# Patient Record
Sex: MALE | Race: WHITE | NOT HISPANIC OR LATINO | Employment: OTHER | URBAN - METROPOLITAN AREA
[De-identification: names, ages, dates, MRNs, and addresses within clinical notes are randomized per-mention and may not be internally consistent; named-entity substitution may affect disease eponyms.]

---

## 2017-01-20 ENCOUNTER — ALLSCRIPTS OFFICE VISIT (OUTPATIENT)
Dept: OTHER | Facility: OTHER | Age: 82
End: 2017-01-20

## 2017-02-01 ENCOUNTER — TRANSCRIBE ORDERS (OUTPATIENT)
Dept: LAB | Facility: CLINIC | Age: 82
End: 2017-02-01

## 2017-02-01 ENCOUNTER — APPOINTMENT (OUTPATIENT)
Dept: LAB | Facility: CLINIC | Age: 82
End: 2017-02-01
Payer: MEDICARE

## 2017-02-01 DIAGNOSIS — E03.4 IDIOPATHIC ATROPHIC HYPOTHYROIDISM: Primary | ICD-10-CM

## 2017-02-01 DIAGNOSIS — N18.30 CHRONIC KIDNEY DISEASE, STAGE III (MODERATE) (HCC): ICD-10-CM

## 2017-02-01 DIAGNOSIS — I12.9 PARENCHYMAL RENAL HYPERTENSION, STAGE 1-4 OR UNSPECIFIED CHRONIC KIDNEY DISEASE: ICD-10-CM

## 2017-02-01 DIAGNOSIS — E03.4 IDIOPATHIC ATROPHIC HYPOTHYROIDISM: ICD-10-CM

## 2017-02-01 DIAGNOSIS — E78.5 HYPERLIPIDEMIA, UNSPECIFIED HYPERLIPIDEMIA TYPE: ICD-10-CM

## 2017-02-01 LAB
ANION GAP SERPL CALCULATED.3IONS-SCNC: 3 MMOL/L (ref 4–13)
BUN SERPL-MCNC: 17 MG/DL (ref 5–25)
CALCIUM SERPL-MCNC: 9.1 MG/DL (ref 8.3–10.1)
CHLORIDE SERPL-SCNC: 108 MMOL/L (ref 100–108)
CO2 SERPL-SCNC: 31 MMOL/L (ref 21–32)
CREAT SERPL-MCNC: 1.41 MG/DL (ref 0.6–1.3)
GFR SERPL CREATININE-BSD FRML MDRD: 48.2 ML/MIN/1.73SQ M
GLUCOSE SERPL-MCNC: 102 MG/DL (ref 65–140)
POTASSIUM SERPL-SCNC: 4.3 MMOL/L (ref 3.5–5.3)
SODIUM SERPL-SCNC: 142 MMOL/L (ref 136–145)
TSH SERPL DL<=0.05 MIU/L-ACNC: 5.01 UIU/ML (ref 0.36–3.74)

## 2017-02-01 PROCEDURE — 80048 BASIC METABOLIC PNL TOTAL CA: CPT

## 2017-02-01 PROCEDURE — 84443 ASSAY THYROID STIM HORMONE: CPT

## 2017-02-01 PROCEDURE — 36415 COLL VENOUS BLD VENIPUNCTURE: CPT

## 2017-02-03 ENCOUNTER — ALLSCRIPTS OFFICE VISIT (OUTPATIENT)
Dept: OTHER | Facility: OTHER | Age: 82
End: 2017-02-03

## 2017-04-12 ENCOUNTER — APPOINTMENT (OUTPATIENT)
Dept: LAB | Facility: CLINIC | Age: 82
End: 2017-04-12
Payer: MEDICARE

## 2017-04-12 ENCOUNTER — TRANSCRIBE ORDERS (OUTPATIENT)
Dept: LAB | Facility: CLINIC | Age: 82
End: 2017-04-12

## 2017-04-12 DIAGNOSIS — E78.2 MIXED HYPERLIPIDEMIA: Primary | ICD-10-CM

## 2017-04-12 DIAGNOSIS — E78.5 HYPERLIPIDEMIA: ICD-10-CM

## 2017-04-12 DIAGNOSIS — N18.30 CHRONIC KIDNEY DISEASE, STAGE III (MODERATE) (HCC): ICD-10-CM

## 2017-04-12 DIAGNOSIS — I12.9 HYPERTENSIVE CHRONIC KIDNEY DISEASE WITH STAGE 1 THROUGH STAGE 4 CHRONIC KIDNEY DISEASE, OR UNSPECIFIED CHRONIC KIDNEY DISEASE: ICD-10-CM

## 2017-04-12 LAB
ALBUMIN SERPL BCP-MCNC: 3.4 G/DL (ref 3.5–5)
ALP SERPL-CCNC: 70 U/L (ref 46–116)
ALT SERPL W P-5'-P-CCNC: 26 U/L (ref 12–78)
ANION GAP SERPL CALCULATED.3IONS-SCNC: 7 MMOL/L (ref 4–13)
AST SERPL W P-5'-P-CCNC: 23 U/L (ref 5–45)
BASOPHILS # BLD AUTO: 0.02 THOUSANDS/ΜL (ref 0–0.1)
BASOPHILS NFR BLD AUTO: 0 % (ref 0–1)
BILIRUB SERPL-MCNC: 0.67 MG/DL (ref 0.2–1)
BUN SERPL-MCNC: 20 MG/DL (ref 5–25)
CALCIUM SERPL-MCNC: 9 MG/DL (ref 8.3–10.1)
CHLORIDE SERPL-SCNC: 109 MMOL/L (ref 100–108)
CHOLEST SERPL-MCNC: 148 MG/DL (ref 50–200)
CK SERPL-CCNC: 146 U/L (ref 39–308)
CO2 SERPL-SCNC: 28 MMOL/L (ref 21–32)
CREAT SERPL-MCNC: 1.54 MG/DL (ref 0.6–1.3)
CREAT UR-MCNC: 182 MG/DL
EOSINOPHIL # BLD AUTO: 0.25 THOUSAND/ΜL (ref 0–0.61)
EOSINOPHIL NFR BLD AUTO: 4 % (ref 0–6)
ERYTHROCYTE [DISTWIDTH] IN BLOOD BY AUTOMATED COUNT: 15.1 % (ref 11.6–15.1)
GFR SERPL CREATININE-BSD FRML MDRD: 43.6 ML/MIN/1.73SQ M
GLUCOSE P FAST SERPL-MCNC: 105 MG/DL (ref 65–99)
HCT VFR BLD AUTO: 41.9 % (ref 36.5–49.3)
HDLC SERPL-MCNC: 44 MG/DL (ref 40–60)
HGB BLD-MCNC: 13.5 G/DL (ref 12–17)
LDLC SERPL CALC-MCNC: 82 MG/DL (ref 0–100)
LYMPHOCYTES # BLD AUTO: 1.19 THOUSANDS/ΜL (ref 0.6–4.47)
LYMPHOCYTES NFR BLD AUTO: 20 % (ref 14–44)
MAGNESIUM SERPL-MCNC: 2.6 MG/DL (ref 1.6–2.6)
MCH RBC QN AUTO: 30.5 PG (ref 26.8–34.3)
MCHC RBC AUTO-ENTMCNC: 32.2 G/DL (ref 31.4–37.4)
MCV RBC AUTO: 95 FL (ref 82–98)
MONOCYTES # BLD AUTO: 1.04 THOUSAND/ΜL (ref 0.17–1.22)
MONOCYTES NFR BLD AUTO: 18 % (ref 4–12)
NEUTROPHILS # BLD AUTO: 3.38 THOUSANDS/ΜL (ref 1.85–7.62)
NEUTS SEG NFR BLD AUTO: 58 % (ref 43–75)
NRBC BLD AUTO-RTO: 0 /100 WBCS
PHOSPHATE SERPL-MCNC: 2.3 MG/DL (ref 2.3–4.1)
PLATELET # BLD AUTO: 222 THOUSANDS/UL (ref 149–390)
PMV BLD AUTO: 11.1 FL (ref 8.9–12.7)
POTASSIUM SERPL-SCNC: 4.2 MMOL/L (ref 3.5–5.3)
PROT SERPL-MCNC: 6.8 G/DL (ref 6.4–8.2)
PROT UR-MCNC: 24 MG/DL
PROT/CREAT UR: 0.13 MG/G{CREAT} (ref 0–0.1)
PTH-INTACT SERPL-MCNC: 72.5 PG/ML (ref 14–72)
RBC # BLD AUTO: 4.43 MILLION/UL (ref 3.88–5.62)
SODIUM SERPL-SCNC: 144 MMOL/L (ref 136–145)
TRIGL SERPL-MCNC: 110 MG/DL
WBC # BLD AUTO: 5.9 THOUSAND/UL (ref 4.31–10.16)

## 2017-04-12 PROCEDURE — 85025 COMPLETE CBC W/AUTO DIFF WBC: CPT

## 2017-04-12 PROCEDURE — 82550 ASSAY OF CK (CPK): CPT

## 2017-04-12 PROCEDURE — 82570 ASSAY OF URINE CREATININE: CPT

## 2017-04-12 PROCEDURE — 80061 LIPID PANEL: CPT

## 2017-04-12 PROCEDURE — 80053 COMPREHEN METABOLIC PANEL: CPT

## 2017-04-12 PROCEDURE — 84100 ASSAY OF PHOSPHORUS: CPT

## 2017-04-12 PROCEDURE — 84156 ASSAY OF PROTEIN URINE: CPT

## 2017-04-12 PROCEDURE — 83970 ASSAY OF PARATHORMONE: CPT

## 2017-04-12 PROCEDURE — 83735 ASSAY OF MAGNESIUM: CPT

## 2017-04-12 PROCEDURE — 36415 COLL VENOUS BLD VENIPUNCTURE: CPT

## 2017-05-02 ENCOUNTER — ALLSCRIPTS OFFICE VISIT (OUTPATIENT)
Dept: OTHER | Facility: OTHER | Age: 82
End: 2017-05-02

## 2017-05-08 ENCOUNTER — ALLSCRIPTS OFFICE VISIT (OUTPATIENT)
Dept: OTHER | Facility: OTHER | Age: 82
End: 2017-05-08

## 2017-06-27 ENCOUNTER — GENERIC CONVERSION - ENCOUNTER (OUTPATIENT)
Dept: OTHER | Facility: OTHER | Age: 82
End: 2017-06-27

## 2017-08-17 ENCOUNTER — ALLSCRIPTS OFFICE VISIT (OUTPATIENT)
Dept: OTHER | Facility: OTHER | Age: 82
End: 2017-08-17

## 2017-10-15 ENCOUNTER — APPOINTMENT (EMERGENCY)
Dept: CT IMAGING | Facility: HOSPITAL | Age: 82
End: 2017-10-15
Payer: MEDICARE

## 2017-10-15 ENCOUNTER — HOSPITAL ENCOUNTER (EMERGENCY)
Facility: HOSPITAL | Age: 82
Discharge: HOME | End: 2017-10-15
Attending: EMERGENCY MEDICINE | Admitting: EMERGENCY MEDICINE
Payer: MEDICARE

## 2017-10-15 VITALS
WEIGHT: 201 LBS | HEART RATE: 68 BPM | BODY MASS INDEX: 31.48 KG/M2 | OXYGEN SATURATION: 95 % | TEMPERATURE: 98.2 F | DIASTOLIC BLOOD PRESSURE: 67 MMHG | RESPIRATION RATE: 17 BRPM | SYSTOLIC BLOOD PRESSURE: 142 MMHG

## 2017-10-15 DIAGNOSIS — N20.1 LEFT URETERAL STONE: Primary | ICD-10-CM

## 2017-10-15 LAB
ALBUMIN SERPL BCP-MCNC: 3.1 G/DL (ref 3.5–5)
ALP SERPL-CCNC: 69 U/L (ref 46–116)
ALT SERPL W P-5'-P-CCNC: 21 U/L (ref 12–78)
ANION GAP SERPL CALCULATED.3IONS-SCNC: 8 MMOL/L (ref 4–13)
AST SERPL W P-5'-P-CCNC: 17 U/L (ref 5–45)
BASOPHILS # BLD AUTO: 0.02 THOUSANDS/ΜL (ref 0–0.1)
BASOPHILS NFR BLD AUTO: 0 % (ref 0–1)
BILIRUB SERPL-MCNC: 0.9 MG/DL (ref 0.2–1)
BUN SERPL-MCNC: 24 MG/DL (ref 5–25)
CALCIUM SERPL-MCNC: 8.9 MG/DL (ref 8.3–10.1)
CHLORIDE SERPL-SCNC: 102 MMOL/L (ref 100–108)
CLARITY, POC: CLEAR
CO2 SERPL-SCNC: 27 MMOL/L (ref 21–32)
COLOR, POC: YELLOW
CREAT SERPL-MCNC: 2.47 MG/DL (ref 0.6–1.3)
EOSINOPHIL # BLD AUTO: 0.01 THOUSAND/ΜL (ref 0–0.61)
EOSINOPHIL NFR BLD AUTO: 0 % (ref 0–6)
ERYTHROCYTE [DISTWIDTH] IN BLOOD BY AUTOMATED COUNT: 15.1 % (ref 11.6–15.1)
EXT BILIRUBIN, UA: NORMAL
EXT BLOOD URINE: NORMAL
EXT GLUCOSE, UA: NORMAL
EXT KETONES: NORMAL
EXT NITRITE, UA: NORMAL
EXT PH, UA: 6
EXT PROTEIN, UA: 100
EXT SPECIFIC GRAVITY, UA: 1.02
EXT UROBILINOGEN: 0.2
GFR SERPL CREATININE-BSD FRML MDRD: 24 ML/MIN/1.73SQ M
GLUCOSE SERPL-MCNC: 110 MG/DL (ref 65–140)
HCT VFR BLD AUTO: 38.4 % (ref 36.5–49.3)
HGB BLD-MCNC: 12.5 G/DL (ref 12–17)
LACTATE SERPL-SCNC: 1.4 MMOL/L (ref 0.5–2)
LIPASE SERPL-CCNC: 51 U/L (ref 73–393)
LYMPHOCYTES # BLD AUTO: 1 THOUSANDS/ΜL (ref 0.6–4.47)
LYMPHOCYTES NFR BLD AUTO: 8 % (ref 14–44)
MCH RBC QN AUTO: 30.6 PG (ref 26.8–34.3)
MCHC RBC AUTO-ENTMCNC: 32.6 G/DL (ref 31.4–37.4)
MCV RBC AUTO: 94 FL (ref 82–98)
MONOCYTES # BLD AUTO: 2.16 THOUSAND/ΜL (ref 0.17–1.22)
MONOCYTES NFR BLD AUTO: 18 % (ref 4–12)
NEUTROPHILS # BLD AUTO: 8.81 THOUSANDS/ΜL (ref 1.85–7.62)
NEUTS SEG NFR BLD AUTO: 74 % (ref 43–75)
PLATELET # BLD AUTO: 193 THOUSANDS/UL (ref 149–390)
PMV BLD AUTO: 11 FL (ref 8.9–12.7)
POTASSIUM SERPL-SCNC: 4 MMOL/L (ref 3.5–5.3)
PROT SERPL-MCNC: 6.9 G/DL (ref 6.4–8.2)
RBC # BLD AUTO: 4.08 MILLION/UL (ref 3.88–5.62)
SODIUM SERPL-SCNC: 137 MMOL/L (ref 136–145)
WBC # BLD AUTO: 12 THOUSAND/UL (ref 4.31–10.16)
WBC # BLD EST: NORMAL 10*3/UL

## 2017-10-15 PROCEDURE — 85025 COMPLETE CBC W/AUTO DIFF WBC: CPT | Performed by: EMERGENCY MEDICINE

## 2017-10-15 PROCEDURE — 99284 EMERGENCY DEPT VISIT MOD MDM: CPT

## 2017-10-15 PROCEDURE — 74176 CT ABD & PELVIS W/O CONTRAST: CPT

## 2017-10-15 PROCEDURE — 96361 HYDRATE IV INFUSION ADD-ON: CPT

## 2017-10-15 PROCEDURE — 36415 COLL VENOUS BLD VENIPUNCTURE: CPT | Performed by: EMERGENCY MEDICINE

## 2017-10-15 PROCEDURE — 83605 ASSAY OF LACTIC ACID: CPT | Performed by: EMERGENCY MEDICINE

## 2017-10-15 PROCEDURE — 96360 HYDRATION IV INFUSION INIT: CPT

## 2017-10-15 PROCEDURE — 80053 COMPREHEN METABOLIC PANEL: CPT | Performed by: EMERGENCY MEDICINE

## 2017-10-15 PROCEDURE — 81002 URINALYSIS NONAUTO W/O SCOPE: CPT | Performed by: EMERGENCY MEDICINE

## 2017-10-15 PROCEDURE — 83690 ASSAY OF LIPASE: CPT | Performed by: EMERGENCY MEDICINE

## 2017-10-15 RX ORDER — OXYCODONE HYDROCHLORIDE 5 MG/1
5 TABLET ORAL EVERY 8 HOURS PRN
Qty: 10 TABLET | Refills: 0 | Status: SHIPPED | OUTPATIENT
Start: 2017-10-15 | End: 2018-03-28 | Stop reason: ALTCHOICE

## 2017-10-15 RX ORDER — ACETAMINOPHEN 325 MG/1
650 TABLET ORAL ONCE
Status: COMPLETED | OUTPATIENT
Start: 2017-10-15 | End: 2017-10-15

## 2017-10-15 RX ORDER — ONDANSETRON 2 MG/ML
4 INJECTION INTRAMUSCULAR; INTRAVENOUS ONCE
Status: DISCONTINUED | OUTPATIENT
Start: 2017-10-15 | End: 2017-10-16 | Stop reason: HOSPADM

## 2017-10-15 RX ORDER — ACETAMINOPHEN 500 MG
500 TABLET ORAL EVERY 6 HOURS PRN
COMMUNITY

## 2017-10-15 RX ORDER — OXYCODONE HYDROCHLORIDE 5 MG/1
5 TABLET ORAL ONCE
Status: COMPLETED | OUTPATIENT
Start: 2017-10-15 | End: 2017-10-15

## 2017-10-15 RX ORDER — MORPHINE SULFATE 4 MG/ML
4 INJECTION, SOLUTION INTRAMUSCULAR; INTRAVENOUS ONCE
Status: DISCONTINUED | OUTPATIENT
Start: 2017-10-15 | End: 2017-10-16 | Stop reason: HOSPADM

## 2017-10-15 RX ADMIN — OXYCODONE HYDROCHLORIDE 5 MG: 5 TABLET ORAL at 23:00

## 2017-10-15 RX ADMIN — SODIUM CHLORIDE 1000 ML: 0.9 INJECTION, SOLUTION INTRAVENOUS at 20:10

## 2017-10-15 RX ADMIN — ACETAMINOPHEN 650 MG: 325 TABLET ORAL at 21:45

## 2017-10-15 NOTE — ED PROVIDER NOTES
History  Chief Complaint   Patient presents with    Abdominal Pain     pt c/o lower abd pain starting days ago  increased pain  History provided by:  Patient   used: No     80year-old male presented with lower abdominal discomfort starting 2 days ago  States that it is poorly localized, mild to moderate intensity  Denies any history of similar pain  No abdominal surgeries  No fever, chills, nausea, vomiting  Has been constipated for a few days  No blood in the stool prior  Decreased appetite  On exam here he has some left lower quadrant tenderness and abdomen feels somewhat distended  Differential diagnosis includes diverticulitis, bowel obstruction viral illness  Will check labs, CT, give fluids  Prior to Admission Medications   Prescriptions Last Dose Informant Patient Reported? Taking? LORazepam (ATIVAN) 1 mg tablet   Yes Yes   Sig: Take 1 mg by mouth as needed for anxiety     Levothyroxine Sodium 112 MCG CAPS   Yes Yes   Sig: Take 112 mcg by mouth daily     Metoprolol Tartrate 75 MG TABS   Yes Yes   Sig: Take 75 mg by mouth 2 (two) times a day  Multiple Vitamins-Minerals (MULTIVITAMIN WITH MINERALS) tablet  Spouse/Significant Other Yes Yes   Sig: Take 1 tablet by mouth daily  Potassium Chloride ER 20 MEQ TBCR   Yes Yes   Sig: Take 20 mEq by mouth every other day     acetaminophen (TYLENOL) 500 mg tablet 10/15/2017 at 1630  Yes Yes   Sig: Take 500 mg by mouth every 6 (six) hours as needed for mild pain   amLODIPine (NORVASC) 2 5 mg tablet   Yes Yes   Sig: Take 2 5 mg by mouth 2 (two) times a day     amiodarone 200 mg tablet   Yes Yes   Sig: Take 200 mg by mouth daily  aspirin 81 MG tablet   Yes Yes   Sig: Take 81 mg by mouth daily  furosemide (LASIX) 40 mg tablet   Yes Yes   Sig: Take 40 mg by mouth every other day     mexiletine (MEXITIL) 150 mg capsule   Yes Yes   Sig: Take 150 mg by mouth 2 (two) times a day     nitroglycerin (NITROSTAT) 0 4 mg SL tablet Yes Yes   Sig: Place 0 4 mg under the tongue every 5 (five) minutes as needed for chest pain     pravastatin (PRAVACHOL) 80 mg tablet   Yes Yes   Sig: Take 80 mg by mouth daily  tamsulosin (FLOMAX) 0 4 mg   Yes Yes   Sig: Take 0 4 mg by mouth daily with dinner  Facility-Administered Medications: None       Past Medical History:   Diagnosis Date    AICD (automatic cardioverter/defibrillator) present 2004    AICD (automatic cardioverter/defibrillator) present 2006    AICD (automatic cardioverter/defibrillator) present 2013    [facility]    Arthritis     R knee and neck    Atrial fibrillation (HCC)     BPH (benign prostatic hyperplasia)     CAD (coronary artery disease) of artery bypass graft     CHF (congestive heart failure) (HCC)     combine    Coronary artery disease     Disease of thyroid gland     Hyperlipidemia     Hypertension     Ischemic cardiomyopathy     Renal disorder     V tach ([address] )        Past Surgical History:   Procedure Laterality Date    CARDIAC CATHETERIZATION      CARDIAC SURGERY      Pacemaker    CORONARY ARTERY BYPASS GRAFT         Family History   Problem Relation Age of Onset    Tuberculosis Father      WWI     I have reviewed and agree with the history as documented  Social History   Substance Use Topics    Smoking status: Never Smoker    Smokeless tobacco: Never Used    Alcohol use No        Review of Systems   Constitutional: Positive for appetite change  Negative for activity change, fatigue and fever  Respiratory: Negative for chest tightness and shortness of breath  Gastrointestinal: Positive for abdominal pain and constipation  Negative for nausea and vomiting  Musculoskeletal: Negative for back pain and neck pain  Skin: Negative for color change and pallor  Neurological: Negative for dizziness, weakness and headaches  All other systems reviewed and are negative        Physical Exam  ED Triage Vitals   Temperature Pulse Respirations Blood Pressure SpO2   10/15/17 1856 10/15/17 1854 10/15/17 1854 10/15/17 1854 10/15/17 1854   98 2 °F (36 8 °C) 77 16 142/71 92 %      Temp Source Heart Rate Source Patient Position - Orthostatic VS BP Location FiO2 (%)   10/15/17 1856 10/15/17 2000 10/15/17 2000 10/15/17 2000 --   Oral Monitor Sitting Right arm       Pain Score       10/15/17 1854       8           Physical Exam   Constitutional: He is oriented to person, place, and time  He appears well-developed and well-nourished  No distress  HENT:   Head: Normocephalic and atraumatic  Mouth/Throat: Oropharynx is clear and moist    Neck: Normal range of motion  Neck supple  Cardiovascular: Regular rhythm  Pulmonary/Chest: Effort normal and breath sounds normal    Abdominal: Soft  Bowel sounds are normal  He exhibits distension  Mild left lower quadrant tenderness  No rebound or guarding  Musculoskeletal: Normal range of motion  He exhibits no edema  Neurological: He is alert and oriented to person, place, and time  Skin: Skin is warm and dry  Psychiatric: He has a normal mood and affect  His behavior is normal    Nursing note and vitals reviewed        ED Medications  Medications   sodium chloride 0 9 % bolus 1,000 mL (0 mL Intravenous Stopped 10/15/17 2142)   acetaminophen (TYLENOL) tablet 650 mg (650 mg Oral Given 10/15/17 2145)   oxyCODONE (ROXICODONE) IR tablet 5 mg (5 mg Oral Given 10/15/17 2300)       Diagnostic Studies  Labs Reviewed   CBC AND DIFFERENTIAL - Abnormal        Result Value Ref Range Status    WBC 12 00 (*) 4 31 - 10 16 Thousand/uL Final    Lymphocytes Relative 8 (*) 14 - 44 % Final    Monocytes Relative 18 (*) 4 - 12 % Final    Neutrophils Absolute 8 81 (*) 1 85 - 7 62 Thousands/µL Final    Monocytes Absolute 2 16 (*) 0 17 - 1 22 Thousand/µL Final    RBC 4 08  3 88 - 5 62 Million/uL Final    Hemoglobin 12 5  12 0 - 17 0 g/dL Final    Hematocrit 38 4  36 5 - 49 3 % Final    MCV 94  82 - 98 fL Final    MCH 30 6  26 8 - 34 3 pg Final    MCHC 32 6  31 4 - 37 4 g/dL Final    RDW 15 1  11 6 - 15 1 % Final    MPV 11 0  8 9 - 12 7 fL Final    Platelets 857  761 - 390 Thousands/uL Final    Neutrophils Relative 74  43 - 75 % Final    Eosinophils Relative 0  0 - 6 % Final    Basophils Relative 0  0 - 1 % Final    Lymphocytes Absolute 1 00  0 60 - 4 47 Thousands/µL Final    Eosinophils Absolute 0 01  0 00 - 0 61 Thousand/µL Final    Basophils Absolute 0 02  0 00 - 0 10 Thousands/µL Final   COMPREHENSIVE METABOLIC PANEL - Abnormal     Creatinine 2 47 (*) 0 60 - 1 30 mg/dL Final    Comment: Standardized to IDMS reference method    Albumin 3 1 (*) 3 5 - 5 0 g/dL Final    Sodium 137  136 - 145 mmol/L Final    Potassium 4 0  3 5 - 5 3 mmol/L Final    Chloride 102  100 - 108 mmol/L Final    CO2 27  21 - 32 mmol/L Final    Anion Gap 8  4 - 13 mmol/L Final    BUN 24  5 - 25 mg/dL Final    Glucose 110  65 - 140 mg/dL Final    Comment:   If the patient is fasting, the ADA then defines impaired fasting glucose as > 100 mg/dL and diabetes as > or equal to 123 mg/dL  Specimen collection should occur prior to Sulfasalazine administration due to the potential for falsely depressed results  Specimen collection should occur prior to Sulfapyridine administration due to the potential for falsely elevated results  Calcium 8 9  8 3 - 10 1 mg/dL Final    AST 17  5 - 45 U/L Final    Comment:   Specimen collection should occur prior to Sulfasalazine administration due to the potential for falsely depressed results  ALT 21  12 - 78 U/L Final    Comment:   Specimen collection should occur prior to Sulfasalazine administration due to the potential for falsely depressed results       Alkaline Phosphatase 69  46 - 116 U/L Final    Total Protein 6 9  6 4 - 8 2 g/dL Final    Total Bilirubin 0 90  0 20 - 1 00 mg/dL Final    eGFR 24  ml/min/1 73sq m Final    Narrative:     National Kidney Disease Education Program recommendations are as follows:  GFR calculation is accurate only with a steady state creatinine  Chronic Kidney disease less than 60 ml/min/1 73 sq  meters  Kidney failure less than 15 ml/min/1 73 sq  meters  LIPASE - Abnormal     Lipase 51 (*) 73 - 393 u/L Final   LACTIC ACID, PLASMA - Normal    LACTIC ACID 1 4  0 5 - 2 0 mmol/L Final    Narrative:     Result may be elevated if tourniquet was used during collection  POCT URINALYSIS DIPSTICK - Normal    Color, UA yellow   Final    Clarity, UA clear   Final    EXT Glucose, UA neg   Final    EXT Bilirubin, UA (Ref: Negative) neg   Final    EXT Ketones, UA (Ref: Negative) mod   Final    EXT Spec Grav, UA 1 020   Final    EXT Blood, UA (Ref: Negative) large   Final    EXT pH, UA 6 0   Final    EXT Protein, UA (Ref: Negative) 100   Final    EXT Urobilinogen, UA (Ref: 0 2- 1 0) 0 2   Final    EXT Leukocytes, UA (Ref: Negative) neg   Final    EXT Nitrite, UA (Ref: Negative) neg   Final       CT abdomen pelvis wo contrast   ED Interpretation   Multiple obstructing calculi in the left ureter  Final Result   1  Bilateral nephrolithiasis including multiple left-sided ureteral calculi measuring 3 to 6 mm and resulting in obstruction with moderate left hydroureteronephrosis evident  2  Colonic diverticular disease without diverticulitis  3  Cholelithiasis without cholecystitis  4  Small left pleural effusion  Workstation performed: [id number]             Procedures  Procedures      Phone Contacts  ED Phone Contact    ED Course  ED Course                                MDM  Number of Diagnoses or Management Options  Left ureteral stone:   Diagnosis management comments: 59-year-old male presents with some poorly localized lower abdominal pain for 2 days  CT remarkable for multiple obstructive left ureteral stones  Labs notable for mild RACHEL  Given IV fluids here  He is tolerating food and drink and has urologist to follow up with  His pain is well controlled    Will discharge and have him return if his symptoms worsen       Amount and/or Complexity of Data Reviewed  Clinical lab tests: ordered and reviewed  Tests in the radiology section of CPT®: ordered and reviewed    Patient Progress  Patient progress: improved    CritCare Time    Disposition  Final diagnoses:   Left ureteral stone     ED Disposition     ED Disposition Condition Comment    Discharge  Aris De Luna discharge to home/self care  Condition at discharge: Stable        Follow-up Information     Follow up With Specialties Details Why Hesham Loya MD Urology   [address], [address]  [place] [id number]  [phone]          Discharge Medication List as of 10/15/2017 10:53 PM      START taking these medications    Details   oxyCODONE (ROXICODONE) 5 mg immediate release tablet Take 1 tablet by mouth every 8 (eight) hours as needed for moderate pain Max Daily Amount: 15 mg, Starting Sun 10/15/2017, Print         CONTINUE these medications which have NOT CHANGED    Details   acetaminophen (TYLENOL) 500 mg tablet Take 500 mg by mouth every 6 (six) hours as needed for mild pain, Historical Med      amiodarone 200 mg tablet Take 200 mg by mouth daily  , Until Discontinued, Historical Med      amLODIPine (NORVASC) 2 5 mg tablet Take 2 5 mg by mouth 2 (two) times a day  , Historical Med      aspirin 81 MG tablet Take 81 mg by mouth daily  , Until Discontinued, Historical Med      furosemide (LASIX) 40 mg tablet Take 40 mg by mouth every other day  , Historical Med      Levothyroxine Sodium 112 MCG CAPS Take 112 mcg by mouth daily  , Historical Med      LORazepam (ATIVAN) 1 mg tablet Take 1 mg by mouth as needed for anxiety  , Historical Med      Metoprolol Tartrate 75 MG TABS Take 75 mg by mouth 2 (two) times a day , Historical Med      mexiletine (MEXITIL) 150 mg capsule Take 150 mg by mouth 2 (two) times a day , Until Discontinued, Historical Med      Multiple Vitamins-Minerals (MULTIVITAMIN WITH MINERALS) tablet Take 1 tablet by mouth daily  , Until Discontinued, Historical Med      nitroglycerin (NITROSTAT) 0 4 mg SL tablet Place 0 4 mg under the tongue every 5 (five) minutes as needed for chest pain  , Historical Med      Potassium Chloride ER 20 MEQ TBCR Take 20 mEq by mouth every other day  , Historical Med      pravastatin (PRAVACHOL) 80 mg tablet Take 80 mg by mouth daily  , Until Discontinued, Historical Med      tamsulosin (FLOMAX) 0 4 mg Take 0 4 mg by mouth daily with dinner , Until Discontinued, Historical Med           No discharge procedures on file      ED Provider  Electronically Signed by       Maye Grady MD  10/16/17 7306

## 2017-10-16 ENCOUNTER — ANESTHESIA EVENT (OUTPATIENT)
Dept: PERIOP | Facility: HOSPITAL | Age: 82
End: 2017-10-16
Payer: MEDICARE

## 2017-10-16 NOTE — ANESTHESIA PREPROCEDURE EVALUATION
Review of Systems/Medical History          Cardiovascular  Pacemaker/AICD, Hyperlipidemia, Hypertension , CAD, , History of CABG, CHF , NYHA Classification: III compensated CHF,    Pulmonary  ,   Comment: Restrictive pattern PFTs     GI/Hepatic       Kidney stones, Chronic kidney disease stage 3,        Endo/Other  History of thyroid disease , Arthritis     GYN       Hematology   Musculoskeletal  Obesity ,        Neurology   Psychology              Anesthesia Plan  ASA Score- 4       Anesthesia Type- general with ASA Monitors  Additional Monitors:   Airway Plan: LMA  Induction- intravenous        Informed Consent-      Lab Results   Component Value Date    WBC 12 00 (H) 10/15/2017    HGB 12 5 10/15/2017    HCT 38 4 10/15/2017    MCV 94 10/15/2017     10/15/2017     Lab Results   Component Value Date    GLUCOSE 110 10/15/2017    CALCIUM 8 9 10/15/2017     10/15/2017    K 4 0 10/15/2017    CO2 27 10/15/2017     10/15/2017    BUN 24 10/15/2017    CREATININE 2 47 (H) 10/15/2017     Lab Results   Component Value Date    INR 1 35 (H) 06/26/2016    INR 1 05 09/11/2015    PROTIME 16 3 (H) 06/26/2016    PROTIME 13 5 09/11/2015     Lab Results   Component Value Date    PTT 35 06/26/2016

## 2017-10-16 NOTE — DISCHARGE INSTRUCTIONS
Ureteral Stones   WHAT YOU NEED TO KNOW:   A ureteral stone is a stone that forms in the kidney and moves down the ureter and gets stuck there  The ureter is the tube that takes urine from the kidney to the bladder  Stones can form in the urinary system when your urine has high levels of minerals and salts  Urinary stones can be made of uric acid, calcium, phosphate, or oxalate crystals  DISCHARGE INSTRUCTIONS:   Return to the emergency department if:   · You have severe pain that does not improve, even after you take medicine  · You have vomiting that is not relieved by medicine  · You develop a fever  Contact your healthcare provider if:   · You develop a fever  · You have any questions or concerns about your condition or care  Follow up with your healthcare provider as directed: You may need to return for more tests  Write down your questions so you remember to ask them during your visits  Medicines: You may need any of the following:  · NSAIDs , such as ibuprofen, help decrease swelling, pain, and fever  This medicine is available with or without a doctor's order  NSAIDs can cause stomach bleeding or kidney problems in certain people  If you take blood thinner medicine, always ask your healthcare provider if NSAIDs are safe for you  Always read the medicine label and follow directions  · Prescription pain medicine  may help decrease pain or help your ureteral stone pass  Do not wait until the pain is severe before you take pain medicine  · Nausea medicine  may help calm your stomach and prevent vomiting  · Take your medicine as directed  Contact your healthcare provider if you think your medicine is not helping or if you have side effects  Tell him or her if you are allergic to any medicine  Keep a list of the medicines, vitamins, and herbs you take  Include the amounts, and when and why you take them  Bring the list or the pill bottles to follow-up visits   Carry your medicine list with you in case of an emergency  Self-care:   · Drink plenty of liquids  Your healthcare provider may tell you to drink at least 8 to 12 (eight-ounce) cups of liquids each day  This helps flush out the ureteral stones when you urinate  Water is the best liquid to drink  · Strain your urine every time you go to the bathroom  Urinate through a strainer or a piece of thin cloth to catch the stones  Take the stones to your healthcare provider so they can be sent to the lab for tests  This will help your healthcare providers plan the best treatment for you  · Ask your healthcare provider about any nutrition changes you need to make  You may need to limit certain foods such as foods high in sodium (salt), certain protein foods, or foods high in oxalate  After you pass your ureteral stone: Once you have passed your ureteral stone, you may need to do a 24-hour urine test  You may need to save all of your urine for 24 hours  Each time you go to the bathroom, you will urinate into a container  Then you will pour your urine into a larger container that is kept cold  You may be told to write down the time and amount of urine you passed  At the end of 24 hours, the urine is sent to a lab for tests  Results from the test will help your healthcare provider plan ways to prevent more stones from forming  © 2017 [employer] Information is for End User's use only and may not be sold, redistributed or otherwise used for commercial purposes  All illustrations and images included in CareNotes® are the copyrighted property of [employer]  or [employer]  The above information is an  only  It is not intended as medical advice for individual conditions or treatments  Talk to your doctor, nurse or pharmacist before following any medical regimen to see if it is safe and effective for you

## 2017-10-17 ENCOUNTER — HOSPITAL ENCOUNTER (OUTPATIENT)
Facility: HOSPITAL | Age: 82
Setting detail: OUTPATIENT SURGERY
Discharge: HOME | End: 2017-10-17
Attending: UROLOGY | Admitting: UROLOGY
Payer: MEDICARE

## 2017-10-17 ENCOUNTER — ANESTHESIA (OUTPATIENT)
Dept: PERIOP | Facility: HOSPITAL | Age: 82
End: 2017-10-17
Payer: MEDICARE

## 2017-10-17 ENCOUNTER — APPOINTMENT (OUTPATIENT)
Dept: RADIOLOGY | Facility: HOSPITAL | Age: 82
End: 2017-10-17
Payer: MEDICARE

## 2017-10-17 VITALS
BODY MASS INDEX: 30.92 KG/M2 | HEIGHT: 68 IN | TEMPERATURE: 99.1 F | OXYGEN SATURATION: 96 % | SYSTOLIC BLOOD PRESSURE: 135 MMHG | HEART RATE: 62 BPM | WEIGHT: 204 LBS | RESPIRATION RATE: 18 BRPM | DIASTOLIC BLOOD PRESSURE: 64 MMHG

## 2017-10-17 DIAGNOSIS — N20.1 CALCULUS OF URETER: ICD-10-CM

## 2017-10-17 PROCEDURE — 82360 CALCULUS ASSAY QUANT: CPT | Performed by: UROLOGY

## 2017-10-17 PROCEDURE — C1769 GUIDE WIRE: HCPCS | Performed by: UROLOGY

## 2017-10-17 PROCEDURE — C2617 STENT, NON-COR, TEM W/O DEL: HCPCS | Performed by: UROLOGY

## 2017-10-17 PROCEDURE — 87086 URINE CULTURE/COLONY COUNT: CPT | Performed by: UROLOGY

## 2017-10-17 PROCEDURE — 74420 UROGRAPHY RTRGR +-KUB: CPT

## 2017-10-17 DEVICE — STENT URETERAL 4.7FR 26CM INLAY OPTIMA: Type: IMPLANTABLE DEVICE | Site: URETER | Status: FUNCTIONAL

## 2017-10-17 RX ORDER — FENTANYL CITRATE 50 UG/ML
INJECTION, SOLUTION INTRAMUSCULAR; INTRAVENOUS AS NEEDED
Status: DISCONTINUED | OUTPATIENT
Start: 2017-10-17 | End: 2017-10-17 | Stop reason: SURG

## 2017-10-17 RX ORDER — FENTANYL CITRATE/PF 50 MCG/ML
25 SYRINGE (ML) INJECTION
Status: DISCONTINUED | OUTPATIENT
Start: 2017-10-17 | End: 2017-10-17 | Stop reason: HOSPADM

## 2017-10-17 RX ORDER — MAGNESIUM HYDROXIDE 1200 MG/15ML
LIQUID ORAL AS NEEDED
Status: DISCONTINUED | OUTPATIENT
Start: 2017-10-17 | End: 2017-10-17 | Stop reason: HOSPADM

## 2017-10-17 RX ORDER — SODIUM CHLORIDE, SODIUM LACTATE, POTASSIUM CHLORIDE, CALCIUM CHLORIDE 600; 310; 30; 20 MG/100ML; MG/100ML; MG/100ML; MG/100ML
30 INJECTION, SOLUTION INTRAVENOUS CONTINUOUS
Status: DISCONTINUED | OUTPATIENT
Start: 2017-10-17 | End: 2017-10-17 | Stop reason: HOSPADM

## 2017-10-17 RX ORDER — LIDOCAINE HYDROCHLORIDE 10 MG/ML
INJECTION, SOLUTION INFILTRATION; PERINEURAL AS NEEDED
Status: DISCONTINUED | OUTPATIENT
Start: 2017-10-17 | End: 2017-10-17 | Stop reason: SURG

## 2017-10-17 RX ORDER — SODIUM CHLORIDE 9 MG/ML
INJECTION, SOLUTION INTRAVENOUS AS NEEDED
Status: DISCONTINUED | OUTPATIENT
Start: 2017-10-17 | End: 2017-10-17 | Stop reason: HOSPADM

## 2017-10-17 RX ORDER — PROPOFOL 10 MG/ML
INJECTION, EMULSION INTRAVENOUS AS NEEDED
Status: DISCONTINUED | OUTPATIENT
Start: 2017-10-17 | End: 2017-10-17 | Stop reason: SURG

## 2017-10-17 RX ORDER — OXYCODONE HYDROCHLORIDE AND ACETAMINOPHEN 5; 325 MG/1; MG/1
1 TABLET ORAL EVERY 4 HOURS PRN
Status: DISCONTINUED | OUTPATIENT
Start: 2017-10-17 | End: 2017-10-17 | Stop reason: HOSPADM

## 2017-10-17 RX ORDER — SODIUM CHLORIDE, SODIUM LACTATE, POTASSIUM CHLORIDE, CALCIUM CHLORIDE 600; 310; 30; 20 MG/100ML; MG/100ML; MG/100ML; MG/100ML
125 INJECTION, SOLUTION INTRAVENOUS CONTINUOUS
Status: DISCONTINUED | OUTPATIENT
Start: 2017-10-17 | End: 2017-10-17 | Stop reason: HOSPADM

## 2017-10-17 RX ORDER — ONDANSETRON 2 MG/ML
4 INJECTION INTRAMUSCULAR; INTRAVENOUS ONCE AS NEEDED
Status: DISCONTINUED | OUTPATIENT
Start: 2017-10-17 | End: 2017-10-17 | Stop reason: HOSPADM

## 2017-10-17 RX ORDER — OXYCODONE HYDROCHLORIDE AND ACETAMINOPHEN 5; 325 MG/1; MG/1
1 TABLET ORAL EVERY 4 HOURS PRN
Qty: 10 TABLET | Refills: 0 | Status: SHIPPED | OUTPATIENT
Start: 2017-10-17 | End: 2017-10-20

## 2017-10-17 RX ORDER — ONDANSETRON 2 MG/ML
INJECTION INTRAMUSCULAR; INTRAVENOUS AS NEEDED
Status: DISCONTINUED | OUTPATIENT
Start: 2017-10-17 | End: 2017-10-17 | Stop reason: SURG

## 2017-10-17 RX ADMIN — CEFAZOLIN SODIUM 2000 MG: 2 SOLUTION INTRAVENOUS at 07:36

## 2017-10-17 RX ADMIN — ONDANSETRON 4 MG: 2 INJECTION INTRAMUSCULAR; INTRAVENOUS at 07:41

## 2017-10-17 RX ADMIN — LIDOCAINE HYDROCHLORIDE 100 MG: 10 INJECTION, SOLUTION INFILTRATION; PERINEURAL at 07:36

## 2017-10-17 RX ADMIN — SODIUM CHLORIDE, SODIUM LACTATE, POTASSIUM CHLORIDE, AND CALCIUM CHLORIDE: .6; .31; .03; .02 INJECTION, SOLUTION INTRAVENOUS at 07:25

## 2017-10-17 RX ADMIN — FENTANYL CITRATE 25 MCG: 50 INJECTION, SOLUTION INTRAMUSCULAR; INTRAVENOUS at 07:49

## 2017-10-17 RX ADMIN — PROPOFOL 50 MG: 10 INJECTION, EMULSION INTRAVENOUS at 07:37

## 2017-10-17 RX ADMIN — OXYCODONE HYDROCHLORIDE AND ACETAMINOPHEN 1 TABLET: 5; 325 TABLET ORAL at 10:12

## 2017-10-17 RX ADMIN — PROPOFOL 50 MG: 10 INJECTION, EMULSION INTRAVENOUS at 07:36

## 2017-10-17 NOTE — ANESTHESIA POSTPROCEDURE EVALUATION
Post-Op Assessment Note      CV Status:  Stable    Mental Status:  Alert and awake    Hydration Status:  Euvolemic    PONV Controlled:  Controlled    Airway Patency:  Patent    Post Op Vitals Reviewed: Yes          Staff: Anesthesiologist           BP   129/69   Temp   97 8 F   Pulse  65 paced   Resp   18   SpO2 97% 6LPM FM     Discussed with Dr Guera Delaney, no need for device interrogation following procedure following magnet application only  Will remote interrogate tomorrow

## 2017-10-17 NOTE — OP NOTE
OPERATIVE REPORT  PATIENT NAME: Audrey Schroeder    :  1935  MRN: [id number]  Pt Location: [facility] 01    SURGERY DATE: 10/17/2017    Surgeon(s) and Role:     * Christopher Singh MD - Primary    Preop Diagnosis:  Calculus of ureter [N20 1] LEFT    Post-Op Diagnosis Codes:     * Calculus of ureter [N20 1]    Procedure(s) (LRB):  CYSTOSCOPY, left  RETROGRADE PYELOGRAM WITH LEFT URETEROSCOPY , stone extraction,LEFT STENT INSERTION (N/A)    Specimen(s):  ID Type Source Tests Collected by Time Destination   A :  Urine Urine, Cystoscopic URINE CULTURE Christopher Singh MD 10/17/2017 0720    B :  Calculus Urinary Bladder STONE ANALYSIS Christopher Singh MD 10/17/2017 4118    C :  Calculus Ureter, Left STONE ANALYSIS Christopher Singh MD 10/17/2017 3938        Estimated Blood Loss:   Minimal    Drains:       Anesthesia Type:   General    Operative Indications:  Calculus of ureter [N20 1]  Left - multiple    Operative Findings:  Multiple left ureteral calculi    Complications:   None    Procedure and Technique: This 80-year-old male has had multiple stones in the past   He presented to [facility] on  with severe pain  At that time he was noted to have hydronephrosis with multiple stones in the left ureter  He also had a stone in the lower pole of the left kidney  Patient brought to operating room identified transferred to the OR table  General anesthesia was administered  After adequate anesthesia patient was placed in lithotomy position and genitalia prepped with Betadine and he was draped  A confirmatory time-out was then performed  Cystoscopy then followed , the urethra was unremarkable  The prostate showed mild lateral lobe hypertrophy judged to be mild to moderate occlusion grade 1-2  The bladder is entered there was trabeculation  Stone debris was seen in the bladder  Edy Rockers was also seen at the ureteral orifice  The stone debris from the bladder was evacuated    A retrograde study was then performed and a safety wire passed up into the kidney upon placement of the safety wire stone also effluxed from the ureteral orifice  A ureteral scope was then introduced into the orifice small amount of debris and small stone in the orifice was flushed out just with irrigation fluid  The ureteroscope was then advanced up to the level of the kidney  No other stone was seen in the ureter at this time  All stones from the previous CT scan demonstrated have been removed  The scope was removed from the ureter  The safety wire was then backloaded with a 4 7 x 26 cm NU stent  He tolerated this well  He is awake without incident and transferred to PACU in good condition  Stent to be removed in 5-7 days     I was present for the entire procedure    Patient Disposition:  PACU     SIGNATURE: Kevin Noel MD  DATE: October 17, 2017  TIME: 8:07 AM

## 2017-10-17 NOTE — PERIOPERATIVE NURSING NOTE
Dr Eliana Shabazz called reported patient continues to pass urine in small amts pink in color no clots, bladder scanned for 31ml reported will proceed with discharge   Patient due to take diuretic when he goes home

## 2017-10-17 NOTE — DISCHARGE INSTRUCTIONS

## 2017-10-19 LAB — BACTERIA UR CULT: NORMAL

## 2017-10-27 LAB
CA PHOS MFR STONE: 2 %
COLOR STONE: NORMAL
COM MFR STONE: 5 %
COMMENT-STONE3: NORMAL
COMPOSITION: NORMAL
LABORATORY COMMENT REPORT: NORMAL
NIDUS STONE QL: NORMAL
PHOTO: NORMAL
SIZE STONE: NORMAL MM
STONE ANALYSIS-IMP: NORMAL
STONE ANALYSIS-IMP: NORMAL
URATE MFR STONE: 93 %
WT STONE: 249 MG

## 2017-11-01 ENCOUNTER — APPOINTMENT (OUTPATIENT)
Dept: LAB | Facility: CLINIC | Age: 82
End: 2017-11-01
Payer: MEDICARE

## 2017-11-01 ENCOUNTER — TRANSCRIBE ORDERS (OUTPATIENT)
Dept: LAB | Facility: CLINIC | Age: 82
End: 2017-11-01

## 2017-11-01 DIAGNOSIS — I12.9 HYPERTENSIVE CHRONIC KIDNEY DISEASE WITH STAGE 1 THROUGH STAGE 4 CHRONIC KIDNEY DISEASE, OR UNSPECIFIED CHRONIC KIDNEY DISEASE: ICD-10-CM

## 2017-11-01 DIAGNOSIS — E78.5 HYPERLIPIDEMIA: ICD-10-CM

## 2017-11-01 DIAGNOSIS — N18.30 CHRONIC KIDNEY DISEASE, STAGE III (MODERATE) (HCC): ICD-10-CM

## 2017-11-01 DIAGNOSIS — E03.4 IDIOPATHIC ATROPHIC HYPOTHYROIDISM: ICD-10-CM

## 2017-11-01 DIAGNOSIS — E78.2 MIXED HYPERLIPIDEMIA: ICD-10-CM

## 2017-11-01 DIAGNOSIS — N18.30 CHRONIC KIDNEY DISEASE, STAGE III (MODERATE) (HCC): Primary | ICD-10-CM

## 2017-11-01 LAB
25(OH)D3 SERPL-MCNC: 38.7 NG/ML (ref 30–100)
ALBUMIN SERPL BCP-MCNC: 3.4 G/DL (ref 3.5–5)
ALP SERPL-CCNC: 83 U/L (ref 46–116)
ALT SERPL W P-5'-P-CCNC: 37 U/L (ref 12–78)
ANION GAP SERPL CALCULATED.3IONS-SCNC: 5 MMOL/L (ref 4–13)
AST SERPL W P-5'-P-CCNC: 31 U/L (ref 5–45)
BASOPHILS # BLD AUTO: 0.03 THOUSANDS/ΜL (ref 0–0.1)
BASOPHILS NFR BLD AUTO: 1 % (ref 0–1)
BILIRUB SERPL-MCNC: 0.7 MG/DL (ref 0.2–1)
BUN SERPL-MCNC: 20 MG/DL (ref 5–25)
CALCIUM SERPL-MCNC: 9.3 MG/DL (ref 8.3–10.1)
CHLORIDE SERPL-SCNC: 105 MMOL/L (ref 100–108)
CHOLEST SERPL-MCNC: 122 MG/DL (ref 50–200)
CK MB SERPL-MCNC: 1.4 % (ref 0–2.5)
CK MB SERPL-MCNC: 2.6 NG/ML (ref 0–5)
CK SERPL-CCNC: 187 U/L (ref 39–308)
CO2 SERPL-SCNC: 31 MMOL/L (ref 21–32)
CREAT SERPL-MCNC: 1.45 MG/DL (ref 0.6–1.3)
CREAT UR-MCNC: 118 MG/DL
EOSINOPHIL # BLD AUTO: 0.17 THOUSAND/ΜL (ref 0–0.61)
EOSINOPHIL NFR BLD AUTO: 3 % (ref 0–6)
ERYTHROCYTE [DISTWIDTH] IN BLOOD BY AUTOMATED COUNT: 14.9 % (ref 11.6–15.1)
GFR SERPL CREATININE-BSD FRML MDRD: 45 ML/MIN/1.73SQ M
GLUCOSE P FAST SERPL-MCNC: 111 MG/DL (ref 65–99)
HCT VFR BLD AUTO: 43 % (ref 36.5–49.3)
HDLC SERPL-MCNC: 42 MG/DL (ref 40–60)
HGB BLD-MCNC: 13.7 G/DL (ref 12–17)
LDLC SERPL CALC-MCNC: 57 MG/DL (ref 0–100)
LYMPHOCYTES # BLD AUTO: 1.08 THOUSANDS/ΜL (ref 0.6–4.47)
LYMPHOCYTES NFR BLD AUTO: 17 % (ref 14–44)
MAGNESIUM SERPL-MCNC: 2 MG/DL (ref 1.6–2.6)
MCH RBC QN AUTO: 30 PG (ref 26.8–34.3)
MCHC RBC AUTO-ENTMCNC: 31.9 G/DL (ref 31.4–37.4)
MCV RBC AUTO: 94 FL (ref 82–98)
MONOCYTES # BLD AUTO: 0.86 THOUSAND/ΜL (ref 0.17–1.22)
MONOCYTES NFR BLD AUTO: 13 % (ref 4–12)
NEUTROPHILS # BLD AUTO: 4.28 THOUSANDS/ΜL (ref 1.85–7.62)
NEUTS SEG NFR BLD AUTO: 66 % (ref 43–75)
PHOSPHATE SERPL-MCNC: 3.1 MG/DL (ref 2.3–4.1)
PLATELET # BLD AUTO: 371 THOUSANDS/UL (ref 149–390)
PMV BLD AUTO: 10 FL (ref 8.9–12.7)
POTASSIUM SERPL-SCNC: 4 MMOL/L (ref 3.5–5.3)
PROT SERPL-MCNC: 7.1 G/DL (ref 6.4–8.2)
PROT UR-MCNC: 20 MG/DL
PROT/CREAT UR: 0.17 MG/G{CREAT} (ref 0–0.1)
PTH-INTACT SERPL-MCNC: 54 PG/ML (ref 14–72)
RBC # BLD AUTO: 4.56 MILLION/UL (ref 3.88–5.62)
SODIUM SERPL-SCNC: 141 MMOL/L (ref 136–145)
TRIGL SERPL-MCNC: 117 MG/DL
TSH SERPL DL<=0.05 MIU/L-ACNC: 3.31 UIU/ML (ref 0.36–3.74)
WBC # BLD AUTO: 6.42 THOUSAND/UL (ref 4.31–10.16)

## 2017-11-01 PROCEDURE — 82306 VITAMIN D 25 HYDROXY: CPT

## 2017-11-01 PROCEDURE — 84100 ASSAY OF PHOSPHORUS: CPT

## 2017-11-01 PROCEDURE — 83735 ASSAY OF MAGNESIUM: CPT

## 2017-11-01 PROCEDURE — 84443 ASSAY THYROID STIM HORMONE: CPT

## 2017-11-01 PROCEDURE — 36415 COLL VENOUS BLD VENIPUNCTURE: CPT

## 2017-11-01 PROCEDURE — 82550 ASSAY OF CK (CPK): CPT

## 2017-11-01 PROCEDURE — 80061 LIPID PANEL: CPT

## 2017-11-01 PROCEDURE — 80053 COMPREHEN METABOLIC PANEL: CPT

## 2017-11-01 PROCEDURE — 82553 CREATINE MB FRACTION: CPT

## 2017-11-01 PROCEDURE — 84156 ASSAY OF PROTEIN URINE: CPT

## 2017-11-01 PROCEDURE — 85025 COMPLETE CBC W/AUTO DIFF WBC: CPT

## 2017-11-01 PROCEDURE — 83970 ASSAY OF PARATHORMONE: CPT

## 2017-11-01 PROCEDURE — 82570 ASSAY OF URINE CREATININE: CPT

## 2017-11-06 ENCOUNTER — ALLSCRIPTS OFFICE VISIT (OUTPATIENT)
Dept: OTHER | Facility: OTHER | Age: 82
End: 2017-11-06

## 2017-11-07 NOTE — PROGRESS NOTES
Assessment  1  Benign hypertension with chronic kidney disease, stage III (403 10,585 3) (I12 9,N18 3)   2  Chronic kidney disease, stage 3 (585 3) (N18 3)   3  Hyperlipidemia (272 4) (E78 5)   4  Nephrolithiasis (592 0) (N20 0)    Plan  Benign hypertension with chronic kidney disease, stage III, Chronic kidney disease,  stage 3, Hyperlipidemia    · (1) CBC/PLT/DIFF; Status:Active; Requested for:01May2018; Perform:[facility]; QRB:[id number];OAKTSWQ; For:Benign hypertension with chronic kidney disease, stage III, Chronic kidney disease, stage 3, Hyperlipidemia; Ordered By:Abdullahi Esteves;   · (1) COMPREHENSIVE METABOLIC PANEL; Status:Active; Requested for:01May2018; Perform:[facility]; :[id number];CVIXPAW; For:Benign hypertension with chronic kidney disease, stage III, Chronic kidney disease, stage 3, Hyperlipidemia; Ordered By:Abdullahi Esteves;   · (1) MAGNESIUM; Status:Active; Requested for:01May2018; Perform:[facility]; VIT:45YWN2097;TDUKHMF; For:Benign hypertension with chronic kidney disease, stage III, Chronic kidney disease, stage 3, Hyperlipidemia; Ordered By:Abdullahi Esteves;   · (1) PHOSPHORUS; Status:Active; Requested for:01May2018; Perform:[facility]; NBK:86EAT9665;EIQZHAR; For:Benign hypertension with chronic kidney disease, stage III, Chronic kidney disease, stage 3, Hyperlipidemia; Ordered By:Jovany Esteves;   · (1) PTH N-TERMINAL (INTACT); Status:Active; Requested for:01May2018; Perform:[facility]; UBW:95TSK6901;HELXQPS; For:Benign hypertension with chronic kidney disease, stage III, Chronic kidney disease, stage 3, Hyperlipidemia; Ordered By:Jovany Esteves;   · (1) URINE PROTEIN CREATININE RATIO; Status:Active; Requested for:01May2018; Perform:[facility]; RZE:11ZQL0538;GQHRSBL; For:Benign hypertension with chronic kidney disease, stage III, Chronic kidney disease, stage 3, Hyperlipidemia;  Ordered By:Abdullahi Esteves Proper;   · (1) VITAMIN D 25-HYDROXY; Status:Active; Requested for:01May2018; Perform:[facility]; ROW:57LML5928;LMOYJTC; For:Benign hypertension with chronic kidney disease, stage III, Chronic kidney disease, stage 3, Hyperlipidemia; Ordered By:Dory Esteves Ang;   · Follow-up visit in 6 months Evaluation and Treatment  Follow-up  Status: Hold For -  Scheduling  Requested for: [id number]   Ordered; For: Benign hypertension with chronic kidney disease, stage III, Chronic kidney disease, stage 3, Hyperlipidemia; Ordered By: Celeste Don Performed:  Due: [id number]  Nephrolithiasis    · (1) STONE RISK PROFILE, 24 HOUR URINE; Status:Active; Requested for:01May2018; Perform:[facility]; Due:01May2019;Ordered;For:Nephrolithiasis; Ordered By:Jovany Esteves;    1  No medication changes today  2   Follow-up labs in 6 months including a 24 hour urine collection for stone prevention  3   Follow-up appointment in 6 months  4   General recommendations:  -avoid salt  -continue to lose weight as you are doing  -try to exercise such as walking at least 3 days a week for 0 5 hour as you are able to  -try to drink at least 10-8 oz glasses of water a day  -avoid medicines such as ibuprofen, Motrin, Naprosyn or Aleve on a regular basis, you can use Tylenol as needed     Discussion/Summary    #1  CK D stage III: The patient's creatinine is doing well at 1 45 mg/dL  He has no significant proteinuria  The mainstay of therapy remains good hypertensive control, good dyslipidemic control per your discretion  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  Volume: He essentially remains euvolemic  Only minimal edema at most  He takes furosemide 3 times a week  He knows to take an extra furosemide the next day on his off day for extra fluid or weight gain  He also takes potassium with this  Hypertension: His readings demonstrate slightly elevated systolic readings but very low diastolic readings  In order to avoid hypoperfusion I would not make any changes at this time  Continue to push diet and exercise and avoidance of salt  Patient has been counseled as such  Electrolytes: remained quite good on potassium supplementation  Mineral bone disease: Doing well at this time  Anemia remains normal Dyslipidemia is outstanding on his current dose of statin  Follow-up per your discretion  Nephrolithiasis actively followed by Urology  I will check a 24 hour urine stone risk profile next visit  For now drink at least 80 oz of water a day  Avoid salt :Cardiac follow-up per Dr Fredis Silva  The patient has ischemic cardiomyopathy/ICD/systolic congestive heart failure  Pulmonary follow-up with Dr Maye Bae as needed  The patient was counseled regarding diagnostic results,-- instructions for management,-- risk factor reductions,-- impressions,-- importance of compliance with treatment  The patient has the current Goals: Continue to monitor and treat leg [id number] oz of water at least todayto monitor blood pressure as he is doing  None  Patient is able to Self-Care  Educational resources provided:   Possible side effects of new medications were reviewed with the patient/guardian today  The treatment plan was reviewed with the patient/guardian  The patient/guardian understands and agrees with the treatment plan   CKD Teaching includes hypertension management, sodium restriction and fluid management  Reason For Visit  Chronic Kidney Disease stage III      History of Present Illness  Right nephrolithiasis requiring intervention by Dr Jerry Pedraza with a ureteral stent and subsequently took it out 1 week later  He is scheduled for lithotripsy in the near future  No further flank pain or blood in the urine at this time  we no urinary symptoms including foamy urinefevers chills cough or colds   Fair appetite, fair energyGI symptomscardiovascular symptoms with an overall improvement in leg swellingheadaches, dizziness or lightheadednesspressure medications:2 5 mg daily75 mg twice a day40 mg every other day along with potassium every other daypressures at home:readings: 138/53 without orthostatic changesreadings: 140/48 without orthostatic changes      Review of Systems  See HPI, otherwise review of systems is completely reviewed the patient are negative         ROS reviewed  Past Medical History    The active problems and past medical history were reviewed and updated today  #1  Ischemic cardiomyopathy/ICD/systolic congestive heart failureNephrolithiasis as outlined aboveHypertension: Since 2004: Usually 120s-140/68-80  history of asthma/emphysema/cancer/seizures/MI      Surgical History    The surgical history was reviewed and updated today  #1  CABG x4 in 2004Tonsils and adenoidectomyAICD 2004/replaced in 2006/2013Lithotripsy/ureter scopic stone removal percutaneous nephrolithotomy 2004 2005      Family History    The family history was reviewed and updated today  No family history of kidney disease family history of hypertension  Social History  The social history was reviewed and is unchanged  Never smoke cigarettes but just cigarsdrinkerIllicit drug use  uses a lot of salt in his diabetes try to cut back; no exercise         Current Meds   1  Amiodarone HCl - 200 MG Oral Tablet; TAKE 1 TABLET DAILY; Therapy: 88EHZ7704 to (Evaluate:23Jun2017)  Requested for: 55QEN3097; Last   Rx:28Jun2016 Ordered   2  AmLODIPine Besylate 2 5 MG Oral Tablet; Take 1 tablet  daily; Therapy: 60CEN4086 to (Evaluate:27Apr2018)  Requested for: 16XNF5659; Last   Rx:02May2017 Ordered   3  Aspirin 81 MG TABS; TAKE 1 TABLET DAILY; Therapy: (Recorded:73Nsi5558) to Recorded   4  Klor-Con M20 20 MEQ Oral Tablet Extended Release; take 1 tablet every other day; Therapy: (Recorded:02May2017) to Recorded   5  Lasix 40 MG Oral Tablet; take 1 tablet every other day;    Therapy: (Nathalie Agudelo) to Recorded   6  LORazepam 1 MG Oral Tablet; TAKE 1 TABLET AT BEDTIME AS NEEDED; Therapy: 22NOL3026 to Recorded   7  Metoprolol Tartrate 50 MG Oral Tablet; TAKE 1 1/2 TABLETS TWICE A DAY; Therapy: (Vianney Eason) to Recorded   8  Mexiletine HCl - 150 MG Oral Capsule; Take 1 capsule twice daily  Requested for:   2016; Last Rx:2016 Ordered   9  Multivitamins CAPS; TAKE 1 CAPSULE DAILY; Therapy: (Recorded:28Ybw8982) to Recorded   10  Nitrostat 0 4 MG Sublingual Tablet Sublingual; DISSOLVE 1 TABLET UNDER THE    TONGUE AS NEEDED FOR CHEST PAIN  Requested for: ; Last Rx:14Gzb2637    Ordered   11  Pravastatin Sodium 80 MG Oral Tablet; TAKE 1 TABLET DAILY; Therapy: 94HGU7619 to (Evaluate:77Lcj5534) Recorded   12  Synthroid 112 MCG Oral Tablet; TAKE 1 TABLET DAILY; Therapy: (Recorded:61Uvb1887) to Recorded   13  Tamsulosin HCl - 0 4 MG Oral Capsule; TAKE 1 CAPSULE Bedtime; Therapy: (Recorded:07Knr5805) to Recorded    Allergies  1  No Known Drug Allergies    Vitals  Vital Signs    Recorded: 18AUA3925 10:15AM Recorded: 96CPL7776 10:05AM   Heart Rate 64     Pulse Quality Regular     Systolic 214, LUE, Sitting 128, LUE, Standing 128, LUE, Sitting   Diastolic 64, LUE, Sitting 66, LUE, Standing 64, LUE, Sitting   BP Comments Blood pressure standin/60     Height  5 ft 7 in    Weight  198 lb     BMI Calculated  31 01    BSA Calculated  2 01      Physical Exam    Constitutional: General appearance: No acute distress, well appearing and well nourished  -- obese  ENT: External ears and nose appear normal      Eyes: Anicteric sclerae  Neck: No bruit heard over either carotid  JVD:  No JVD present  Pulmonary: Respiratory effort: No increased work of breathing or signs of respiratory distress  -- Auscultation of lungs: Clear to auscultation  Cardiovascular: Auscultation of heart: Normal rate and rhythm, normal S1 and S2, without murmurs      Abdomen: Non-tender, no masses  Extremities: No cyanosis, clubbing or edema  -- Trace ankle edema bilaterally  Rash: No rash present  Neurologic: Non Focal      Psychiatric: Orientation to person, place, and time: Normal  -- and-- Mood and affect: Normal     Back: No CVA tenderness  Results/Data  (1) COMPREHENSIVE METABOLIC PANEL 90OIX3860 15:25UN [facility] [place] Order Number: [id number]     Test Name Result Flag Reference   SODIUM 141 mmol/L  136-145   POTASSIUM 4 0 mmol/L  3 5-5 3   CHLORIDE 105 mmol/L  100-108   CARBON DIOXIDE 31 mmol/L  21-32   ANION GAP (CALC) 5 mmol/L  4-13   BLOOD UREA NITROGEN 20 mg/dL  5-25   CREATININE 1 45 mg/dL H 0 60-1 30   Standardized to IDMS reference method   CALCIUM 9 3 mg/dL  8 3-10 1   BILI, TOTAL 0 70 mg/dL  0 20-1 00   ALK PHOSPHATAS 83 U/L     ALT (SGPT) 37 U/L  12-78   Specimen collection should occur prior to Sulfasalazine administration due to the potential for falsely depressed results  AST(SGOT) 31 U/L  5-45   Specimen collection should occur prior to Sulfasalazine administration due to the potential for falsely depressed results  ALBUMIN 3 4 g/dL L 3 5-5 0   TOTAL PROTEIN 7 1 g/dL  6 4-8 2   eGFR 45 ml/min/1 73sq m     [facility] Disease Education Program recommendations are as follows:  GFR calculation is accurate only with a steady state creatinine  Chronic Kidney disease less than 60 ml/min/1 73 sq  meters  Kidney failure less than 15 ml/min/1 73 sq  meters  GLUCOSE FASTING 111 mg/dL H 65-99   Specimen collection should occur prior to Sulfasalazine administration due to the potential for falsely depressed results  Specimen collection should occur prior to Sulfapyridine administration due to the potential for falsely elevated results       (1) CK (CPK) 06VNZ2691 07:04AM [facility] Aman Order Number: [id number]     Test Name Result Flag Reference   CK (CPK) 187 U/L     CK MB FRACTION 2 6 ng/mL  0 0-5 0   CK-MB INDEX 1 4 %  0 0-2 5 (1) CBC/PLT/DIFF [id number] 07:04AM [facility] Order Number: [id number]     Test Name Result Flag Reference   WBC COUNT 6 42 Thousand/uL  4 31-10 16   RBC COUNT 4 56 Million/uL  3 88-5 62   HEMOGLOBIN 13 7 g/dL  12 0-17 0   HEMATOCRIT 43 0 %  36 5-49 3   MCV 94 fL  82-98   MCH 30 0 pg  26 8-34 3   MCHC 31 9 g/dL  31 4-37 4   RDW 14 9 %  11 6-15 1   MPV 10 0 fL  8 9-12 7   PLATELET COUNT 226 Thousands/uL  149-390   NEUTROPHILS RELATIVE PERCENT 66 %  43-75   LYMPHOCYTES RELATIVE PERCENT 17 %  14-44   MONOCYTES RELATIVE PERCENT 13 % H 4-12   EOSINOPHILS RELATIVE PERCENT 3 %  0-6   BASOPHILS RELATIVE PERCENT 1 %  0-1   NEUTROPHILS ABSOLUTE COUNT 4 28 Thousands/? ??L  1 85-7 62   LYMPHOCYTES ABSOLUTE COUNT 1 08 Thousands/? ??L  0 60-4 47   MONOCYTES ABSOLUTE COUNT 0 86 Thousand/? ??L  0 17-1 22   EOSINOPHILS ABSOLUTE COUNT 0 17 Thousand/? ??L  0 00-0 61   BASOPHILS ABSOLUTE COUNT 0 03 Thousands/? ??L  0 00-0 10     (1) MAGNESIUM [id number] 07:04AM [facility] Order Number: [id number]     Test Name Result Flag Reference   MAGNESIUM 2 0 mg/dL  1 6-2 6     (1) PHOSPHORUS [id number] 07:04AM [facility] Order Number: [id number]     Test Name Result Flag Reference   PHOSPHORUS 3 1 mg/dL  2 3-4 1     (1) PTH N-TERMINAL (INTACT) 82FRT2558 07:04AM [facility] Order Number: [id number]     Test Name Result Flag Reference   PARATHYROID HORMONE INTACT 54 0 pg/mL  14 0-72 0     (1) URINE PROTEIN CREATININE RATIO [id number] 07:04AM [facility] Order Number: [id number]     Test Name Result Flag Reference   CREATININE URINE 118 0 mg/dL     URINE PROTEIN:CREATININE RATIO 0 17 H 0 00-0 10   URINE PROTEIN 20 mg/dL       (1) VITAMIN D 25-HYDROXY [id number] 07:04AM [facility] Order Number: [id number]     Test Name Result Flag Reference   VIT D 25-HYDROX 38 7 ng/mL  30 0-100 0   This assay is a certified procedure of the CDC Vitamin D Standardization Certification Program (VDSCP)     Deficiency <20ng/ml   Insufficiency 20-30ng/ml   Sufficient  ng/ml     *Patients undergoing fluorescein dye angiography may retain small amounts of fluorescein in the body for 48-72 hours post procedure  Samples containing fluorescein can produce falsely elevated Vitamin D values  If the patient had this procedure, a specimen should be resubmitted post fluorescein clearance  (1) LIPID PANEL, FASTING [id number] 07:04AM [facility], Provider   Test ordered by: Idania Nevarez     Test Name Result Flag Reference   CHOLESTEROL 122 mg/dL     HDL,DIRECT 42 mg/dL  40-60   Specimen collection should occur prior to Metamizole administration due to the potential for falsley depressed results  LDL CHOLESTEROL CALCULATED 57 mg/dL  0-100   This is a patient instruction: This is a fasting test  Water, black tea or black coffee only after 9:00pm the night before the test  Drink 2 glasses of water the morning of the test         Triglyceride:        Normal <150 mg/dl   Borderline High 150-199 mg/dl   High 200-499 mg/dl   Very High >499 mg/dl      Cholesterol:       Desirable <200 mg/dl    Borderline High 200-239 mg/dl    High >239 mg/dl      HDL Cholesterol:       High>59 mg/dL    Low <41 mg/dL      This screening LDL is a calculated result  It does not have the accuracy of the Direct Measured LDL in the monitoring of patients with hyperlipidemia and/or statin therapy  Direct Measure LDL (BLI467) must be ordered separately in these patients  TRIGLYCERIDES 117 mg/dL  <=150   Specimen collection should occur prior to N-Acetylcysteine or Metamizole administration due to the potential for falsely depressed results  (1) TSH [id number] 07:04AM [facility], Provider   Test ordered by: Idania Nevarez     Test Name Result Flag Reference   TSH 3 309 uIU/mL  0 358-3 740   This is a patient instruction: This test is non-fasting  Please drink two glasses of water morning of bloodwork  Patients undergoing fluorescein dye angiography may retain small amounts of fluorescein in the body for 48-72 hours post procedure  Samples containing fluorescein can produce falsely depressed TSH values  If the patient had this procedure,a specimen should be resubmitted post fluorescein clearance  (1) COMPREHENSIVE METABOLIC PANEL [id number] 79:09[facility] [place] Order Number: [id number]     Test Name Result Flag Reference   CHLORIDE 105 mmol/L  100-108     Thank you very much for allowing me to participate in the care of this patient  If you have any questions, please do not hesitate to contact me        Future Appointments    Date/Time Provider Specialty Site   11/17/2017 01:00 PM Cardiology, Device Remote   [address]   02/19/2018 09:00 AM Cardiology, Device Remote   [address]   12/12/2017 11:00 AM Vahe Levy MD Cardiology [address]     Signatures   Electronically signed by : CHRIS Thorpe ; Nov 6 2017 10:29AM EST                       (Author)

## 2017-11-17 ENCOUNTER — ALLSCRIPTS OFFICE VISIT (OUTPATIENT)
Dept: OTHER | Facility: OTHER | Age: 82
End: 2017-11-17

## 2017-12-12 ENCOUNTER — ALLSCRIPTS OFFICE VISIT (OUTPATIENT)
Dept: OTHER | Facility: OTHER | Age: 82
End: 2017-12-12

## 2017-12-12 DIAGNOSIS — I50.22 CHRONIC SYSTOLIC CONGESTIVE HEART FAILURE (HCC): ICD-10-CM

## 2017-12-12 DIAGNOSIS — I25.10 ATHEROSCLEROTIC HEART DISEASE OF NATIVE CORONARY ARTERY WITHOUT ANGINA PECTORIS: ICD-10-CM

## 2017-12-13 NOTE — PROGRESS NOTES
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   2  Chronic kidney disease, stage 3 (585 3) (N18 3)   3  Chronic systolic congestive heart failure (428 22,428 0) (I50 22)   4  Coronary artery disease (414 00) (I25 10)   5  MARTINES (dyspnea on exertion) (786 09) (R06 09)   6  Hyperlipidemia (272 4) (E78 5)   7  Hypertension (401 9) (I10)   8  Ischemic cardiomyopathy (414 8) (I25 5)   9  Ventricular tachycardia (427 1) (I47 2)    Plan  Chronic systolic congestive heart failure, Coronary artery disease    · * XR CHEST PA & LATERAL; Status:Active; Requested for:33Zox6585;    Perform:[facility]; Due:94Tkt6111; Ordered;systolic congestive heart failure, Coronary artery disease; Ordered By:Mele Montalvo;   · Follow-up visit in 6 months Evaluation and Treatment  Follow-up  Status: Complete Done: 46WFA0879   Ordered; For: Chronic systolic congestive heart failure, Coronary artery disease; Ordered By: Mayra Amador Performed:  Due: 99HQP6053; Last Updated By: Feliz Martinez; 12/12/2017 11:36:23 AM  Hypertension, Ischemic cardiomyopathy    · EKG/ECG- POC; Status:Complete;   Done: 33GHI4218   Perform: In Office; Due:68Row1387; Last Updated By:Beam, Myrtis Shone; 12/12/2017 10:53:09 AM;Ordered;Ischemic cardiomyopathy; Ordered By:Marais, Saintclair Mulling;    Discussion/Summary  Cardiology Discussion Summary Free Text Note Form [facility]:   Dear Dr Shweta Swartz,  was a pleasure to have Ashwini Collier see me in the [facility] office   - Stress test for VT 9/15 showed EF 43%, large inferior, inferolateral scar, no ischemia  On a beta blocker, statin, aspirin  no current complaints  systolic CHF/ischemic cardiomyopathy - Class III CHF symptoms but remains euvolemic on furosemide 3 times a week  Stopped lisinopril because of low BP and RACHEL 9/15  tachycardia/Biventricular ICD - this was an upgrade in 2013  VT 9/15 and hospitalized, Continues on Mexilitine   PFT's 9/14 - stable with mild restrictive pattern, DLCO 72%  Recent TSH and LFTs are normal  He refuses PFT's  He will accept getting a CXR  - on max pravastatin, last LDL 57  of the left shoulder and right knee - Tylenol or tramadol is okay  Avoid NSAIDs  stage 3 - Creatinine stable at 1 45     for Amiodarone surveillance  well from Cardiac standpoint  Chief Complaint  Chief Complaint Free Text Note Form: Patient is here for a follow up and has no complaints at this time  He yearly EKG was completed today  Chief Complaint Chronic Condition [facility]: Patient is here today for follow up of chronic conditions described in HPI  History of Present Illness  Cardiology HPI Free Text Note Form [facility]: Bob Lopez presents without any new cardiac symptoms  He has a history cardiomyopathy, CAD, ICD, chronic systolic CHF, ventricular tachycardia without recurrence on mexiletine and amiodarone  He has not pursued his PFTs surveillance  He is agreeable to going with a chest x-ray  He denies any worsening cough, dyspnea, edema etc  He denies any device shocks  He denies chest pain  He has CKD stage 3 but creatinine has been stable  He has a history of acute kidney injury and hyperkalemia with lisinopril and therefore does not take ACE-inhibitor therapy  He enjoys is vacationing in [place] every year, near [facility] and will be leaving on Saturday  Active Problems  Problems    1  Abnormal chest xray (793 2) (R93 8)   2  Abnormal pulmonary function test (794 2) (R94 2)   3  Acute kidney injury (584 9) (N17 9)   4  Anxiety (300 00) (F41 9)   5  Benign hypertension with chronic kidney disease, stage III (403 10,585 3) (I12 9,N18 3)   6  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   7  Chronic kidney disease, stage 3 (585 3) (N18 3)   8  Chronic respiratory failure with hypoxia (518 83,799 02) (J96 11)   9  Chronic systolic congestive heart failure (428 22,428 0) (I50 22)   10   Coronary artery disease (414 00) (I25 10)   11  MARTINES (dyspnea on exertion) (786 09) (R06 09)   12  Hospital discharge follow-up (V67 59) (Z09)   13  Hyperlipidemia (272 4) (E78 5)   14  Hypertension (401 9) (I10)   15  Hypothyroidism (244 9) (E03 9)   16  Ischemic cardiomyopathy (414 8) (I25 5)   17  Nephrolithiasis (592 0) (N20 0)   18  Nocturnal hypoxia (327 24) (G47 34)   19  Right knee pain (719 46) (M25 561)   20  Sustained SVT (427 89) (I47 1)   21  Ventricular tachycardia (427 1) (I47 2)    Past Medical History  Problems    1  History of Cough (786 2) (R05)   2  History of edema (V13 89) (Z87 898)   3  History of pneumonia (V12 61) (Z87 01)   4  History of Nephrolithiasis (V13 01)  Active Problems And Past Medical History Reviewed: The active problems and past medical history were reviewed and updated today  Surgical History  Problems    1  CABG   2  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   3  History of Previous Stent Placement    Family History  Father    1  Family history of Tuberculosis    Social History  Problems    · Daily caffeine consumption, 2-3 servings a day   · Former smoker (V15 82) (Y88 563)   · Marital History - Currently    · Never Drank Alcohol  Social History Reviewed: The social history was reviewed and updated today  The social history was reviewed and is unchanged  Current Meds   1  Amiodarone HCl - 200 MG Oral Tablet; TAKE 1 TABLET DAILY; Therapy: 74TNT5754 to (Evaluate:23Jun2017)  Requested for: 10QWN7175; Last Rx:28Jun2016 Ordered   2  AmLODIPine Besylate 2 5 MG Oral Tablet; Take 1 tablet  daily; Therapy: 67UXF7951 to (Evaluate:27Apr2018)  Requested for: 06HYX3480; Last Rx:02May2017 Ordered   3  Aspirin 81 MG TABS; TAKE 1 TABLET DAILY; Therapy: (Recorded:22Adb6782) to Recorded   4  Klor-Con M20 20 MEQ Oral Tablet Extended Release; take 1 tablet every other day; Therapy: (Recorded:02May2017) to Recorded   5   Lasix 40 MG Oral Tablet; take 1 tablet every other day; Therapy: (Recorded:18Bqt9415) to Recorded   6  LORazepam 1 MG Oral Tablet; TAKE 1 TABLET AT BEDTIME AS NEEDED; Therapy: 70ACT8112 to Recorded   7  Metoprolol Tartrate 50 MG Oral Tablet; TAKE 1 1/2 TABLETS TWICE A DAY; Therapy: (459 9584) to Recorded   8  Mexiletine HCl - 150 MG Oral Capsule; Take 1 capsule twice daily  Requested for: 28Jun2016; Last Rx:28Jun2016 Ordered   9  Multivitamins CAPS; TAKE 1 CAPSULE DAILY; Therapy: (Recorded:72Yub2395) to Recorded   10  Nitrostat 0 4 MG Sublingual Tablet Sublingual; DISSOLVE 1 TABLET UNDER THE  TONGUE AS NEEDED FOR CHEST PAIN  Requested for: 75JOO6767; Last Rx:84Luv2896  Ordered   11  Pravastatin Sodium 80 MG Oral Tablet; TAKE 1 TABLET DAILY; Therapy: 12WUJ5749 to (Evaluate:39Pag8405) Recorded   12  Synthroid 112 MCG Oral Tablet; TAKE 1 TABLET DAILY; Therapy: (Recorded:83Njd7267) to Recorded   13  Tamsulosin HCl - 0 4 MG Oral Capsule; TAKE 1 CAPSULE Bedtime; Therapy: (Recorded:93Jpd5705) to Recorded  Medication List Reviewed: The medication list was reviewed and updated today  Allergies  Medication    1  No Known Drug Allergies    Vitals  Vital Signs    Recorded: 45Zdw6378 10:53AM   Heart Rate 62   Systolic 105, LUE, Sitting   Diastolic 78, LUE, Sitting   Height 5 ft 7 in   Weight 201 lb 4 oz   BMI Calculated 31 52   BSA Calculated 2 03   O2 Saturation 98       Physical Exam   Constitutional - General appearance: No acute distress, well appearing and well nourished  Eyes - Conjunctiva and Sclera examination: Conjunctiva pink, sclera anicteric  Neck - Normal, no JVD   Pulmonary - Respiratory effort: No signs of respiratory distress  -- Auscultation of lungs: Clear to auscultation  Cardiovascular - Auscultation of heart: Normal rate and rhythm, normal S1 and S2, no murmurs  Carotid pulses: Normal, 2+ bilaterally  -- Pedal pulses: Normal, 2+ bilaterally   -- Examination of extremities for edema and/or varicosities: Normal  Abdomen - Soft  Musculoskeletal - Gait and station: Normal gait  Skin - Skin: Normal without rashes  Skin is warm and well perfused  Neurologic - Speech normal  No focal deficits  Psychiatric - Orientation to person, place, and time: Normal       Results/Data  Diagnostic Studies Reviewed Cardio:  Stress Test: 9/14 - EF 19%, large severe scar inferoseptal, inferior, inferolateral, anterolateral walls, no ischemia  Echocardiogram/XIOMARA: 6/14 - EF 30%, global hypokinesis, dilated LV, mild MR, mild TR  Other: PFTs as per the history of present illness  ECG Report: 11/20/15 - AV paced rhythm, heart rate 60- AV paced- AV paced12/17 - AV paced  (1) COMPREHENSIVE METABOLIC PANEL 74UIG2269 98:26AB [employer] Order Number: [id number]     Test Name Result Flag Reference   SODIUM 141 mmol/L  136-145   POTASSIUM 4 0 mmol/L  3 5-5 3   CHLORIDE 105 mmol/L  100-108   CARBON DIOXIDE 31 mmol/L  21-32   ANION GAP (CALC) 5 mmol/L  4-13   BLOOD UREA NITROGEN 20 mg/dL  5-25   CREATININE 1 45 mg/dL H 0 60-1 30   Standardized to IDMS reference method   CALCIUM 9 3 mg/dL  8 3-10 1   BILI, TOTAL 0 70 mg/dL  0 20-1 00   ALK PHOSPHATAS 83 U/L     ALT (SGPT) 37 U/L  12-78   Specimen collection should occur prior to Sulfasalazine administration due to the potential for falsely depressed results  AST(SGOT) 31 U/L  5-45   Specimen collection should occur prior to Sulfasalazine administration due to the potential for falsely depressed results  ALBUMIN 3 4 g/dL L 3 5-5 0   TOTAL PROTEIN 7 1 g/dL  6 4-8 2   eGFR 45 ml/min/1 73sq m       [facility] [employer] Disease Education Program recommendations are as follows: GFR calculation is accurate only with a steady state creatinine Chronic Kidney disease less than 60 ml/min/1 73 sq  meters Kidney failure less than 15 ml/min/1 73 sq  meters     GLUCOSE FASTING 111 mg/dL H 65-99   Specimen collection should occur prior to Sulfasalazine administration due to the potential for falsely depressed results  Specimen collection should occur prior to Sulfapyridine administration due to the potential for falsely elevated results  (1) LIPID PANEL, FASTING 08UDM6287 07:04AM [facility], Provider   Test ordered by: Gerlean Aschoff     Test Name Result Flag Reference   CHOLESTEROL 122 mg/dL     HDL,DIRECT 42 mg/dL  40-60   Specimen collection should occur prior to Metamizole administration due to the potential for falsley depressed results  LDL CHOLESTEROL CALCULATED 57 mg/dL  0-100     This is a patient instruction: This is a fasting test  Water, black tea or black coffee only after 9:00pm the night before the test  Drink 2 glasses of water the morning of the test     Triglyceride:       Normal <150 mg/dl  Borderline High 150-199 mg/dl  High 200-499 mg/dl  Very High >499 mg/dl   Cholesterol:      Desirable <200 mg/dl   Borderline High 200-239 mg/dl   High >239 mg/dl   HDL Cholesterol:      High>59 mg/dL   Low <41 mg/dL   This screening LDL is a calculated result  It does not have the accuracy of the Direct Measured LDL in the monitoring of patients with hyperlipidemia and/or statin therapy  Direct Measure LDL (MDO723) must be ordered separately in these patients  TRIGLYCERIDES 117 mg/dL  <=150   Specimen collection should occur prior to N-Acetylcysteine or Metamizole administration due to the potential for falsely depressed results       Future Appointments    Date/Time Provider Specialty Site   02/19/2018 09:00 AM Cardiology, Device Remote  [facility] CARDIOLOGY DEVICE CLINIC       Signatures   Electronically signed by : Lianna Iqbal MD; Dec 12 2017  4:54PM EST                       (Author)

## 2018-01-11 NOTE — MISCELLANEOUS
Message   Recorded as Task   Date: 08/08/2016 10:41 AM, Created By: Arthur Schumacher   Task Name: Follow Up   Assigned To: Catracho Lombardi   Regarding Patient: Tari Conti, Status: Active   Kamar Aguilar - 08 Aug 2016 10:41 AM     TASK CREATED  stop the amlodipine  repeat bp readings in 2-3 weeks am and pm , sitting and standing   Spoke to pt and informed about the above  PL      Active Problems    1  Abnormal chest xray (793 2) (R93 8)   2  Abnormal pulmonary function test (794 2) (R94 2)   3  Acute kidney injury (584 9) (N17 9)   4  Anxiety (300 00) (F41 9)   5  Benign hypertension with chronic kidney disease, stage III (403 10,585 3) (I12 9,N18 3)   6  History of Cardioverter-Defibrillator Pulse Generator With Synchronous Cardiac   Pacemaker   7  Chronic kidney disease, stage 3 (585 3) (N18 3)   8  Chronic respiratory failure with hypoxia (518 83,799 02) (J96 11)   9  Chronic systolic congestive heart failure (428 22,428 0) (I50 22)   10  Coronary artery disease (414 00) (I25 10)   11  MARTINES (dyspnea on exertion) (786 09) (R06 09)   12  Hospital discharge follow-up (V67 59) (Z09)   13  Hyperlipidemia (272 4) (E78 5)   14  Hypertension (401 9) (I10)   15  Hypothyroidism (244 9) (E03 9)   16  Ischemic cardiomyopathy (414 8) (I25 5)   17  Nocturnal hypoxia (327 24) (G47 34)   18  Right knee pain (719 46) (M25 561)   19  Sustained SVT (427 89) (I47 1)   20  Ventricular tachycardia (427 1) (I47 2)    Current Meds   1  Amiodarone HCl - 200 MG Oral Tablet; TAKE 1 TABLET DAILY; Therapy: 76SRU9889 to (Evaluate:23Jun2017)  Requested for: 53FTL4716; Last   Rx:28Jun2016 Ordered   2  Aspirin 81 MG TABS; TAKE 1 TABLET DAILY; Therapy: (Recorded:89Obx4394) to Recorded   3  Klor-Con M10 10 MEQ Oral Tablet Extended Release; 4X a week; Therapy: (Recorded:75Som6294) to Recorded   4  Klor-Con M20 20 MEQ Oral Tablet Extended Release; 3X A WEEK; Therapy: (Recorded:81Per2469) to Recorded   5   Lasix 20 MG Oral Tablet (Furosemide); 4X a week; Therapy: (Recorded:59Kkh9429) to Recorded   6  Lasix 40 MG Oral Tablet (Furosemide); 3X A WEEK; Therapy: (Recorded:85Bmv0364) to Recorded   7  LORazepam 0 5 MG Oral Tablet; TAKE 1 TABLET DAILY AS NEEDED; Therapy: 17DPT0752 to Recorded   8  Metoprolol Tartrate 50 MG Oral Tablet; TAKE 1 1/2 TABLETS TWICE A DAY; Therapy: (Gómez Left) to Recorded   9  Mexiletine HCl - 150 MG Oral Capsule; Take 1 capsule twice daily  Requested for:   28Jun2016; Last Rx:28Jun2016 Ordered   10  Multivitamins CAPS; TAKE 1 CAPSULE DAILY; Therapy: (Recorded:23Bxy5600) to Recorded   11  Nitrostat 0 4 MG Sublingual Tablet Sublingual; DISSOLVE 1 TABLET UNDER THE    TONGUE AS NEEDED FOR CHEST PAIN  Requested for: 89QTH9331; Last Rx:22Qvo7223    Ordered   12  Pravastatin Sodium 80 MG Oral Tablet; TAKE 1 TABLET DAILY; Therapy: 81NVF6463 to (Evaluate:23Nkm2206) Recorded   13  Synthroid 100 MCG Oral Tablet (Levothyroxine Sodium); TAKE 1 TABLET DAILY; Therapy: (Recorded:77Wxt6075) to Recorded   14  Tamsulosin HCl - 0 4 MG Oral Capsule; TAKE 1 CAPSULE Bedtime; Therapy: (Recorded:51Ksp8128) to Recorded    Allergies    1   No Known Drug Allergies    Signatures   Electronically signed by : CHRIS Ray ; Aug  8 2016  2:30PM EST

## 2018-01-12 VITALS
SYSTOLIC BLOOD PRESSURE: 132 MMHG | BODY MASS INDEX: 31.08 KG/M2 | WEIGHT: 198 LBS | DIASTOLIC BLOOD PRESSURE: 64 MMHG | HEIGHT: 67 IN | HEART RATE: 64 BPM

## 2018-01-12 VITALS
DIASTOLIC BLOOD PRESSURE: 62 MMHG | HEART RATE: 59 BPM | HEIGHT: 67 IN | SYSTOLIC BLOOD PRESSURE: 120 MMHG | WEIGHT: 203.44 LBS | BODY MASS INDEX: 31.93 KG/M2

## 2018-01-14 VITALS
HEIGHT: 67 IN | WEIGHT: 203.5 LBS | SYSTOLIC BLOOD PRESSURE: 130 MMHG | DIASTOLIC BLOOD PRESSURE: 68 MMHG | BODY MASS INDEX: 31.94 KG/M2 | HEART RATE: 68 BPM

## 2018-01-23 VITALS
OXYGEN SATURATION: 98 % | SYSTOLIC BLOOD PRESSURE: 126 MMHG | DIASTOLIC BLOOD PRESSURE: 78 MMHG | WEIGHT: 201.25 LBS | BODY MASS INDEX: 31.59 KG/M2 | HEIGHT: 67 IN | HEART RATE: 62 BPM

## 2018-02-19 ENCOUNTER — CLINICAL SUPPORT (OUTPATIENT)
Dept: CARDIOLOGY CLINIC | Facility: CLINIC | Age: 83
End: 2018-02-19
Payer: MEDICARE

## 2018-02-19 DIAGNOSIS — I25.5 ISCHEMIC CARDIOMYOPATHY: ICD-10-CM

## 2018-02-19 DIAGNOSIS — Z95.810 AICD (AUTOMATIC CARDIOVERTER/DEFIBRILLATOR) PRESENT: ICD-10-CM

## 2018-02-19 DIAGNOSIS — I47.2 V TACH (HCC): Primary | ICD-10-CM

## 2018-02-19 PROCEDURE — 93297 REM INTERROG DEV EVAL ICPMS: CPT | Performed by: INTERNAL MEDICINE

## 2018-02-19 PROCEDURE — 93296 REM INTERROG EVL PM/IDS: CPT | Performed by: INTERNAL MEDICINE

## 2018-02-19 PROCEDURE — 93295 DEV INTERROG REMOTE 1/2/MLT: CPT | Performed by: INTERNAL MEDICINE

## 2018-02-20 NOTE — PROGRESS NOTES
MERLIN TRANSMISSION: BATTERY STATUS "OK"  AP 98% %  RA AMP <2:1 SAFETY MARGIN  OTHERWISE ALL OTHER AVAILABLE LEAD PARAMETERS & TRENDS WITHIN NORMAL LIMITS  NO SIGNIFICANT HIGH RATE EPISODES  CORVUE IMPEDANCE MONITORING WITHIN NORMAL LIMITS  NORMAL DEVICE FUNCTION  NC     Current Outpatient Prescriptions:     acetaminophen (TYLENOL) 500 mg tablet, Take 500 mg by mouth every 6 (six) hours as needed for mild pain, Disp: , Rfl:     amiodarone 200 mg tablet, Take 200 mg by mouth daily  , Disp: , Rfl:     amLODIPine (NORVASC) 2 5 mg tablet, Take 2 5 mg by mouth 2 (two) times a day  , Disp: , Rfl:     aspirin 81 MG tablet, Take 81 mg by mouth daily  , Disp: , Rfl:     furosemide (LASIX) 40 mg tablet, Take 40 mg by mouth every other day  , Disp: , Rfl:     Levothyroxine Sodium 112 MCG CAPS, Take 112 mcg by mouth daily  , Disp: , Rfl:     LORazepam (ATIVAN) 1 mg tablet, Take 1 mg by mouth as needed for anxiety  , Disp: , Rfl:     Metoprolol Tartrate 75 MG TABS, Take 75 mg by mouth 2 (two) times a day , Disp: , Rfl:     mexiletine (MEXITIL) 150 mg capsule, Take 150 mg by mouth 2 (two) times a day , Disp: , Rfl:     Multiple Vitamins-Minerals (MULTIVITAMIN WITH MINERALS) tablet, Take 1 tablet by mouth daily  , Disp: , Rfl:     nitroglycerin (NITROSTAT) 0 4 mg SL tablet, Place 0 4 mg under the tongue every 5 (five) minutes as needed for chest pain  , Disp: , Rfl:     oxyCODONE (ROXICODONE) 5 mg immediate release tablet, Take 1 tablet by mouth every 8 (eight) hours as needed for moderate pain Max Daily Amount: 15 mg, Disp: 10 tablet, Rfl: 0    Potassium Chloride ER 20 MEQ TBCR, Take 20 mEq by mouth every other day  , Disp: , Rfl:     pravastatin (PRAVACHOL) 80 mg tablet, Take 80 mg by mouth daily  , Disp: , Rfl:     tamsulosin (FLOMAX) 0 4 mg, Take 0 4 mg by mouth daily with dinner , Disp: , Rfl:     [address]   [address] [place] [address], [address] [facility]   Phone: [phone] TRANSTHORACIC ECHOCARDIOGRAM   2D, M-MODE, DOPPLER, AND COLOR DOPPLER   Study date:  12-Sep-2015     LEFT VENTRICLE:   The ventricle was moderately dilated  Systolic function was markedly reduced  Ejection fraction was estimated to be   30 %

## 2018-02-26 DIAGNOSIS — N40.1 BENIGN PROSTATIC HYPERPLASIA WITH LOWER URINARY TRACT SYMPTOMS, SYMPTOM DETAILS UNSPECIFIED: Primary | ICD-10-CM

## 2018-02-26 NOTE — TELEPHONE ENCOUNTER
Pt called this morning asking that we send a refill for his tamsulosin to his [address]  I will put a mesg into [place] to have this refilled

## 2018-02-27 RX ORDER — TAMSULOSIN HYDROCHLORIDE 0.4 MG/1
0.4 CAPSULE ORAL
Qty: 90 CAPSULE | Refills: 3 | Status: SHIPPED | OUTPATIENT
Start: 2018-02-27 | End: 2022-01-24

## 2018-03-07 NOTE — PROGRESS NOTES
"  Discussion/Summary  Normal device function      Results/Data  Results   Cardiac Device Remote 02Jun2016 06:00AM Yelitza Gallo     Test Name Result Flag Reference   MISCELLANEOUS COMMENT      MERLIN TRANSMISSION: BATTERY VOLTAGE ADEQUATE (3 5 YRS)  AP-97%, BVP>99%  NO SIGNIFICANT HIGH RATE EPISODES  CORVUE IMPEDANCE MONITORING WITHIN NORMAL LIMITS  ALL AVAILABLE LEAD PARAMETERS WITHIN NORMAL LIMITS  NORMAL DEVICE FUNCTION  GV   Cardiac Electrophysiology Report      [id number]  pdf   DEVICE TYPE CRT-D       Cardiac Electrophysiology Report 02Jun2016 06:00AM Yelitza Gallo     Test Name Result Flag Reference   Cardiac Electrophysiology Report      [id number]  pdf     Signatures   Electronically signed by : Kathe Bush RN; Jun 2 2016  1:22PM EST                       (Author)    Electronically signed by : Enrique Hendrix DO; Donal  3 2016 12:30PM EST                       (Author)    "

## 2018-03-07 NOTE — PROGRESS NOTES
"  Discussion/Summary  Normal device function     2 PMT noted  one is sinus tachycardia  other is PMT that terminates with PMT algorithm  Results/Data  Cardiac Device In Clinic [id number] 01:28PM Sylvester Quezada     Test Name Result Flag Reference   MISCELLANEOUS COMMENT (Report)     DEVICE INTERROGATED IN THE [facility] OFFICE: BATTERY VOLTAGE ADEQUATE (3 3 YRS)  AP 89% BVP >99%  ALL LEAD PARAMETERS WITHIN NORMAL LIMITS  2 PMT EPISODES  1 IS SINUS TACH, THE OTHER IS TRUE PMT WITH SUCCESSFUL TERMINATION BY PMT ALGORITHM  NO OTHER SIGNIFICANT HIGH RATE EPISODES  CORVUE IMPEDANCE MONITORING WITHIN NORMAL LIMITS  NO PROGRAMMING CHANGES MADE TO DEVICE PARAMETERS  APPROPRIATELY FUNCTIONING BV ICD  CP   Cardiac Electrophysiology Report      [id number]  pdf   DEVICE TYPE CRT-D       Cardiac Electrophysiology Report 61Pns2018 01:28PM Sylvester Quezada     Test Name Result Flag Reference   Cardiac Electrophysiology Report      [id number]  pdf     Signatures   Electronically signed by : Christiana Lucero, ; Aug  2 2016 10:58AM EST                       (Author)    Electronically signed by : CHRIS Shook ; Aug  4 2016 10:50PM EST                       (Author)    "

## 2018-03-07 NOTE — PROGRESS NOTES
"  Discussion/Summary  Normal device function      Results/Data  Cardiac Device In Clinic [id number] 12:52PM Darrol Kurt     Test Name Result Flag Reference   MISCELLANEOUS COMMENT (Report)     DEVICE INTERROGATED IN THE [facility] OFFICE: BATTERY VOLTAGE ADEQUATE (2 3 YR)  AP 97% BP >99%  ALL LEAD PARAMETERS WITHIN NORMAL LIMITS  NO SIGNIFICANT HIGH RATE EPISODES  1 PMT EPISODE, DEVICE TERMINATED  CORVUE IMPEDANCE MONITORING WITHIN NORMAL LIMITS  NO PROGRAMMING CHANGES MADE TO DEVICE PARAMETERS  NORMAL DEVICE FUNCTION  RG   Cardiac Electrophysiology Report      [id number]  pdf   DEVICE TYPE CRT-D       Cardiac Electrophysiology Report [id number] 12:52PM Darrol Kurt     Test Name Result Flag Reference   Cardiac Electrophysiology Report      [id number]  pdf     Signatures   Electronically signed by : Elif Castelan, ; Aug 17 2017  9:14AM EST                       (Author)    Electronically signed by : CHRIS Calvillo ; Aug 17 2017  1:17PM EST                       (Author)    "

## 2018-03-07 NOTE — PROGRESS NOTES
"  Discussion/Summary  Normal device function      Results/Data  Cardiac Device Remote 20Jan2017 07:00AM Zelphia Lung     Test Name Result Flag Reference   MISCELLANEOUS COMMENT (Report)     MERLIN TRANSMISSION - CORVUE ONLY: CORVUE IMPEDANCE THRESHOLD CROSSED BUT NOW RETURNING TO WNL; WILL JULIET IN 31 DAYS; NORMAL DEVICE FUNCTION  eb [id number] VOLTAGE ADEQUATE (2 8 YRS); AP = 97%, BVP = 99%; NO SIGNIFICANT HIGH RATE EPISODES; ALL AVAILABLE LEAD PARAMETERS WITHIN NORMAL LIMITS  eb   Cardiac Electrophysiology Report      [id number]  merlin  1 20 17  pdf   DEVICE TYPE CRT-D       Cardiac Electrophysiology Report [id number] 07:00AM Zelphia Lung     Test Name Result Flag Reference   Cardiac Electrophysiology Report      [id number]  pdf     Signatures   Electronically signed by : Trang Gallegos, ; Jan 20 2017  2:16PM EST                       (Author)    Electronically signed by : Mili Meng DO; Jan 20 2017  7:45PM EST                       (Author)    "

## 2018-03-07 NOTE — PROGRESS NOTES
"  Discussion/Summary  Normal device function     Adequate BiV pacing  Results/Data  Cardiac Device Remote [id number] 06:00AM Kendra Barry     Test Name Result Flag Reference   MISCELLANEOUS COMMENT (Report)     [address] FOR NSVT [id number] STATUS "OK"  AP= 97%, BVP = 99%  ALL AVAILABLE LEAD PARAMETERS APPEAR WITHIN NORMAL LIMITS & STABLE  1 VHR EPISODE FOR NSVT WITH DURATION @ 10 SECS/13 BEATS/AVG  MS  EF = 30% (9/2015 ECHO)  PT TAKES AMIODORONE, METOPROLOL TART , ASA 81  CORVUE IMPEDANCE MONITORING WITHIN NORMAL LIMITS  APPROPRIATELY FUNCTIONING ICD  eb   Cardiac Electrophysiology Report      [id number]  merlin  6 27 17 pdf   DEVICE TYPE CRT-D       Cardiac Electrophysiology Report [id number] 06:00AM Kendra Barry     Test Name Result Flag Reference   Cardiac Electrophysiology Report      [id number]  pdf     Signatures   Electronically signed by : Dinora Ledesma, ; Jun 27 2017  9:47AM EST                       (Author)    Electronically signed by : CHRIS Reeves ; Jun 30 2017  6:26AM EST                       (Author)    "

## 2018-03-07 NOTE — PROGRESS NOTES
"  Discussion/Summary  Normal device function      Results/Data  Results   Cardiac Device Remote [id number] 07:00AM Fer Craze     Test Name Result Flag Reference   MISCELLANEOUS COMMENT      MERLIN TRANSMISSION: [facility]  (3 6 YRS)  NO SIGNIFICANT HIGH RATE EPISODES  ALL AVAILABLE LEAD PARAMETERS WITHIN NORMAL LIMITS  AP 97% BVP 99%  CORVUE IMPEDANCE MONITORING WITHIN NORMAL LIMITS  NORMAL DEVICE FUNCTION  ---PASTOR   Cardiac Electrophysiology Report      [id number]  pdf   DEVICE TYPE CRT-D       Cardiac Electrophysiology Report [id number] 07:00AM Fer Craze     Test Name Result Flag Reference   Cardiac Electrophysiology Report      [id number]  pdf     Signatures   Electronically signed by : Mayra Shah, ; Mar  1 2016  9:21AM EST                       (Author)    Electronically signed by : Jack Atkinson DO; Mar 15 2016  8:50PM EST                       (Author)    "

## 2018-03-07 NOTE — PROGRESS NOTES
"  Discussion/Summary  Normal device function      Results/Data  Cardiac Device In Clinic [id number] 01:51PM Lenore Mckeon     Test Name Result Flag Reference   MISCELLANEOUS COMMENT (Report)     DEVICE INTERROGATED IN THE [facility] OFFICE  PTS DEVICE IS ON ALERT FROM [facility] FOR PREMATURE BATTERY DEPLETION  VIBRATORY NOTIFIER DEMONSTRATED AND PT TOLD TO GO TO THE ER IF NOTIFIER ACTIVATES  IMPORTANCE OF MERLIN TRANSMITTER REVIEWED WITH PT  BATTERY VOLTAGE ADEQUATE (3 YRS)  AP >99% BVP >99%  ALL LEAD PARAMETERS WITHIN NORMAL LIMITS  NO SIGNIFICANT HIGH RATE EPISODES  CORVUE IMPEDANCE MONITORING WITHIN NORMAL LIMITS  NO PROGRAMMING CHANGES MADE TO DEVICE PARAMETERS  PT NOT PACEMAKER DEPENDENT  APPROPRIATELY FUNCTIONING BIV ICD  CP   Cardiac Electrophysiology Report      [id number]  pdf   DEVICE TYPE CRT-D       Cardiac Electrophysiology Report [id number] 01:51PM Lenore Mckeon     Test Name Result Flag Reference   Cardiac Electrophysiology Report      [id number]  pdf     Signatures   Electronically signed by : Rosa Maxwell, ; Dec  7 2016  8:58AM EST                       (Author)    Electronically signed by : Gordon Fox DO; Dec  9 2016  7:20PM EST                       (Author)    "

## 2018-03-15 ENCOUNTER — TELEPHONE (OUTPATIENT)
Dept: CARDIOLOGY CLINIC | Facility: CLINIC | Age: 83
End: 2018-03-15

## 2018-03-17 ENCOUNTER — TRANSCRIBE ORDERS (OUTPATIENT)
Dept: LAB | Facility: CLINIC | Age: 83
End: 2018-03-17

## 2018-03-17 ENCOUNTER — HOSPITAL ENCOUNTER (OUTPATIENT)
Dept: RADIOLOGY | Facility: HOSPITAL | Age: 83
Discharge: HOME | End: 2018-03-17
Payer: MEDICARE

## 2018-03-17 ENCOUNTER — APPOINTMENT (OUTPATIENT)
Dept: LAB | Facility: CLINIC | Age: 83
End: 2018-03-17
Payer: MEDICARE

## 2018-03-17 ENCOUNTER — TRANSCRIBE ORDERS (OUTPATIENT)
Dept: ADMINISTRATIVE | Facility: HOSPITAL | Age: 83
End: 2018-03-17

## 2018-03-17 DIAGNOSIS — I25.719 ATHEROSCLEROSIS OF AUTOLOGOUS VEIN CORONARY ARTERY BYPASS GRAFT WITH ANGINA PECTORIS (HCC): ICD-10-CM

## 2018-03-17 DIAGNOSIS — N18.30 CHRONIC KIDNEY DISEASE, STAGE III (MODERATE) (HCC): ICD-10-CM

## 2018-03-17 DIAGNOSIS — I10 ESSENTIAL HYPERTENSION, MALIGNANT: ICD-10-CM

## 2018-03-17 DIAGNOSIS — R05.9 COUGH: ICD-10-CM

## 2018-03-17 DIAGNOSIS — N20.0 STONE, KIDNEY: Primary | ICD-10-CM

## 2018-03-17 DIAGNOSIS — N18.30 CHRONIC KIDNEY DISEASE, STAGE III (MODERATE) (HCC): Primary | ICD-10-CM

## 2018-03-17 DIAGNOSIS — E03.4 IDIOPATHIC ATROPHIC HYPOTHYROIDISM: ICD-10-CM

## 2018-03-17 DIAGNOSIS — N20.0 STONE, KIDNEY: ICD-10-CM

## 2018-03-17 LAB
ALBUMIN SERPL BCP-MCNC: 3.5 G/DL (ref 3.5–5)
ALP SERPL-CCNC: 71 U/L (ref 46–116)
ALT SERPL W P-5'-P-CCNC: 22 U/L (ref 12–78)
ANION GAP SERPL CALCULATED.3IONS-SCNC: 9 MMOL/L (ref 4–13)
AST SERPL W P-5'-P-CCNC: 21 U/L (ref 5–45)
BASOPHILS # BLD AUTO: 0.02 THOUSANDS/ΜL (ref 0–0.1)
BASOPHILS NFR BLD AUTO: 0 % (ref 0–1)
BILIRUB SERPL-MCNC: 0.8 MG/DL (ref 0.2–1)
BILIRUB UR QL STRIP: NEGATIVE
BUN SERPL-MCNC: 21 MG/DL (ref 5–25)
CALCIUM SERPL-MCNC: 9.3 MG/DL (ref 8.3–10.1)
CHLORIDE SERPL-SCNC: 107 MMOL/L (ref 100–108)
CHOLEST SERPL-MCNC: 110 MG/DL (ref 50–200)
CLARITY UR: CLEAR
CO2 SERPL-SCNC: 29 MMOL/L (ref 21–32)
COLOR UR: YELLOW
CREAT SERPL-MCNC: 1.45 MG/DL (ref 0.6–1.3)
EOSINOPHIL # BLD AUTO: 0.22 THOUSAND/ΜL (ref 0–0.61)
EOSINOPHIL NFR BLD AUTO: 3 % (ref 0–6)
ERYTHROCYTE [DISTWIDTH] IN BLOOD BY AUTOMATED COUNT: 14.9 % (ref 11.6–15.1)
GFR SERPL CREATININE-BSD FRML MDRD: 45 ML/MIN/1.73SQ M
GLUCOSE P FAST SERPL-MCNC: 109 MG/DL (ref 65–99)
GLUCOSE UR STRIP-MCNC: NEGATIVE MG/DL
HCT VFR BLD AUTO: 39.6 % (ref 36.5–49.3)
HDLC SERPL-MCNC: 40 MG/DL (ref 40–60)
HGB BLD-MCNC: 12.8 G/DL (ref 12–17)
HGB UR QL STRIP.AUTO: NEGATIVE
KETONES UR STRIP-MCNC: NEGATIVE MG/DL
LDLC SERPL CALC-MCNC: 54 MG/DL (ref 0–100)
LEUKOCYTE ESTERASE UR QL STRIP: NEGATIVE
LYMPHOCYTES # BLD AUTO: 1.26 THOUSANDS/ΜL (ref 0.6–4.47)
LYMPHOCYTES NFR BLD AUTO: 19 % (ref 14–44)
MCH RBC QN AUTO: 30.5 PG (ref 26.8–34.3)
MCHC RBC AUTO-ENTMCNC: 32.3 G/DL (ref 31.4–37.4)
MCV RBC AUTO: 94 FL (ref 82–98)
MONOCYTES # BLD AUTO: 0.97 THOUSAND/ΜL (ref 0.17–1.22)
MONOCYTES NFR BLD AUTO: 15 % (ref 4–12)
NEUTROPHILS # BLD AUTO: 4.04 THOUSANDS/ΜL (ref 1.85–7.62)
NEUTS SEG NFR BLD AUTO: 63 % (ref 43–75)
NITRITE UR QL STRIP: NEGATIVE
PH UR STRIP.AUTO: 6 [PH] (ref 4.5–8)
PLATELET # BLD AUTO: 232 THOUSANDS/UL (ref 149–390)
PMV BLD AUTO: 10.2 FL (ref 8.9–12.7)
POTASSIUM SERPL-SCNC: 3.7 MMOL/L (ref 3.5–5.3)
PROT SERPL-MCNC: 6.7 G/DL (ref 6.4–8.2)
PROT UR STRIP-MCNC: NEGATIVE MG/DL
RBC # BLD AUTO: 4.2 MILLION/UL (ref 3.88–5.62)
SODIUM SERPL-SCNC: 145 MMOL/L (ref 136–145)
SP GR UR STRIP.AUTO: 1.02 (ref 1–1.03)
TRIGL SERPL-MCNC: 80 MG/DL
TSH SERPL DL<=0.05 MIU/L-ACNC: 6.12 UIU/ML (ref 0.36–3.74)
UROBILINOGEN UR QL STRIP.AUTO: 0.2 E.U./DL
WBC # BLD AUTO: 6.51 THOUSAND/UL (ref 4.31–10.16)

## 2018-03-17 PROCEDURE — 81003 URINALYSIS AUTO W/O SCOPE: CPT | Performed by: INTERNAL MEDICINE

## 2018-03-17 PROCEDURE — 36415 COLL VENOUS BLD VENIPUNCTURE: CPT

## 2018-03-17 PROCEDURE — 71046 X-RAY EXAM CHEST 2 VIEWS: CPT

## 2018-03-17 PROCEDURE — 80061 LIPID PANEL: CPT

## 2018-03-17 PROCEDURE — 84443 ASSAY THYROID STIM HORMONE: CPT

## 2018-03-17 PROCEDURE — 80053 COMPREHEN METABOLIC PANEL: CPT

## 2018-03-17 PROCEDURE — 85025 COMPLETE CBC W/AUTO DIFF WBC: CPT

## 2018-03-17 PROCEDURE — 74018 RADEX ABDOMEN 1 VIEW: CPT

## 2018-03-28 ENCOUNTER — OFFICE VISIT (OUTPATIENT)
Dept: UROLOGY | Facility: CLINIC | Age: 83
End: 2018-03-28
Payer: MEDICARE

## 2018-03-28 VITALS
HEIGHT: 68 IN | SYSTOLIC BLOOD PRESSURE: 129 MMHG | WEIGHT: 200 LBS | DIASTOLIC BLOOD PRESSURE: 72 MMHG | HEART RATE: 65 BPM | BODY MASS INDEX: 30.31 KG/M2

## 2018-03-28 DIAGNOSIS — N20.0 KIDNEY STONES: ICD-10-CM

## 2018-03-28 DIAGNOSIS — N40.1 BPH WITH OBSTRUCTION/LOWER URINARY TRACT SYMPTOMS: Primary | ICD-10-CM

## 2018-03-28 DIAGNOSIS — N13.8 BPH WITH OBSTRUCTION/LOWER URINARY TRACT SYMPTOMS: Primary | ICD-10-CM

## 2018-03-28 LAB
SL AMB  POCT GLUCOSE, UA: ABNORMAL
SL AMB LEUKOCYTE ESTERASE,UA: ABNORMAL
SL AMB POCT BLOOD,UA: ABNORMAL
SL AMB POCT CLARITY,UA: CLEAR
SL AMB POCT COLOR,UA: YELLOW
SL AMB POCT KETONES,UA: ABNORMAL
SL AMB POCT NITRITE,UA: ABNORMAL
SL AMB POCT PH,UA: 5
SL AMB POCT URINE PROTEIN: ABNORMAL

## 2018-03-28 PROCEDURE — 99213 OFFICE O/P EST LOW 20 MIN: CPT | Performed by: UROLOGY

## 2018-03-28 PROCEDURE — 81002 URINALYSIS NONAUTO W/O SCOPE: CPT | Performed by: UROLOGY

## 2018-04-05 ENCOUNTER — TELEPHONE (OUTPATIENT)
Dept: CARDIOLOGY CLINIC | Facility: CLINIC | Age: 83
End: 2018-04-05

## 2018-04-05 NOTE — TELEPHONE ENCOUNTER
I embarked in [place], I am in the [facility] office next week and you could schedule him in the office with me Monday Tuesday or Wednesday  However if his symptoms are worse, he should go to the ER  If that does not work, just find any cardiologist with the soonest availability

## 2018-04-05 NOTE — TELEPHONE ENCOUNTER
Pt's spouse Adam Perez called to have Britt Xie seen this week by you  Britt Xie c/o worsening SOB over the last few weeks  Weight is unchanged, no worsening edema  Has edema on off day of Lasix but resolves when days Lasix is given  Currently on Lasix 40 mg QOD  No availability by you or Payton Bolden anytime soon  Please advise     C/b # [phone]

## 2018-04-09 ENCOUNTER — OFFICE VISIT (OUTPATIENT)
Dept: CARDIOLOGY CLINIC | Facility: CLINIC | Age: 83
End: 2018-04-09
Payer: MEDICARE

## 2018-04-09 VITALS
DIASTOLIC BLOOD PRESSURE: 76 MMHG | HEIGHT: 68 IN | SYSTOLIC BLOOD PRESSURE: 124 MMHG | HEART RATE: 62 BPM | BODY MASS INDEX: 29.86 KG/M2 | WEIGHT: 197 LBS | OXYGEN SATURATION: 92 %

## 2018-04-09 DIAGNOSIS — I25.810 CORONARY ARTERY DISEASE INVOLVING CORONARY BYPASS GRAFT OF NATIVE HEART WITHOUT ANGINA PECTORIS: ICD-10-CM

## 2018-04-09 DIAGNOSIS — I10 BENIGN ESSENTIAL HYPERTENSION: ICD-10-CM

## 2018-04-09 DIAGNOSIS — E78.5 HYPERLIPIDEMIA, UNSPECIFIED HYPERLIPIDEMIA TYPE: ICD-10-CM

## 2018-04-09 DIAGNOSIS — I25.5 ISCHEMIC CARDIOMYOPATHY: ICD-10-CM

## 2018-04-09 DIAGNOSIS — R06.00 DOE (DYSPNEA ON EXERTION): ICD-10-CM

## 2018-04-09 DIAGNOSIS — I47.2 V TACH (HCC): ICD-10-CM

## 2018-04-09 DIAGNOSIS — R06.02 SHORTNESS OF BREATH: Primary | ICD-10-CM

## 2018-04-09 DIAGNOSIS — I48.0 PAROXYSMAL ATRIAL FIBRILLATION (HCC): ICD-10-CM

## 2018-04-09 PROCEDURE — 93000 ELECTROCARDIOGRAM COMPLETE: CPT | Performed by: INTERNAL MEDICINE

## 2018-04-09 PROCEDURE — 99214 OFFICE O/P EST MOD 30 MIN: CPT | Performed by: INTERNAL MEDICINE

## 2018-04-09 RX ORDER — FUROSEMIDE 40 MG/1
40 TABLET ORAL DAILY
Qty: 90 TABLET | Refills: 3 | Status: SHIPPED | OUTPATIENT
Start: 2018-04-09 | End: 2018-04-26 | Stop reason: SDUPTHER

## 2018-04-09 NOTE — PROGRESS NOTES
Follow-up - Cardiology   Jayant Casiano 80 y o  male MRN: [id number]        Problems    1  Shortness of breath  POCT ECG    Basic metabolic panel    Echo complete with contrast if indicated    NM myocardial perfusion spect (rx stress and/or rest)    furosemide (LASIX) 40 mg tablet   2  Coronary artery disease involving coronary bypass graft of native heart without angina pectoris     3  Hyperlipidemia, unspecified hyperlipidemia type     4  Benign essential hypertension     5  Ischemic cardiomyopathy     6  Paroxysmal atrial fibrillation (HCC)     7  V tach ([address] )     8  MARTINES (dyspnea on exertion)         Plan    Wesly came to see me for worsening shortness of breath that has been persistent since January, but seem to have initially started as may be an upper respiratory infection  He denies any significant weight gain, but on examination he does have evidence of jugular venous distension suggesting some signs of volume overload  It has been about 3 years since we have done major cardiac testing, and his EKG is essentially uninterpretable from a ischemic standpoint due to the biventricular paced rhythm  I recommended he undergo a repeat ischemic evaluation by Arianna Steve stress test     I have recommended he undergo an echocardiogram to reassess LV function and assess for any new valvular disease  I recommended increase furosemide to 40 mg once a day, have repeat blood work done in 2 weeks prior to seeing me  If this testing proves negative, I am a little concerned regarding the amiodarone and we will have to consider further testing in that regard, and or discontinuation  HPI: Jayant Casiano is a 80y o  year old male  With a longstanding history of CAD, CABG, ischemic cardiomyopathy with last known ejection fraction 16%, chronic systolic/ diastolic CHF, ventricular tachycardia and paroxysmal atrial fibrillation, on long-term amiodarone and mexiletine     He comes to me for an acute visit for worsening shortness of breath and dyspnea  He does have mild conversational dyspnea in the office today  He denies any significant weight gain, denies any noncompliance with his furosemide 40 mg every other day, denies any worsening edema  He denies a cough, fever, chills  He recently was in [place] but this was about 3 months ago  He denies any leg pain or leg tenderness  He has a  CRT- D device  He is in an appropriate paced rhythm today, his pacemaker checks as of February revealed 100% ventricular paced rhythm  He does have a history of abnormal PFTs from 2014 with stable mild restrictive pattern at that time, DLCO 72%  He has had surveillance studies otherwise from an amiodarone standpoint but has refused to have repeat PFTs done, and thus has undergone chest x-ray imaging  He has mild chronic changes of the bilateral bases described as atelectasis and scarring  Recent chest x-ray 3/17/2018 did not reveal any significant interstitial abnormalities  He has CKD stage 3, had an episode of acute kidney injury last October, creatinine over 2 at that time, but more recently down to his baseline 1 45  He follows with Nephrology  Review of Systems   Constitutional: Positive for fatigue  Positive insomnia   HENT: Negative  Respiratory: Positive for chest tightness and shortness of breath  Negative for cough and wheezing  Cardiovascular: Negative  Negative for chest pain, palpitations and leg swelling  Gastrointestinal: Negative  Genitourinary: Negative  Musculoskeletal: Negative  Skin: Negative  Neurological: Negative  Hematological: Negative  Psychiatric/Behavioral: Negative            Past Medical History:   Diagnosis Date    AICD (automatic cardioverter/defibrillator) present 2004    AICD (automatic cardioverter/defibrillator) present 2006    AICD (automatic cardioverter/defibrillator) present 2013    [facility]    Arthritis     R knee and neck  Atrial fibrillation (HCC)     BPH (benign prostatic hyperplasia)     CAD (coronary artery disease) of artery bypass graft     CHF (congestive heart failure) (HCC)     combine    Coronary artery disease     Disease of thyroid gland     Hyperlipidemia     Hypertension     Ischemic cardiomyopathy     Left ureteral calculus 10/17/2017    Renal disorder     V tach (HCC)      History   Alcohol Use No     History   Drug Use No     History   Smoking Status    Never Smoker   Smokeless Tobacco    Never Used       Allergies:  No Known Allergies    Medications:     Current Outpatient Prescriptions:     acetaminophen (TYLENOL) 500 mg tablet, Take 500 mg by mouth every 6 (six) hours as needed for mild pain, Disp: , Rfl:     amiodarone 200 mg tablet, Take 200 mg by mouth daily  , Disp: , Rfl:     amLODIPine (NORVASC) 2 5 mg tablet, Take 2 5 mg by mouth daily  , Disp: , Rfl:     aspirin 81 MG tablet, Take 81 mg by mouth daily  , Disp: , Rfl:     furosemide (LASIX) 40 mg tablet, Take 1 tablet (40 mg total) by mouth daily, Disp: 90 tablet, Rfl: 3    Levothyroxine Sodium 112 MCG CAPS, Take 112 mcg by mouth daily  , Disp: , Rfl:     LORazepam (ATIVAN) 1 mg tablet, Take 1 mg by mouth as needed for anxiety  , Disp: , Rfl:     Metoprolol Tartrate 75 MG TABS, Take 75 mg by mouth 2 (two) times a day , Disp: , Rfl:     mexiletine (MEXITIL) 150 mg capsule, Take 150 mg by mouth 2 (two) times a day , Disp: , Rfl:     Multiple Vitamins-Minerals (MULTIVITAMIN WITH MINERALS) tablet, Take 1 tablet by mouth daily  , Disp: , Rfl:     nitroglycerin (NITROSTAT) 0 4 mg SL tablet, Place 0 4 mg under the tongue every 5 (five) minutes as needed for chest pain  , Disp: , Rfl:     Potassium Chloride ER 20 MEQ TBCR, Take 20 mEq by mouth every other day  , Disp: , Rfl:     pravastatin (PRAVACHOL) 80 mg tablet, Take 80 mg by mouth daily  , Disp: , Rfl:     tamsulosin (FLOMAX) 0 4 mg, Take 1 capsule (0 4 mg total) by mouth daily with dinner, Disp: 90 capsule, Rfl: 3      Vitals:    04/09/18 1344   BP: 124/76   Pulse: 62   SpO2: 92%     Weight (last 2 days)     Date/Time   Weight    04/09/18 1344  89 4 (197)            Physical Exam   Constitutional: He is oriented to person, place, and time  He appears well-developed and well-nourished  No distress  HENT:   Head: Normocephalic and atraumatic  Mouth/Throat: Oropharynx is clear and moist    Eyes: Conjunctivae and EOM are normal  Pupils are equal, round, and reactive to light  No scleral icterus  Neck: Normal range of motion  Neck supple  JVD present  No thyromegaly present  Cardiovascular: Normal rate, regular rhythm, normal heart sounds and intact distal pulses  Exam reveals no gallop and no friction rub  No murmur heard  Pulmonary/Chest: Effort normal  No respiratory distress  He has no wheezes  He has rales (  Faint basilar crackles)  Abdominal: Soft  Bowel sounds are normal  He exhibits no distension  There is no tenderness  Musculoskeletal: Normal range of motion  He exhibits no edema or deformity  Neurological: He is alert and oriented to person, place, and time  No cranial nerve deficit  Skin: Skin is warm and dry  No rash noted  He is not diaphoretic  No erythema  Psychiatric: He has a normal mood and affect           Laboratory Studies:  CMP:     Sodium 136 - 145 mmol/L 145     Potassium 3 5 - 5 3 mmol/L 3 7     Chloride 100 - 108 mmol/L 107     CO2 21 - 32 mmol/L 29     Anion Gap 4 - 13 mmol/L 9     BUN 5 - 25 mg/dL 21     Creatinine 0 60 - 1 30 mg/dL 1 45   H    Comments: Standardized to IDMS reference method   Glucose, Fasting 65 - 99 mg/dL 109   H    Comments:         NT-proBNP:   Lab Results   Component Value Date    NTBNP 3,682 (H) 06/26/2016      Coags:    Lipid Profile:   Lab Results   Component Value Date    CHOL 110 03/17/2018     Lab Results   Component Value Date    HDL 40 03/17/2018     Lab Results   Component Value Date    LDLCALC 54 03/17/2018     Lab Results   Component Value Date    TRIG 80 2018       Cardiac testing:   EKG reviewed personally:  AV paced rhythm, by biventricular pacemaker detected  Results for orders placed in visit on 09/11/15   Echo complete with contrast if indicated    Narrative [address]   [facility], [address] [facility]   Phone: [phone]   TRANSTHORACIC ECHOCARDIOGRAM   2D, M-MODE, DOPPLER, AND COLOR DOPPLER   Study date:  12-Sep-2015   Patient: Adrianne Holley   MR number: [id number]   Account number: [de-identified]   : 1935   Age: 78 years   Gender: Male   Status: Inpatient   Location: Bedside   Height: 68 in   Weight: 207 5 lb   BP: 134/ 63 mmHg   Indications: [place] cardiac device; Cardiomyopathy   Diagnoses: 425 8 - CARDIOMYOPATH IN OTH DIS, 996 01 - [address] [employer] [facility]   Sonographer:  Les Meraz   Primary Physician:  Nayana Gruber MD   Referring Physician:  Nayana Gruber MD   Group:  [facility]   Interpreting Physician:  Joanna Osgood, MD   SUMMARY   LEFT VENTRICLE:   The ventricle was moderately dilated  Systolic function was markedly reduced  Ejection fraction was estimated to be   30 %  There was moderate diffuse hypokinesis with distinct regional wall    motion   abnormalities  There was akinesis of the entire inferior and inferolateral   walls  Doppler parameters were consistent with a restrictive pattern,   indicative of decreased left ventricular diastolic compliance and/or increased   left atrial pressure (grade 3 diastolic dysfunction)  LEFT ATRIUM:   The atrium was mildly to moderately dilated  MITRAL VALVE:   There was mild to moderate regurgitation  TRICUSPID VALVE:   There was mild regurgitation  HISTORY: PRIOR HISTORY: HTN; Ischemic CM; VTACH; PICD   PROCEDURE: The procedure was performed at the bedside   This was a routine   TRANSTHORACIC ECHOCARDIOGRAM   Patient: Adrianne Holley   MR number: [id number]    ------ Page 2   study  The transthoracic approach was used  The study included complete 2D   imaging, M-mode, complete spectral Doppler, and color Doppler  Echocardiographic views were limited due to poor acoustic window availability,   decreased penetration, and lung interference  This was a technically difficult   study  LEFT VENTRICLE: The ventricle was moderately dilated  Systolic function was   markedly reduced  Ejection fraction was estimated to be 30 %  There was   moderate diffuse hypokinesis with distinct regional wall motion abnormalities  There was akinesis of the entire inferior and inferolateral walls  Wall   thickness was normal  DOPPLER: Doppler parameters were consistent with a   restrictive pattern, indicative of decreased left ventricular diastolic   compliance and/or increased left atrial pressure (grade 3 diastolic   dysfunction)  RIGHT VENTRICLE: The size was normal  Systolic function was normal  A pacing   wire was present  LEFT ATRIUM: The atrium was mildly to moderately dilated  RIGHT ATRIUM: Size was normal    MITRAL VALVE: Valve structure was normal  There was mild thickening  There was   normal leaflet separation  DOPPLER: The transmitral velocity was within the   normal range  There was no evidence for stenosis  There was mild to moderate   regurgitation  AORTIC VALVE: The valve was probably trileaflet  Leaflets exhibited normal   thickness and normal cuspal separation  DOPPLER: Transaortic velocity was   within the normal range  There was no evidence for stenosis  There was no   regurgitation  TRICUSPID VALVE: The valve structure was normal  There was normal leaflet   separation  DOPPLER: The transtricuspid velocity was within the normal range  There was no evidence for stenosis  There was mild regurgitation  Estimated   peak PA pressure was 30 mmHg  PULMONIC VALVE: Not well visualized  PERICARDIUM: There was no pericardial effusion     AORTA: The root exhibited normal size    SYSTEMIC VEINS: IVC: The inferior vena cava was not well visualized  SYSTEM MEASUREMENT TABLES   2D   %FS: 14 77 %   AV Diam: 3 02 cm   TRANSTHORACIC ECHOCARDIOGRAM   Patient: Jessica Jones   MR number: [id number]    ------ Page 3   EDV(Teich): 182 1 ml   EF Biplane: 32 05 %   EF(Teich): 30 76 %   ESV(Teich): 126 07 ml   IVSd: 0 58 cm   LA Area: 23 12 cm2   LA Diam: 4 72 cm   LVEDV MOD A2C: 130 74 ml   LVEDV MOD A4C: 190 68 ml   LVEDV MOD BP: 165 48 ml   LVEF MOD A2C: 25 79 %   LVEF MOD A4C: 37 68 %   LVESV MOD A2C: 97 02 ml   LVESV MOD A4C: 118 83 ml   LVESV MOD BP: 112 45 ml   LVIDd: 6 03 cm   LVIDs: 5 14 cm   LVLd A2C: 8 07 cm   LVLd A4C: 9 28 cm   LVLs A2C: 7 38 cm   LVLs A4C: 6 38 cm   LVPWd: 0 59 cm   RA Area: 15 54 cm2   RVIDd: 3 2 cm   SV MOD A2C: 33 72 ml   SV MOD A4C: 71 85 ml   SV(Teich): 56 02 ml   CW   RAP: 10 mmHg   TR Vmax: 2 5 m/s   TR maxP 96 mmHg   MM   TAPSE: 1 98 cm   PW   E': 0 07 m/s   E/E': 12 63   MV A Hemanth: 0 54 m/s   MV Dec Monroe: 5 44 m/s2   MV DecT: 168 43 ms   MV E Hemanth: 0 92 m/s   MV E/A Ratio: 1 71   MV PHT: 48 85 ms   MVA By PHT: 4 5 cm2   RVSP: 34 96 mmHg   [facility]   TRANSTHORACIC ECHOCARDIOGRAM   Patient: Jessica Jones   MR number: [id number]    ------ Page 4   Prepared and electronically signed by   Adina Lyles MD   Signed 12-Sep-2015 15:47:33      No results found for this or any previous visit  No results found for this or any previous visit  Results for orders placed in visit on 09/11/15   NM myocardial perfusion spect (stress and/or rest)    Narrative PHARMACOLOGIC STRESS TEST, LEXISCAN          INDICATION-  Shortness of breath, fatigue, abnormal EKG, old MI   COMPARISON- 2014   TECHNIQUE-  Pharmacologic stress testing was performed utilizing   Stupil  SPECT myocardial perfusion images was performed  Dosages of   TC-99-mm Myoview were 11 mCi and 33 mCi IV   Time peak imaging for rest 70 minutes and stress 70 minutes  Both rest and stress images were   performed  Images were then reconstructed in the short, vertical and   horizontal long axes projections  Baseline heart rate 61 beats per minute  Peak heart of 74 beats per minute  Baseline blood pressure 117/69 mmHg  Peak blood pressure 103/50 mmHg  Symptoms-   Belly cramps   Please see cardiology report of physiologic data  FINDINGS-   OVERALL QUALITY-   Acceptable  ARTIFACT-  None  PERFUSION IMAGES-   Stable large fixed defect in the inferior and   inferolateral wall compatible with scarring  No reversible ischemia   demonstrated  WALL MOTION-  Akinesis in the inferior and inferolateral walls   compatible with scarring  Additional global hypokinesis  EJECTION FRACTION-  43 %   IMPRESSION-   1   Stable large fixed defect in the inferior and inferolateral wall   compatible with scarring  No reversible ischemia demonstrated  2  Left ventricular ejection fraction- 43%   Transcribed on- [id number]           - CHUNG Serna MD        Reading Radiologist- CHUNG Serna MD        Electronically Signed- CHUNG Serna MD        Released Date Time- 09/14/15 1312      ------------------------------------------------------------------------------   Dima Worrell MD    Portions of the record may have been created with voice recognition software   Occasional wrong word or "sound a like" substitutions may have occurred due to the inherent limitations of voice recognition software   Read the chart carefully and recognize, using context, where substitutions have occurred

## 2018-04-13 ENCOUNTER — HOSPITAL ENCOUNTER (OUTPATIENT)
Dept: NON INVASIVE DIAGNOSTICS | Facility: CLINIC | Age: 83
Discharge: HOME | End: 2018-04-13
Payer: MEDICARE

## 2018-04-13 DIAGNOSIS — R06.02 SHORTNESS OF BREATH: ICD-10-CM

## 2018-04-13 PROCEDURE — 78452 HT MUSCLE IMAGE SPECT MULT: CPT

## 2018-04-13 PROCEDURE — A9502 TC99M TETROFOSMIN: HCPCS

## 2018-04-13 PROCEDURE — 93017 CV STRESS TEST TRACING ONLY: CPT

## 2018-04-13 RX ADMIN — REGADENOSON 0.4 MG: 0.08 INJECTION, SOLUTION INTRAVENOUS at 09:10

## 2018-04-16 LAB
ARRHY DURING EX: NORMAL
CHEST PAIN STATEMENT: NORMAL
MAX DIASTOLIC BP: 80 MMHG
MAX HEART RATE: 65 BPM
MAX PREDICTED HEART RATE: 138 BPM
MAX. SYSTOLIC BP: 142 MMHG
PROTOCOL NAME: NORMAL
REASON FOR TERMINATION: NORMAL
TARGET HR FORMULA: NORMAL
TEST INDICATION: NORMAL
TIME IN EXERCISE PHASE: NORMAL

## 2018-04-24 ENCOUNTER — HOSPITAL ENCOUNTER (OUTPATIENT)
Dept: NON INVASIVE DIAGNOSTICS | Facility: HOSPITAL | Age: 83
Discharge: HOME | End: 2018-04-24
Attending: INTERNAL MEDICINE
Payer: MEDICARE

## 2018-04-24 DIAGNOSIS — R06.02 SHORTNESS OF BREATH: ICD-10-CM

## 2018-04-24 PROCEDURE — 93306 TTE W/DOPPLER COMPLETE: CPT | Performed by: INTERNAL MEDICINE

## 2018-04-24 PROCEDURE — 93306 TTE W/DOPPLER COMPLETE: CPT

## 2018-04-25 ENCOUNTER — TRANSCRIBE ORDERS (OUTPATIENT)
Dept: LAB | Facility: CLINIC | Age: 83
End: 2018-04-25

## 2018-04-25 ENCOUNTER — APPOINTMENT (OUTPATIENT)
Dept: LAB | Facility: CLINIC | Age: 83
End: 2018-04-25
Payer: MEDICARE

## 2018-04-25 DIAGNOSIS — R06.02 SHORTNESS OF BREATH: ICD-10-CM

## 2018-04-25 LAB
ANION GAP SERPL CALCULATED.3IONS-SCNC: 5 MMOL/L (ref 4–13)
BUN SERPL-MCNC: 19 MG/DL (ref 5–25)
CALCIUM SERPL-MCNC: 9.3 MG/DL (ref 8.3–10.1)
CHLORIDE SERPL-SCNC: 106 MMOL/L (ref 100–108)
CO2 SERPL-SCNC: 31 MMOL/L (ref 21–32)
CREAT SERPL-MCNC: 1.28 MG/DL (ref 0.6–1.3)
GFR SERPL CREATININE-BSD FRML MDRD: 52 ML/MIN/1.73SQ M
GLUCOSE P FAST SERPL-MCNC: 113 MG/DL (ref 65–99)
POTASSIUM SERPL-SCNC: 4 MMOL/L (ref 3.5–5.3)
SODIUM SERPL-SCNC: 142 MMOL/L (ref 136–145)

## 2018-04-25 PROCEDURE — 80048 BASIC METABOLIC PNL TOTAL CA: CPT

## 2018-04-25 PROCEDURE — 36415 COLL VENOUS BLD VENIPUNCTURE: CPT

## 2018-04-26 ENCOUNTER — OFFICE VISIT (OUTPATIENT)
Dept: CARDIOLOGY CLINIC | Facility: CLINIC | Age: 83
End: 2018-04-26
Payer: MEDICARE

## 2018-04-26 VITALS
HEIGHT: 67 IN | BODY MASS INDEX: 30.21 KG/M2 | DIASTOLIC BLOOD PRESSURE: 70 MMHG | HEART RATE: 68 BPM | SYSTOLIC BLOOD PRESSURE: 122 MMHG | OXYGEN SATURATION: 90 % | WEIGHT: 192.5 LBS

## 2018-04-26 DIAGNOSIS — I47.2 V TACH (HCC): ICD-10-CM

## 2018-04-26 DIAGNOSIS — I25.810 CORONARY ARTERY DISEASE INVOLVING CORONARY BYPASS GRAFT OF NATIVE HEART WITHOUT ANGINA PECTORIS: Primary | ICD-10-CM

## 2018-04-26 DIAGNOSIS — I48.0 PAROXYSMAL ATRIAL FIBRILLATION (HCC): ICD-10-CM

## 2018-04-26 DIAGNOSIS — I10 BENIGN ESSENTIAL HYPERTENSION: ICD-10-CM

## 2018-04-26 DIAGNOSIS — I25.5 ISCHEMIC CARDIOMYOPATHY: ICD-10-CM

## 2018-04-26 DIAGNOSIS — R06.02 SHORTNESS OF BREATH: ICD-10-CM

## 2018-04-26 DIAGNOSIS — N18.30 CKD (CHRONIC KIDNEY DISEASE) STAGE 3, GFR 30-59 ML/MIN (HCC): ICD-10-CM

## 2018-04-26 DIAGNOSIS — I50.22 CHRONIC SYSTOLIC CONGESTIVE HEART FAILURE (HCC): ICD-10-CM

## 2018-04-26 PROCEDURE — 99214 OFFICE O/P EST MOD 30 MIN: CPT | Performed by: INTERNAL MEDICINE

## 2018-04-26 RX ORDER — POTASSIUM CHLORIDE 1500 MG/1
20 TABLET, FILM COATED, EXTENDED RELEASE ORAL DAILY
Qty: 90 TABLET | Refills: 3 | Status: SHIPPED | OUTPATIENT
Start: 2018-04-26 | End: 2018-09-20

## 2018-04-26 RX ORDER — FUROSEMIDE 40 MG/1
40 TABLET ORAL DAILY
Qty: 90 TABLET | Refills: 3 | Status: SHIPPED | OUTPATIENT
Start: 2018-04-26 | End: 2018-05-17 | Stop reason: SDUPTHER

## 2018-04-26 NOTE — PROGRESS NOTES
Follow-up - Cardiology   Tracie Juan 80 y o  male MRN: [id number]        Problems    1  Coronary artery disease involving coronary bypass graft of native heart without angina pectoris     2  Chronic systolic congestive heart failure (HCC)  Basic metabolic panel   3  Ischemic cardiomyopathy     4  Paroxysmal atrial fibrillation (HCC)     5  V tach ([address] )     6  Benign essential hypertension     7  CKD (chronic kidney disease) stage 3, GFR 30-59 ml/min        CHF much improved, now on daily Lasix, exam much improved with less JVD, rales have resolved, no edema  Symptoms have improved  Follow-up testing revealed no significant change with large inferior wall scar, EF about 30% by echo, no ischemia by nuclear stress testing  Plan      No continue furosemide daily   would not make any other changes to other medications   creatinine has actually much improved since increasing diuresis, and it is actually the best creatinine is had enormous 2 years  It was 1 28      at this point I think we can resume usual follow-up at 6 months      HPI: Tracie Juan is a 80y o  year old male  With a longstanding history of CAD, CABG, ischemic cardiomyopathy with last known ejection fraction 09%, chronic systolic/ diastolic CHF, ventricular tachycardia and paroxysmal atrial fibrillation, on long-term amiodarone and mexiletine  He returns for a follow-up visit because of recent shortness of breath which was likely representative of acute systolic CHF  Furosemide increased to 40 mg daily, he is much less short of breath today  Stress test and echocardiogram were obtained for follow-up testing, these do not reveal any significant changes from prior with a large inferior scar and EF between 23 and 30% depending on the modality of testing  His hypertension is under good control  He denies any device shocks  He continues on the same medications except for the furosemide dose    He does not take an ACE-inhibitor due to history of acute kidney injury and hypotension in 2015 that necessitated discontinuation  Review of Systems   Constitutional: Negative for fatigue  Positive insomnia   HENT: Negative  Respiratory: Positive for shortness of breath  Negative for cough, chest tightness and wheezing  Cardiovascular: Negative  Negative for chest pain, palpitations and leg swelling  Gastrointestinal: Negative  Genitourinary: Negative  Musculoskeletal: Negative  Skin: Negative  Neurological: Negative  Hematological: Negative  Psychiatric/Behavioral: Negative  Past Medical History:   Diagnosis Date    AICD (automatic cardioverter/defibrillator) present 2004    AICD (automatic cardioverter/defibrillator) present 2006    AICD (automatic cardioverter/defibrillator) present 2013    [facility]    Arthritis     R knee and neck    Atrial fibrillation (HCC)     BPH (benign prostatic hyperplasia)     CAD (coronary artery disease) of artery bypass graft     CHF (congestive heart failure) (HCC)     combine    Coronary artery disease     Disease of thyroid gland     Hyperlipidemia     Hypertension     Ischemic cardiomyopathy     Left ureteral calculus 10/17/2017    Renal disorder     V tach (HCC)      History   Alcohol Use No     History   Drug Use No     History   Smoking Status    Never Smoker   Smokeless Tobacco    Never Used       Allergies:  No Known Allergies    Medications:     Current Outpatient Prescriptions:     acetaminophen (TYLENOL) 500 mg tablet, Take 500 mg by mouth every 6 (six) hours as needed for mild pain, Disp: , Rfl:     amiodarone 200 mg tablet, Take 200 mg by mouth daily  , Disp: , Rfl:     amLODIPine (NORVASC) 2 5 mg tablet, Take 2 5 mg by mouth daily  , Disp: , Rfl:     aspirin 81 MG tablet, Take 81 mg by mouth daily  , Disp: , Rfl:     furosemide (LASIX) 40 mg tablet, Take 1 tablet (40 mg total) by mouth daily, Disp: 90 tablet, Rfl: 3   Levothyroxine Sodium 125 MCG CAPS, Take 112 mcg by mouth daily  , Disp: , Rfl:     LORazepam (ATIVAN) 1 mg tablet, Take 1 mg by mouth as needed for anxiety  , Disp: , Rfl:     Metoprolol Tartrate 75 MG TABS, Take 75 mg by mouth 2 (two) times a day , Disp: , Rfl:     mexiletine (MEXITIL) 150 mg capsule, Take 150 mg by mouth 2 (two) times a day , Disp: , Rfl:     Multiple Vitamins-Minerals (MULTIVITAMIN WITH MINERALS) tablet, Take 1 tablet by mouth daily  , Disp: , Rfl:     nitroglycerin (NITROSTAT) 0 4 mg SL tablet, Place 0 4 mg under the tongue every 5 (five) minutes as needed for chest pain  , Disp: , Rfl:     Potassium Chloride ER 20 MEQ TBCR, Take 20 mEq by mouth daily  , Disp: , Rfl:     pravastatin (PRAVACHOL) 80 mg tablet, Take 80 mg by mouth daily  , Disp: , Rfl:     tamsulosin (FLOMAX) 0 4 mg, Take 1 capsule (0 4 mg total) by mouth daily with dinner, Disp: 90 capsule, Rfl: 3      Vitals:    04/26/18 1512   BP: 122/70   Pulse: 68   SpO2: 90%     Weight (last 2 days)     Date/Time   Weight    04/26/18 1512  87 3 (192 5)            Physical Exam   Constitutional: He is oriented to person, place, and time  He appears well-developed and well-nourished  No distress  HENT:   Head: Normocephalic and atraumatic  Mouth/Throat: Oropharynx is clear and moist    Eyes: Conjunctivae and EOM are normal  Pupils are equal, round, and reactive to light  No scleral icterus  Neck: Normal range of motion  Neck supple  No JVD present  No thyromegaly present  Cardiovascular: Normal rate, regular rhythm, normal heart sounds and intact distal pulses  Exam reveals no gallop and no friction rub  No murmur heard  Pulmonary/Chest: Effort normal  No respiratory distress  He has no wheezes  He has no rales (  Faint basilar crackles)  Abdominal: Soft  Bowel sounds are normal  He exhibits no distension  There is no tenderness  Musculoskeletal: Normal range of motion  He exhibits no edema or deformity     Neurological: He is alert and oriented to person, place, and time  No cranial nerve deficit  Skin: Skin is warm and dry  No rash noted  He is not diaphoretic  No erythema  Psychiatric: He has a normal mood and affect           Laboratory Studies:  CMP:    Results from last 7 days  Lab Units 18  0806   SODIUM mmol/L 142   POTASSIUM mmol/L 4 0   CHLORIDE mmol/L 106   CO2 mmol/L 31   ANION GAP mmol/L 5   BUN mg/dL 19   CREATININE mg/dL 1 28   CALCIUM mg/dL 9 3   EGFR ml/min/1 73sq m 52      Sodium 136 - 145 mmol/L 145     Potassium 3 5 - 5 3 mmol/L 3 7     Chloride 100 - 108 mmol/L 107     CO2 21 - 32 mmol/L 29     Anion Gap 4 - 13 mmol/L 9     BUN 5 - 25 mg/dL 21     Creatinine 0 60 - 1 30 mg/dL 1 45   H    Comments: Standardized to IDMS reference method   Glucose, Fasting 65 - 99 mg/dL 109   H    Comments:         NT-proBNP:   Lab Results   Component Value Date    NTBNP 3,682 (H) 2016      Coags:    Lipid Profile:   Lab Results   Component Value Date    CHOL 110 2018     Lab Results   Component Value Date    HDL 40 2018     Lab Results   Component Value Date    LDLCALC 54 2018     Lab Results   Component Value Date    TRIG 80 2018       Cardiac testing:   EKG reviewed personally:  AV paced rhythm, by biventricular pacemaker detected  Results for orders placed in visit on 09/11/15   Echo complete with contrast if indicated    Narrative [address]   Phone: [phone]   TRANSTHORACIC ECHOCARDIOGRAM   2D, M-MODE, DOPPLER, AND COLOR DOPPLER   Study date:  12-Sep-2015   Patient: Xochitl Laurent   MR number: [id number]   Account number: [de-identified]   : 1935   Age: 78 years   Gender: Male   Status: Inpatient   Location: Bedside   Height: 68 in   Weight: 207 5 lb   BP: 134/ 63 mmHg   Indications: Malfunc cardiac device; Cardiomyopathy   Diagnoses: 425 8 - CARDIOMYOPATH IN OTH DIS, 996 01 - 768 [facility] Sonographer:  Kyler Lennon   Primary Physician:  Allen Galvin MD   Referring Physician:  Allen Galvin MD   Group:  [facility]   Interpreting Physician:  Effie Duane, MD   SUMMARY   LEFT VENTRICLE:   The ventricle was moderately dilated  Systolic function was markedly reduced  Ejection fraction was estimated to be   30 %  There was moderate diffuse hypokinesis with distinct regional wall    motion   abnormalities  There was akinesis of the entire inferior and inferolateral   walls  Doppler parameters were consistent with a restrictive pattern,   indicative of decreased left ventricular diastolic compliance and/or increased   left atrial pressure (grade 3 diastolic dysfunction)  LEFT ATRIUM:   The atrium was mildly to moderately dilated  MITRAL VALVE:   There was mild to moderate regurgitation  TRICUSPID VALVE:   There was mild regurgitation  HISTORY: PRIOR HISTORY: HTN; Ischemic CM; VTACH; PICD   PROCEDURE: The procedure was performed at the bedside  This was a routine   TRANSTHORACIC ECHOCARDIOGRAM   Patient: Naina Schaeffer   MR number: [id number]    ------ Page 2   study  The transthoracic approach was used  The study included complete 2D   imaging, M-mode, complete spectral Doppler, and color Doppler  Echocardiographic views were limited due to poor acoustic window availability,   decreased penetration, and lung interference  This was a technically difficult   study  LEFT VENTRICLE: The ventricle was moderately dilated  Systolic function was   markedly reduced  Ejection fraction was estimated to be 30 %  There was   moderate diffuse hypokinesis with distinct regional wall motion abnormalities  There was akinesis of the entire inferior and inferolateral walls   Wall   thickness was normal  DOPPLER: Doppler parameters were consistent with a   restrictive pattern, indicative of decreased left ventricular diastolic   compliance and/or increased left atrial pressure (grade 3 diastolic   dysfunction)  RIGHT VENTRICLE: The size was normal  Systolic function was normal  A pacing   wire was present  LEFT ATRIUM: The atrium was mildly to moderately dilated  RIGHT ATRIUM: Size was normal    MITRAL VALVE: Valve structure was normal  There was mild thickening  There was   normal leaflet separation  DOPPLER: The transmitral velocity was within the   normal range  There was no evidence for stenosis  There was mild to moderate   regurgitation  AORTIC VALVE: The valve was probably trileaflet  Leaflets exhibited normal   thickness and normal cuspal separation  DOPPLER: Transaortic velocity was   within the normal range  There was no evidence for stenosis  There was no   regurgitation  TRICUSPID VALVE: The valve structure was normal  There was normal leaflet   separation  DOPPLER: The transtricuspid velocity was within the normal range  There was no evidence for stenosis  There was mild regurgitation  Estimated   peak PA pressure was 30 mmHg  PULMONIC VALVE: Not well visualized  PERICARDIUM: There was no pericardial effusion  AORTA: The root exhibited normal size  SYSTEMIC VEINS: IVC: The inferior vena cava was not well visualized     SYSTEM MEASUREMENT TABLES   2D   %FS: 14 77 %   AV Diam: 3 02 cm   TRANSTHORACIC ECHOCARDIOGRAM   Patient: Arian Mauricio   MR number: [id number]    ------ Page 3   EDV(Teich): 182 1 ml   EF Biplane: 32 05 %   EF(Teich): 30 76 %   ESV(Teich): 126 07 ml   IVSd: 0 58 cm   LA Area: 23 12 cm2   LA Diam: 4 72 cm   LVEDV MOD A2C: 130 74 ml   LVEDV MOD A4C: 190 68 ml   LVEDV MOD BP: 165 48 ml   LVEF MOD A2C: 25 79 %   LVEF MOD A4C: 37 68 %   LVESV MOD A2C: 97 02 ml   LVESV MOD A4C: 118 83 ml   LVESV MOD BP: 112 45 ml   LVIDd: 6 03 cm   LVIDs: 5 14 cm   LVLd A2C: 8 07 cm   LVLd A4C: 9 28 cm   LVLs A2C: 7 38 cm   LVLs A4C: 6 38 cm   LVPWd: 0 59 cm   RA Area: 15 54 cm2   RVIDd: 3 2 cm   SV MOD A2C: 33 72 ml   SV MOD A4C: 71 85 ml   SV(Teich): 56 02 ml   CW   RAP: 10 mmHg   TR Vmax: 2 5 m/s   TR maxP 96 mmHg   MM   TAPSE: 1 98 cm   PW   E': 0 07 m/s   E/E': 12 63   MV A Hemanth: 0 54 m/s   MV Dec Lincoln: 5 44 m/s2   MV DecT: 168 43 ms   MV E Hemanth: 0 92 m/s   MV E/A Ratio: 1 71   MV PHT: 48 85 ms   MVA By PHT: 4 5 cm2   RVSP: 34 96 mmHg   [facility]   TRANSTHORACIC ECHOCARDIOGRAM   Patient: Charmayne Citron   MR number: [id number]    ------ Page 4   Prepared and electronically signed by   Moises Decker MD   Signed 12-Sep-2015 15:47:33      No results found for this or any previous visit  No results found for this or any previous visit  Results for orders placed in visit on 09/11/15   NM myocardial perfusion spect (stress and/or rest)    Narrative PHARMACOLOGIC STRESS TEST, LEXISCAN          INDICATION-  Shortness of breath, fatigue, abnormal EKG, old MI   COMPARISON- 2014   TECHNIQUE-  Pharmacologic stress testing was performed utilizing   Lexiscan  SPECT myocardial perfusion images was performed  Dosages of   TC-99-mm Myoview were 11 mCi and 33 mCi IV  Time peak imaging for rest   70 minutes and stress 70 minutes  Both rest and stress images were   performed  Images were then reconstructed in the short, vertical and   horizontal long axes projections  Baseline heart rate 61 beats per minute  Peak heart of 74 beats per minute  Baseline blood pressure 117/69 mmHg  Peak blood pressure 103/50 mmHg  Symptoms-   Belly cramps   Please see cardiology report of physiologic data  FINDINGS-   OVERALL QUALITY-   Acceptable  ARTIFACT-  None  PERFUSION IMAGES-   Stable large fixed defect in the inferior and   inferolateral wall compatible with scarring  No reversible ischemia   demonstrated  WALL MOTION-  Akinesis in the inferior and inferolateral walls   compatible with scarring  Additional global hypokinesis     EJECTION FRACTION-  43 %   IMPRESSION-   1   Stable large fixed defect in the inferior and inferolateral wall   compatible with scarring  No reversible ischemia demonstrated  2  Left ventricular ejection fraction- 43%   Transcribed on- [id number]           - CHUNG Valero MD        Reading Radiologist- Boni Sims, CHUNG NEW        Electronically Signed- CHUNG Valero MD        Released Date Time- 09/14/15 1312      ------------------------------------------------------------------------------   Ventura Mauro MD    Portions of the record may have been created with voice recognition software   Occasional wrong word or "sound a like" substitutions may have occurred due to the inherent limitations of voice recognition software   Read the chart carefully and recognize, using context, where substitutions have occurred

## 2018-05-01 DIAGNOSIS — E78.5 HYPERLIPIDEMIA: ICD-10-CM

## 2018-05-01 DIAGNOSIS — N20.0 CALCULUS OF KIDNEY: ICD-10-CM

## 2018-05-01 DIAGNOSIS — I12.9 HYPERTENSIVE CHRONIC KIDNEY DISEASE WITH STAGE 1 THROUGH STAGE 4 CHRONIC KIDNEY DISEASE, OR UNSPECIFIED CHRONIC KIDNEY DISEASE: ICD-10-CM

## 2018-05-01 DIAGNOSIS — N18.30 CHRONIC KIDNEY DISEASE, STAGE III (MODERATE) (HCC): ICD-10-CM

## 2018-05-08 ENCOUNTER — TRANSCRIBE ORDERS (OUTPATIENT)
Dept: LAB | Facility: CLINIC | Age: 83
End: 2018-05-08

## 2018-05-08 ENCOUNTER — APPOINTMENT (OUTPATIENT)
Dept: LAB | Facility: CLINIC | Age: 83
End: 2018-05-08
Payer: MEDICARE

## 2018-05-08 DIAGNOSIS — I51.9 MYXEDEMA HEART DISEASE: Primary | ICD-10-CM

## 2018-05-08 DIAGNOSIS — E78.5 HYPERLIPIDEMIA: ICD-10-CM

## 2018-05-08 DIAGNOSIS — N18.30 CHRONIC KIDNEY DISEASE, STAGE III (MODERATE) (HCC): ICD-10-CM

## 2018-05-08 DIAGNOSIS — Z79.899 NEED FOR PROPHYLACTIC CHEMOTHERAPY: ICD-10-CM

## 2018-05-08 DIAGNOSIS — I12.9 HYPERTENSIVE CHRONIC KIDNEY DISEASE WITH STAGE 1 THROUGH STAGE 4 CHRONIC KIDNEY DISEASE, OR UNSPECIFIED CHRONIC KIDNEY DISEASE: ICD-10-CM

## 2018-05-08 DIAGNOSIS — E03.9 MYXEDEMA HEART DISEASE: Primary | ICD-10-CM

## 2018-05-08 DIAGNOSIS — I51.9 MYXEDEMA HEART DISEASE: ICD-10-CM

## 2018-05-08 DIAGNOSIS — E03.9 MYXEDEMA HEART DISEASE: ICD-10-CM

## 2018-05-08 LAB
25(OH)D3 SERPL-MCNC: 38.3 NG/ML (ref 30–100)
ALBUMIN SERPL BCP-MCNC: 3.5 G/DL (ref 3.5–5)
ALP SERPL-CCNC: 66 U/L (ref 46–116)
ALT SERPL W P-5'-P-CCNC: 24 U/L (ref 12–78)
ANION GAP SERPL CALCULATED.3IONS-SCNC: 8 MMOL/L (ref 4–13)
AST SERPL W P-5'-P-CCNC: 26 U/L (ref 5–45)
BASOPHILS # BLD AUTO: 0.01 THOUSANDS/ΜL (ref 0–0.1)
BASOPHILS NFR BLD AUTO: 0 % (ref 0–1)
BILIRUB SERPL-MCNC: 0.7 MG/DL (ref 0.2–1)
BUN SERPL-MCNC: 21 MG/DL (ref 5–25)
CALCIUM SERPL-MCNC: 9.2 MG/DL (ref 8.3–10.1)
CHLORIDE SERPL-SCNC: 106 MMOL/L (ref 100–108)
CO2 SERPL-SCNC: 30 MMOL/L (ref 21–32)
CREAT SERPL-MCNC: 1.5 MG/DL (ref 0.6–1.3)
CREAT UR-MCNC: 173 MG/DL
EOSINOPHIL # BLD AUTO: 0.24 THOUSAND/ΜL (ref 0–0.61)
EOSINOPHIL NFR BLD AUTO: 4 % (ref 0–6)
ERYTHROCYTE [DISTWIDTH] IN BLOOD BY AUTOMATED COUNT: 15.2 % (ref 11.6–15.1)
GFR SERPL CREATININE-BSD FRML MDRD: 43 ML/MIN/1.73SQ M
GLUCOSE P FAST SERPL-MCNC: 107 MG/DL (ref 65–99)
HCT VFR BLD AUTO: 40.9 % (ref 36.5–49.3)
HGB BLD-MCNC: 13.3 G/DL (ref 12–17)
LYMPHOCYTES # BLD AUTO: 1.35 THOUSANDS/ΜL (ref 0.6–4.47)
LYMPHOCYTES NFR BLD AUTO: 22 % (ref 14–44)
MAGNESIUM SERPL-MCNC: 2.1 MG/DL (ref 1.6–2.6)
MCH RBC QN AUTO: 30.5 PG (ref 26.8–34.3)
MCHC RBC AUTO-ENTMCNC: 32.5 G/DL (ref 31.4–37.4)
MCV RBC AUTO: 94 FL (ref 82–98)
MONOCYTES # BLD AUTO: 0.83 THOUSAND/ΜL (ref 0.17–1.22)
MONOCYTES NFR BLD AUTO: 13 % (ref 4–12)
NEUTROPHILS # BLD AUTO: 3.76 THOUSANDS/ΜL (ref 1.85–7.62)
NEUTS SEG NFR BLD AUTO: 61 % (ref 43–75)
PHOSPHATE SERPL-MCNC: 3.2 MG/DL (ref 2.3–4.1)
PLATELET # BLD AUTO: 210 THOUSANDS/UL (ref 149–390)
PMV BLD AUTO: 10.3 FL (ref 8.9–12.7)
POTASSIUM SERPL-SCNC: 4.3 MMOL/L (ref 3.5–5.3)
PROT SERPL-MCNC: 6.6 G/DL (ref 6.4–8.2)
PROT UR-MCNC: 23 MG/DL
PROT/CREAT UR: 0.13 MG/G{CREAT} (ref 0–0.1)
PTH-INTACT SERPL-MCNC: 80.8 PG/ML (ref 18.4–80.1)
RBC # BLD AUTO: 4.36 MILLION/UL (ref 3.88–5.62)
SODIUM SERPL-SCNC: 144 MMOL/L (ref 136–145)
TSH SERPL DL<=0.05 MIU/L-ACNC: 3.55 UIU/ML (ref 0.36–3.74)
WBC # BLD AUTO: 6.19 THOUSAND/UL (ref 4.31–10.16)

## 2018-05-08 PROCEDURE — 82570 ASSAY OF URINE CREATININE: CPT

## 2018-05-08 PROCEDURE — 80053 COMPREHEN METABOLIC PANEL: CPT

## 2018-05-08 PROCEDURE — 36415 COLL VENOUS BLD VENIPUNCTURE: CPT

## 2018-05-08 PROCEDURE — 84443 ASSAY THYROID STIM HORMONE: CPT

## 2018-05-08 PROCEDURE — 83735 ASSAY OF MAGNESIUM: CPT

## 2018-05-08 PROCEDURE — 84156 ASSAY OF PROTEIN URINE: CPT

## 2018-05-08 PROCEDURE — 84100 ASSAY OF PHOSPHORUS: CPT

## 2018-05-08 PROCEDURE — 83970 ASSAY OF PARATHORMONE: CPT

## 2018-05-08 PROCEDURE — 82306 VITAMIN D 25 HYDROXY: CPT

## 2018-05-08 PROCEDURE — 85025 COMPLETE CBC W/AUTO DIFF WBC: CPT

## 2018-05-10 ENCOUNTER — APPOINTMENT (OUTPATIENT)
Dept: LAB | Facility: CLINIC | Age: 83
End: 2018-05-10
Payer: MEDICARE

## 2018-05-10 DIAGNOSIS — N20.0 CALCULUS OF KIDNEY: ICD-10-CM

## 2018-05-10 PROCEDURE — 82507 ASSAY OF CITRATE: CPT

## 2018-05-10 PROCEDURE — 84392 ASSAY OF URINE SULFATE: CPT

## 2018-05-10 PROCEDURE — 84560 ASSAY OF URINE/URIC ACID: CPT

## 2018-05-10 PROCEDURE — 82131 AMINO ACIDS SINGLE QUANT: CPT

## 2018-05-10 PROCEDURE — 83735 ASSAY OF MAGNESIUM: CPT

## 2018-05-10 PROCEDURE — 82570 ASSAY OF URINE CREATININE: CPT

## 2018-05-10 PROCEDURE — 81003 URINALYSIS AUTO W/O SCOPE: CPT

## 2018-05-10 PROCEDURE — 82340 ASSAY OF CALCIUM IN URINE: CPT

## 2018-05-10 PROCEDURE — 84300 ASSAY OF URINE SODIUM: CPT

## 2018-05-10 PROCEDURE — 82436 ASSAY OF URINE CHLORIDE: CPT

## 2018-05-10 PROCEDURE — 82140 ASSAY OF AMMONIA: CPT

## 2018-05-10 PROCEDURE — 83935 ASSAY OF URINE OSMOLALITY: CPT

## 2018-05-10 PROCEDURE — 84105 ASSAY OF URINE PHOSPHORUS: CPT

## 2018-05-10 PROCEDURE — 84133 ASSAY OF URINE POTASSIUM: CPT

## 2018-05-10 PROCEDURE — 83945 ASSAY OF OXALATE: CPT

## 2018-05-16 PROBLEM — E78.5 DYSLIPIDEMIA: Status: ACTIVE | Noted: 2018-05-16

## 2018-05-16 PROBLEM — E87.6 HYPOKALEMIA: Status: ACTIVE | Noted: 2018-05-16

## 2018-05-16 PROBLEM — N20.0 NEPHROLITHIASIS: Status: ACTIVE | Noted: 2018-05-16

## 2018-05-16 NOTE — PROGRESS NOTES
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  2  Volume: He essentially remains euvolemic  He only has slight edema in the evening time  Continue current diuretic regimen and avoid salt  3  Hypertension:  Blood pressure is acceptable especially given artificial elevation by pumping is machine  No changes at this time, purchase a new blood pressure machine in the future  4  Electrolytes: remained quite good on potassium supplementation  5  Mineral bone disease: Doing well at this time  He has mild secondary hyperparathyroidism with a level 80 8; normal vitamin-D at 38 3   6  Anemia remains normal   7  Dyslipidemia is outstanding on his current dose of statin  Follow-up per your discretion  8  Nephrolithiasis actively followed by Urology  I will check a 24 hour urine stone risk profile next visit  For now drink at least 80 oz of water a day  Avoid salt  9  Cardiac follow-up per Dr Giovanni Knight  The patient has ischemic cardiomyopathy/ICD/systolic congestive heart failure/CABG  Apparently EF remains about 30% and no ischemia on a recent nuclear stress test   10  Pulmonary follow-up with Dr Lexus Dale as needed  PATIENT INSTRUCTIONS:    Patient Instructions   1  No medication changes  2   At your convenience, purchase a new blood pressure machine:  Omron-10 [id number] which you can get through the Internet/Amazon  3    Follow-up in 4 months:  -please go for fasting lab work prior to your appointment  -please bring in 1 week a blood pressure readings morning and evening, sitting and standing at your appointment  -please bring in your new blood pressure machine for correlation with the office machine  4   I will give you further instructions regarding stone prevention once we have the 24 hour urine results  For now try to drink as much water as possible up to approximately 80 oz of water a day as long as you do not develops swelling or weight gain with that  5 General instructions:  -avoid salt  -avoid medications such as Motrin, Naprosyn, ibuprofen, Aleve or Advil or Celebrex as they can affect your kidney function; you can use Tylenol as needed for pain or fevers if you have no liver problems  -avoid medications such as Sudafed or other medications with decongestants as they can raise your blood pressure  -try to exercise at least 30 minutes at least 3 days a week with an ultimate goal of 5 days a week, as you are able to even with simple walking  -try to lose 5-10 lb by your next visit        Subjective:   Patient  had a viral syndrome when he came back from [place] with a nonproductive cough for few months  He was seen by Cardiology about a month ago and felt mildly fluid overloaded, subsequently furosemide was increased to 40 mg once a day with improvement of symptoms  He now gets only slight leg edema in the evening but it goes away by the morning  He denies any significant shortness of breath  He denies any chest pain    No fevers chills cough or colds at this time  Good appetite energy  No gastrointestinal symptoms  No urinary symptoms and no stone passage since October  No headaches, dizziness or lightheadedness  Blood pressure medications:  -metoprolol 75 mg b i d   -furosemide 40 mg daily  -amlodipine 2 5 mg daily  Blood pressure is:  -a m :  136/70 WITHOUT ORTHOSTATIC CHANGES  -p m :  132/63 WITHOUT ORTHOSTATIC CHANGES  THESE MAY BE ARTIFICIALLY INCREASED BECAUSE HE HAS TO PUMP HIS BLOOD PRESSURE MACHINE WITH HIS ARM/HAND    ROS:  See HPI, otherwise review of systems as completely reviewed with the patient are negative    Past Medical History:   Diagnosis Date    AICD (automatic cardioverter/defibrillator) present 2004    AICD (automatic cardioverter/defibrillator) present 2006    AICD (automatic cardioverter/defibrillator) present 2013    [facility]    Arthritis     R knee and neck    Atrial fibrillation (HCC)     BPH (benign prostatic hyperplasia)     CAD (coronary artery disease) of artery bypass graft     CHF (congestive heart failure) (HCC)     combine    Coronary artery disease     Disease of thyroid gland     Hyperlipidemia     Hypertension     Ischemic cardiomyopathy     Left ureteral calculus 10/17/2017    Renal disorder     V tach ([address] )      Past Surgical History:   Procedure Laterality Date    CARDIAC CATHETERIZATION      CARDIAC SURGERY      Pacemaker    CORONARY ARTERY BYPASS GRAFT      MI CYSTOURETHROSCOPY,URETER CATHETER N/A 10/17/2017    Procedure: CYSTOSCOPY, left  RETROGRADE PYELOGRAM WITH LEFT URETEROSCOPY , stone extraction,LEFT STENT INSERTION;  Surgeon: Alondra Patel MD;  [facility];  Service: Urology     Family History   Problem Relation Age of Onset    Tuberculosis Father      WWI    Hypertension Mother     Stroke Brother       reports that he has never smoked  He has never used smokeless tobacco  He reports that he does not drink alcohol or use drugs  I COMPLETELY REVIEWED THE PAST MEDICAL HISTORY/PAST SURGICAL HISTORY/SOCIAL HISTORY/FAMILY HISTORY/AND MEDICATIONS  AND UPDATED ALL    Objective:     Vitals:   BP sitting on left:  132/72 with a heart rate of 60 and regular  BP standing on left:  130/60    Weight (last 2 days)     Date/Time   Weight    05/17/18 1035  89 8 (198)            Body mass index is 31 01 kg/m²      Physical Exam: General:  Obese, No acute distress  Skin:  No acute rash  Eyes:  No scleral icterus, noninjected  ENT:  Moist mucous membranes  Neck:  Supple, no jugular venous distention  Back   No CVAT  Chest:  Clear to auscultation and percussion, decreased sounds diffusely but no wheezes or rhonchi or rales  CVS:  Regular rate and rhythm without a rub, murmurs or gallops  Abdomen:  Obese, Soft and nontender with normal bowel sounds  Extremities:  No cyanosis and no edema  Neuro:  Grossly intact  Psych:  Alert, oriented x3 and appropriate      Medications:    Current Outpatient Prescriptions:     acetaminophen (TYLENOL) 500 mg tablet, Take 500 mg by mouth every 6 (six) hours as needed for mild pain, Disp: , Rfl:     amiodarone 200 mg tablet, Take 200 mg by mouth daily  , Disp: , Rfl:     amLODIPine (NORVASC) 2 5 mg tablet, Take 2 5 mg by mouth daily  , Disp: , Rfl:     aspirin 81 MG tablet, Take 81 mg by mouth daily  , Disp: , Rfl:     furosemide (LASIX) 40 mg tablet, Take 1 tablet (40 mg total) by mouth daily, Disp: 90 tablet, Rfl: 0    Levothyroxine Sodium 125 MCG CAPS, Take 112 mcg by mouth daily  , Disp: , Rfl:     LORazepam (ATIVAN) 1 mg tablet, Take 1 mg by mouth as needed for anxiety  , Disp: , Rfl:     Metoprolol Tartrate 75 MG TABS, Take 75 mg by mouth 2 (two) times a day , Disp: , Rfl:     mexiletine (MEXITIL) 150 mg capsule, Take 150 mg by mouth 2 (two) times a day , Disp: , Rfl:     Multiple Vitamins-Minerals (MULTIVITAMIN WITH MINERALS) tablet, Take 1 tablet by mouth daily  , Disp: , Rfl:     nitroglycerin (NITROSTAT) 0 4 mg SL tablet, Place 0 4 mg under the tongue every 5 (five) minutes as needed for chest pain  , Disp: , Rfl:     Potassium Chloride ER 20 MEQ TBCR, Take 1 tablet (20 mEq total) by mouth daily, Disp: 90 tablet, Rfl: 3    pravastatin (PRAVACHOL) 80 mg tablet, Take 80 mg by mouth daily  , Disp: , Rfl:     tamsulosin (FLOMAX) 0 4 mg, Take 1 capsule (0 4 mg total) by mouth daily with dinner, Disp: 90 capsule, Rfl: 3    Lab, Imaging and other studies: I have personally reviewed pertinent labs    Laboratory Results:  Results for orders placed or performed in visit on 05/08/18   Comprehensive metabolic panel   Result Value Ref Range    Sodium 144 136 - 145 mmol/L    Potassium 4 3 3 5 - 5 3 mmol/L    Chloride 106 100 - 108 mmol/L    CO2 30 21 - 32 mmol/L    Anion Gap 8 4 - 13 mmol/L    BUN 21 5 - 25 mg/dL    Creatinine 1 50 (H) 0 60 - 1 30 mg/dL    Glucose, Fasting 107 (H) 65 - 99 mg/dL    Calcium 9 2 8 3 - 10 1 mg/dL    AST 26 5 - 45 U/L    ALT 24 12 - 78 U/L    Alkaline Phosphatase 66 46 - 116 U/L    Total Protein 6 6 6 4 - 8 2 g/dL    Albumin 3 5 3 5 - 5 0 g/dL    Total Bilirubin 0 70 0 20 - 1 00 mg/dL    eGFR 43 ml/min/1 73sq m   CBC and differential   Result Value Ref Range    WBC 6 19 4 31 - 10 16 Thousand/uL    RBC 4 36 3 88 - 5 62 Million/uL    Hemoglobin 13 3 12 0 - 17 0 g/dL    Hematocrit 40 9 36 5 - 49 3 %    MCV 94 82 - 98 fL    MCH 30 5 26 8 - 34 3 pg    MCHC 32 5 31 4 - 37 4 g/dL    RDW 15 2 (H) 11 6 - 15 1 %    MPV 10 3 8 9 - 12 7 fL    Platelets 326 363 - 998 Thousands/uL    Neutrophils Relative 61 43 - 75 %    Lymphocytes Relative 22 14 - 44 %    Monocytes Relative 13 (H) 4 - 12 %    Eosinophils Relative 4 0 - 6 %    Basophils Relative 0 0 - 1 %    Neutrophils Absolute 3 76 1 85 - 7 62 Thousands/µL    Lymphocytes Absolute 1 35 0 60 - 4 47 Thousands/µL    Monocytes Absolute 0 83 0 17 - 1 22 Thousand/µL    Eosinophils Absolute 0 24 0 00 - 0 61 Thousand/µL    Basophils Absolute 0 01 0 00 - 0 10 Thousands/µL   Magnesium   Result Value Ref Range    Magnesium 2 1 1 6 - 2 6 mg/dL   Phosphorus   Result Value Ref Range    Phosphorus 3 2 2 3 - 4 1 mg/dL   PTH, intact   Result Value Ref Range    PTH 80 8 (H) 18 4 - 80 1 pg/mL   Protein / creatinine ratio, urine   Result Value Ref Range    Creatinine, Ur 173 0 mg/dL    Protein Urine Random 23 mg/dL    Prot/Creat Ratio, Ur 0 13 (H) 0 00 - 0 10   Vitamin D 25 hydroxy   Result Value Ref Range    Vit D, 25-Hydroxy 38 3 30 0 - 100 0 ng/mL   TSH, 3rd generation   Result Value Ref Range    TSH 3RD GENERATON 3 550 0 358 - 3 740 uIU/mL               Radiology review:   chest X-ray    Ultrasound      Portions of the record may have been created with voice recognition software   Occasional wrong word or "sound a like" substitutions may have occurred due to the inherent limitations of voice recognition software   Read the chart carefully and recognize, using context, where substitutions have occurred

## 2018-05-17 ENCOUNTER — OFFICE VISIT (OUTPATIENT)
Dept: NEPHROLOGY | Facility: CLINIC | Age: 83
End: 2018-05-17
Payer: MEDICARE

## 2018-05-17 VITALS — WEIGHT: 198 LBS | BODY MASS INDEX: 31.08 KG/M2 | HEIGHT: 67 IN

## 2018-05-17 DIAGNOSIS — I12.9 HYPERTENSIVE CHRONIC KIDNEY DISEASE WITH STAGE 1 THROUGH STAGE 4 CHRONIC KIDNEY DISEASE, OR UNSPECIFIED CHRONIC KIDNEY DISEASE: ICD-10-CM

## 2018-05-17 DIAGNOSIS — E87.6 HYPOKALEMIA: ICD-10-CM

## 2018-05-17 DIAGNOSIS — N20.0 NEPHROLITHIASIS: ICD-10-CM

## 2018-05-17 DIAGNOSIS — N18.30 CHRONIC KIDNEY DISEASE, STAGE III (MODERATE) (HCC): Primary | ICD-10-CM

## 2018-05-17 DIAGNOSIS — R06.02 SHORTNESS OF BREATH: ICD-10-CM

## 2018-05-17 DIAGNOSIS — E78.5 DYSLIPIDEMIA: ICD-10-CM

## 2018-05-17 LAB
AMMONIA 24H UR-MRATE: 8 MEQ/24 HR
AMMONIA UR-SCNC: ABNORMAL UG/DL
CA H2 PHOS DIHYD CRY URNS QL MICRO: 0.5 RATIO (ref 0–3)
CALCIUM 24H UR-MCNC: 7.7 MG/DL
CALCIUM 24H UR-MRATE: 104 MG/24 HR (ref 100–300)
CHLORIDE 24H UR-SCNC: 70 MMOL/L
CHLORIDE 24H UR-SRATE: 95 MMOL/24 HR (ref 110–250)
CITRATE 24H UR-MCNC: 196 MG/L
CITRATE 24H UR-MRATE: 265 MG/24 HR (ref 320–1240)
COM CRY STONE QL IR: 4.69 RATIO (ref 0–6)
CREAT 24H UR-MCNC: 48.5 MG/DL
CREAT 24H UR-MRATE: 654.8 MG/24 HR (ref 1000–2000)
CYSTINE 24H UR-MCNC: 6 MG/L
CYSTINE 24H UR-MRATE: 8.1 MG/24 HR (ref 10–100)
MAGNESIUM 24H UR-MRATE: 73 MG/24 HR (ref 12–293)
MAGNESIUM UR-MCNC: 5.4 MG/DL
NA URATE CRY STONE QL IR: 1.94 RATIO (ref 0–4)
OSMOLALITY UR: 341 MOSMOL/KG (ref 300–900)
OXALATE 24H UR-MRATE: 23 MG/24 HR (ref 7–44)
OXALATE UR-MCNC: 17 MG/L
PH 24H UR: 5.7 [PH]
PHOSPHATE 24H UR-MCNC: 40.7 MG/DL
PHOSPHATE 24H UR-MRATE: 549.4 MG/24 HR (ref 400–1300)
PLEASE NOTE (STONE RISK): ABNORMAL
POTASSIUM 24H UR-SCNC: 40.8 MMOL/24 HR (ref 25–125)
POTASSIUM UR-SCNC: 30.2 MMOL/L
PRESERVED URINE: 1350 ML/24 HR (ref 800–1800)
SODIUM 24H UR-SCNC: 80 MMOL/L
SODIUM 24H UR-SRATE: 108 MMOL/24 HR (ref 23–207)
SPECIMEN VOL 24H UR: 1350 ML/24 HR (ref 800–1800)
SULFATE 24H UR-MCNC: 11 MEQ/24 HR (ref 0–30)
SULFATE UR-MCNC: 8 MEQ/L
TRI-PHOS CRY STONE MICRO: 0.01 RATIO (ref 0–1)
URATE 24H UR-MCNC: 27.3 MG/DL
URATE 24H UR-MRATE: 369 MG/24 HR (ref 250–750)
URATE DIHYD CRY STONE QL IR: 1.89 RATIO (ref 0–1.2)

## 2018-05-17 PROCEDURE — 99214 OFFICE O/P EST MOD 30 MIN: CPT | Performed by: INTERNAL MEDICINE

## 2018-05-17 RX ORDER — FUROSEMIDE 40 MG/1
40 TABLET ORAL DAILY
Qty: 90 TABLET | Refills: 0 | Status: SHIPPED | OUTPATIENT
Start: 2018-05-17 | End: 2018-09-20

## 2018-05-17 NOTE — PATIENT INSTRUCTIONS
1   No medication changes  2   At your convenience, purchase a new blood pressure machine:  Omron-10 [id number] which you can get through the Internet/Amazon  3  Follow-up in 4 months:  -please go for fasting lab work prior to your appointment  -please bring in 1 week a blood pressure readings morning and evening, sitting and standing at your appointment  -please bring in your new blood pressure machine for correlation with the office machine  4   I will give you further instructions regarding stone prevention once we have the 24 hour urine results  For now try to drink as much water as possible up to approximately 80 oz of water a day as long as you do not develops swelling or weight gain with that    5 General instructions:  -avoid salt  -avoid medications such as Motrin, Naprosyn, ibuprofen, Aleve or Advil or Celebrex as they can affect your kidney function; you can use Tylenol as needed for pain or fevers if you have no liver problems  -avoid medications such as Sudafed or other medications with decongestants as they can raise your blood pressure  -try to exercise at least 30 minutes at least 3 days a week with an ultimate goal of 5 days a week, as you are able to even with simple walking  -try to lose 5-10 lb by your next visit

## 2018-05-17 NOTE — LETTER
May 17, 2018     Sergio Mohs [address]    Patient: Lonnie Montiel   YOB: 1935   Date of Visit: 5/17/2018       Dear Dr Pipe Trent:    Thank you for referring Lonnie Montiel to me for evaluation  Below are my notes for this consultation  If you have questions, please do not hesitate to call me  I look forward to following your patient along with you  Sincerely,        Angela Hassan MD        CC: Guadlupe Schlatter, DO Darren Darcy Ditto, MD Baldwin Baumgarten, MD Margart Rosin, MD  5/17/2018 11:06 AM  Sign at close encounter  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  2  Volume: He essentially remains euvolemic  He only has slight edema in the evening time  Continue current diuretic regimen and avoid salt  3  Hypertension:  Blood pressure is acceptable especially given artificial elevation by pumping is machine  No changes at this time, purchase a new blood pressure machine in the future  4  Electrolytes: remained quite good on potassium supplementation  5  Mineral bone disease: Doing well at this time  He has mild secondary hyperparathyroidism with a level 80 8; normal vitamin-D at 38 3   6  Anemia remains normal   7  Dyslipidemia is outstanding on his current dose of statin  Follow-up per your discretion  8  Nephrolithiasis actively followed by Urology  I will check a 24 hour urine stone risk profile next visit  For now drink at least 80 oz of water a day  Avoid salt  9  Cardiac follow-up per Dr Consuelo Smith  The patient has ischemic cardiomyopathy/ICD/systolic congestive heart failure/CABG  Apparently EF remains about 30% and no ischemia on a recent nuclear stress test   10  Pulmonary follow-up with Dr Billie Dutton as needed  PATIENT INSTRUCTIONS:    Patient Instructions   1  No medication changes  2   At your convenience, purchase a new blood pressure machine:  Omron-10 [id number] which you can get through the Internet/Amazon  3  Follow-up in 4 months:  -please go for fasting lab work prior to your appointment  -please bring in 1 week a blood pressure readings morning and evening, sitting and standing at your appointment  -please bring in your new blood pressure machine for correlation with the office machine  4   I will give you further instructions regarding stone prevention once we have the 24 hour urine results  For now try to drink as much water as possible up to approximately 80 oz of water a day as long as you do not develops swelling or weight gain with that  5 General instructions:  -avoid salt  -avoid medications such as Motrin, Naprosyn, ibuprofen, Aleve or Advil or Celebrex as they can affect your kidney function; you can use Tylenol as needed for pain or fevers if you have no liver problems  -avoid medications such as Sudafed or other medications with decongestants as they can raise your blood pressure  -try to exercise at least 30 minutes at least 3 days a week with an ultimate goal of 5 days a week, as you are able to even with simple walking  -try to lose 5-10 lb by your next visit        Subjective:   Patient  had a viral syndrome when he came back from [place] with a nonproductive cough for few months  He was seen by Cardiology about a month ago and felt mildly fluid overloaded, subsequently furosemide was increased to 40 mg once a day with improvement of symptoms  He now gets only slight leg edema in the evening but it goes away by the morning  He denies any significant shortness of breath  He denies any chest pain    No fevers chills cough or colds at this time  Good appetite energy  No gastrointestinal symptoms  No urinary symptoms and no stone passage since October  No headaches, dizziness or lightheadedness  Blood pressure medications:  -metoprolol 75 mg b i d   -furosemide 40 mg daily  -amlodipine 2 5 mg daily  Blood pressure is:  -a m :  136/70 WITHOUT ORTHOSTATIC CHANGES  -p m :  132/63 WITHOUT ORTHOSTATIC CHANGES  THESE MAY BE ARTIFICIALLY INCREASED BECAUSE HE HAS TO PUMP HIS BLOOD PRESSURE MACHINE WITH HIS ARM/HAND    ROS:  See HPI, otherwise review of systems as completely reviewed with the patient are negative    Past Medical History:   Diagnosis Date    AICD (automatic cardioverter/defibrillator) present 2004    AICD (automatic cardioverter/defibrillator) present 2006    AICD (automatic cardioverter/defibrillator) present 2013    [facility]    Arthritis     R knee and neck    Atrial fibrillation (HCC)     BPH (benign prostatic hyperplasia)     CAD (coronary artery disease) of artery bypass graft     CHF [facility]congestive heart failure) ([facility] )     combine    Coronary artery disease     Disease of thyroid gland     Hyperlipidemia     Hypertension     Ischemic cardiomyopathy     Left ureteral calculus 10/17/2017    Renal disorder     [facility]      Past Surgical History:   Procedure Laterality Date    CARDIAC CATHETERIZATION      CARDIAC SURGERY      Pacemaker    CORONARY ARTERY BYPASS GRAFT      AR CYSTOURETHROSCOPY,URETER CATHETER N/A 10/17/2017    Procedure: CYSTOSCOPY, left  RETROGRADE PYELOGRAM WITH LEFT URETEROSCOPY , stone extraction,LEFT STENT INSERTION;  Surgeon: Vi Garrett MD;  Location[facility];  Service: Urology     Family History   Problem Relation Age of Onset    Tuberculosis Father      WWI    Hypertension Mother     Stroke Brother       reports that he has never smoked  He has never used smokeless tobacco  He reports that he does not drink alcohol or use drugs      I COMPLETELY REVIEWED THE PAST MEDICAL HISTORY/PAST SURGICAL HISTORY/SOCIAL HISTORY/FAMILY HISTORY/AND MEDICATIONS  AND UPDATED ALL    Objective:     Vitals:   BP sitting on left:  132/72 with a heart rate of 60 and regular  BP standing on left:  130/60    Weight (last 2 days)     Date/Time   Weight    05/17/18 1035  89 8 (198)            Body mass index is 31 01 kg/m²  Physical Exam: General:  Obese, No acute distress  Skin:  No acute rash  Eyes:  No scleral icterus, noninjected  ENT:  Moist mucous membranes  Neck:  Supple, no jugular venous distention  Back   No CVAT  Chest:  Clear to auscultation and percussion, decreased sounds diffusely but no wheezes or rhonchi or rales  CVS:  Regular rate and rhythm without a rub, murmurs or gallops  Abdomen:  Obese, Soft and nontender with normal bowel sounds  Extremities:  No cyanosis and no edema  Neuro:  Grossly intact  Psych:  Alert, oriented x3 and appropriate      Medications:    Current Outpatient Prescriptions:     acetaminophen (TYLENOL) 500 mg tablet, Take 500 mg by mouth every 6 (six) hours as needed for mild pain, Disp: , Rfl:     amiodarone 200 mg tablet, Take 200 mg by mouth daily  , Disp: , Rfl:     amLODIPine (NORVASC) 2 5 mg tablet, Take 2 5 mg by mouth daily  , Disp: , Rfl:     aspirin 81 MG tablet, Take 81 mg by mouth daily  , Disp: , Rfl:     furosemide (LASIX) 40 mg tablet, Take 1 tablet (40 mg total) by mouth daily, Disp: 90 tablet, Rfl: 0    Levothyroxine Sodium 125 MCG CAPS, Take 112 mcg by mouth daily  , Disp: , Rfl:     LORazepam (ATIVAN) 1 mg tablet, Take 1 mg by mouth as needed for anxiety  , Disp: , Rfl:     Metoprolol Tartrate 75 MG TABS, Take 75 mg by mouth 2 (two) times a day , Disp: , Rfl:     mexiletine (MEXITIL) 150 mg capsule, Take 150 mg by mouth 2 (two) times a day , Disp: , Rfl:     Multiple Vitamins-Minerals (MULTIVITAMIN WITH MINERALS) tablet, Take 1 tablet by mouth daily  , Disp: , Rfl:     nitroglycerin (NITROSTAT) 0 4 mg SL tablet, Place 0 4 mg under the tongue every 5 (five) minutes as needed for chest pain  , Disp: , Rfl:     Potassium Chloride ER 20 MEQ TBCR, Take 1 tablet (20 mEq total) by mouth daily, Disp: 90 tablet, Rfl: 3    pravastatin (PRAVACHOL) 80 mg tablet, Take 80 mg by mouth daily  , Disp: , Rfl:     tamsulosin (FLOMAX) 0 4 mg, Take 1 capsule (0 4 mg total) by mouth daily with dinner, Disp: 90 capsule, Rfl: 3    Lab, Imaging and other studies: I have personally reviewed pertinent labs    Laboratory Results:  Results for orders placed or performed in visit on 05/08/18   Comprehensive metabolic panel   Result Value Ref Range    Sodium 144 136 - 145 mmol/L    Potassium 4 3 3 5 - 5 3 mmol/L    Chloride 106 100 - 108 mmol/L    CO2 30 21 - 32 mmol/L    Anion Gap 8 4 - 13 mmol/L    BUN 21 5 - 25 mg/dL    Creatinine 1 50 (H) 0 60 - 1 30 mg/dL    Glucose, Fasting 107 (H) 65 - 99 mg/dL    Calcium 9 2 8 3 - 10 1 mg/dL    AST 26 5 - 45 U/L    ALT 24 12 - 78 U/L    Alkaline Phosphatase 66 46 - 116 U/L    Total Protein 6 6 6 4 - 8 2 g/dL    Albumin 3 5 3 5 - 5 0 g/dL    Total Bilirubin 0 70 0 20 - 1 00 mg/dL    eGFR 43 ml/min/1 73sq m   CBC and differential   Result Value Ref Range    WBC 6 19 4 31 - 10 16 Thousand/uL    RBC 4 36 3 88 - 5 62 Million/uL    Hemoglobin 13 3 12 0 - 17 0 g/dL    Hematocrit 40 9 36 5 - 49 3 %    MCV 94 82 - 98 fL    MCH 30 5 26 8 - 34 3 pg    MCHC 32 5 31 4 - 37 4 g/dL    RDW 15 2 (H) 11 6 - 15 1 %    MPV 10 3 8 9 - 12 7 fL    Platelets 667 893 - 239 Thousands/uL    Neutrophils Relative 61 43 - 75 %    Lymphocytes Relative 22 14 - 44 %    Monocytes Relative 13 (H) 4 - 12 %    Eosinophils Relative 4 0 - 6 %    Basophils Relative 0 0 - 1 %    Neutrophils Absolute 3 76 1 85 - 7 62 Thousands/µL    Lymphocytes Absolute 1 35 0 60 - 4 47 Thousands/µL    Monocytes Absolute 0 83 0 17 - 1 22 Thousand/µL    Eosinophils Absolute 0 24 0 00 - 0 61 Thousand/µL    Basophils Absolute 0 01 0 00 - 0 10 Thousands/µL   Magnesium   Result Value Ref Range    Magnesium 2 1 1 6 - 2 6 mg/dL   Phosphorus   Result Value Ref Range    Phosphorus 3 2 2 3 - 4 1 mg/dL   PTH, intact   Result Value Ref Range    PTH 80 8 (H) 18 4 - 80 1 pg/mL   Protein / creatinine ratio, urine   Result Value Ref Range    Creatinine, Ur 173 0 mg/dL    Protein Urine Random 23 mg/dL    Prot/Creat Ratio, Ur 0 13 (H) 0 00 - 0 10   Vitamin D 25 hydroxy   Result Value Ref Range    Vit D, 25-Hydroxy 38 3 30 0 - 100 0 ng/mL   TSH, 3rd generation   Result Value Ref Range    TSH 3RD GENERATON 3 550 0 358 - 3 740 uIU/mL               Radiology review:   chest X-ray    Ultrasound      Portions of the record may have been created with voice recognition software   Occasional wrong word or "sound a like" substitutions may have occurred due to the inherent limitations of voice recognition software   Read the chart carefully and recognize, using context, where substitutions have occurred

## 2018-05-21 ENCOUNTER — TELEPHONE (OUTPATIENT)
Dept: NEPHROLOGY | Facility: CLINIC | Age: 83
End: 2018-05-21

## 2018-05-21 NOTE — TELEPHONE ENCOUNTER
Notified patient of below results  No questions  Will call with further concerns  ----- Message from Vidhya Pruitt MD sent at 5/21/2018  2:32 PM EDT -----  24 hour urine notable for 1300ml and low citrate  Recommend:  1  Increase the water to 80 ounces/day   2   Next step if he develops a stone is potassium citrate

## 2018-05-23 ENCOUNTER — IN-CLINIC DEVICE VISIT (OUTPATIENT)
Dept: CARDIOLOGY CLINIC | Facility: CLINIC | Age: 83
End: 2018-05-23
Payer: MEDICARE

## 2018-05-23 DIAGNOSIS — I25.5 ISCHEMIC CARDIOMYOPATHY: ICD-10-CM

## 2018-05-23 DIAGNOSIS — Z95.810 PRESENCE OF IMPLANTABLE CARDIOVERTER-DEFIBRILLATOR (ICD): ICD-10-CM

## 2018-05-23 DIAGNOSIS — I47.2 VT (VENTRICULAR TACHYCARDIA) (HCC): ICD-10-CM

## 2018-05-23 DIAGNOSIS — I48.0 PAROXYSMAL ATRIAL FIBRILLATION (HCC): Primary | ICD-10-CM

## 2018-05-23 PROCEDURE — 93296 REM INTERROG EVL PM/IDS: CPT | Performed by: INTERNAL MEDICINE

## 2018-05-23 PROCEDURE — 93295 DEV INTERROG REMOTE 1/2/MLT: CPT | Performed by: INTERNAL MEDICINE

## 2018-05-23 NOTE — PROGRESS NOTES
TERENCE CRT-D  MERLIN TRANSMISSION:  BATTERY VOLTAGE ADEQUATE (1 8 YR)   AP 97% BP 99%   ALL AVAILABLE LEAD PARAMETERS WITHIN NORMAL LIMITS   NO SIGNIFICANT HIGH RATE EPISODES   1 NOISE REVERSION EPISODE SHOWING 3 SEC   OF NOISE ON THE ATRIAL AND LEADLESS CHANNELS  [address] NORMAL LIMITS   NORMAL DEVICE FUNCTION  [address]

## 2018-07-20 ENCOUNTER — TRANSCRIBE ORDERS (OUTPATIENT)
Dept: RADIOLOGY | Facility: HOSPITAL | Age: 83
End: 2018-07-20

## 2018-07-20 ENCOUNTER — APPOINTMENT (OUTPATIENT)
Dept: LAB | Facility: CLINIC | Age: 83
End: 2018-07-20
Payer: MEDICARE

## 2018-07-20 ENCOUNTER — HOSPITAL ENCOUNTER (OUTPATIENT)
Dept: RADIOLOGY | Facility: HOSPITAL | Age: 83
Discharge: HOME | End: 2018-07-20
Payer: MEDICARE

## 2018-07-20 DIAGNOSIS — I42.9 CARDIOMYOPATHY, UNSPECIFIED TYPE (HCC): ICD-10-CM

## 2018-07-20 DIAGNOSIS — N18.30 CHRONIC KIDNEY DISEASE, STAGE III (MODERATE) (HCC): ICD-10-CM

## 2018-07-20 DIAGNOSIS — E03.4 HYPOTHYROIDISM DUE TO ACQUIRED ATROPHY OF THYROID: ICD-10-CM

## 2018-07-20 DIAGNOSIS — I50.42 CHRONIC COMBINED SYSTOLIC AND DIASTOLIC HEART FAILURE (HCC): Primary | ICD-10-CM

## 2018-07-20 DIAGNOSIS — I50.42 CHRONIC COMBINED SYSTOLIC AND DIASTOLIC HEART FAILURE (HCC): ICD-10-CM

## 2018-07-20 DIAGNOSIS — R06.89 HYPOVENTILATION, IDIOPATHIC: ICD-10-CM

## 2018-07-20 LAB
ALBUMIN SERPL BCP-MCNC: 3.4 G/DL (ref 3.5–5)
ALP SERPL-CCNC: 71 U/L (ref 46–116)
ALT SERPL W P-5'-P-CCNC: 29 U/L (ref 12–78)
ANION GAP SERPL CALCULATED.3IONS-SCNC: 4 MMOL/L (ref 4–13)
AST SERPL W P-5'-P-CCNC: 26 U/L (ref 5–45)
BACTERIA UR QL AUTO: ABNORMAL /HPF
BASOPHILS # BLD AUTO: 0.03 THOUSANDS/ΜL (ref 0–0.1)
BASOPHILS NFR BLD AUTO: 1 % (ref 0–1)
BILIRUB SERPL-MCNC: 0.9 MG/DL (ref 0.2–1)
BILIRUB UR QL STRIP: NEGATIVE
BUN SERPL-MCNC: 20 MG/DL (ref 5–25)
CALCIUM SERPL-MCNC: 9 MG/DL (ref 8.3–10.1)
CHLORIDE SERPL-SCNC: 105 MMOL/L (ref 100–108)
CHOLEST SERPL-MCNC: 122 MG/DL (ref 50–200)
CLARITY UR: CLEAR
CO2 SERPL-SCNC: 31 MMOL/L (ref 21–32)
COLOR UR: YELLOW
CREAT SERPL-MCNC: 1.39 MG/DL (ref 0.6–1.3)
EOSINOPHIL # BLD AUTO: 0.24 THOUSAND/ΜL (ref 0–0.61)
EOSINOPHIL NFR BLD AUTO: 4 % (ref 0–6)
ERYTHROCYTE [DISTWIDTH] IN BLOOD BY AUTOMATED COUNT: 15.4 % (ref 11.6–15.1)
GFR SERPL CREATININE-BSD FRML MDRD: 47 ML/MIN/1.73SQ M
GLUCOSE P FAST SERPL-MCNC: 110 MG/DL (ref 65–99)
GLUCOSE UR STRIP-MCNC: NEGATIVE MG/DL
HCT VFR BLD AUTO: 39.4 % (ref 36.5–49.3)
HDLC SERPL-MCNC: 42 MG/DL (ref 40–60)
HGB BLD-MCNC: 12.9 G/DL (ref 12–17)
HGB UR QL STRIP.AUTO: ABNORMAL
KETONES UR STRIP-MCNC: NEGATIVE MG/DL
LDLC SERPL CALC-MCNC: 64 MG/DL (ref 0–100)
LEUKOCYTE ESTERASE UR QL STRIP: NEGATIVE
LYMPHOCYTES # BLD AUTO: 1.11 THOUSANDS/ΜL (ref 0.6–4.47)
LYMPHOCYTES NFR BLD AUTO: 18 % (ref 14–44)
MCH RBC QN AUTO: 31.2 PG (ref 26.8–34.3)
MCHC RBC AUTO-ENTMCNC: 32.7 G/DL (ref 31.4–37.4)
MCV RBC AUTO: 95 FL (ref 82–98)
MONOCYTES # BLD AUTO: 0.85 THOUSAND/ΜL (ref 0.17–1.22)
MONOCYTES NFR BLD AUTO: 14 % (ref 4–12)
NEUTROPHILS # BLD AUTO: 3.89 THOUSANDS/ΜL (ref 1.85–7.62)
NEUTS SEG NFR BLD AUTO: 64 % (ref 43–75)
NITRITE UR QL STRIP: NEGATIVE
NON-SQ EPI CELLS URNS QL MICRO: ABNORMAL /HPF
NONHDLC SERPL-MCNC: 80 MG/DL
PH UR STRIP.AUTO: 7 [PH] (ref 4.5–8)
PLATELET # BLD AUTO: 210 THOUSANDS/UL (ref 149–390)
PMV BLD AUTO: 9.8 FL (ref 8.9–12.7)
POTASSIUM SERPL-SCNC: 4 MMOL/L (ref 3.5–5.3)
PROT SERPL-MCNC: 6.7 G/DL (ref 6.4–8.2)
PROT UR STRIP-MCNC: NEGATIVE MG/DL
RBC # BLD AUTO: 4.13 MILLION/UL (ref 3.88–5.62)
RBC #/AREA URNS AUTO: ABNORMAL /HPF
SODIUM SERPL-SCNC: 140 MMOL/L (ref 136–145)
SP GR UR STRIP.AUTO: 1.01 (ref 1–1.03)
TRIGL SERPL-MCNC: 79 MG/DL
TSH SERPL DL<=0.05 MIU/L-ACNC: 3.39 UIU/ML (ref 0.36–3.74)
UROBILINOGEN UR QL STRIP.AUTO: 0.2 E.U./DL
WBC # BLD AUTO: 6.12 THOUSAND/UL (ref 4.31–10.16)
WBC #/AREA URNS AUTO: ABNORMAL /HPF

## 2018-07-20 PROCEDURE — 84443 ASSAY THYROID STIM HORMONE: CPT

## 2018-07-20 PROCEDURE — 36415 COLL VENOUS BLD VENIPUNCTURE: CPT

## 2018-07-20 PROCEDURE — 71046 X-RAY EXAM CHEST 2 VIEWS: CPT

## 2018-07-20 PROCEDURE — 85025 COMPLETE CBC W/AUTO DIFF WBC: CPT

## 2018-07-20 PROCEDURE — 80053 COMPREHEN METABOLIC PANEL: CPT

## 2018-07-20 PROCEDURE — 81001 URINALYSIS AUTO W/SCOPE: CPT | Performed by: INTERNAL MEDICINE

## 2018-07-20 PROCEDURE — 80061 LIPID PANEL: CPT

## 2018-08-16 ENCOUNTER — IN-CLINIC DEVICE VISIT (OUTPATIENT)
Dept: CARDIOLOGY CLINIC | Facility: CLINIC | Age: 83
End: 2018-08-16
Payer: MEDICARE

## 2018-08-16 DIAGNOSIS — I25.119 CORONARY ARTERY DISEASE INVOLVING NATIVE HEART WITH ANGINA PECTORIS, UNSPECIFIED VESSEL OR LESION TYPE (HCC): ICD-10-CM

## 2018-08-16 DIAGNOSIS — I47.2 VENTRICULAR TACHYCARDIA (HCC): Primary | ICD-10-CM

## 2018-08-16 DIAGNOSIS — Z95.810 PRESENCE OF AUTOMATIC CARDIOVERTER/DEFIBRILLATOR (AICD): ICD-10-CM

## 2018-08-16 PROCEDURE — 93284 PRGRMG EVAL IMPLANTABLE DFB: CPT | Performed by: INTERNAL MEDICINE

## 2018-08-16 NOTE — PROGRESS NOTES
Results for orders placed or performed in visit on 08/16/18   Cardiac EP device report    Narrative    [facility] CRT-D  DEVICE INTERROGATED IN THE [facility] OFFICE: BATTERY VOLTAGE ADEQUATE (1 6 YRS)  AP: 98%  BP: > 99%  ALL LEAD PARAMETERS WITHIN NORMAL LIMITS  NO SIGNIFICANT HIGH RATE EPISODES  CORVUE IMPEDANCE MONITORING WITHIN NORMAL LIMITS  NO PROGRAMMING CHANGES MADE TO DEVICE PARAMETERS  APPROPRIATELY FUNCTIONING  BI-V ICD    [address] [place] [address]

## 2018-08-18 NOTE — PROGRESS NOTES
"  Discussion/Summary  Normal device function      Results/Data  Cardiac Device Remote [id number] 09:00AM [facility]     Test Name Result Flag Reference   MISCELLANEOUS COMMENT      MERLIN TRANSMISSION: BATTERY VOLTAGE ADEQUATE (2 2 YRS)  AP-93%, BVP>99%  ALL AVAILABLE LEAD PARAMETERS WITHIN NORMAL LIMITS  NO SIGNIFICANT HIGH RATE EPISODES  CORVUE IMPEDANCE MONITORING WITHIN NORMAL LIMITS  NORMAL DEVICE FUNCTION  GV   Cardiac Electrophysiology Report      [id number]  pdf   DEVICE TYPE CRT-D       Cardiac Electrophysiology Report [id number] 09:00AM [facility]     Test Name Result Flag Reference   Cardiac Electrophysiology Report      [id number]  pdf     Signatures   Electronically signed by : Purcell Boas, RN; Nov 20 2017  4:02PM EST                       (Author)    Electronically signed by : CHRIS Deutsch ; Nov 20 2017  7:03PM EST                       (Author)    " numerical 0-10

## 2018-09-12 ENCOUNTER — APPOINTMENT (OUTPATIENT)
Dept: LAB | Facility: CLINIC | Age: 83
End: 2018-09-12
Payer: MEDICARE

## 2018-09-12 ENCOUNTER — TRANSCRIBE ORDERS (OUTPATIENT)
Dept: LAB | Facility: CLINIC | Age: 83
End: 2018-09-12

## 2018-09-12 ENCOUNTER — TELEPHONE (OUTPATIENT)
Dept: NEPHROLOGY | Facility: CLINIC | Age: 83
End: 2018-09-12

## 2018-09-12 DIAGNOSIS — E78.5 HYPERLIPIDEMIA, UNSPECIFIED HYPERLIPIDEMIA TYPE: ICD-10-CM

## 2018-09-12 DIAGNOSIS — N20.0 NEPHROLITHIASIS: ICD-10-CM

## 2018-09-12 DIAGNOSIS — I12.9 PARENCHYMAL RENAL HYPERTENSION, STAGE 1 THROUGH STAGE 4 OR UNSPECIFIED CHRONIC KIDNEY DISEASE: ICD-10-CM

## 2018-09-12 DIAGNOSIS — R06.02 SHORTNESS OF BREATH: ICD-10-CM

## 2018-09-12 DIAGNOSIS — E78.5 DYSLIPIDEMIA: ICD-10-CM

## 2018-09-12 DIAGNOSIS — E87.6 HYPOPOTASSEMIA: ICD-10-CM

## 2018-09-12 DIAGNOSIS — N18.30 CHRONIC KIDNEY DISEASE, STAGE III (MODERATE) (HCC): ICD-10-CM

## 2018-09-12 DIAGNOSIS — N20.0 URIC ACID NEPHROLITHIASIS: ICD-10-CM

## 2018-09-12 DIAGNOSIS — N18.30 CHRONIC KIDNEY DISEASE, STAGE III (MODERATE) (HCC): Primary | ICD-10-CM

## 2018-09-12 DIAGNOSIS — E87.6 HYPOKALEMIA: ICD-10-CM

## 2018-09-12 DIAGNOSIS — I12.9 HYPERTENSIVE CHRONIC KIDNEY DISEASE WITH STAGE 1 THROUGH STAGE 4 CHRONIC KIDNEY DISEASE, OR UNSPECIFIED CHRONIC KIDNEY DISEASE: ICD-10-CM

## 2018-09-12 LAB
25(OH)D3 SERPL-MCNC: 31 NG/ML (ref 30–100)
ALBUMIN SERPL BCP-MCNC: 3.5 G/DL (ref 3.5–5)
ALP SERPL-CCNC: 73 U/L (ref 46–116)
ALT SERPL W P-5'-P-CCNC: 28 U/L (ref 12–78)
ANION GAP SERPL CALCULATED.3IONS-SCNC: 9 MMOL/L (ref 4–13)
AST SERPL W P-5'-P-CCNC: 23 U/L (ref 5–45)
BILIRUB SERPL-MCNC: 0.6 MG/DL (ref 0.2–1)
BUN SERPL-MCNC: 22 MG/DL (ref 5–25)
CALCIUM SERPL-MCNC: 9.1 MG/DL (ref 8.3–10.1)
CHLORIDE SERPL-SCNC: 109 MMOL/L (ref 100–108)
CK SERPL-CCNC: 104 U/L (ref 39–308)
CO2 SERPL-SCNC: 30 MMOL/L (ref 21–32)
CREAT SERPL-MCNC: 1.67 MG/DL (ref 0.6–1.3)
CREAT UR-MCNC: 161 MG/DL
ERYTHROCYTE [DISTWIDTH] IN BLOOD BY AUTOMATED COUNT: 14.4 % (ref 11.6–15.1)
GFR SERPL CREATININE-BSD FRML MDRD: 38 ML/MIN/1.73SQ M
GLUCOSE P FAST SERPL-MCNC: 107 MG/DL (ref 65–99)
HCT VFR BLD AUTO: 40 % (ref 36.5–49.3)
HGB BLD-MCNC: 13 G/DL (ref 12–17)
MAGNESIUM SERPL-MCNC: 2 MG/DL (ref 1.6–2.6)
MCH RBC QN AUTO: 31.5 PG (ref 26.8–34.3)
MCHC RBC AUTO-ENTMCNC: 32.5 G/DL (ref 31.4–37.4)
MCV RBC AUTO: 97 FL (ref 82–98)
PHOSPHATE SERPL-MCNC: 3.1 MG/DL (ref 2.3–4.1)
PLATELET # BLD AUTO: 225 THOUSANDS/UL (ref 149–390)
PMV BLD AUTO: 9.7 FL (ref 8.9–12.7)
POTASSIUM SERPL-SCNC: 4.6 MMOL/L (ref 3.5–5.3)
PROT SERPL-MCNC: 6.9 G/DL (ref 6.4–8.2)
PROT UR-MCNC: 22 MG/DL
PROT/CREAT UR: 0.14 MG/G{CREAT} (ref 0–0.1)
PTH-INTACT SERPL-MCNC: 75.8 PG/ML (ref 18.4–80.1)
RBC # BLD AUTO: 4.13 MILLION/UL (ref 3.88–5.62)
SODIUM SERPL-SCNC: 148 MMOL/L (ref 136–145)
WBC # BLD AUTO: 6 THOUSAND/UL (ref 4.31–10.16)

## 2018-09-12 PROCEDURE — 84156 ASSAY OF PROTEIN URINE: CPT

## 2018-09-12 PROCEDURE — 36415 COLL VENOUS BLD VENIPUNCTURE: CPT

## 2018-09-12 PROCEDURE — 82550 ASSAY OF CK (CPK): CPT

## 2018-09-12 PROCEDURE — 82306 VITAMIN D 25 HYDROXY: CPT

## 2018-09-12 PROCEDURE — 84100 ASSAY OF PHOSPHORUS: CPT

## 2018-09-12 PROCEDURE — 83970 ASSAY OF PARATHORMONE: CPT

## 2018-09-12 PROCEDURE — 82570 ASSAY OF URINE CREATININE: CPT

## 2018-09-12 PROCEDURE — 80053 COMPREHEN METABOLIC PANEL: CPT

## 2018-09-12 PROCEDURE — 85027 COMPLETE CBC AUTOMATED: CPT

## 2018-09-12 PROCEDURE — 83735 ASSAY OF MAGNESIUM: CPT

## 2018-09-12 NOTE — TELEPHONE ENCOUNTER
----- Message from Jose L Galeas MD sent at 9/12/2018 10:51 AM EDT -----  Please make sure the patient feels well  His sodium is a little bit high that may suggest dehydration  Make sure he has no nausea vomiting or diarrhea and that he is eating and drinking well  Let me know if there is a problem

## 2018-09-20 ENCOUNTER — OFFICE VISIT (OUTPATIENT)
Dept: NEPHROLOGY | Facility: CLINIC | Age: 83
End: 2018-09-20
Payer: MEDICARE

## 2018-09-20 VITALS
HEIGHT: 67 IN | HEART RATE: 60 BPM | DIASTOLIC BLOOD PRESSURE: 57 MMHG | SYSTOLIC BLOOD PRESSURE: 128 MMHG | BODY MASS INDEX: 32.08 KG/M2 | WEIGHT: 204.4 LBS

## 2018-09-20 DIAGNOSIS — N18.30 CHRONIC KIDNEY DISEASE, STAGE III (MODERATE) (HCC): ICD-10-CM

## 2018-09-20 DIAGNOSIS — N20.1 LEFT URETERAL CALCULUS: ICD-10-CM

## 2018-09-20 DIAGNOSIS — R60.0 LOCALIZED EDEMA: ICD-10-CM

## 2018-09-20 DIAGNOSIS — N20.0 NEPHROLITHIASIS: ICD-10-CM

## 2018-09-20 DIAGNOSIS — I12.9 HYPERTENSIVE CHRONIC KIDNEY DISEASE WITH STAGE 1 THROUGH STAGE 4 CHRONIC KIDNEY DISEASE, OR UNSPECIFIED CHRONIC KIDNEY DISEASE: Primary | ICD-10-CM

## 2018-09-20 DIAGNOSIS — E78.5 DYSLIPIDEMIA: ICD-10-CM

## 2018-09-20 PROCEDURE — 99215 OFFICE O/P EST HI 40 MIN: CPT | Performed by: INTERNAL MEDICINE

## 2018-09-20 RX ORDER — POTASSIUM CITRATE 10 MEQ/1
20 TABLET, EXTENDED RELEASE ORAL 2 TIMES DAILY
Qty: 120 TABLET | Refills: 5 | Status: SHIPPED | OUTPATIENT
Start: 2018-09-20 | End: 2019-09-20 | Stop reason: SDUPTHER

## 2018-09-20 RX ORDER — TORSEMIDE 20 MG/1
30 TABLET ORAL DAILY
Qty: 45 TABLET | Refills: 5 | Status: SHIPPED | OUTPATIENT
Start: 2018-09-20 | End: 2018-10-04 | Stop reason: SDUPTHER

## 2018-09-20 NOTE — LETTER
September 20, 2018     Nacho Bojorquez  [address] [place] [address]    Patient: Cricket Hassan   YOB: 1935   Date of Visit: 9/20/2018       Dear Dr Carolee Martínez:    Thank you for referring Cricket Hassan to me for evaluation  Below are my notes for this consultation  If you have questions, please do not hesitate to call me  I look forward to following your patient along with you  Sincerely,        Althea Hoffman MD        CC: DO Lefty Jennings MD Vikki Square, MD Evonnie Rei, MD  9/20/2018 12:38 PM  Sign at close encounter  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  · Baseline creatinine:  1 5  · Current creatinine:  1 67 close to baseline  · Urine protein creatinine ratio:  0 14 g at goal  Recommendations:  · Treat hypertension-please see below  · Treat dyslipidemia-please see below  · Maintain proteinuria less than 1 g or as low as possible  · Avoid nephrotoxic agents such as NSAIDs, patient counseled as such  2   Volume:  Patient with significant lower extremity edema  Recommendations:  -supportive:  Avoid salt as he is doing, leg elevation as able to upon seated position,  -changed to torsemide 30 mg once a day for better absorption/affect and monitor leg swelling and weights and labs closely  He will be following with Cardiology in 2 weeks and potential further adjustment at that time   -check TSH; last 1 was in July just to make sure he is not hypothyroid  -check venous duplex to make sure no DVT  3    Hypertension:       Current blood pressure averages:  AM:  121/73 without orthostatic changes  PM:  119/66 without orthostatic changes    · Goal blood pressure:  Less than 130/80 given CAD  Recommendations:  · Changed to torsemide 30 mg once a day  · Because of this I would decrease amlodipine back to 2 5 mg once a day to avoid hypotension/hypoperfusion as well as potentially helping leg swelling  4   Electrolytes:  Mild hypernatremia to be followed making sure the patient is well-hydrated  REPEAT A BASIC METABOLIC PROFILE  5  Mineral bone disorder:  · Calcium/magnesium/phosphorus:  All acceptable  · PTH intact:  75 8 at goal  · Vitamin-D:  31 at goal  6  Dyslipidemia:  · Goal LDL:  Goal less than 70  · Current lipid profile:  LDL 64/HDL 42/triglycerides 79 at goal     Recommendations:  No changes  7  Anemia:  Normal hemoglobin at 13  8  Nephrolithiasis:  Also followed by urology:  24 hour urine for stone risk:  -volume 1350 which is BELOW GOAL  -calcium 104 mg at goal  -citrate which is 265 mg BELOW GOAL  -oxalate 23 mg which is at goal  -sodium excretion 108 millimoles which is at goal  -uric acid:  369 mg which is at goal  Recommendations:  · Modest fluid intake with water, try to remove soda, we are restricted because of ischemic cardiomyopathy and fluid overload  · To consider potassium citrate and stop potassium chloride  · Repeat 24 hour urine for stone risk next visit  9  Other problems:  · Cardiac:  Followed by Dr Fredis Silva  Patient with ischemic cardiomyopathy/ICD/systolic CHF/CABG  Ejection fraction 30% without any reversible ischemia on nuclear stress test   · BPH  · Hypothyroidism          PATIENT INSTRUCTIONS:    Patient Instructions   1  Medication changes:  · Stop Lasix  · Decrease amlodipine/Norvasc to 2 5 mg once a day  · Stop potassium chloride/Klor-Con  · Begin torsemide 30 mg once a day  · Begin Urocit-K 2 tablets twice a day  2  Watch your weights daily as well as your swelling  My goal would be to reduce the swelling, as well as potentially reducing her weight 4-6 lb to get us at goal for your swelling  If you are not losing weight or your swelling is no better please call me and certainly discuss it with Dr Fredis Silva  3    Please go for nonfasting lab work in 1-2 weeks and you can put the labs together with the cardiology labs  4   Please wait 2 weeks after making the above medication changes and then take 1 week a blood pressure readings morning and evening just sitting  Do the morning readings before taking any medications and the evening readings towards bedtime  Please send those readings in  5  Please call us in about 4 weeks and let us know how this swelling is and how your weight is  6  Follow-up in 3 months after you come back from your vacation:  · Please bring in 1 week a blood pressure readings morning and evening, sitting and standing  When you stay and keep your arm elevated at heart level  · Please go for fasting lab work prior to your appointment  7  General instructions:  -avoid salt, keep her legs elevated when sitting  -avoid medications such as Motrin, Naprosyn, ibuprofen, Aleve or Advil or Celebrex as they can affect your kidney function; you can use Tylenol as needed for pain or fevers if you have no liver problems  -avoid medications such as Sudafed or other medications with decongestants as they can raise your blood pressure  -continue to be active, try to walk as you are able to  -try to lose 5-10 lb by your next visit  Eduardo Ireland, IF YOUR WEIGHT HAS GONE UP BY A FEW LB AND NOT GETTING BETTER, OR IF YOUR SWELLING PERSISTS OR IS WORSE          Subjective: The patient has the following problems:  -increase in pedal edema the for the last couple of months  No excessive fluid intake at this time,  No salt intake  No fevers chills cough or colds at this time  Breathing is stable, he did have some difficulty breathing when lying down a couple months ago it seems to be improved  Still dyspnea on exertion  No chest pain    No active urinary symptoms including foamy urine  No GI symptoms  No headaches or dizziness or lightheadedness  Blood pressure medications:  -amlodipine 2 5 mg twice a day  -furosemide 40 mg once a day in the morning  -metoprolol tartrate 75 mg twice a day  -K-Dur 20 mEq once a day    ROS:  See HPI, otherwise review of systems as completely reviewed with the patient are negative    Past Medical History:   Diagnosis Date    AICD (automatic cardioverter/defibrillator) present 2004    AICD (automatic cardioverter/defibrillator) present 2006    AICD (automatic cardioverter/defibrillator) present 2013    [facility]    Arthritis     R knee and neck    Atrial fibrillation (HCC)     BPH (benign prostatic hyperplasia)     CAD (coronary artery disease) of artery bypass graft     CHF (congestive heart failure) ([facility] [address] )     combine    Coronary artery disease     Disease of thyroid gland     Hyperlipidemia     Hypertension     Ischemic cardiomyopathy     Left ureteral calculus 10/17/2017    Renal disorder     V tach ([facility] [address] )      Past Surgical History:   Procedure Laterality Date    CARDIAC CATHETERIZATION      CARDIAC SURGERY      Pacemaker    CORONARY ARTERY BYPASS GRAFT      VT CYSTOURETHROSCOPY,URETER CATHETER N/A 10/17/2017    Procedure: CYSTOSCOPY, left  RETROGRADE PYELOGRAM WITH LEFT URETEROSCOPY , stone extraction,LEFT STENT INSERTION;  Surgeon: Vishnu Bauer MD;  Location: [facility];  Service: Urology     Family History   Problem Relation Age of Onset    Tuberculosis Father         WWI    Hypertension Mother     Stroke Brother       reports that he has never smoked  He has never used smokeless tobacco  He reports that he does not drink alcohol or use drugs      I COMPLETELY REVIEWED THE PAST MEDICAL HISTORY/PAST SURGICAL HISTORY/SOCIAL HISTORY/FAMILY HISTORY/AND MEDICATIONS  AND UPDATED ALL    Objective:     Vitals:   Vitals:    09/20/18 1149   BP: 128/57   Pulse: 60    BP sitting on left:  118/60 with a heart rate of 72 and regular  BP standing on left:  116/58 with a heart rate of 72 and regular    Weight (last 2 days)     Date/Time   Weight    09/20/18 1149  92 7 (204 4)            Wt Readings from Last 3 Encounters:   09/20/18 92 7 kg (204 lb 6 4 oz)   05/17/18 89 8 kg (198 lb)   04/26/18 87 3 kg (192 lb 8 oz)     Body mass index is 32 01 kg/m²  Physical Exam: General:  Obese, but in no acute distress  Skin:  No acute rash  Eyes:  No scleral icterus, noninjected  ENT:  Moist mucous membranes  Neck:  Supple, no jugular venous distention  Back   No CVAT  Chest:  Clear to auscultation and percussion  CVS:  Regular rate and rhythm without a rub, murmurs or gallops  Abdomen:  Obese,Soft and nontender with normal bowel sounds  Extremities:  No cyanosis and 2+ lower extremity edema 2/3 the way up to pretibial region; 3+ pedal edema bilateral  Neuro:  Grossly intact  Psych:  Alert, oriented x3 and appropriate      Medications:    Current Outpatient Prescriptions:     acetaminophen (TYLENOL) 500 mg tablet, Take 500 mg by mouth every 6 (six) hours as needed for mild pain, Disp: , Rfl:     amiodarone 200 mg tablet, Take 200 mg by mouth daily  , Disp: , Rfl:     amLODIPine (NORVASC) 2 5 mg tablet, Take 2 5 mg by mouth daily , Disp: , Rfl:     aspirin 81 MG tablet, Take 81 mg by mouth daily  , Disp: , Rfl:     Levothyroxine Sodium 125 MCG CAPS, Take 125 mcg by mouth daily  , Disp: , Rfl:     LORazepam (ATIVAN) 1 mg tablet, Take 1 mg by mouth as needed for anxiety  , Disp: , Rfl:     Metoprolol Tartrate 75 MG TABS, Take 75 mg by mouth 2 (two) times a day , Disp: , Rfl:     mexiletine (MEXITIL) 150 mg capsule, Take 150 mg by mouth 2 (two) times a day , Disp: , Rfl:     Multiple Vitamins-Minerals (MULTIVITAMIN WITH MINERALS) tablet, Take 1 tablet by mouth daily  , Disp: , Rfl:     nitroglycerin (NITROSTAT) 0 4 mg SL tablet, Place 0 4 mg under the tongue every 5 (five) minutes as needed for chest pain  , Disp: , Rfl:     pravastatin (PRAVACHOL) 80 mg tablet, Take 80 mg by mouth daily  , Disp: , Rfl:     tamsulosin (FLOMAX) 0 4 mg, Take 1 capsule (0 4 mg total) by mouth daily with dinner, Disp: 90 capsule, Rfl: 3   potassium citrate (UROCIT-K 10) 10 mEq, Take 2 tablets (20 mEq total) by mouth 2 (two) times a day, Disp: 120 tablet, Rfl: 5    torsemide (DEMADEX) 20 mg tablet, Take 1 5 tablets (30 mg total) by mouth daily, Disp: 45 tablet, Rfl: 5    Lab, Imaging and other studies: I have personally reviewed pertinent labs    Laboratory Results:  Results for orders placed or performed in visit on 09/12/18   Comprehensive metabolic panel   Result Value Ref Range    Sodium 148 (H) 136 - 145 mmol/L    Potassium 4 6 3 5 - 5 3 mmol/L    Chloride 109 (H) 100 - 108 mmol/L    CO2 30 21 - 32 mmol/L    ANION GAP 9 4 - 13 mmol/L    BUN 22 5 - 25 mg/dL    Creatinine 1 67 (H) 0 60 - 1 30 mg/dL    Glucose, Fasting 107 (H) 65 - 99 mg/dL    Calcium 9 1 8 3 - 10 1 mg/dL    AST 23 5 - 45 U/L    ALT 28 12 - 78 U/L    Alkaline Phosphatase 73 46 - 116 U/L    Total Protein 6 9 6 4 - 8 2 g/dL    Albumin 3 5 3 5 - 5 0 g/dL    Total Bilirubin 0 60 0 20 - 1 00 mg/dL    eGFR 38 ml/min/1 73sq m   CK   Result Value Ref Range    Total  39 - 308 U/L   CBC   Result Value Ref Range    WBC 6 00 4 31 - 10 16 Thousand/uL    RBC 4 13 3 88 - 5 62 Million/uL    Hemoglobin 13 0 12 0 - 17 0 g/dL    Hematocrit 40 0 36 5 - 49 3 %    MCV 97 82 - 98 fL    MCH 31 5 26 8 - 34 3 pg    MCHC 32 5 31 4 - 37 4 g/dL    RDW 14 4 11 6 - 15 1 %    Platelets 754 238 - 019 Thousands/uL    MPV 9 7 8 9 - 12 7 fL   Magnesium   Result Value Ref Range    Magnesium 2 0 1 6 - 2 6 mg/dL   Phosphorus   Result Value Ref Range    Phosphorus 3 1 2 3 - 4 1 mg/dL   Protein / creatinine ratio, urine   Result Value Ref Range    Creatinine, Ur 161 0 mg/dL    Protein Urine Random 22 mg/dL    Prot/Creat Ratio, Ur 0 14 (H) 0 00 - 0 10   PTH, intact   Result Value Ref Range    PTH 75 8 18 4 - 80 1 pg/mL   Vitamin D 25 hydroxy   Result Value Ref Range    Vit D, 25-Hydroxy 31 0 30 0 - 100 0 ng/mL               Radiology review:   chest X-ray    Ultrasound      Portions of the record may have been created with voice recognition software   Occasional wrong word or "sound a like" substitutions may have occurred due to the inherent limitations of voice recognition software   Read the chart carefully and recognize, using context, where substitutions have occurred

## 2018-09-20 NOTE — LETTER
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  · Baseline creatinine:  1 5  · Current creatinine:  1 67 close to baseline  · Urine protein creatinine ratio:  0 14 g at goal  Recommendations:  · Treat hypertension-please see below  · Treat dyslipidemia-please see below  · Maintain proteinuria less than 1 g or as low as possible  · Avoid nephrotoxic agents such as NSAIDs, patient counseled as such  2   Volume:  Patient with significant lower extremity edema  Recommendations:  -supportive:  Avoid salt as he is doing, leg elevation as able to upon seated position,  -changed to torsemide 30 mg once a day for better absorption/affect and monitor leg swelling and weights and labs closely  He will be following with Cardiology in 2 weeks and potential further adjustment at that time   -check TSH; last 1 was in July just to make sure he is not hypothyroid  -check venous duplex to make sure no DVT  3    Hypertension:       Current blood pressure averages:  AM:  121/73 without orthostatic changes  PM:  119/66 without orthostatic changes    · Goal blood pressure:  Less than 130/80 given CAD  Recommendations:  · Changed to torsemide 30 mg once a day  · Because of this I would decrease amlodipine back to 2 5 mg once a day to avoid hypotension/hypoperfusion as well as potentially helping leg swelling  4   Electrolytes:  Mild hypernatremia to be followed making sure the patient is well-hydrated  REPEAT A BASIC METABOLIC PROFILE  5  Mineral bone disorder:  · Calcium/magnesium/phosphorus:  All acceptable  · PTH intact:  75 8 at goal  · Vitamin-D:  31 at goal  6  Dyslipidemia:  · Goal LDL:  Goal less than 70  · Current lipid profile:  LDL 64/HDL 42/triglycerides 79 at goal     Recommendations:  No changes  7  Anemia:  Normal hemoglobin at 13  8  Nephrolithiasis:  Also followed by urology:  24 hour urine for stone risk:  -volume 1350 which is BELOW GOAL  -calcium 104 mg at goal  -citrate which is 265 mg BELOW GOAL  -oxalate 23 mg which is at goal  -sodium excretion 108 millimoles which is at goal  -uric acid:  369 mg which is at goal  Recommendations:  · Modest fluid intake with water, try to remove soda, we are restricted because of ischemic cardiomyopathy and fluid overload  · To consider potassium citrate and stop potassium chloride  · Repeat 24 hour urine for stone risk next visit  9  Other problems:  · Cardiac:  Followed by Dr Daniele Watson  Patient with ischemic cardiomyopathy/ICD/systolic CHF/CABG  Ejection fraction 30% without any reversible ischemia on nuclear stress test   · BPH  · Hypothyroidism          PATIENT INSTRUCTIONS:    Patient Instructions   1  Medication changes:  · Stop Lasix  · Decrease amlodipine/Norvasc to 2 5 mg once a day  · Stop potassium chloride/Klor-Con  · Begin torsemide 30 mg once a day  · Begin Urocit-K 2 tablets twice a day  2  Watch your weights daily as well as your swelling  My goal would be to reduce the swelling, as well as potentially reducing her weight 4-6 lb to get us at goal for your swelling  If you are not losing weight or your swelling is no better please call me and certainly discuss it with Dr Daniele Watson  3  Please go for nonfasting lab work in 1-2 weeks and you can put the labs together with the cardiology labs    4   Please wait 2 weeks after making the above medication changes and then take 1 week a blood pressure readings morning and evening just sitting  Do the morning readings before taking any medications and the evening readings towards bedtime  Please send those readings in  5  Please call us in about 4 weeks and let us know how this swelling is and how your weight is  6  Follow-up in 3 months after you come back from your vacation:  · Please bring in 1 week a blood pressure readings morning and evening, sitting and standing  When you stay and keep your arm elevated at heart level  · Please go for fasting lab work prior to your appointment  7  General instructions:  -avoid salt, keep her legs elevated when sitting  -avoid medications such as Motrin, Naprosyn, ibuprofen, Aleve or Advil or Celebrex as they can affect your kidney function; you can use Tylenol as needed for pain or fevers if you have no liver problems  -avoid medications such as Sudafed or other medications with decongestants as they can raise your blood pressure  -continue to be active, try to walk as you are able to  -try to lose 5-10 lb by your next visit  Eduardo Ireland, IF YOUR WEIGHT HAS GONE UP BY A FEW LB AND NOT GETTING BETTER, OR IF YOUR SWELLING PERSISTS OR IS WORSE          Subjective: The patient has the following problems:  -increase in pedal edema the for the last couple of months  No excessive fluid intake at this time,  No salt intake  No fevers chills cough or colds at this time  Breathing is stable, he did have some difficulty breathing when lying down a couple months ago it seems to be improved  Still dyspnea on exertion  No chest pain    No active urinary symptoms including foamy urine  No GI symptoms  No headaches or dizziness or lightheadedness  Blood pressure medications:  -amlodipine 2 5 mg twice a day  -furosemide 40 mg once a day in the morning  -metoprolol tartrate 75 mg twice a day  -K-Dur 20 mEq once a day    ROS:  See HPI, otherwise review of systems as completely reviewed with the patient are negative    Past Medical History:   Diagnosis Date    AICD (automatic cardioverter/defibrillator) present 2004    AICD (automatic cardioverter/defibrillator) present 2006    AICD (automatic cardioverter/defibrillator) present 2013    [facility]    Arthritis     R knee and neck    Atrial fibrillation (HCC)     BPH (benign prostatic hyperplasia)     CAD (coronary artery disease) of artery bypass graft     CHF [facility]congestive heart failure) ([facility] [address] )     combine    Coronary artery disease     Disease of thyroid gland     Hyperlipidemia     Hypertension     Ischemic cardiomyopathy     Left ureteral calculus 10/17/2017    Renal disorder     V tach ([facility] [address] )      Past Surgical History:   Procedure Laterality Date    CARDIAC CATHETERIZATION      CARDIAC SURGERY      Pacemaker    CORONARY ARTERY BYPASS GRAFT      CO CYSTOURETHROSCOPY,URETER CATHETER N/A 10/17/2017    Procedure: CYSTOSCOPY, left  RETROGRADE PYELOGRAM WITH LEFT URETEROSCOPY , stone extraction,LEFT STENT INSERTION;  Surgeon: London Shepard MD;  Location: [facility];  Service: Urology     Family History   Problem Relation Age of Onset    Tuberculosis Father         WWI    Hypertension Mother     Stroke Brother       reports that he has never smoked  He has never used smokeless tobacco  He reports that he does not drink alcohol or use drugs      I COMPLETELY REVIEWED THE PAST MEDICAL HISTORY/PAST SURGICAL HISTORY/SOCIAL HISTORY/FAMILY HISTORY/AND MEDICATIONS  AND UPDATED ALL    Objective:     Vitals:   Vitals:    09/20/18 1149   BP: 128/57   Pulse: 60    BP sitting on left:  118/60 with a heart rate of 72 and regular  BP standing on left:  116/58 with a heart rate of 72 and regular    Weight (last 2 days)     Date/Time   Weight    09/20/18 1149  92 7 (204 4)            Wt Readings from Last 3 Encounters:   09/20/18 92 7 kg (204 lb 6 4 oz) 05/17/18 89 8 kg (198 lb)   04/26/18 87 3 kg (192 lb 8 oz)     Body mass index is 32 01 kg/m²  Physical Exam: General:  Obese, but in no acute distress  Skin:  No acute rash  Eyes:  No scleral icterus, noninjected  ENT:  Moist mucous membranes  Neck:  Supple, no jugular venous distention  Back   No CVAT  Chest:  Clear to auscultation and percussion  CVS:  Regular rate and rhythm without a rub, murmurs or gallops  Abdomen:  Obese,Soft and nontender with normal bowel sounds  Extremities:  No cyanosis and 2+ lower extremity edema 2/3 the way up to pretibial region; 3+ pedal edema bilateral  Neuro:  Grossly intact  Psych:  Alert, oriented x3 and appropriate      Medications:    Current Outpatient Prescriptions:     acetaminophen (TYLENOL) 500 mg tablet, Take 500 mg by mouth every 6 (six) hours as needed for mild pain, Disp: , Rfl:     amiodarone 200 mg tablet, Take 200 mg by mouth daily  , Disp: , Rfl:     amLODIPine (NORVASC) 2 5 mg tablet, Take 2 5 mg by mouth daily , Disp: , Rfl:     aspirin 81 MG tablet, Take 81 mg by mouth daily  , Disp: , Rfl:     Levothyroxine Sodium 125 MCG CAPS, Take 125 mcg by mouth daily  , Disp: , Rfl:     LORazepam (ATIVAN) 1 mg tablet, Take 1 mg by mouth as needed for anxiety  , Disp: , Rfl:     Metoprolol Tartrate 75 MG TABS, Take 75 mg by mouth 2 (two) times a day , Disp: , Rfl:     mexiletine (MEXITIL) 150 mg capsule, Take 150 mg by mouth 2 (two) times a day , Disp: , Rfl:     Multiple Vitamins-Minerals (MULTIVITAMIN WITH MINERALS) tablet, Take 1 tablet by mouth daily  , Disp: , Rfl:     nitroglycerin (NITROSTAT) 0 4 mg SL tablet, Place 0 4 mg under the tongue every 5 (five) minutes as needed for chest pain  , Disp: , Rfl:     pravastatin (PRAVACHOL) 80 mg tablet, Take 80 mg by mouth daily  , Disp: , Rfl:     tamsulosin (FLOMAX) 0 4 mg, Take 1 capsule (0 4 mg total) by mouth daily with dinner, Disp: 90 capsule, Rfl: 3    potassium citrate (UROCIT-K 10) 10 mEq, Take 2 tablets (20 mEq total) by mouth 2 (two) times a day, Disp: 120 tablet, Rfl: 5    torsemide (DEMADEX) 20 mg tablet, Take 1 5 tablets (30 mg total) by mouth daily, Disp: 45 tablet, Rfl: 5    Lab, Imaging and other studies: I have personally reviewed pertinent labs    Laboratory Results:  Results for orders placed or performed in visit on 09/12/18   Comprehensive metabolic panel   Result Value Ref Range    Sodium 148 (H) 136 - 145 mmol/L    Potassium 4 6 3 5 - 5 3 mmol/L    Chloride 109 (H) 100 - 108 mmol/L    CO2 30 21 - 32 mmol/L    ANION GAP 9 4 - 13 mmol/L    BUN 22 5 - 25 mg/dL    Creatinine 1 67 (H) 0 60 - 1 30 mg/dL    Glucose, Fasting 107 (H) 65 - 99 mg/dL    Calcium 9 1 8 3 - 10 1 mg/dL    AST 23 5 - 45 U/L    ALT 28 12 - 78 U/L    Alkaline Phosphatase 73 46 - 116 U/L    Total Protein 6 9 6 4 - 8 2 g/dL    Albumin 3 5 3 5 - 5 0 g/dL    Total Bilirubin 0 60 0 20 - 1 00 mg/dL    eGFR 38 ml/min/1 73sq m   CK   Result Value Ref Range    Total  39 - 308 U/L   CBC   Result Value Ref Range    WBC 6 00 4 31 - 10 16 Thousand/uL    RBC 4 13 3 88 - 5 62 Million/uL    Hemoglobin 13 0 12 0 - 17 0 g/dL    Hematocrit 40 0 36 5 - 49 3 %    MCV 97 82 - 98 fL    MCH 31 5 26 8 - 34 3 pg    MCHC 32 5 31 4 - 37 4 g/dL    RDW 14 4 11 6 - 15 1 %    Platelets 042 271 - 698 Thousands/uL    MPV 9 7 8 9 - 12 7 fL   Magnesium   Result Value Ref Range    Magnesium 2 0 1 6 - 2 6 mg/dL   Phosphorus   Result Value Ref Range    Phosphorus 3 1 2 3 - 4 1 mg/dL   Protein / creatinine ratio, urine   Result Value Ref Range    Creatinine, Ur 161 0 mg/dL    Protein Urine Random 22 mg/dL    Prot/Creat Ratio, Ur 0 14 (H) 0 00 - 0 10   PTH, intact   Result Value Ref Range    PTH 75 8 18 4 - 80 1 pg/mL   Vitamin D 25 hydroxy   Result Value Ref Range    Vit D, 25-Hydroxy 31 0 30 0 - 100 0 ng/mL               Radiology review:   chest X-ray    Ultrasound      Portions of the record may have been created with voice recognition software   Occasional wrong word or "sound a like" substitutions may have occurred due to the inherent limitations of voice recognition software   Read the chart carefully and recognize, using context, where substitutions have occurred

## 2018-09-20 NOTE — PATIENT INSTRUCTIONS
1   Medication changes:  · Stop Lasix  · Decrease amlodipine/Norvasc to 2 5 mg once a day  · Stop potassium chloride/Klor-Con  · Begin torsemide 30 mg once a day  · Begin Urocit-K 2 tablets twice a day  2  Watch your weights daily as well as your swelling  My goal would be to reduce the swelling, as well as potentially reducing her weight 4-6 lb to get us at goal for your swelling  If you are not losing weight or your swelling is no better please call me and certainly discuss it with Dr Brice Park  3  Please go for nonfasting lab work in 1-2 weeks and you can put the labs together with the cardiology labs  4   Please wait 2 weeks after making the above medication changes and then take 1 week a blood pressure readings morning and evening just sitting  Do the morning readings before taking any medications and the evening readings towards bedtime  Please send those readings in  5  Please call us in about 4 weeks and let us know how this swelling is and how your weight is  6  Follow-up in 3 months after you come back from your vacation:  · Please bring in 1 week a blood pressure readings morning and evening, sitting and standing  When you stay and keep your arm elevated at heart level  · Please go for fasting lab work prior to your appointment  7  General instructions:  -avoid salt, keep her legs elevated when sitting  -avoid medications such as Motrin, Naprosyn, ibuprofen, Aleve or Advil or Celebrex as they can affect your kidney function; you can use Tylenol as needed for pain or fevers if you have no liver problems  -avoid medications such as Sudafed or other medications with decongestants as they can raise your blood pressure  -continue to be active, try to walk as you are able to  -try to lose 5-10 lb by your next visit  Eduardo Ireland, IF YOUR WEIGHT HAS GONE UP BY A FEW LB AND NOT GETTING BETTER, OR IF YOUR SWELLING PERSISTS OR IS WORSE

## 2018-09-20 NOTE — PROGRESS NOTES
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  · Baseline creatinine:  1 5  · Current creatinine:  1 67 close to baseline  · Urine protein creatinine ratio:  0 14 g at goal  Recommendations:  · Treat hypertension-please see below  · Treat dyslipidemia-please see below  · Maintain proteinuria less than 1 g or as low as possible  · Avoid nephrotoxic agents such as NSAIDs, patient counseled as such  2   Volume:  Patient with significant lower extremity edema  Recommendations:  -supportive:  Avoid salt as he is doing, leg elevation as able to upon seated position,  -changed to torsemide 30 mg once a day for better absorption/affect and monitor leg swelling and weights and labs closely  He will be following with Cardiology in 2 weeks and potential further adjustment at that time   -check TSH; last 1 was in July just to make sure he is not hypothyroid  -check venous duplex to make sure no DVT  3  Hypertension:       Current blood pressure averages:  AM:  121/73 without orthostatic changes  PM:  119/66 without orthostatic changes    · Goal blood pressure:  Less than 130/80 given CAD  Recommendations:  · Changed to torsemide 30 mg once a day  · Because of this I would decrease amlodipine back to 2 5 mg once a day to avoid hypotension/hypoperfusion as well as potentially helping leg swelling  4   Electrolytes:  Mild hypernatremia to be followed making sure the patient is well-hydrated  REPEAT A BASIC METABOLIC PROFILE  5  Mineral bone disorder:  · Calcium/magnesium/phosphorus:  All acceptable  · PTH intact:  75 8 at goal  · Vitamin-D:  31 at goal  6  Dyslipidemia:  · Goal LDL:  Goal less than 70  · Current lipid profile:  LDL 64/HDL 42/triglycerides 79 at goal     Recommendations:  No changes  7  Anemia:  Normal hemoglobin at 13  8    Nephrolithiasis:  Also followed by urology:  24 hour urine for stone risk:  -volume 1350 which is BELOW GOAL  -calcium 104 mg at goal  -citrate which is 265 mg BELOW GOAL  -oxalate 23 mg which is at goal  -sodium excretion 108 millimoles which is at goal  -uric acid:  369 mg which is at goal  Recommendations:  · Modest fluid intake with water, try to remove soda, we are restricted because of ischemic cardiomyopathy and fluid overload  · To consider potassium citrate and stop potassium chloride  · Repeat 24 hour urine for stone risk next visit  9  Other problems:  · Cardiac:  Followed by Dr Miriam Quintero  Patient with ischemic cardiomyopathy/ICD/systolic CHF/CABG  Ejection fraction 30% without any reversible ischemia on nuclear stress test   · BPH  · Hypothyroidism  I spent an additional 25 minutes counseling the patient plus the 25 minutes appointment time in reviewing the progressive leg swelling/volume overload addressing how we can treated, what we will do to work it up, and also communication with Cardiology  I answered all questions and reviewed the plan thoroughly with the patient        PATIENT INSTRUCTIONS:    Patient Instructions   1  Medication changes:  · Stop Lasix  · Decrease amlodipine/Norvasc to 2 5 mg once a day  · Stop potassium chloride/Klor-Con  · Begin torsemide 30 mg once a day  · Begin Urocit-K 2 tablets twice a day  2  Watch your weights daily as well as your swelling  My goal would be to reduce the swelling, as well as potentially reducing her weight 4-6 lb to get us at goal for your swelling  If you are not losing weight or your swelling is no better please call me and certainly discuss it with Dr Miriam Quintero  3  Please go for nonfasting lab work in 1-2 weeks and you can put the labs together with the cardiology labs  4   Please wait 2 weeks after making the above medication changes and then take 1 week a blood pressure readings morning and evening just sitting  Do the morning readings before taking any medications and the evening readings towards bedtime  Please send those readings in  5  Please call us in about 4 weeks and let us know how this swelling is and how your weight is  6  Follow-up in 3 months after you come back from your vacation:  · Please bring in 1 week a blood pressure readings morning and evening, sitting and standing  When you stay and keep your arm elevated at heart level  · Please go for fasting lab work prior to your appointment  7  General instructions:  -avoid salt, keep her legs elevated when sitting  -avoid medications such as Motrin, Naprosyn, ibuprofen, Aleve or Advil or Celebrex as they can affect your kidney function; you can use Tylenol as needed for pain or fevers if you have no liver problems  -avoid medications such as Sudafed or other medications with decongestants as they can raise your blood pressure  -continue to be active, try to walk as you are able to  -try to lose 5-10 lb by your next visit  Eduardo Ireland, IF YOUR WEIGHT HAS GONE UP BY A FEW LB AND NOT GETTING BETTER, OR IF YOUR SWELLING PERSISTS OR IS WORSE          Subjective: The patient has the following problems:  -increase in pedal edema the for the last couple of months  No excessive fluid intake at this time,  No salt intake  No fevers chills cough or colds at this time  Breathing is stable, he did have some difficulty breathing when lying down a couple months ago it seems to be improved  Still dyspnea on exertion  No chest pain    No active urinary symptoms including foamy urine  No GI symptoms  No headaches or dizziness or lightheadedness  Blood pressure medications:  -amlodipine 2 5 mg twice a day  -furosemide 40 mg once a day in the morning  -metoprolol tartrate 75 mg twice a day  -K-Dur 20 mEq once a day    ROS:  See HPI, otherwise review of systems as completely reviewed with the patient are negative    Past Medical History:   Diagnosis Date    AICD (automatic cardioverter/defibrillator) present 2004    AICD (automatic cardioverter/defibrillator) present 2006    AICD (automatic cardioverter/defibrillator) present 2013    [facility]    Arthritis     R knee and neck    Atrial fibrillation (HCC)     BPH (benign prostatic hyperplasia)     CAD (coronary artery disease) of artery bypass graft     CHF (congestive heart failure) (HCC)     combine    Coronary artery disease     Disease of thyroid gland     Hyperlipidemia     Hypertension     Ischemic cardiomyopathy     Left ureteral calculus 10/17/2017    Renal disorder     V tach ([address] )      Past Surgical History:   Procedure Laterality Date    CARDIAC CATHETERIZATION      CARDIAC SURGERY      Pacemaker    CORONARY ARTERY BYPASS GRAFT      [place] CYSTOURETHROSCOPY,URETER CATHETER N/A 10/17/2017    Procedure: CYSTOSCOPY, left  RETROGRADE PYELOGRAM WITH LEFT URETEROSCOPY , stone extraction,LEFT STENT INSERTION;  Surgeon: Darrick Carrion MD;  Location: [facility];  Service: Urology     Family History   Problem Relation Age of Onset    Tuberculosis Father         WWI    Hypertension Mother     Stroke Brother       reports that he has never smoked  He has never used smokeless tobacco  He reports that he does not drink alcohol or use drugs  I COMPLETELY REVIEWED THE PAST MEDICAL HISTORY/PAST SURGICAL HISTORY/SOCIAL HISTORY/FAMILY HISTORY/AND MEDICATIONS  AND UPDATED ALL    Objective:     Vitals:   Vitals:    09/20/18 1149   BP: 128/57   Pulse: 60    BP sitting on left:  118/60 with a heart rate of 72 and regular  BP standing on left:  116/58 with a heart rate of 72 and regular    Weight (last 2 days)     Date/Time   Weight    09/20/18 1149  92 7 (204 4)            Wt Readings from Last 3 Encounters:   09/20/18 92 7 kg (204 lb 6 4 oz)   05/17/18 89 8 kg (198 lb)   04/26/18 87 3 kg (192 lb 8 oz)     Body mass index is 32 01 kg/m²      Physical Exam: General:  Obese, but in no acute distress  Skin:  No acute rash  Eyes:  No scleral icterus, noninjected  ENT:  Moist mucous membranes  Neck:  Supple, no jugular venous distention  Back   No CVAT  Chest:  Clear to auscultation and percussion  CVS:  Regular rate and rhythm without a rub, murmurs or gallops  Abdomen:  Obese,Soft and nontender with normal bowel sounds  Extremities:  No cyanosis and 2+ lower extremity edema 2/3 the way up to pretibial region; 3+ pedal edema bilateral  Neuro:  Grossly intact  Psych:  Alert, oriented x3 and appropriate      Medications:    Current Outpatient Prescriptions:     acetaminophen (TYLENOL) 500 mg tablet, Take 500 mg by mouth every 6 (six) hours as needed for mild pain, Disp: , Rfl:     amiodarone 200 mg tablet, Take 200 mg by mouth daily  , Disp: , Rfl:     amLODIPine (NORVASC) 2 5 mg tablet, Take 2 5 mg by mouth daily , Disp: , Rfl:     aspirin 81 MG tablet, Take 81 mg by mouth daily  , Disp: , Rfl:     Levothyroxine Sodium 125 MCG CAPS, Take 125 mcg by mouth daily  , Disp: , Rfl:     LORazepam (ATIVAN) 1 mg tablet, Take 1 mg by mouth as needed for anxiety  , Disp: , Rfl:     Metoprolol Tartrate 75 MG TABS, Take 75 mg by mouth 2 (two) times a day , Disp: , Rfl:     mexiletine (MEXITIL) 150 mg capsule, Take 150 mg by mouth 2 (two) times a day , Disp: , Rfl:     Multiple Vitamins-Minerals (MULTIVITAMIN WITH MINERALS) tablet, Take 1 tablet by mouth daily  , Disp: , Rfl:     nitroglycerin (NITROSTAT) 0 4 mg SL tablet, Place 0 4 mg under the tongue every 5 (five) minutes as needed for chest pain  , Disp: , Rfl:     pravastatin (PRAVACHOL) 80 mg tablet, Take 80 mg by mouth daily  , Disp: , Rfl:     tamsulosin (FLOMAX) 0 4 mg, Take 1 capsule (0 4 mg total) by mouth daily with dinner, Disp: 90 capsule, Rfl: 3    potassium citrate (UROCIT-K 10) 10 mEq, Take 2 tablets (20 mEq total) by mouth 2 (two) times a day, Disp: 120 tablet, Rfl: 5    torsemide (DEMADEX) 20 mg tablet, Take 1 5 tablets (30 mg total) by mouth daily, Disp: 45 tablet, Rfl: 5    Lab, Imaging and other studies: I have personally reviewed pertinent labs    Laboratory Results:  Results for orders placed or performed in visit on 09/12/18   Comprehensive metabolic panel   Result Value Ref Range    Sodium 148 (H) 136 - 145 mmol/L    Potassium 4 6 3 5 - 5 3 mmol/L    Chloride 109 (H) 100 - 108 mmol/L    CO2 30 21 - 32 mmol/L    ANION GAP 9 4 - 13 mmol/L    BUN 22 5 - 25 mg/dL    Creatinine 1 67 (H) 0 60 - 1 30 mg/dL    Glucose, Fasting 107 (H) 65 - 99 mg/dL    Calcium 9 1 8 3 - 10 1 mg/dL    AST 23 5 - 45 U/L    ALT 28 12 - 78 U/L    Alkaline Phosphatase 73 46 - 116 U/L    Total Protein 6 9 6 4 - 8 2 g/dL    Albumin 3 5 3 5 - 5 0 g/dL    Total Bilirubin 0 60 0 20 - 1 00 mg/dL    eGFR 38 ml/min/1 73sq m   CK   Result Value Ref Range    Total  39 - 308 U/L   CBC   Result Value Ref Range    WBC 6 00 4 31 - 10 16 Thousand/uL    RBC 4 13 3 88 - 5 62 Million/uL    Hemoglobin 13 0 12 0 - 17 0 g/dL    Hematocrit 40 0 36 5 - 49 3 %    MCV 97 82 - 98 fL    MCH 31 5 26 8 - 34 3 pg    MCHC 32 5 31 4 - 37 4 g/dL    RDW 14 4 11 6 - 15 1 %    Platelets 209 567 - 925 Thousands/uL    MPV 9 7 8 9 - 12 7 fL   Magnesium   Result Value Ref Range    Magnesium 2 0 1 6 - 2 6 mg/dL   Phosphorus   Result Value Ref Range    Phosphorus 3 1 2 3 - 4 1 mg/dL   Protein / creatinine ratio, urine   Result Value Ref Range    Creatinine, Ur 161 0 mg/dL    Protein Urine Random 22 mg/dL    Prot/Creat Ratio, Ur 0 14 (H) 0 00 - 0 10   PTH, intact   Result Value Ref Range    PTH 75 8 18 4 - 80 1 pg/mL   Vitamin D 25 hydroxy   Result Value Ref Range    Vit D, 25-Hydroxy 31 0 30 0 - 100 0 ng/mL               Radiology review:   chest X-ray    Ultrasound      Portions of the record may have been created with voice recognition software   Occasional wrong word or "sound a like" substitutions may have occurred due to the inherent limitations of voice recognition software   Read the chart carefully and recognize, using context, where substitutions have occurred

## 2018-09-28 ENCOUNTER — OFFICE VISIT (OUTPATIENT)
Dept: UROLOGY | Facility: CLINIC | Age: 83
End: 2018-09-28
Payer: MEDICARE

## 2018-09-28 VITALS
BODY MASS INDEX: 31.55 KG/M2 | WEIGHT: 201 LBS | HEART RATE: 60 BPM | HEIGHT: 67 IN | DIASTOLIC BLOOD PRESSURE: 62 MMHG | SYSTOLIC BLOOD PRESSURE: 130 MMHG

## 2018-09-28 DIAGNOSIS — N13.8 BPH WITH OBSTRUCTION/LOWER URINARY TRACT SYMPTOMS: Primary | ICD-10-CM

## 2018-09-28 DIAGNOSIS — N20.0 KIDNEY STONES: ICD-10-CM

## 2018-09-28 DIAGNOSIS — N40.1 BPH WITH OBSTRUCTION/LOWER URINARY TRACT SYMPTOMS: Primary | ICD-10-CM

## 2018-09-28 LAB
SL AMB  POCT GLUCOSE, UA: ABNORMAL
SL AMB LEUKOCYTE ESTERASE,UA: ABNORMAL
SL AMB POCT BILIRUBIN,UA: ABNORMAL
SL AMB POCT BLOOD,UA: ABNORMAL
SL AMB POCT CLARITY,UA: CLEAR
SL AMB POCT COLOR,UA: YELLOW
SL AMB POCT KETONES,UA: ABNORMAL
SL AMB POCT NITRITE,UA: ABNORMAL
SL AMB POCT PH,UA: 7
SL AMB POCT SPECIFIC GRAVITY,UA: 1.01
SL AMB POCT URINE PROTEIN: ABNORMAL
SL AMB POCT UROBILINOGEN: ABNORMAL

## 2018-09-28 PROCEDURE — 99213 OFFICE O/P EST LOW 20 MIN: CPT | Performed by: PHYSICIAN ASSISTANT

## 2018-09-28 PROCEDURE — 81002 URINALYSIS NONAUTO W/O SCOPE: CPT | Performed by: PHYSICIAN ASSISTANT

## 2018-09-28 NOTE — PROGRESS NOTES
UROLOGY PROGRESS NOTE   Patient Identifiers: Laurie Ramirez (MRN [id number])  Date of Service: 9/28/2018    Subjective:     72-year-old male history of kidney stones  He had a ureteroscopy last year  KUB showed bilateral residual stones  He comes in today for follow-up  He forgot to get a new KUB and is not interested in any further treatment due to other medical issues  His urine does show 3+ microscopic blood  No burning no gross hematuria no flank pain  Patient has    See above      Objective:     VITALS:    Vitals:    09/28/18 0906   BP: 130/62   Pulse: 60     AUA SYMPTOM SCORE      Most Recent Value   AUA SYMPTOM SCORE   How often have you had a sensation of not emptying your bladder completely after you finished urinating? 0   How often have you had to urinate again less than two hours after you finished urinating? 3   How often have you found you stopped and started again several times when you urinate?  0   How often have you found it difficult to postpone urination? 1   How often have you had a weak urinary stream?  2   How often have you had to push or strain to begin urination? 0   How many times did you most typically get up to urinate from the time you went to bed at night until the time you got up in the morning?   2   Quality of Life: If you were to spend the rest of your life with your urinary condition just the way it is now, how would you feel about that?  2   AUA SYMPTOM SCORE  8            LABS:  Lab Results   Component Value Date    HGB 13 0 09/12/2018    HCT 40 0 09/12/2018    WBC 6 00 09/12/2018     09/12/2018   ]    Lab Results   Component Value Date     (H) 09/12/2018    K 4 6 09/12/2018     (H) 09/12/2018    CO2 30 09/12/2018    BUN 22 09/12/2018    CREATININE 1 67 (H) 09/12/2018    CALCIUM 9 1 09/12/2018    GLUCOSE 112 10/19/2015   ]        INPATIENT MEDS:    Current Outpatient Prescriptions:     acetaminophen (TYLENOL) 500 mg tablet, Take 500 mg by mouth every 6 (six) hours as needed for mild pain, Disp: , Rfl:     amiodarone 200 mg tablet, Take 200 mg by mouth daily  , Disp: , Rfl:     amLODIPine (NORVASC) 2 5 mg tablet, Take 2 5 mg by mouth daily , Disp: , Rfl:     aspirin 81 MG tablet, Take 81 mg by mouth daily  , Disp: , Rfl:     Levothyroxine Sodium 125 MCG CAPS, Take 125 mcg by mouth daily  , Disp: , Rfl:     LORazepam (ATIVAN) 1 mg tablet, Take 1 mg by mouth as needed for anxiety  , Disp: , Rfl:     Metoprolol Tartrate 75 MG TABS, Take 75 mg by mouth 2 (two) times a day , Disp: , Rfl:     mexiletine (MEXITIL) 150 mg capsule, Take 150 mg by mouth 2 (two) times a day , Disp: , Rfl:     Multiple Vitamins-Minerals (MULTIVITAMIN WITH MINERALS) tablet, Take 1 tablet by mouth daily  , Disp: , Rfl:     potassium citrate (UROCIT-K 10) 10 mEq, Take 2 tablets (20 mEq total) by mouth 2 (two) times a day, Disp: 120 tablet, Rfl: 5    pravastatin (PRAVACHOL) 80 mg tablet, Take 80 mg by mouth daily  , Disp: , Rfl:     tamsulosin (FLOMAX) 0 4 mg, Take 1 capsule (0 4 mg total) by mouth daily with dinner, Disp: 90 capsule, Rfl: 3    torsemide (DEMADEX) 20 mg tablet, Take 1 5 tablets (30 mg total) by mouth daily, Disp: 45 tablet, Rfl: 5    nitroglycerin (NITROSTAT) 0 4 mg SL tablet, Place 0 4 mg under the tongue every 5 (five) minutes as needed for chest pain  , Disp: , Rfl:       Physical Exam:   /62 (BP Location: Left arm, Patient Position: Sitting, Cuff Size: Adult)   Pulse 60   Ht 5' 7" (1 702 m)   Wt 91 2 kg (201 lb)   BMI 31 48 kg/m²   GEN: no acute distress    RESP: breathing comfortably with no accessory muscle use    ABD: soft, non-tender, non-distended   INCISION:    EXT: bilateral peripheral edema     RADIOLOGY:    none     Assessment:    1  Kidney stones   2   BPH     Plan:   - get a KUB now follow-up in 6 months for annual exam   -  He does not wish to pursue any other treatment or any further x-rays  -  -

## 2018-10-01 ENCOUNTER — HOSPITAL ENCOUNTER (OUTPATIENT)
Dept: NON INVASIVE DIAGNOSTICS | Facility: CLINIC | Age: 83
Discharge: HOME | End: 2018-10-01
Payer: MEDICARE

## 2018-10-01 DIAGNOSIS — I12.9 HYPERTENSIVE CHRONIC KIDNEY DISEASE WITH STAGE 1 THROUGH STAGE 4 CHRONIC KIDNEY DISEASE, OR UNSPECIFIED CHRONIC KIDNEY DISEASE: ICD-10-CM

## 2018-10-01 DIAGNOSIS — R60.0 LOCALIZED EDEMA: ICD-10-CM

## 2018-10-01 DIAGNOSIS — E78.5 DYSLIPIDEMIA: ICD-10-CM

## 2018-10-01 DIAGNOSIS — N18.30 CHRONIC KIDNEY DISEASE, STAGE III (MODERATE) (HCC): ICD-10-CM

## 2018-10-01 DIAGNOSIS — N20.0 NEPHROLITHIASIS: ICD-10-CM

## 2018-10-01 DIAGNOSIS — N20.1 LEFT URETERAL CALCULUS: ICD-10-CM

## 2018-10-01 PROCEDURE — 93970 EXTREMITY STUDY: CPT | Performed by: SURGERY

## 2018-10-01 PROCEDURE — 93970 EXTREMITY STUDY: CPT

## 2018-10-02 ENCOUNTER — TELEPHONE (OUTPATIENT)
Dept: NEPHROLOGY | Facility: CLINIC | Age: 83
End: 2018-10-02

## 2018-10-02 ENCOUNTER — APPOINTMENT (OUTPATIENT)
Dept: LAB | Facility: CLINIC | Age: 83
End: 2018-10-02
Payer: MEDICARE

## 2018-10-02 DIAGNOSIS — R60.0 LOCALIZED EDEMA: ICD-10-CM

## 2018-10-02 DIAGNOSIS — I50.22 CHRONIC SYSTOLIC CONGESTIVE HEART FAILURE (HCC): ICD-10-CM

## 2018-10-02 DIAGNOSIS — N18.30 CHRONIC KIDNEY DISEASE, STAGE III (MODERATE) (HCC): ICD-10-CM

## 2018-10-02 DIAGNOSIS — N20.0 NEPHROLITHIASIS: ICD-10-CM

## 2018-10-02 DIAGNOSIS — I12.9 HYPERTENSIVE CHRONIC KIDNEY DISEASE WITH STAGE 1 THROUGH STAGE 4 CHRONIC KIDNEY DISEASE, OR UNSPECIFIED CHRONIC KIDNEY DISEASE: ICD-10-CM

## 2018-10-02 DIAGNOSIS — N20.1 LEFT URETERAL CALCULUS: ICD-10-CM

## 2018-10-02 DIAGNOSIS — E78.5 DYSLIPIDEMIA: ICD-10-CM

## 2018-10-02 LAB
ANION GAP SERPL CALCULATED.3IONS-SCNC: 7 MMOL/L (ref 4–13)
BUN SERPL-MCNC: 30 MG/DL (ref 5–25)
CALCIUM SERPL-MCNC: 9.4 MG/DL (ref 8.3–10.1)
CHLORIDE SERPL-SCNC: 104 MMOL/L (ref 100–108)
CO2 SERPL-SCNC: 30 MMOL/L (ref 21–32)
CREAT SERPL-MCNC: 1.72 MG/DL (ref 0.6–1.3)
GFR SERPL CREATININE-BSD FRML MDRD: 36 ML/MIN/1.73SQ M
GLUCOSE P FAST SERPL-MCNC: 107 MG/DL (ref 65–99)
POTASSIUM SERPL-SCNC: 4.1 MMOL/L (ref 3.5–5.3)
SODIUM SERPL-SCNC: 141 MMOL/L (ref 136–145)
T4 FREE SERPL-MCNC: 1.26 NG/DL (ref 0.76–1.46)
TSH SERPL DL<=0.05 MIU/L-ACNC: 7.94 UIU/ML (ref 0.36–3.74)

## 2018-10-02 PROCEDURE — 84443 ASSAY THYROID STIM HORMONE: CPT

## 2018-10-02 PROCEDURE — 80048 BASIC METABOLIC PNL TOTAL CA: CPT

## 2018-10-02 PROCEDURE — 84439 ASSAY OF FREE THYROXINE: CPT

## 2018-10-02 PROCEDURE — 36415 COLL VENOUS BLD VENIPUNCTURE: CPT

## 2018-10-02 NOTE — TELEPHONE ENCOUNTER
I spoke with Bobby Kennedy from Dr Ritchie Cabezas office  He asked me send them a copy of the TSH result so that they can give it to the doctor  The fax number he gave me was [phone]  I will be sending that over now

## 2018-10-02 NOTE — TELEPHONE ENCOUNTER
----- Message from Benjie Good MD sent at 10/2/2018  9:25 AM EDT -----  Please make sure the family physician is addressing the TSH result

## 2018-10-04 ENCOUNTER — OFFICE VISIT (OUTPATIENT)
Dept: CARDIOLOGY CLINIC | Facility: CLINIC | Age: 83
End: 2018-10-04
Payer: MEDICARE

## 2018-10-04 VITALS
HEART RATE: 72 BPM | DIASTOLIC BLOOD PRESSURE: 62 MMHG | SYSTOLIC BLOOD PRESSURE: 124 MMHG | OXYGEN SATURATION: 91 % | WEIGHT: 202.2 LBS | BODY MASS INDEX: 31.74 KG/M2 | HEIGHT: 67 IN

## 2018-10-04 DIAGNOSIS — N18.30 CHRONIC KIDNEY DISEASE, STAGE III (MODERATE) (HCC): ICD-10-CM

## 2018-10-04 DIAGNOSIS — I50.22 CHRONIC SYSTOLIC CONGESTIVE HEART FAILURE (HCC): ICD-10-CM

## 2018-10-04 DIAGNOSIS — I25.810 CORONARY ARTERY DISEASE INVOLVING CORONARY BYPASS GRAFT OF NATIVE HEART WITHOUT ANGINA PECTORIS: ICD-10-CM

## 2018-10-04 DIAGNOSIS — I10 BENIGN ESSENTIAL HYPERTENSION: ICD-10-CM

## 2018-10-04 DIAGNOSIS — I25.5 ISCHEMIC CARDIOMYOPATHY: ICD-10-CM

## 2018-10-04 DIAGNOSIS — N20.0 NEPHROLITHIASIS: ICD-10-CM

## 2018-10-04 DIAGNOSIS — E78.5 DYSLIPIDEMIA: ICD-10-CM

## 2018-10-04 DIAGNOSIS — E78.5 HYPERLIPIDEMIA, UNSPECIFIED HYPERLIPIDEMIA TYPE: ICD-10-CM

## 2018-10-04 DIAGNOSIS — I48.0 PAROXYSMAL ATRIAL FIBRILLATION (HCC): ICD-10-CM

## 2018-10-04 DIAGNOSIS — N20.1 LEFT URETERAL CALCULUS: ICD-10-CM

## 2018-10-04 DIAGNOSIS — I47.2 V TACH (HCC): ICD-10-CM

## 2018-10-04 DIAGNOSIS — I12.9 HYPERTENSIVE CHRONIC KIDNEY DISEASE WITH STAGE 1 THROUGH STAGE 4 CHRONIC KIDNEY DISEASE, OR UNSPECIFIED CHRONIC KIDNEY DISEASE: ICD-10-CM

## 2018-10-04 DIAGNOSIS — I48.91 ATRIAL FIBRILLATION, UNSPECIFIED TYPE (HCC): Primary | ICD-10-CM

## 2018-10-04 PROCEDURE — 93000 ELECTROCARDIOGRAM COMPLETE: CPT | Performed by: INTERNAL MEDICINE

## 2018-10-04 PROCEDURE — 99215 OFFICE O/P EST HI 40 MIN: CPT | Performed by: INTERNAL MEDICINE

## 2018-10-04 RX ORDER — METOPROLOL SUCCINATE 50 MG/1
50 TABLET, EXTENDED RELEASE ORAL 2 TIMES DAILY
Qty: 60 TABLET | Refills: 11 | Status: SHIPPED | OUTPATIENT
Start: 2018-10-04 | End: 2019-05-31 | Stop reason: SDUPTHER

## 2018-10-04 RX ORDER — TORSEMIDE 20 MG/1
40 TABLET ORAL DAILY
Qty: 60 TABLET | Refills: 11 | Status: SHIPPED | OUTPATIENT
Start: 2018-10-04 | End: 2019-03-21 | Stop reason: SDUPTHER

## 2018-10-04 RX ORDER — ISOSORBIDE MONONITRATE 30 MG/1
30 TABLET, EXTENDED RELEASE ORAL DAILY
Qty: 30 TABLET | Refills: 11 | Status: SHIPPED | OUTPATIENT
Start: 2018-10-04 | End: 2019-09-25 | Stop reason: SDUPTHER

## 2018-10-04 NOTE — PROGRESS NOTES
Follow-up - Cardiology   Israel Llamas 80 y o  male MRN: [id number]        Problems    1  Atrial fibrillation, unspecified type ([facility] )  POCT ECG   2  Coronary artery disease involving coronary bypass graft of native heart without angina pectoris     3  Ischemic cardiomyopathy     4  Paroxysmal atrial fibrillation (HCC)     5  V tach ([facility] )     6  Hyperlipidemia, unspecified hyperlipidemia type     7  Chronic systolic congestive heart failure ([facility] )     8  Benign essential hypertension     9  Chronic kidney disease, stage III (moderate) (HCC)       Impression    At this point in time he has some mild acute on chronic decompensated systolic CHF, probably about 5-6 lb over his dry weight  He has had no arrhythmias to have caused this decompensation  His cardiac testing, ischemic testing back in the spring had no changes  He has never been on ACE-inhibitor or ARB therapy due to his fluctuating CKD stage 3  He is currently only on amlodipine  His legs are warm, no signs of low output  Plan    I have recommended that Wesly start taking torsemide 40 mg daily, but for the next 2 days I would like him to take it twice a day for some enhanced diuresis  I would like to reduce the dose of his beta-blocker, and switch him from metoprolol tartrate to metoprolol succinate 50 mg twice a day  This may aid his inotropy and improve renal perfusion for better diuresis  I would like to enhance afterload reduction with nitrates, start 30 mg of Imdur daily  Blood work to be obtained in 2 weeks to monitor renal function      HPI: Israel Llamas is a 80y o  year old male  With a longstanding history of CAD, CABG, ischemic cardiomyopathy with last known ejection fraction 38%, chronic systolic/ diastolic CHF, ventricular tachycardia and paroxysmal atrial fibrillation, on long-term amiodarone and mexiletine         Rosario Armando was responding quite well to furosemide initially in April, renal function significantly improved at that time with D congestion down to 1 28, but has gained a fair amount of weight over the course of the summer, about 5-7 lb on and off, renal function was recently 1 7 and saw Dr Oumou Fowler with Nephrology  He was switched to torsemide 30 mg daily, does not feel like he has had a significant response to that, although admits to being about 2 lb down since that time  He complains of shortness of breath, his wife notices is at home, he has some mild orthopnea, he has mostly pedal edema, but some up the legs  He denies any device shocks  He is in an atrial/ventricular paced rhythm on his EKG today, his recent device check shows greater than 99% ventricular pacing in a biventricular manner which is excellent  He has had no recurrence of atrial fibrillation or ventricular tachycardia, and remains on amiodarone and mexiletine  Review of Systems   Constitutional: Positive for appetite change and unexpected weight change  Eyes: Negative  Respiratory: Positive for shortness of breath  Negative for cough  Cardiovascular: Positive for leg swelling  Negative for chest pain and palpitations  Gastrointestinal: Positive for abdominal distention  Negative for abdominal pain  Genitourinary: Negative  Musculoskeletal: Negative  Skin: Negative  Neurological: Negative  Hematological: Negative  Psychiatric/Behavioral: Negative            Past Medical History:   Diagnosis Date    AICD (automatic cardioverter/defibrillator) present 2004    AICD (automatic cardioverter/defibrillator) present 2006    AICD (automatic cardioverter/defibrillator) present 2013    [facility]    Arthritis     R knee and neck    Atrial fibrillation (HCC)     BPH (benign prostatic hyperplasia)     CAD (coronary artery disease) of artery bypass graft     CHF (congestive heart failure) (HCC)     combine    Coronary artery disease     Disease of thyroid gland     Hyperlipidemia     Hypertension     Ischemic cardiomyopathy     Left ureteral calculus 10/17/2017    Renal disorder     V tach (HCC)      History   Alcohol Use No     History   Drug Use No     History   Smoking Status    Never Smoker   Smokeless Tobacco    Never Used       Allergies:  No Known Allergies    Medications:     Current Outpatient Prescriptions:     acetaminophen (TYLENOL) 500 mg tablet, Take 500 mg by mouth every 6 (six) hours as needed for mild pain, Disp: , Rfl:     amiodarone 200 mg tablet, Take 200 mg by mouth daily  , Disp: , Rfl:     amLODIPine (NORVASC) 2 5 mg tablet, Take 2 5 mg by mouth daily , Disp: , Rfl:     aspirin 81 MG tablet, Take 81 mg by mouth daily  , Disp: , Rfl:     Levothyroxine Sodium 125 MCG CAPS, Take 150 mcg by mouth daily  , Disp: , Rfl:     LORazepam (ATIVAN) 1 mg tablet, Take 1 mg by mouth as needed for anxiety  , Disp: , Rfl:     Metoprolol Tartrate 75 MG TABS, Take 75 mg by mouth 2 (two) times a day , Disp: , Rfl:     mexiletine (MEXITIL) 150 mg capsule, Take 150 mg by mouth 2 (two) times a day , Disp: , Rfl:     Multiple Vitamins-Minerals (MULTIVITAMIN WITH MINERALS) tablet, Take 1 tablet by mouth daily  , Disp: , Rfl:     nitroglycerin (NITROSTAT) 0 4 mg SL tablet, Place 0 4 mg under the tongue every 5 (five) minutes as needed for chest pain  , Disp: , Rfl:     potassium citrate (UROCIT-K 10) 10 mEq, Take 2 tablets (20 mEq total) by mouth 2 (two) times a day, Disp: 120 tablet, Rfl: 5    pravastatin (PRAVACHOL) 80 mg tablet, Take 80 mg by mouth daily  , Disp: , Rfl:     tamsulosin (FLOMAX) 0 4 mg, Take 1 capsule (0 4 mg total) by mouth daily with dinner, Disp: 90 capsule, Rfl: 3    torsemide (DEMADEX) 20 mg tablet, Take 1 5 tablets (30 mg total) by mouth daily, Disp: 45 tablet, Rfl: 5      Vitals:    10/04/18 0920   BP: 124/62   Pulse: 72   SpO2: 91%     Weight (last 2 days)     Date/Time   Weight    10/04/18 0920  91 7 (202 2)            Physical Exam   Constitutional: He is oriented to person, place, and time  He appears well-developed and well-nourished  No distress  HENT:   Head: Normocephalic and atraumatic  Mouth/Throat: Oropharynx is clear and moist    Eyes: Pupils are equal, round, and reactive to light  Conjunctivae and EOM are normal  No scleral icterus  Neck: Normal range of motion  Neck supple  JVD present  No thyromegaly present  Cardiovascular: Normal rate, regular rhythm and intact distal pulses  Exam reveals no gallop and no friction rub  Murmur heard  Pulmonary/Chest: Effort normal  No respiratory distress  He has no wheezes  He has rales  Abdominal: Soft  Bowel sounds are normal  He exhibits no distension  There is no tenderness  Musculoskeletal: Normal range of motion  He exhibits edema  He exhibits no deformity  Neurological: He is alert and oriented to person, place, and time  No cranial nerve deficit  Skin: Skin is warm and dry  No rash noted  He is not diaphoretic  No erythema  Psychiatric: He has a normal mood and affect           Laboratory Studies:  CMP:    Results from last 7 days  Lab Units 10/02/18  0638   SODIUM mmol/L 141   POTASSIUM mmol/L 4 1   CHLORIDE mmol/L 104   CO2 mmol/L 30   BUN mg/dL 30*   CREATININE mg/dL 1 72*   CALCIUM mg/dL 9 4   EGFR ml/min/1 73sq m 36      Sodium 136 - 145 mmol/L 145     Potassium 3 5 - 5 3 mmol/L 3 7     Chloride 100 - 108 mmol/L 107     CO2 21 - 32 mmol/L 29     Anion Gap 4 - 13 mmol/L 9     BUN 5 - 25 mg/dL 21     Creatinine 0 60 - 1 30 mg/dL 1 45   H    Comments: Standardized to IDMS reference method   Glucose, Fasting 65 - 99 mg/dL 109   H    Comments:         NT-proBNP:   Lab Results   Component Value Date    NTBNP 3,682 (H) 06/26/2016      Coags:    Lipid Profile:   Lab Results   Component Value Date    CHOL 122 09/09/2015     Lab Results   Component Value Date    HDL 42 07/20/2018     Lab Results   Component Value Date    LDLCALC 64 07/20/2018     Lab Results   Component Value Date    TRIG 79 07/20/2018       Cardiac testing:   EKG reviewed personally:  AV paced with BIV pacing  Results for orders placed in visit on 09/11/15   Echo complete with contrast if indicated    Narrative [address]   [facility], [address] [facility]   Phone: [phone]   TRANSTHORACIC ECHOCARDIOGRAM   2D, M-MODE, DOPPLER, AND COLOR DOPPLER   Study date:  12-Sep-2015   Patient: Indu Ontiveros   MR number: [id number]   Account number: [de-identified]   : 1935   Age: 78 years   Gender: Male   Status: Inpatient   Location: Bedside   Height: 68 in   Weight: 207 5 lb   BP: 134/ 63 mmHg   Indications: M[place]unc cardiac device; Cardiomyopathy   Diagnoses: 425 8 - CARDIOMYOPATH IN OTH DIS, 99 [address] [facility]   Sonographer:  Anna Sanchez   Primary Physician:  Wei Wan MD   Referring Physician:  Wei Wan MD   Group:  [id number]   Interpreting Physician:  Satish Valencia MD   SUMMARY   LEFT VENTRICLE:   The ventricle was moderately dilated  Systolic function was markedly reduced  Ejection fraction was estimated to be   30 %  There was moderate diffuse hypokinesis with distinct regional wall    motion   abnormalities  There was akinesis of the entire inferior and inferolateral   walls  Doppler parameters were consistent with a restrictive pattern,   indicative of decreased left ventricular diastolic compliance and/or increased   left atrial pressure (grade 3 diastolic dysfunction)  LEFT ATRIUM:   The atrium was mildly to moderately dilated  MITRAL VALVE:   There was mild to moderate regurgitation  TRICUSPID VALVE:   There was mild regurgitation  HISTORY: PRIOR HISTORY: HTN; Ischemic CM; VTACH; PICD   PROCEDURE: The procedure was performed at the bedside  This was a routine   TRANSTHORACIC ECHOCARDIOGRAM   Patient: Indu Ontiveros   MR number: [id number]    ------ Page 2   study  The transthoracic approach was used   The study included complete 2D   imaging, M-mode, complete spectral Doppler, and color Doppler  Echocardiographic views were limited due to poor acoustic window availability,   decreased penetration, and lung interference  This was a technically difficult   study  LEFT VENTRICLE: The ventricle was moderately dilated  Systolic function was   markedly reduced  Ejection fraction was estimated to be 30 %  There was   moderate diffuse hypokinesis with distinct regional wall motion abnormalities  There was akinesis of the entire inferior and inferolateral walls  Wall   thickness was normal  DOPPLER: Doppler parameters were consistent with a   restrictive pattern, indicative of decreased left ventricular diastolic   compliance and/or increased left atrial pressure (grade 3 diastolic   dysfunction)  RIGHT VENTRICLE: The size was normal  Systolic function was normal  A pacing   wire was present  LEFT ATRIUM: The atrium was mildly to moderately dilated  RIGHT ATRIUM: Size was normal    MITRAL VALVE: Valve structure was normal  There was mild thickening  There was   normal leaflet separation  DOPPLER: The transmitral velocity was within the   normal range  There was no evidence for stenosis  There was mild to moderate   regurgitation  AORTIC VALVE: The valve was probably trileaflet  Leaflets exhibited normal   thickness and normal cuspal separation  DOPPLER: Transaortic velocity was   within the normal range  There was no evidence for stenosis  There was no   regurgitation  TRICUSPID VALVE: The valve structure was normal  There was normal leaflet   separation  DOPPLER: The transtricuspid velocity was within the normal range  There was no evidence for stenosis  There was mild regurgitation  Estimated   peak PA pressure was 30 mmHg  PULMONIC VALVE: Not well visualized  PERICARDIUM: There was no pericardial effusion  AORTA: The root exhibited normal size  SYSTEMIC VEINS: IVC: The inferior vena cava was not well visualized     SYSTEM MEASUREMENT TABLES   2D   %FS: 14 77 %   AV Diam: 3 02 cm   TRANSTHORACIC ECHOCARDIOGRAM   Patient: Tristan Dawkins   MR number: [id number]    ------ Page 3   EDV(Teich): 182 1 ml   EF Biplane: 32 05 %   EF(Teich): 30 76 %   ESV(Teich): 126 07 ml   IVSd: 0 58 cm   LA Area: 23 12 cm2   LA Diam: 4 72 cm   LVEDV MOD A2C: 130 74 ml   LVEDV MOD A4C: 190 68 ml   LVEDV MOD BP: 165 48 ml   LVEF MOD A2C: 25 79 %   LVEF MOD A4C: 37 68 %   LVESV MOD A2C: 97 02 ml   LVESV MOD A4C: 118 83 ml   LVESV MOD BP: 112 45 ml   LVIDd: 6 03 cm   LVIDs: 5 14 cm   LVLd A2C: 8 07 cm   LVLd A4C: 9 28 cm   LVLs A2C: 7 38 cm   LVLs A4C: 6 38 cm   LVPWd: 0 59 cm   RA Area: 15 54 cm2   RVIDd: 3 2 cm   SV MOD A2C: 33 72 ml   SV MOD A4C: 71 85 ml   SV(Teich): 56 02 ml   CW   RAP: 10 mmHg   TR Vmax: 2 5 m/s   TR maxP 96 mmHg   MM   TAPSE: 1 98 cm   PW   E': 0 07 m/s   E/E': 12 63   MV A Hemanth: 0 54 m/s   MV Dec [place]: 5 44 m/s2   MV DecT: 168 43 ms   MV E Hemanth: 0 92 m/s   MV E/A Ratio: 1 71   MV PHT: 48 85 ms   MVA By PHT: 4 5 cm2   RVSP: 34 96 mmHg   [facility]   TRANSTHORACIC ECHOCARDIOGRAM   Patient: Tristan Dawkins   MR number: [id number]    ------ Page 4   Prepared and electronically signed by   Germaine Chen MD   Signed 12-Sep-2015 15:47:33      No results found for this or any previous visit  No results found for this or any previous visit  Results for orders placed in visit on 09/11/15   NM myocardial perfusion spect (stress and/or rest)    Narrative PHARMACOLOGIC STRESS TEST, LEXISCAN          INDICATION-  Shortness of breath, fatigue, abnormal EKG, old MI   COMPARISON- 2014   TECHNIQUE-  Pharmacologic stress testing was performed utilizing   Lexiscan  SPECT myocardial perfusion images was performed  Dosages of   TC-99-mm Myoview were 11 mCi and 33 mCi IV  Time peak imaging for rest   70 minutes and stress 70 minutes  Both rest and stress images were   performed    Images were then reconstructed in the short, vertical and   horizontal long axes projections  Baseline heart rate 61 beats per minute  Peak heart of 74 beats per minute  Baseline blood pressure 117/69 mmHg  Peak blood pressure 103/50 mmHg  Symptoms-   Belly cramps   Please see cardiology report of physiologic data  FINDINGS-   OVERALL QUALITY-   Acceptable  ARTIFACT-  None  PERFUSION IMAGES-   Stable large fixed defect in the inferior and   inferolateral wall compatible with scarring  No reversible ischemia   demonstrated  WALL MOTION-  Akinesis in the inferior and inferolateral walls   compatible with scarring  Additional global hypokinesis  EJECTION FRACTION-  43 %   IMPRESSION-   1   Stable large fixed defect in the inferior and inferolateral wall   compatible with scarring  No reversible ischemia demonstrated  2  Left ventricular ejection fraction- 43%   Transcribed on- [id number]           - CHUNG Carson MD        Reading Radiologist- CHUNG Carson MD        Electronically Signed- CHUNG Carson MD        Released Date Time- 09/14/15 1312      ------------------------------------------------------------------------------   Isa Knight MD    Portions of the record may have been created with voice recognition software   Occasional wrong word or "sound a like" substitutions may have occurred due to the inherent limitations of voice recognition software   Read the chart carefully and recognize, using context, where substitutions have occurred

## 2018-10-04 NOTE — PATIENT INSTRUCTIONS
I have recommended that Wesly start taking torsemide 40 mg daily, but for the next 2 days I would like him to take it twice a day for some enhanced diuresis  I would like to reduce the dose of his beta-blocker, and switch him from metoprolol tartrate to metoprolol succinate 50 mg twice a day  This may aid his inotropy and improve renal perfusion for better diuresis    I would like to enhance afterload reduction with nitrates, start 30 mg of Imdur daily  Blood work to be obtained in 2 weeks to monitor renal function

## 2018-10-24 ENCOUNTER — APPOINTMENT (OUTPATIENT)
Dept: LAB | Facility: CLINIC | Age: 83
End: 2018-10-24
Payer: MEDICARE

## 2018-10-24 DIAGNOSIS — I50.22 CHRONIC SYSTOLIC CONGESTIVE HEART FAILURE (HCC): ICD-10-CM

## 2018-10-24 LAB
ANION GAP SERPL CALCULATED.3IONS-SCNC: 10 MMOL/L (ref 4–13)
BUN SERPL-MCNC: 27 MG/DL (ref 5–25)
CALCIUM SERPL-MCNC: 9.6 MG/DL (ref 8.3–10.1)
CHLORIDE SERPL-SCNC: 102 MMOL/L (ref 100–108)
CO2 SERPL-SCNC: 30 MMOL/L (ref 21–32)
CREAT SERPL-MCNC: 1.83 MG/DL (ref 0.6–1.3)
GFR SERPL CREATININE-BSD FRML MDRD: 34 ML/MIN/1.73SQ M
GLUCOSE P FAST SERPL-MCNC: 105 MG/DL (ref 65–99)
POTASSIUM SERPL-SCNC: 3.9 MMOL/L (ref 3.5–5.3)
SODIUM SERPL-SCNC: 142 MMOL/L (ref 136–145)

## 2018-10-24 PROCEDURE — 80048 BASIC METABOLIC PNL TOTAL CA: CPT

## 2018-10-24 PROCEDURE — 36415 COLL VENOUS BLD VENIPUNCTURE: CPT

## 2018-11-12 ENCOUNTER — TRANSCRIBE ORDERS (OUTPATIENT)
Dept: LAB | Facility: CLINIC | Age: 83
End: 2018-11-12

## 2018-11-12 ENCOUNTER — APPOINTMENT (OUTPATIENT)
Dept: LAB | Facility: CLINIC | Age: 83
End: 2018-11-12
Payer: MEDICARE

## 2018-11-12 DIAGNOSIS — E03.4 IDIOPATHIC ATROPHIC HYPOTHYROIDISM: Primary | ICD-10-CM

## 2018-11-12 DIAGNOSIS — E03.4 IDIOPATHIC ATROPHIC HYPOTHYROIDISM: ICD-10-CM

## 2018-11-12 LAB — TSH SERPL DL<=0.05 MIU/L-ACNC: 0.72 UIU/ML (ref 0.36–3.74)

## 2018-11-12 PROCEDURE — 36415 COLL VENOUS BLD VENIPUNCTURE: CPT

## 2018-11-12 PROCEDURE — 84443 ASSAY THYROID STIM HORMONE: CPT

## 2018-11-19 ENCOUNTER — REMOTE DEVICE CLINIC VISIT (OUTPATIENT)
Dept: CARDIOLOGY CLINIC | Facility: CLINIC | Age: 83
End: 2018-11-19
Payer: MEDICARE

## 2018-11-19 DIAGNOSIS — I47.2 VENTRICULAR TACHYCARDIA (HCC): Primary | ICD-10-CM

## 2018-11-19 DIAGNOSIS — I25.119 CORONARY ARTERY DISEASE INVOLVING NATIVE HEART WITH ANGINA PECTORIS, UNSPECIFIED VESSEL OR LESION TYPE (HCC): ICD-10-CM

## 2018-11-19 DIAGNOSIS — Z95.810 PRESENCE OF AUTOMATIC CARDIOVERTER/DEFIBRILLATOR (AICD): ICD-10-CM

## 2018-11-19 PROCEDURE — 93295 DEV INTERROG REMOTE 1/2/MLT: CPT | Performed by: INTERNAL MEDICINE

## 2018-11-19 PROCEDURE — 93296 REM INTERROG EVL PM/IDS: CPT | Performed by: INTERNAL MEDICINE

## 2018-11-19 NOTE — PROGRESS NOTES
Results for orders placed or performed in visit on 11/19/18   Cardiac EP device report    Narrative    [facility] CRT-D  MERLIN TRANSMISSION: BATTERY VOLTAGE ADEQUATE (1 3 YRS)  AP: 96%  BP:>99%  ALL AVAILABLE LEAD PARAMETERS WITHIN NORMAL LIMITS   1 AMS EPISODE W/ EGRAM SHOWING NOISE, NOISE REVERSION DETECTED ALL ON SAME DAY  NO OTHER SIGNIFICANT HIGH RATE EPISODES  CORVUE IMPEDANCE MONITORING WITHIN NORMAL LIMITS  APPROPRIATELY FUNCTIONING  BI- V ICD    [facility]

## 2018-11-26 ENCOUNTER — TELEPHONE (OUTPATIENT)
Dept: NEPHROLOGY | Facility: CLINIC | Age: 83
End: 2018-11-26

## 2018-11-26 NOTE — TELEPHONE ENCOUNTER
I called patient to schedule January follow up appt, they said they will be away until end of January so he will get blood work done then and they would like to be put on the February recall list

## 2018-12-04 ENCOUNTER — OFFICE VISIT (OUTPATIENT)
Dept: CARDIOLOGY CLINIC | Facility: CLINIC | Age: 83
End: 2018-12-04
Payer: MEDICARE

## 2018-12-04 VITALS
HEIGHT: 67 IN | OXYGEN SATURATION: 91 % | SYSTOLIC BLOOD PRESSURE: 120 MMHG | WEIGHT: 202.4 LBS | HEART RATE: 70 BPM | DIASTOLIC BLOOD PRESSURE: 70 MMHG | BODY MASS INDEX: 31.77 KG/M2

## 2018-12-04 DIAGNOSIS — I25.5 ISCHEMIC CARDIOMYOPATHY: ICD-10-CM

## 2018-12-04 DIAGNOSIS — I48.91 ATRIAL FIBRILLATION, UNSPECIFIED TYPE (HCC): Primary | ICD-10-CM

## 2018-12-04 DIAGNOSIS — I48.0 PAROXYSMAL ATRIAL FIBRILLATION (HCC): ICD-10-CM

## 2018-12-04 DIAGNOSIS — N18.30 CHRONIC KIDNEY DISEASE, STAGE III (MODERATE) (HCC): ICD-10-CM

## 2018-12-04 DIAGNOSIS — I50.22 CHRONIC SYSTOLIC CONGESTIVE HEART FAILURE (HCC): ICD-10-CM

## 2018-12-04 DIAGNOSIS — I10 BENIGN ESSENTIAL HYPERTENSION: ICD-10-CM

## 2018-12-04 PROCEDURE — 93000 ELECTROCARDIOGRAM COMPLETE: CPT | Performed by: INTERNAL MEDICINE

## 2018-12-04 PROCEDURE — 99214 OFFICE O/P EST MOD 30 MIN: CPT | Performed by: INTERNAL MEDICINE

## 2018-12-04 NOTE — PROGRESS NOTES
Follow-up - Cardiology   Collins Putnam 80 y o  male MRN: [id number]        Problems    1  Atrial fibrillation, unspecified type ([facility] [address] )  POCT ECG   2  Chronic systolic congestive heart failure (HCC)     3  Paroxysmal atrial fibrillation ([facility] )     4  Ischemic cardiomyopathy     5  Benign essential hypertension     6  Chronic kidney disease, stage III (moderate) (HCC)       Impression    Wesly came in feeling much better than 2 months ago, weights are down a bit, legs much less edematous, lungs are clear, EKG shows normal a and V paced rhythm and has tolerated the addition of isosorbide mononitrate for his ischemic cardiomyopathy could/chronic systolic CHF, and has tolerated the slight increase in furosemide  Initial 40 mg twice a day was very effective, now taking 20 mg twice a day  Blood pressure is currently well controlled  No current arrhythmias to speak of  Renal function was also stable with a creatinine 1 83      Plan    Continue current plan of furosemide 20 mg twice a day, but also indicated to him he can take all 40 mg once a day if needed  He understands to continue watching his weights, understands that if his weight does go up by 2-3 lb, or significant shortness of breath/leg edema, he is to increase his furosemide to 40 mg twice a day for 2-3 days until symptoms subside  We will recommend a 5 month follow-up  Labs have already been ordered, he expects to do an in January  HPI: Collins Putnam is a 80y o  year old male  With a longstanding history of CAD, CABG, ischemic cardiomyopathy with last known ejection fraction 26%, chronic systolic/ diastolic CHF, ventricular tachycardia and paroxysmal atrial fibrillation, on long-term amiodarone and mexiletine  [facility] had his diuretic increased to 40 mg b i d   For a few days, now taking a 20 mg b i d , weights are down a few lb, leg edema is resolved, shortness of breath has improved significantly, and appetite is much improved as well  He had lower extremity Dopplers done at the request of Dr Tato Smith, there were normal   His CKD stage 3 is been stable, last creatinine was 1 83  He had isosorbide mononitrate added for afterload reduction considering his CKD and avoidance of ARB/ACE-inhibitor, and it is well tolerated, blood pressure is well controlled  Review of Systems   Respiratory: Positive for shortness of breath  Cardiovascular: Negative  Gastrointestinal: Negative  Neurological: Positive for tremors  Past Medical History:   Diagnosis Date    AICD (automatic cardioverter/defibrillator) present 2004    AICD (automatic cardioverter/defibrillator) present 2006    AICD (automatic cardioverter/defibrillator) present 2013    [facility]    Arthritis     R knee and neck    Atrial fibrillation (HCC)     BPH (benign prostatic hyperplasia)     CAD (coronary artery disease) of artery bypass graft     CHF (congestive heart failure) (HCC)     combine    Coronary artery disease     Disease of thyroid gland     Hyperlipidemia     Hypertension     Ischemic cardiomyopathy     Left ureteral calculus 10/17/2017    Renal disorder     V tach (HCC)      History   Alcohol Use No     History   Drug Use No     History   Smoking Status    Never Smoker   Smokeless Tobacco    Never Used       Allergies:  No Known Allergies    Medications:     Current Outpatient Prescriptions:     acetaminophen (TYLENOL) 500 mg tablet, Take 500 mg by mouth every 6 (six) hours as needed for mild pain, Disp: , Rfl:     amiodarone 200 mg tablet, Take 200 mg by mouth daily  , Disp: , Rfl:     amLODIPine (NORVASC) 2 5 mg tablet, Take 2 5 mg by mouth daily , Disp: , Rfl:     aspirin 81 MG tablet, Take 81 mg by mouth daily  , Disp: , Rfl:     isosorbide mononitrate (IMDUR) 30 mg 24 hr tablet, Take 1 tablet (30 mg total) by mouth daily, Disp: 30 tablet, Rfl: 11    Levothyroxine Sodium 125 MCG CAPS, Take 150 mcg by mouth daily  , Disp: , Rfl:    LORazepam (ATIVAN) 1 mg tablet, Take 1 mg by mouth as needed for anxiety  , Disp: , Rfl:     metoprolol succinate (TOPROL-XL) 50 mg 24 hr tablet, Take 1 tablet (50 mg total) by mouth 2 (two) times a day, Disp: 60 tablet, Rfl: 11    mexiletine (MEXITIL) 150 mg capsule, Take 150 mg by mouth 2 (two) times a day , Disp: , Rfl:     Multiple Vitamins-Minerals (MULTIVITAMIN WITH MINERALS) tablet, Take 1 tablet by mouth daily  , Disp: , Rfl:     nitroglycerin (NITROSTAT) 0 4 mg SL tablet, Place 0 4 mg under the tongue every 5 (five) minutes as needed for chest pain  , Disp: , Rfl:     potassium citrate (UROCIT-K 10) 10 mEq, Take 2 tablets (20 mEq total) by mouth 2 (two) times a day, Disp: 120 tablet, Rfl: 5    pravastatin (PRAVACHOL) 80 mg tablet, Take 80 mg by mouth daily  , Disp: , Rfl:     tamsulosin (FLOMAX) 0 4 mg, Take 1 capsule (0 4 mg total) by mouth daily with dinner, Disp: 90 capsule, Rfl: 3    torsemide (DEMADEX) 20 mg tablet, Take 2 tablets (40 mg total) by mouth daily, Disp: 60 tablet, Rfl: 11      Vitals:    12/04/18 0808   BP: 120/70   Pulse: 70   SpO2: 91%     Weight (last 2 days)     Date/Time   Weight    12/04/18 0808  91 8 (202 4)            Physical Exam   Constitutional: He is oriented to person, place, and time  No distress  HENT:   Head: Normocephalic and atraumatic  Eyes: Conjunctivae are normal  No scleral icterus  Neck: Normal range of motion  No JVD present  Cardiovascular: Normal rate, regular rhythm, normal heart sounds and intact distal pulses  No murmur heard  Pulmonary/Chest: Effort normal and breath sounds normal  He has no wheezes  He has no rales  Musculoskeletal: He exhibits no edema  Neurological: He is alert and oriented to person, place, and time  Skin: Skin is warm and dry  He is not diaphoretic           Laboratory Studies:  CMP:        Invalid input(s): ALBUMIN   Sodium 136 - 145 mmol/L 145     Potassium 3 5 - 5 3 mmol/L 3 7     Chloride 100 - 108 mmol/L 107 CO2 21 - 32 mmol/L 29     Anion Gap 4 - 13 mmol/L 9     BUN 5 - 25 mg/dL 21     Creatinine 0 60 - 1 30 mg/dL 1 45   H    Comments: Standardized to IDMS reference method   Glucose, Fasting 65 - 99 mg/dL 109   H    Comments:         NT-proBNP:   Lab Results   Component Value Date    NTBNP 3,682 (H) 2016      Coags:    Lipid Profile:   Lab Results   Component Value Date    CHOL 122 2015     Lab Results   Component Value Date    HDL 42 2018     Lab Results   Component Value Date    LDLCALC 64 2018     Lab Results   Component Value Date    TRIG 79 2018       Cardiac testing:   EKG reviewed personally:   AV paced    Results for orders placed in visit on 09/11/15   Echo complete with contrast if indicated    Narrative [address]   [address] [facility], [address] [place] [address]   Phone: [phone]   TRANSTHORACIC ECHOCARDIOGRAM   2D, M-MODE, DOPPLER, AND COLOR DOPPLER   Study date:  12-Sep-2015   Patient: Mandeep Chappell   MR number: [id number]   Account number: [de-identified]   : 1935   Age: 78 years   Gender: Male   Status: Inpatient   Location: Bedside   Height: 68 in   Weight: 207 5 lb   BP: 134/ 63 mmHg   Indications: M[place]unc cardiac device; Cardiomyopathy   Diagnoses: 425 8 - CARDIOMYOPATH IN OTH DIS, [address] - [address]  CARDIAC PACEMAKE   Sonographer:  Jeff Worley   Primary Physician:  Zaira Villalta MD   Referring Physician:  Zaira Villalta MD   Group:  [facility]   Interpreting Physician:  Luann Heck MD   SUMMARY   LEFT VENTRICLE:   The ventricle was moderately dilated  Systolic function was markedly reduced  Ejection fraction was estimated to be   30 %  There was moderate diffuse hypokinesis with distinct regional wall    motion   abnormalities  There was akinesis of the entire inferior and inferolateral   walls   Doppler parameters were consistent with a restrictive pattern,   indicative of decreased left ventricular diastolic compliance and/or increased   left atrial pressure (grade 3 diastolic dysfunction)  LEFT ATRIUM:   The atrium was mildly to moderately dilated  MITRAL VALVE:   There was mild to moderate regurgitation  TRICUSPID VALVE:   There was mild regurgitation  HISTORY: PRIOR HISTORY: HTN; Ischemic CM; VTACH; PICD   PROCEDURE: The procedure was performed at the bedside  This was a routine   TRANSTHORACIC ECHOCARDIOGRAM   Patient: Bravo Sierra   MR number: [id number]    ------ Page 2   study  The transthoracic approach was used  The study included complete 2D   imaging, M-mode, complete spectral Doppler, and color Doppler  Echocardiographic views were limited due to poor acoustic window availability,   decreased penetration, and lung interference  This was a technically difficult   study  LEFT VENTRICLE: The ventricle was moderately dilated  Systolic function was   markedly reduced  Ejection fraction was estimated to be 30 %  There was   moderate diffuse hypokinesis with distinct regional wall motion abnormalities  There was akinesis of the entire inferior and inferolateral walls  Wall   thickness was normal  DOPPLER: Doppler parameters were consistent with a   restrictive pattern, indicative of decreased left ventricular diastolic   compliance and/or increased left atrial pressure (grade 3 diastolic   dysfunction)  RIGHT VENTRICLE: The size was normal  Systolic function was normal  A pacing   wire was present  LEFT ATRIUM: The atrium was mildly to moderately dilated  RIGHT ATRIUM: Size was normal    MITRAL VALVE: Valve structure was normal  There was mild thickening  There was   normal leaflet separation  DOPPLER: The transmitral velocity was within the   normal range  There was no evidence for stenosis  There was mild to moderate   regurgitation  AORTIC VALVE: The valve was probably trileaflet  Leaflets exhibited normal   thickness and normal cuspal separation   DOPPLER: Transaortic velocity was   within the normal range  There was no evidence for stenosis  There was no   regurgitation  TRICUSPID VALVE: The valve structure was normal  There was normal leaflet   separation  DOPPLER: The transtricuspid velocity was within the normal range  There was no evidence for stenosis  There was mild regurgitation  Estimated   peak PA pressure was 30 mmHg  PULMONIC VALVE: Not well visualized  PERICARDIUM: There was no pericardial effusion  AORTA: The root exhibited normal size  SYSTEMIC VEINS: IVC: The inferior vena cava was not well visualized  SYSTEM MEASUREMENT TABLES   2D   %FS: 14 77 %   AV Diam: 3 02 cm   TRANSTHORACIC ECHOCARDIOGRAM   Patient: Marzena Robin   MR number: [id number]    ------ Page 3   EDV(Teich): 182 1 ml   EF Biplane: 32 05 %   EF(Teich): 30 76 %   ESV(Teich): 126 07 ml   IVSd: 0 58 cm   LA Area: 23 12 cm2   LA Diam: 4 72 cm   LVEDV MOD A2C: 130 74 ml   LVEDV MOD A4C: 190 68 ml   LVEDV MOD BP: 165 48 ml   LVEF MOD A2C: 25 79 %   LVEF MOD A4C: 37 68 %   LVESV MOD A2C: 97 02 ml   LVESV MOD A4C: 118 83 ml   LVESV MOD BP: 112 45 ml   LVIDd: 6 03 cm   LVIDs: 5 14 cm   LVLd A2C: 8 07 cm   LVLd A4C: 9 28 cm   LVLs A2C: 7 38 cm   LVLs A4C: 6 38 cm   LVPWd: 0 59 cm   RA Area: 15 54 cm2   RVIDd: 3 2 cm   SV MOD A2C: 33 72 ml   SV MOD A4C: 71 85 ml   SV(Teich): 56 02 ml   CW   RAP: 10 mmHg   TR Vmax: 2 5 m/s   TR maxP 96 mmHg   MM   TAPSE: 1 98 cm   PW   E': 0 07 m/s   E/E': 12 63   MV A [place]: 0 54 m/s   MV Dec [place]: 5 44 m/s2   MV DecT: 168 43 ms   MV E Hemanth: 0 92 m/s   MV E/A Ratio: 1 71   MV PHT: 48 85 ms   MVA By PHT: 4 5 cm2   RVSP: 34 96 mmHg   [facility]   TRANSTHORACIC ECHOCARDIOGRAM   Patient: Marzena Robin   MR number: [id number]    ------ Page 4   Prepared and electronically signed by   Otoniel Muhammad MD   Signed 12-Sep-2015 15:47:33      No results found for this or any previous visit    No results found for this or any previous visit  Results for orders placed in visit on 09/11/15   NM myocardial perfusion spect (stress and/or rest)    Narrative PHARMACOLOGIC STRESS TEST, LEXISCAN          INDICATION-  Shortness of breath, fatigue, abnormal EKG, old MI   COMPARISON- 9/30/2014   TECHNIQUE-  Pharmacologic stress testing was performed utilizing   Lexiscan  SPECT myocardial perfusion images was performed  Dosages of   TC-99-mm Myoview were 11 mCi and 33 mCi IV  Time peak imaging for rest   70 minutes and stress 70 minutes  Both rest and stress images were   performed  Images were then reconstructed in the short, vertical and   horizontal long axes projections  Baseline heart rate 61 beats per minute  Peak heart of 74 beats per minute  Baseline blood pressure 117/69 mmHg  Peak blood pressure 103/50 mmHg  Symptoms-   Belly cramps   Please see cardiology report of physiologic data  FINDINGS-   OVERALL QUALITY-   Acceptable  ARTIFACT-  None  PERFUSION IMAGES-   Stable large fixed defect in the inferior and   inferolateral wall compatible with scarring  No reversible ischemia   demonstrated  WALL MOTION-  Akinesis in the inferior and inferolateral walls   compatible with scarring  Additional global hypokinesis  EJECTION FRACTION-  43 %   IMPRESSION-   1   Stable large fixed defect in the inferior and inferolateral wall   compatible with scarring  No reversible ischemia demonstrated     2  Left ventricular ejection fraction- 43%   Transcribed on- [id number]           - CHUNG Galarza MD        Reading Radiologist- CHUNG Galarza MD        Electronically Signed- CHUNG Galarza MD        Released Date Time- 09/14/15 1312      ------------------------------------------------------------------------------   Mike Patel MD    Portions of the record may have been created with voice recognition software   Occasional wrong word or "sound a like" substitutions may have occurred due to the inherent limitations of voice recognition software   Read the chart carefully and recognize, using context, where substitutions have occurred

## 2018-12-28 ENCOUNTER — TELEPHONE (OUTPATIENT)
Dept: NEPHROLOGY | Facility: CLINIC | Age: 83
End: 2018-12-28

## 2018-12-28 NOTE — TELEPHONE ENCOUNTER
I called and left message for patient to call back to schedule February follow up appt with Dr Smiley Garcia

## 2019-01-18 ENCOUNTER — TELEPHONE (OUTPATIENT)
Dept: UROLOGY | Facility: CLINIC | Age: 84
End: 2019-01-18

## 2019-01-18 NOTE — TELEPHONE ENCOUNTER
I called pt to see if he had gone for his KUB yet  I did speak with pt and he said that he just returned home from [place] and has not had time to go yet  Pt  Nilda Madrigal that he will try to have the KUB done next week

## 2019-01-24 ENCOUNTER — HOSPITAL ENCOUNTER (OUTPATIENT)
Dept: RADIOLOGY | Facility: HOSPITAL | Age: 84
Discharge: HOME | End: 2019-01-24
Payer: MEDICARE

## 2019-01-24 DIAGNOSIS — N20.0 KIDNEY STONES: ICD-10-CM

## 2019-01-24 PROCEDURE — 74018 RADEX ABDOMEN 1 VIEW: CPT

## 2019-02-08 ENCOUNTER — APPOINTMENT (OUTPATIENT)
Dept: LAB | Facility: CLINIC | Age: 84
End: 2019-02-08
Payer: MEDICARE

## 2019-02-08 ENCOUNTER — TRANSCRIBE ORDERS (OUTPATIENT)
Dept: LAB | Facility: CLINIC | Age: 84
End: 2019-02-08

## 2019-02-08 DIAGNOSIS — N20.1 LEFT URETERAL CALCULUS: ICD-10-CM

## 2019-02-08 DIAGNOSIS — E78.5 DYSLIPIDEMIA: ICD-10-CM

## 2019-02-08 DIAGNOSIS — N18.30 CHRONIC KIDNEY DISEASE, STAGE III (MODERATE) (HCC): ICD-10-CM

## 2019-02-08 DIAGNOSIS — R60.0 LOCALIZED EDEMA: ICD-10-CM

## 2019-02-08 DIAGNOSIS — I12.9 HYPERTENSIVE CHRONIC KIDNEY DISEASE WITH STAGE 1 THROUGH STAGE 4 CHRONIC KIDNEY DISEASE, OR UNSPECIFIED CHRONIC KIDNEY DISEASE: ICD-10-CM

## 2019-02-08 DIAGNOSIS — N20.0 NEPHROLITHIASIS: ICD-10-CM

## 2019-02-08 LAB
ALBUMIN SERPL BCP-MCNC: 3.1 G/DL (ref 3.5–5)
ALP SERPL-CCNC: 82 U/L (ref 46–116)
ALT SERPL W P-5'-P-CCNC: 35 U/L (ref 12–78)
ANION GAP SERPL CALCULATED.3IONS-SCNC: 7 MMOL/L (ref 4–13)
AST SERPL W P-5'-P-CCNC: 30 U/L (ref 5–45)
BILIRUB SERPL-MCNC: 0.7 MG/DL (ref 0.2–1)
BUN SERPL-MCNC: 23 MG/DL (ref 5–25)
CALCIUM SERPL-MCNC: 9 MG/DL (ref 8.3–10.1)
CHLORIDE SERPL-SCNC: 103 MMOL/L (ref 100–108)
CHOLEST SERPL-MCNC: 139 MG/DL (ref 50–200)
CK SERPL-CCNC: 144 U/L (ref 39–308)
CO2 SERPL-SCNC: 32 MMOL/L (ref 21–32)
CREAT SERPL-MCNC: 1.63 MG/DL (ref 0.6–1.3)
CREAT UR-MCNC: 161 MG/DL
ERYTHROCYTE [DISTWIDTH] IN BLOOD BY AUTOMATED COUNT: 14.1 % (ref 11.6–15.1)
GFR SERPL CREATININE-BSD FRML MDRD: 38 ML/MIN/1.73SQ M
GLUCOSE P FAST SERPL-MCNC: 104 MG/DL (ref 65–99)
HCT VFR BLD AUTO: 39.5 % (ref 36.5–49.3)
HDLC SERPL-MCNC: 44 MG/DL (ref 40–60)
HGB BLD-MCNC: 13.1 G/DL (ref 12–17)
LDLC SERPL CALC-MCNC: 73 MG/DL (ref 0–100)
MAGNESIUM SERPL-MCNC: 2 MG/DL (ref 1.6–2.6)
MCH RBC QN AUTO: 31.8 PG (ref 26.8–34.3)
MCHC RBC AUTO-ENTMCNC: 33.2 G/DL (ref 31.4–37.4)
MCV RBC AUTO: 96 FL (ref 82–98)
PHOSPHATE SERPL-MCNC: 3 MG/DL (ref 2.3–4.1)
PLATELET # BLD AUTO: 260 THOUSANDS/UL (ref 149–390)
PMV BLD AUTO: 10.3 FL (ref 8.9–12.7)
POTASSIUM SERPL-SCNC: 3.9 MMOL/L (ref 3.5–5.3)
PROT SERPL-MCNC: 6.7 G/DL (ref 6.4–8.2)
PROT UR-MCNC: 26 MG/DL
PROT/CREAT UR: 0.16 MG/G{CREAT} (ref 0–0.1)
PTH-INTACT SERPL-MCNC: 81.9 PG/ML (ref 18.4–80.1)
RBC # BLD AUTO: 4.12 MILLION/UL (ref 3.88–5.62)
SODIUM SERPL-SCNC: 142 MMOL/L (ref 136–145)
TRIGL SERPL-MCNC: 108 MG/DL
WBC # BLD AUTO: 7.55 THOUSAND/UL (ref 4.31–10.16)

## 2019-02-08 PROCEDURE — 84100 ASSAY OF PHOSPHORUS: CPT

## 2019-02-08 PROCEDURE — 83970 ASSAY OF PARATHORMONE: CPT

## 2019-02-08 PROCEDURE — 80053 COMPREHEN METABOLIC PANEL: CPT

## 2019-02-08 PROCEDURE — 84156 ASSAY OF PROTEIN URINE: CPT

## 2019-02-08 PROCEDURE — 83735 ASSAY OF MAGNESIUM: CPT

## 2019-02-08 PROCEDURE — 82550 ASSAY OF CK (CPK): CPT

## 2019-02-08 PROCEDURE — 85027 COMPLETE CBC AUTOMATED: CPT

## 2019-02-08 PROCEDURE — 82570 ASSAY OF URINE CREATININE: CPT

## 2019-02-08 PROCEDURE — 80061 LIPID PANEL: CPT

## 2019-02-08 PROCEDURE — 36415 COLL VENOUS BLD VENIPUNCTURE: CPT

## 2019-02-18 ENCOUNTER — REMOTE DEVICE CLINIC VISIT (OUTPATIENT)
Dept: CARDIOLOGY CLINIC | Facility: CLINIC | Age: 84
End: 2019-02-18
Payer: MEDICARE

## 2019-02-18 DIAGNOSIS — I47.2 V TACH (HCC): Primary | ICD-10-CM

## 2019-02-18 DIAGNOSIS — Z95.810 AICD (AUTOMATIC CARDIOVERTER/DEFIBRILLATOR) PRESENT: ICD-10-CM

## 2019-02-18 PROCEDURE — 93297 REM INTERROG DEV EVAL ICPMS: CPT | Performed by: INTERNAL MEDICINE

## 2019-02-18 PROCEDURE — 93295 DEV INTERROG REMOTE 1/2/MLT: CPT | Performed by: INTERNAL MEDICINE

## 2019-02-18 PROCEDURE — 93296 REM INTERROG EVL PM/IDS: CPT | Performed by: INTERNAL MEDICINE

## 2019-02-19 ENCOUNTER — TELEPHONE (OUTPATIENT)
Dept: CARDIOLOGY CLINIC | Facility: CLINIC | Age: 84
End: 2019-02-19

## 2019-02-19 NOTE — TELEPHONE ENCOUNTER
S/w Wesly, denies any SOB or LE edema  Feels better than ever  Seeing Nephro next week  Not weighing himself daily anymore  I advised getting back to am weights only, he said he was doing it BID  Advised to call office w/ 3 lb weight gain, SOB or LE edema-- verbally understood

## 2019-02-22 NOTE — PROGRESS NOTES
Results for orders placed or performed in visit on 02/18/19   Cardiac EP device report    Narrative    [facility] CRT-D  MERLIN TRANSMISSION: BATTERY STATUS "OK"  AP 90%  100%  RA AMP <2:1 MARGIN; LEAD ON MONITOR  OTHERWISE ALL OTHER LEAD PARAMETERS & TRENDS WITHIN NORMAL LIMITS  NO SIGNIFICANT HIGH RATE EPISODES  CORVUE IMPEDANCE THRESHOLD CROSSED  NB NOTIFIED   NC

## 2019-03-18 ENCOUNTER — OFFICE VISIT (OUTPATIENT)
Dept: NEPHROLOGY | Facility: CLINIC | Age: 84
End: 2019-03-18
Payer: MEDICARE

## 2019-03-18 VITALS
HEART RATE: 60 BPM | DIASTOLIC BLOOD PRESSURE: 55 MMHG | WEIGHT: 206.2 LBS | SYSTOLIC BLOOD PRESSURE: 124 MMHG | BODY MASS INDEX: 32.36 KG/M2 | HEIGHT: 67 IN

## 2019-03-18 DIAGNOSIS — E78.5 DYSLIPIDEMIA: ICD-10-CM

## 2019-03-18 DIAGNOSIS — N25.81 SECONDARY HYPERPARATHYROIDISM OF RENAL ORIGIN (HCC): ICD-10-CM

## 2019-03-18 DIAGNOSIS — R60.0 LOCALIZED EDEMA: ICD-10-CM

## 2019-03-18 DIAGNOSIS — N20.0 NEPHROLITHIASIS: ICD-10-CM

## 2019-03-18 DIAGNOSIS — N18.30 CHRONIC KIDNEY DISEASE, STAGE III (MODERATE) (HCC): ICD-10-CM

## 2019-03-18 DIAGNOSIS — I12.9 HYPERTENSIVE CHRONIC KIDNEY DISEASE WITH STAGE 1 THROUGH STAGE 4 CHRONIC KIDNEY DISEASE, OR UNSPECIFIED CHRONIC KIDNEY DISEASE: Primary | ICD-10-CM

## 2019-03-18 PROCEDURE — 99214 OFFICE O/P EST MOD 30 MIN: CPT | Performed by: INTERNAL MEDICINE

## 2019-03-18 RX ORDER — EZETIMIBE 10 MG/1
10 TABLET ORAL DAILY
Qty: 90 TABLET | Refills: 3 | Status: SHIPPED | OUTPATIENT
Start: 2019-03-18 | End: 2019-05-31

## 2019-03-18 NOTE — LETTER
March 18, 2019     [facility]  [address] [place] [address]    Patient: Nava Lockett   YOB: 1935   Date of Visit: 3/18/2019       Dear Dr Silver Dye:    Thank you for referring Nava Lockett to me for evaluation  Below are my notes for this consultation  If you have questions, please do not hesitate to call me  I look forward to following your patient along with you  Sincerely,        Megan Blancas MD        CC: Kip Krabbe, DO Darren Arlyne Atta, MD Nolen Purple, MD Leticia Spittle, MD  3/18/2019  1:14 PM  Sign at close encounter  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  · Baseline creatinine:  1 5 -1 83  · Current creatinine:  1 6 3 at baseline  · Urine protein creatinine ratio:  0 1 6 g at goal  Recommendations:  · Treat hypertension-please see below  · Treat dyslipidemia-please see below  · Maintain proteinuria less than 1 g or as low as possible  · Avoid nephrotoxic agents such as NSAIDs, patient counseled as such  2   Volume:   TSH was acceptable; venous duplex negative; no significant edema at this time  3  Hypertension:       Current blood pressure averages:  AM:    125/74, standing 115/68  PM:   130/70, standing 126/68  Heart rate:     · Goal blood pressure:  Less than 120-125/80 given CAD  Recommendations:  · Essentially at goal   Push diet and exercise  4   Electrolytes:   all acceptable  5  Mineral bone disorder:  · Calcium/magnesium/phosphorus:  All acceptable  · PTH intact:  81 9:  ACCEPTABLE  · Vitamin-D:  to be obtained next time  6  Dyslipidemia:  · Goal LDL:  Goal less than 70  · Current lipid profile:   LDL 73/HDL 44/triglycerides 108    Recommendations:   ADJUST CHOLESTEROL MEDICATIONS I WOULD ADD ZETIA 10 MG DAILY  7  Anemia:  Normal hemoglobin at 13 1  8    Nephrolithiasis:  Also followed by urology: Patient with no further stone passage  24 hour urine for stone risk:  IN THE PAST:  -volume 1350 which is BELOW GOAL  -calcium 104 mg at goal  -citrate which is 265 mg BELOW GOAL  -oxalate 23 mg which is at goal  -sodium excretion 108 millimoles which is at goal  -uric acid:  369 mg which is at goal  Recommendations:  · Modest fluid intake with water, try to remove soda, we are restricted because of ischemic cardiomyopathy and fluid overload  · Potassium citrate  · KUB with multiple bilateral renal calculi similar to previous exam   · Repeat 24 hour urine for stone risk next visit  9  Other problems:  · Cardiac:  Followed by Dr Eunice Scott  Patient with ischemic cardiomyopathy/ICD/systolic CHF/CABG  Ejection fraction 30% without any reversible ischemia on nuclear stress test   · BPH  · Hypothyroidism        PATIENT INSTRUCTIONS:    Patient Instructions   1  Medication changes today:  -please add Zetia 10 mg once a day for your cholesterol  If you get any body aches or joint pains call me    2  Please go for a 24 hour urine for stone risk at your convenience in the next couple of weeks    3  Please go for nonfasting lab work in 6 weeks any time of the day    4  Follow-up in 4 months:  -please go for fasting lab work prior to your appointment  -please bring in 1 week a blood pressure readings morning evening, sitting and standing:  · Take the morning readings before any medications  · Take the evening readings closer to bedtime  · When taking standing readings, keep your arm supported at heart level and not dangling  -PLEASE BRING IN YOUR BLOOD PRESSURE MACHINE FOR CORRELATION WITH THE OFFICE MACHINE    5  General instructions:  -avoid salt  -avoid medications such as Motrin, Naprosyn, ibuprofen, Aleve or Advil or Celebrex as they can affect your kidney function; you can use Tylenol as needed for pain or fevers if you have no liver problems  -avoid medications such as Sudafed or other medications with decongestants as they can raise your blood pressure  -try to exercise at least 30 minutes at least 3 days a week with an ultimate goal of 5 days a week  -try to lose 5-10 lb by your next visit    6  I would recommend a referral to Dermatology for your rash  I am happy to facilitate in any fashion  I would use a lotion for dry skin that may help the rash in the interim  For example Eucerin                  Subjective: The patient overall is feeling well  Complains of pruritus with a pimple type rash of his back and shoulders  No fevers chills cough or colds    Good appetite and good energy  No urinary symptoms including foamy urine or blood in the urine,  No gastrointestinal symptoms  No cardiovascular symptoms including swelling of the legs  No headaches, dizziness or lightheadedness  Blood pressure medications:      ROS:  See HPI, otherwise review of systems as completely reviewed with the patient are negative    Past Medical History:   Diagnosis Date    AICD (automatic cardioverter/defibrillator) present 2004    AICD (automatic cardioverter/defibrillator) present 2006    AICD (automatic cardioverter/defibrillator) present 2013    [facility]    Arthritis     R knee and neck    Atrial fibrillation (HCC)     BPH (benign prostatic hyperplasia)     CAD (coronary artery disease) of artery bypass graft     [facility]congestive [facility] failure) [facility]     combine    Coronary artery disease     Disease of thyroid gland     Hyperlipidemia     Hypertension     Ischemic cardiomyopathy     Left ureteral calculus 10/17/2017    Renal disorder     V tach ([facility] [address] )      Past Surgical History:   Procedure Laterality Date    CARDIAC CATHETERIZATION      CARDIAC SURGERY      Pacemaker    CORONARY ARTERY BYPASS GRAFT      GA CYSTOURETHROSCOPY,URETER CATHETER N/A 10/17/2017    Procedure: CYSTOSCOPY, left  RETROGRADE PYELOGRAM WITH LEFT URETEROSCOPY , stone extraction,LEFT STENT INSERTION;  Surgeon: Jackelyn Harman MD;  Location: [facility] OR;  Service: Urology     Family History   Problem Relation Age of Onset    Tuberculosis Father         WWI    Hypertension Mother     Stroke Brother       reports that he has never smoked  He has never used smokeless tobacco  He reports that he does not drink alcohol or use drugs  I COMPLETELY REVIEWED THE PAST MEDICAL HISTORY/PAST SURGICAL HISTORY/SOCIAL HISTORY/FAMILY HISTORY/AND MEDICATIONS  AND UPDATED ALL    Objective:     Vitals:   Vitals:    03/18/19 1205   BP: 124/55   Pulse: 60    BP sitting on left:  138/60 with a heart rate of 60 and regular  BP standing on left:  130/60 with a heart rate of 60 and regular    Weight (last 2 days)     Date/Time   Weight    03/18/19 1205   118 (260 2)            Wt Readings from Last 3 Encounters:   03/18/19 118 kg (260 lb 3 2 oz)   12/04/18 91 8 kg (202 lb 6 4 oz)   10/04/18 91 7 kg (202 lb 3 2 oz)       Body mass index is 40 75 kg/m²  Physical Exam: General:  No acute distress  Skin:  Excoriated type lesions in his back and chest and arms  Eyes:  No scleral icterus, noninjected  ENT:  Moist mucous membranes  Neck:  Supple, no jugular venous distention  Back   No CVAT  Chest:  Clear to auscultation and percussion, good respiratory effort  CVS:  Slightly irregular  rhythm without a rub, murmurs or gallops  Abdomen:  Obese, Soft and nontender with normal bowel sounds  Extremities:  No cyanosis and no edema  Neuro:  Grossly intact  Psych:  Alert, oriented x3 and appropriate      Medications:    Current Outpatient Medications:     acetaminophen (TYLENOL) 500 mg tablet, Take 500 mg by mouth every 6 (six) hours as needed for mild pain, Disp: , Rfl:     amiodarone 200 mg tablet, Take 200 mg by mouth daily  , Disp: , Rfl:     amLODIPine (NORVASC) 2 5 mg tablet, Take 2 5 mg by mouth 2 (two) times a day , Disp: , Rfl:     aspirin 81 MG tablet, Take 81 mg by mouth daily  , Disp: , Rfl:     isosorbide mononitrate (IMDUR) 30 mg 24 hr tablet, Take 1 tablet (30 mg total) by mouth daily, Disp: 30 tablet, Rfl: 11    Levothyroxine Sodium 125 MCG CAPS, Take 150 mcg by mouth daily  , Disp: , Rfl:     LORazepam (ATIVAN) 1 mg tablet, Take 1 mg by mouth as needed for anxiety  , Disp: , Rfl:     metoprolol succinate (TOPROL-XL) 50 mg 24 hr tablet, Take 1 tablet (50 mg total) by mouth 2 (two) times a day, Disp: 60 tablet, Rfl: 11    mexiletine (MEXITIL) 150 mg capsule, Take 150 mg by mouth 2 (two) times a day , Disp: , Rfl:     Multiple Vitamins-Minerals (MULTIVITAMIN WITH MINERALS) tablet, Take 1 tablet by mouth daily  , Disp: , Rfl:     nitroglycerin (NITROSTAT) 0 4 mg SL tablet, Place 0 4 mg under the tongue every 5 (five) minutes as needed for chest pain  , Disp: , Rfl:     potassium citrate (UROCIT-K 10) 10 mEq, Take 2 tablets (20 mEq total) by mouth 2 (two) times a day, Disp: 120 tablet, Rfl: 5    pravastatin (PRAVACHOL) 80 mg tablet, Take 80 mg by mouth daily  , Disp: , Rfl:     tamsulosin (FLOMAX) 0 4 mg, Take 1 capsule (0 4 mg total) by mouth daily with dinner, Disp: 90 capsule, Rfl: 3    torsemide (DEMADEX) 20 mg tablet, Take 2 tablets (40 mg total) by mouth daily, Disp: 60 tablet, Rfl: 11    ezetimibe (ZETIA) 10 mg tablet, Take 1 tablet (10 mg total) by mouth daily, Disp: 90 tablet, Rfl: 3    Lab, Imaging and other studies: I have personally reviewed pertinent labs    Laboratory Results:  Results for orders placed or performed in visit on 02/08/19   Comprehensive metabolic panel   Result Value Ref Range    Sodium 142 136 - 145 mmol/L    Potassium 3 9 3 5 - 5 3 mmol/L    Chloride 103 100 - 108 mmol/L    CO2 32 21 - 32 mmol/L    ANION GAP 7 4 - 13 mmol/L    BUN 23 5 - 25 mg/dL    Creatinine 1 63 (H) 0 60 - 1 30 mg/dL    Glucose, Fasting 104 (H) 65 - 99 mg/dL    Calcium 9 0 8 3 - 10 1 mg/dL    AST 30 5 - 45 U/L    ALT 35 12 - 78 U/L    Alkaline Phosphatase 82 46 - 116 U/L    Total Protein 6 7 6 4 - 8 2 g/dL    Albumin 3 1 (L) 3 5 - 5 0 g/dL    Total Bilirubin 0 70 0 20 - 1 00 mg/dL    eGFR 38 ml/min/1 73sq m   CBC   Result Value Ref Range    WBC 7 55 4 31 - 10 16 Thousand/uL    RBC 4 12 3 88 - 5 62 Million/uL    Hemoglobin 13 1 12 0 - 17 0 g/dL    Hematocrit 39 5 36 5 - 49 3 %    MCV 96 82 - 98 fL    MCH 31 8 26 8 - 34 3 pg    MCHC 33 2 31 4 - 37 4 g/dL    RDW 14 1 11 6 - 15 1 %    Platelets 348 219 - 383 Thousands/uL    MPV 10 3 8 9 - 12 7 fL   CK   Result Value Ref Range    Total  39 - 308 U/L   Lipid Panel with Direct LDL reflex   Result Value Ref Range    Cholesterol 139 50 - 200 mg/dL    Triglycerides 108 <=150 mg/dL    HDL, Direct 44 40 - 60 mg/dL    LDL Calculated 73 0 - 100 mg/dL   Magnesium   Result Value Ref Range    Magnesium 2 0 1 6 - 2 6 mg/dL   Phosphorus   Result Value Ref Range    Phosphorus 3 0 2 3 - 4 1 mg/dL   Protein / creatinine ratio, urine   Result Value Ref Range    Creatinine, Ur 161 0 mg/dL    Protein Urine Random 26 mg/dL    Prot/Creat Ratio, Ur 0 16 (H) 0 00 - 0 10   PTH, intact   Result Value Ref Range    PTH 81 9 (H) 18 4 - 80 1 pg/mL             Invalid input(s): ALBUMIN      Radiology review:   chest X-ray    Ultrasound      Portions of the record may have been created with voice recognition software  Occasional wrong word or "sound a like" substitutions may have occurred due to the inherent limitations of voice recognition software  Read the chart carefully and recognize, using context, where substitutions have occurred

## 2019-03-18 NOTE — PATIENT INSTRUCTIONS
1  Medication changes today:  -please add Zetia 10 mg once a day for your cholesterol  If you get any body aches or joint pains call me    2  Please go for a 24 hour urine for stone risk at your convenience in the next couple of weeks    3  Please go for nonfasting lab work in 6 weeks any time of the day    4  Follow-up in 4 months:  -please go for fasting lab work prior to your appointment  -please bring in 1 week a blood pressure readings morning evening, sitting and standing:  · Take the morning readings before any medications  · Take the evening readings closer to bedtime  · When taking standing readings, keep your arm supported at heart level and not dangling  -PLEASE BRING IN YOUR BLOOD PRESSURE MACHINE FOR CORRELATION WITH THE OFFICE MACHINE    5  General instructions:  -avoid salt  -avoid medications such as Motrin, Naprosyn, ibuprofen, Aleve or Advil or Celebrex as they can affect your kidney function; you can use Tylenol as needed for pain or fevers if you have no liver problems  -avoid medications such as Sudafed or other medications with decongestants as they can raise your blood pressure  -try to exercise at least 30 minutes at least 3 days a week with an ultimate goal of 5 days a week  -try to lose 5-10 lb by your next visit    6  I would recommend a referral to Dermatology for your rash  I am happy to facilitate in any fashion  I would use a lotion for dry skin that may help the rash in the interim    For example Eucerin

## 2019-03-18 NOTE — PROGRESS NOTES
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  · Baseline creatinine:  1 5-1 83  · Current creatinine:  1 63 at baseline  · Urine protein creatinine ratio:  0 16 g at goal  Recommendations:  · Treat hypertension-please see below  · Treat dyslipidemia-please see below  · Maintain proteinuria less than 1 g or as low as possible  · Avoid nephrotoxic agents such as NSAIDs, patient counseled as such  2   Volume:  TSH was acceptable; venous duplex negative; no significant edema at this time  3  Hypertension:       Current blood pressure averages:  AM:   125/74, standing 115/68  PM:  130/70, standing 126/68  Heart rate:     · Goal blood pressure:  Less than 120-125/80 given CAD  Recommendations:  · Essentially at goal   Push diet and exercise  4   Electrolytes:  all acceptable  5  Mineral bone disorder:  · Calcium/magnesium/phosphorus:  All acceptable  · PTH intact:  81 9:  ACCEPTABLE  · Vitamin-D:  to be obtained next time  6  Dyslipidemia:  · Goal LDL:  Goal less than 70  · Current lipid profile:  LDL 73/HDL 44/triglycerides 108    Recommendations:  ADJUST CHOLESTEROL MEDICATIONS I WOULD ADD ZETIA 10 MG DAILY  7  Anemia:  Normal hemoglobin at 13 1  8  Nephrolithiasis:  Also followed by urology:  Patient with no further stone passage  24 hour urine for stone risk:  IN THE PAST:  -volume 1350 which is BELOW GOAL  -calcium 104 mg at goal  -citrate which is 265 mg BELOW GOAL  -oxalate 23 mg which is at goal  -sodium excretion 108 millimoles which is at goal  -uric acid:  369 mg which is at goal  Recommendations:  · Modest fluid intake with water, try to remove soda, we are restricted because of ischemic cardiomyopathy and fluid overload  · Potassium citrate  · KUB with multiple bilateral renal calculi similar to previous exam   · Repeat 24 hour urine for stone risk next visit  9    Other problems:  · Cardiac:  Followed by Dr Rocky Manzano  Patient with ischemic cardiomyopathy/ICD/systolic CHF/CABG  Ejection fraction 30% without any reversible ischemia on nuclear stress test   · BPH  · Hypothyroidism        PATIENT INSTRUCTIONS:    Patient Instructions   1  Medication changes today:  -please add Zetia 10 mg once a day for your cholesterol  If you get any body aches or joint pains call me    2  Please go for a 24 hour urine for stone risk at your convenience in the next couple of weeks    3  Please go for nonfasting lab work in 6 weeks any time of the day    4  Follow-up in 4 months:  -please go for fasting lab work prior to your appointment  -please bring in 1 week a blood pressure readings morning evening, sitting and standing:  · Take the morning readings before any medications  · Take the evening readings closer to bedtime  · When taking standing readings, keep your arm supported at heart level and not dangling  -PLEASE BRING IN YOUR BLOOD PRESSURE MACHINE FOR CORRELATION WITH THE OFFICE MACHINE    5  General instructions:  -avoid salt  -avoid medications such as Motrin, Naprosyn, ibuprofen, Aleve or Advil or Celebrex as they can affect your kidney function; you can use Tylenol as needed for pain or fevers if you have no liver problems  -avoid medications such as Sudafed or other medications with decongestants as they can raise your blood pressure  -try to exercise at least 30 minutes at least 3 days a week with an ultimate goal of 5 days a week  -try to lose 5-10 lb by your next visit    6  I would recommend a referral to Dermatology for your rash  I am happy to facilitate in any fashion  I would use a lotion for dry skin that may help the rash in the interim  For example Eucerin                  Subjective: The patient overall is feeling well  Complains of pruritus with a pimple type rash of his back and shoulders  No fevers chills cough or colds    Good appetite and good energy  No urinary symptoms including foamy urine or blood in the urine,  No gastrointestinal symptoms  No cardiovascular symptoms including swelling of the legs  No headaches, dizziness or lightheadedness  Blood pressure medications:      ROS:  See HPI, otherwise review of systems as completely reviewed with the patient are negative    Past Medical History:   Diagnosis Date    AICD (automatic cardioverter/defibrillator) present 2004    AICD (automatic cardioverter/defibrillator) present 2006    AICD (automatic cardioverter/defibrillator) present 2013    [facility]    Arthritis     R knee and neck    Atrial fibrillation (HCC)     BPH (benign prostatic hyperplasia)     CAD (coronary artery disease) of artery bypass graft     CHF [facility]congestive heart failure) ([facility] [address] )     combine    Coronary artery disease     Disease of thyroid gland     Hyperlipidemia     Hypertension     Ischemic cardiomyopathy     Left ureteral calculus 10/17/2017    Renal disorder     [facility]      Past Surgical History:   Procedure Laterality Date    CARDIAC CATHETERIZATION      CARDIAC SURGERY      Pacemaker    CORONARY ARTERY BYPASS GRAFT      PA CYSTOURETHROSCOPY,URETER CATHETER N/A 10/17/2017    Procedure: CYSTOSCOPY, left  RETROGRADE PYELOGRAM WITH LEFT URETEROSCOPY , stone extraction,LEFT STENT INSERTION;  Surgeon: Liliana Banegas MD;  Location[facility];  Service: Urology     Family History   Problem Relation Age of Onset    Tuberculosis Father         WWI    Hypertension Mother     Stroke Brother       reports that he has never smoked  He has never used smokeless tobacco  He reports that he does not drink alcohol or use drugs      I COMPLETELY REVIEWED THE PAST MEDICAL HISTORY/PAST SURGICAL HISTORY/SOCIAL HISTORY/FAMILY HISTORY/AND MEDICATIONS  AND UPDATED ALL    Objective:     Vitals:   Vitals:    03/18/19 1205   BP: 124/55   Pulse: 60    BP sitting on left:  138/60 with a heart rate of 60 and regular  BP standing on left:  130/60 with a heart rate of 60 and regular    Weight (last 2 days)     Date/Time   Weight    03/18/19 1205   118 (260 2)            Wt Readings from Last 3 Encounters:   03/18/19 118 kg (260 lb 3 2 oz)   12/04/18 91 8 kg (202 lb 6 4 oz)   10/04/18 91 7 kg (202 lb 3 2 oz)       Body mass index is 40 75 kg/m²  Physical Exam: General:  No acute distress  Skin:  Excoriated type lesions in his back and chest and arms  Eyes:  No scleral icterus, noninjected  ENT:  Moist mucous membranes  Neck:  Supple, no jugular venous distention  Back   No CVAT  Chest:  Clear to auscultation and percussion, good respiratory effort  CVS:  Slightly irregular  rhythm without a rub, murmurs or gallops  Abdomen:  Obese, Soft and nontender with normal bowel sounds  Extremities:  No cyanosis and no edema  Neuro:  Grossly intact  Psych:  Alert, oriented x3 and appropriate      Medications:    Current Outpatient Medications:     acetaminophen (TYLENOL) 500 mg tablet, Take 500 mg by mouth every 6 (six) hours as needed for mild pain, Disp: , Rfl:     amiodarone 200 mg tablet, Take 200 mg by mouth daily  , Disp: , Rfl:     amLODIPine (NORVASC) 2 5 mg tablet, Take 2 5 mg by mouth 2 (two) times a day , Disp: , Rfl:     aspirin 81 MG tablet, Take 81 mg by mouth daily  , Disp: , Rfl:     isosorbide mononitrate (IMDUR) 30 mg 24 hr tablet, Take 1 tablet (30 mg total) by mouth daily, Disp: 30 tablet, Rfl: 11    Levothyroxine Sodium 125 MCG CAPS, Take 150 mcg by mouth daily  , Disp: , Rfl:     LORazepam (ATIVAN) 1 mg tablet, Take 1 mg by mouth as needed for anxiety  , Disp: , Rfl:     metoprolol succinate (TOPROL-XL) 50 mg 24 hr tablet, Take 1 tablet (50 mg total) by mouth 2 (two) times a day, Disp: 60 tablet, Rfl: 11    mexiletine (MEXITIL) 150 mg capsule, Take 150 mg by mouth 2 (two) times a day , Disp: , Rfl:     Multiple Vitamins-Minerals (MULTIVITAMIN WITH MINERALS) tablet, Take 1 tablet by mouth daily  , Disp: , Rfl:     nitroglycerin (NITROSTAT) 0 4 mg SL tablet, Place 0 4 mg under the tongue every 5 (five) minutes as needed for chest pain  , Disp: , Rfl:     potassium citrate (UROCIT-K 10) 10 mEq, Take 2 tablets (20 mEq total) by mouth 2 (two) times a day, Disp: 120 tablet, Rfl: 5    pravastatin (PRAVACHOL) 80 mg tablet, Take 80 mg by mouth daily  , Disp: , Rfl:     tamsulosin (FLOMAX) 0 4 mg, Take 1 capsule (0 4 mg total) by mouth daily with dinner, Disp: 90 capsule, Rfl: 3    torsemide (DEMADEX) 20 mg tablet, Take 2 tablets (40 mg total) by mouth daily, Disp: 60 tablet, Rfl: 11    ezetimibe (ZETIA) 10 mg tablet, Take 1 tablet (10 mg total) by mouth daily, Disp: 90 tablet, Rfl: 3    Lab, Imaging and other studies: I have personally reviewed pertinent labs    Laboratory Results:  Results for orders placed or performed in visit on 02/08/19   Comprehensive metabolic panel   Result Value Ref Range    Sodium 142 136 - 145 mmol/L    Potassium 3 9 3 5 - 5 3 mmol/L    Chloride 103 100 - 108 mmol/L    CO2 32 21 - 32 mmol/L    ANION GAP 7 4 - 13 mmol/L    BUN 23 5 - 25 mg/dL    Creatinine 1 63 (H) 0 60 - 1 30 mg/dL    Glucose, Fasting 104 (H) 65 - 99 mg/dL    Calcium 9 0 8 3 - 10 1 mg/dL    AST 30 5 - 45 U/L    ALT 35 12 - 78 U/L    Alkaline Phosphatase 82 46 - 116 U/L    Total Protein 6 7 6 4 - 8 2 g/dL    Albumin 3 1 (L) 3 5 - 5 0 g/dL    Total Bilirubin 0 70 0 20 - 1 00 mg/dL    eGFR 38 ml/min/1 73sq m   CBC   Result Value Ref Range    WBC 7 55 4 31 - 10 16 Thousand/uL    RBC 4 12 3 88 - 5 62 Million/uL    Hemoglobin 13 1 12 0 - 17 0 g/dL    Hematocrit 39 5 36 5 - 49 3 %    MCV 96 82 - 98 fL    MCH 31 8 26 8 - 34 3 pg    MCHC 33 2 31 4 - 37 4 g/dL    RDW 14 1 11 6 - 15 1 %    Platelets 328 412 - 086 Thousands/uL    MPV 10 3 8 9 - 12 7 fL   CK   Result Value Ref Range    Total  39 - 308 U/L   Lipid Panel with Direct LDL reflex   Result Value Ref Range    Cholesterol 139 50 - 200 mg/dL    Triglycerides 108 <=150 mg/dL    HDL, Direct 44 40 - 60 mg/dL    LDL Calculated 73 0 - 100 mg/dL   Magnesium   Result Value Ref Range    Magnesium 2 0 1 6 - 2 6 mg/dL   Phosphorus   Result Value Ref Range    Phosphorus 3 0 2 3 - 4 1 mg/dL   Protein / creatinine ratio, urine   Result Value Ref Range    Creatinine, Ur 161 0 mg/dL    Protein Urine Random 26 mg/dL    Prot/Creat Ratio, Ur 0 16 (H) 0 00 - 0 10   PTH, intact   Result Value Ref Range    PTH 81 9 (H) 18 4 - 80 1 pg/mL             Invalid input(s): ALBUMIN      Radiology review:   chest X-ray    Ultrasound      Portions of the record may have been created with voice recognition software  Occasional wrong word or "sound a like" substitutions may have occurred due to the inherent limitations of voice recognition software  Read the chart carefully and recognize, using context, where substitutions have occurred

## 2019-03-21 DIAGNOSIS — N20.1 LEFT URETERAL CALCULUS: ICD-10-CM

## 2019-03-21 DIAGNOSIS — E78.5 DYSLIPIDEMIA: ICD-10-CM

## 2019-03-21 DIAGNOSIS — N18.30 CHRONIC KIDNEY DISEASE, STAGE III (MODERATE) (HCC): ICD-10-CM

## 2019-03-21 DIAGNOSIS — I12.9 HYPERTENSIVE CHRONIC KIDNEY DISEASE WITH STAGE 1 THROUGH STAGE 4 CHRONIC KIDNEY DISEASE, OR UNSPECIFIED CHRONIC KIDNEY DISEASE: ICD-10-CM

## 2019-03-21 DIAGNOSIS — N20.0 NEPHROLITHIASIS: ICD-10-CM

## 2019-03-27 RX ORDER — TORSEMIDE 20 MG/1
TABLET ORAL
Qty: 60 TABLET | Refills: 11 | Status: SHIPPED | OUTPATIENT
Start: 2019-03-27 | End: 2019-06-13 | Stop reason: SDUPTHER

## 2019-04-02 ENCOUNTER — OFFICE VISIT (OUTPATIENT)
Dept: UROLOGY | Facility: CLINIC | Age: 84
End: 2019-04-02
Payer: MEDICARE

## 2019-04-02 VITALS
BODY MASS INDEX: 32.18 KG/M2 | HEIGHT: 67 IN | WEIGHT: 205 LBS | DIASTOLIC BLOOD PRESSURE: 60 MMHG | SYSTOLIC BLOOD PRESSURE: 110 MMHG | HEART RATE: 66 BPM

## 2019-04-02 DIAGNOSIS — N20.0 KIDNEY STONES: Primary | ICD-10-CM

## 2019-04-02 PROCEDURE — 99213 OFFICE O/P EST LOW 20 MIN: CPT | Performed by: PHYSICIAN ASSISTANT

## 2019-04-17 ENCOUNTER — APPOINTMENT (OUTPATIENT)
Dept: LAB | Facility: CLINIC | Age: 84
End: 2019-04-17
Payer: MEDICARE

## 2019-04-17 ENCOUNTER — TRANSCRIBE ORDERS (OUTPATIENT)
Dept: LAB | Facility: CLINIC | Age: 84
End: 2019-04-17

## 2019-04-17 DIAGNOSIS — N18.30 CHRONIC KIDNEY DISEASE, STAGE III (MODERATE) (HCC): ICD-10-CM

## 2019-04-17 DIAGNOSIS — N20.0 NEPHROLITHIASIS: ICD-10-CM

## 2019-04-17 DIAGNOSIS — E03.4 HYPOTHYROIDISM DUE TO ACQUIRED ATROPHY OF THYROID: ICD-10-CM

## 2019-04-17 DIAGNOSIS — E78.5 DYSLIPIDEMIA: ICD-10-CM

## 2019-04-17 DIAGNOSIS — R60.0 LOCALIZED EDEMA: ICD-10-CM

## 2019-04-17 DIAGNOSIS — E03.4 HYPOTHYROIDISM DUE TO ACQUIRED ATROPHY OF THYROID: Primary | ICD-10-CM

## 2019-04-17 DIAGNOSIS — N25.81 SECONDARY HYPERPARATHYROIDISM OF RENAL ORIGIN (HCC): ICD-10-CM

## 2019-04-17 DIAGNOSIS — I12.9 HYPERTENSIVE CHRONIC KIDNEY DISEASE WITH STAGE 1 THROUGH STAGE 4 CHRONIC KIDNEY DISEASE, OR UNSPECIFIED CHRONIC KIDNEY DISEASE: ICD-10-CM

## 2019-04-17 LAB
ALBUMIN SERPL BCP-MCNC: 3.3 G/DL (ref 3.5–5)
ALP SERPL-CCNC: 74 U/L (ref 46–116)
ALT SERPL W P-5'-P-CCNC: 41 U/L (ref 12–78)
ANION GAP SERPL CALCULATED.3IONS-SCNC: 9 MMOL/L (ref 4–13)
AST SERPL W P-5'-P-CCNC: 40 U/L (ref 5–45)
BILIRUB SERPL-MCNC: 0.5 MG/DL (ref 0.2–1)
BUN SERPL-MCNC: 22 MG/DL (ref 5–25)
CALCIUM SERPL-MCNC: 8.9 MG/DL (ref 8.3–10.1)
CHLORIDE SERPL-SCNC: 105 MMOL/L (ref 100–108)
CK SERPL-CCNC: 132 U/L (ref 39–308)
CO2 SERPL-SCNC: 29 MMOL/L (ref 21–32)
CREAT SERPL-MCNC: 1.8 MG/DL (ref 0.6–1.3)
GFR SERPL CREATININE-BSD FRML MDRD: 34 ML/MIN/1.73SQ M
GLUCOSE P FAST SERPL-MCNC: 116 MG/DL (ref 65–99)
POTASSIUM SERPL-SCNC: 4 MMOL/L (ref 3.5–5.3)
PROT SERPL-MCNC: 6.6 G/DL (ref 6.4–8.2)
SODIUM SERPL-SCNC: 143 MMOL/L (ref 136–145)
TSH SERPL DL<=0.05 MIU/L-ACNC: 2.05 UIU/ML (ref 0.36–3.74)

## 2019-04-17 PROCEDURE — 36415 COLL VENOUS BLD VENIPUNCTURE: CPT

## 2019-04-17 PROCEDURE — 82550 ASSAY OF CK (CPK): CPT

## 2019-04-17 PROCEDURE — 80053 COMPREHEN METABOLIC PANEL: CPT

## 2019-04-17 PROCEDURE — 84443 ASSAY THYROID STIM HORMONE: CPT

## 2019-04-18 ENCOUNTER — APPOINTMENT (OUTPATIENT)
Dept: LAB | Facility: CLINIC | Age: 84
End: 2019-04-18
Payer: MEDICARE

## 2019-04-18 PROCEDURE — 83935 ASSAY OF URINE OSMOLALITY: CPT | Performed by: INTERNAL MEDICINE

## 2019-04-18 PROCEDURE — 84300 ASSAY OF URINE SODIUM: CPT | Performed by: INTERNAL MEDICINE

## 2019-04-18 PROCEDURE — 83945 ASSAY OF OXALATE: CPT | Performed by: INTERNAL MEDICINE

## 2019-04-18 PROCEDURE — 84392 ASSAY OF URINE SULFATE: CPT | Performed by: INTERNAL MEDICINE

## 2019-04-18 PROCEDURE — 82570 ASSAY OF URINE CREATININE: CPT | Performed by: INTERNAL MEDICINE

## 2019-04-18 PROCEDURE — 83735 ASSAY OF MAGNESIUM: CPT | Performed by: INTERNAL MEDICINE

## 2019-04-18 PROCEDURE — 81003 URINALYSIS AUTO W/O SCOPE: CPT | Performed by: INTERNAL MEDICINE

## 2019-04-18 PROCEDURE — 82436 ASSAY OF URINE CHLORIDE: CPT | Performed by: INTERNAL MEDICINE

## 2019-04-18 PROCEDURE — 82140 ASSAY OF AMMONIA: CPT | Performed by: INTERNAL MEDICINE

## 2019-04-18 PROCEDURE — 84133 ASSAY OF URINE POTASSIUM: CPT | Performed by: INTERNAL MEDICINE

## 2019-04-18 PROCEDURE — 82131 AMINO ACIDS SINGLE QUANT: CPT | Performed by: INTERNAL MEDICINE

## 2019-04-18 PROCEDURE — 82507 ASSAY OF CITRATE: CPT | Performed by: INTERNAL MEDICINE

## 2019-04-18 PROCEDURE — 84560 ASSAY OF URINE/URIC ACID: CPT | Performed by: INTERNAL MEDICINE

## 2019-04-18 PROCEDURE — 84105 ASSAY OF URINE PHOSPHORUS: CPT | Performed by: INTERNAL MEDICINE

## 2019-04-18 PROCEDURE — 82340 ASSAY OF CALCIUM IN URINE: CPT | Performed by: INTERNAL MEDICINE

## 2019-05-20 ENCOUNTER — REMOTE DEVICE CLINIC VISIT (OUTPATIENT)
Dept: CARDIOLOGY CLINIC | Facility: CLINIC | Age: 84
End: 2019-05-20
Payer: MEDICARE

## 2019-05-20 DIAGNOSIS — Z95.810 AICD (AUTOMATIC CARDIOVERTER/DEFIBRILLATOR) PRESENT: Primary | ICD-10-CM

## 2019-05-20 PROCEDURE — 93295 DEV INTERROG REMOTE 1/2/MLT: CPT | Performed by: INTERNAL MEDICINE

## 2019-05-20 PROCEDURE — 93297 REM INTERROG DEV EVAL ICPMS: CPT | Performed by: INTERNAL MEDICINE

## 2019-05-20 PROCEDURE — 93296 REM INTERROG EVL PM/IDS: CPT | Performed by: INTERNAL MEDICINE

## 2019-05-31 ENCOUNTER — OFFICE VISIT (OUTPATIENT)
Dept: CARDIOLOGY CLINIC | Facility: CLINIC | Age: 84
End: 2019-05-31
Payer: MEDICARE

## 2019-05-31 VITALS
WEIGHT: 206.7 LBS | HEIGHT: 67 IN | OXYGEN SATURATION: 93 % | BODY MASS INDEX: 32.44 KG/M2 | HEART RATE: 56 BPM | DIASTOLIC BLOOD PRESSURE: 70 MMHG | SYSTOLIC BLOOD PRESSURE: 130 MMHG

## 2019-05-31 DIAGNOSIS — I47.2 V TACH (HCC): ICD-10-CM

## 2019-05-31 DIAGNOSIS — I25.5 ISCHEMIC CARDIOMYOPATHY: ICD-10-CM

## 2019-05-31 DIAGNOSIS — I50.22 CHRONIC SYSTOLIC CONGESTIVE HEART FAILURE (HCC): ICD-10-CM

## 2019-05-31 DIAGNOSIS — I25.810 CORONARY ARTERY DISEASE INVOLVING CORONARY BYPASS GRAFT OF NATIVE HEART WITHOUT ANGINA PECTORIS: ICD-10-CM

## 2019-05-31 DIAGNOSIS — N18.30 CHRONIC KIDNEY DISEASE, STAGE III (MODERATE) (HCC): ICD-10-CM

## 2019-05-31 DIAGNOSIS — E78.5 HYPERLIPIDEMIA, UNSPECIFIED HYPERLIPIDEMIA TYPE: ICD-10-CM

## 2019-05-31 DIAGNOSIS — I10 BENIGN ESSENTIAL HYPERTENSION: Primary | ICD-10-CM

## 2019-05-31 DIAGNOSIS — I48.0 PAROXYSMAL ATRIAL FIBRILLATION (HCC): ICD-10-CM

## 2019-05-31 PROCEDURE — 99214 OFFICE O/P EST MOD 30 MIN: CPT | Performed by: INTERNAL MEDICINE

## 2019-05-31 PROCEDURE — 93000 ELECTROCARDIOGRAM COMPLETE: CPT | Performed by: INTERNAL MEDICINE

## 2019-05-31 RX ORDER — METOPROLOL SUCCINATE 50 MG/1
50 TABLET, EXTENDED RELEASE ORAL 2 TIMES DAILY
Qty: 180 TABLET | Refills: 3 | Status: SHIPPED | OUTPATIENT
Start: 2019-05-31 | End: 2020-05-28

## 2019-06-10 DIAGNOSIS — N18.30 CHRONIC KIDNEY DISEASE, STAGE III (MODERATE) (HCC): ICD-10-CM

## 2019-06-10 DIAGNOSIS — N20.0 NEPHROLITHIASIS: ICD-10-CM

## 2019-06-10 DIAGNOSIS — E78.5 DYSLIPIDEMIA: ICD-10-CM

## 2019-06-10 DIAGNOSIS — N20.1 LEFT URETERAL CALCULUS: ICD-10-CM

## 2019-06-10 DIAGNOSIS — I12.9 HYPERTENSIVE CHRONIC KIDNEY DISEASE WITH STAGE 1 THROUGH STAGE 4 CHRONIC KIDNEY DISEASE, OR UNSPECIFIED CHRONIC KIDNEY DISEASE: ICD-10-CM

## 2019-06-10 RX ORDER — TORSEMIDE 20 MG/1
40 TABLET ORAL DAILY
Qty: 180 TABLET | Refills: 3 | Status: CANCELLED | OUTPATIENT
Start: 2019-06-10

## 2019-06-13 DIAGNOSIS — N18.30 CHRONIC KIDNEY DISEASE, STAGE III (MODERATE) (HCC): ICD-10-CM

## 2019-06-13 DIAGNOSIS — N20.1 LEFT URETERAL CALCULUS: ICD-10-CM

## 2019-06-13 DIAGNOSIS — N20.0 NEPHROLITHIASIS: ICD-10-CM

## 2019-06-13 DIAGNOSIS — I12.9 HYPERTENSIVE CHRONIC KIDNEY DISEASE WITH STAGE 1 THROUGH STAGE 4 CHRONIC KIDNEY DISEASE, OR UNSPECIFIED CHRONIC KIDNEY DISEASE: ICD-10-CM

## 2019-06-13 DIAGNOSIS — E78.5 DYSLIPIDEMIA: ICD-10-CM

## 2019-06-17 RX ORDER — TORSEMIDE 20 MG/1
40 TABLET ORAL DAILY
Qty: 180 TABLET | Refills: 3 | Status: SHIPPED | OUTPATIENT
Start: 2019-06-17 | End: 2020-07-27 | Stop reason: SDUPTHER

## 2019-07-09 ENCOUNTER — APPOINTMENT (OUTPATIENT)
Dept: LAB | Facility: CLINIC | Age: 84
End: 2019-07-09
Payer: MEDICARE

## 2019-07-09 ENCOUNTER — HOSPITAL ENCOUNTER (OUTPATIENT)
Dept: RADIOLOGY | Facility: HOSPITAL | Age: 84
Discharge: HOME | End: 2019-07-09
Attending: INTERNAL MEDICINE
Payer: MEDICARE

## 2019-07-09 ENCOUNTER — TRANSCRIBE ORDERS (OUTPATIENT)
Dept: LAB | Facility: CLINIC | Age: 84
End: 2019-07-09

## 2019-07-09 DIAGNOSIS — N25.81 SECONDARY HYPERPARATHYROIDISM OF RENAL ORIGIN (HCC): ICD-10-CM

## 2019-07-09 DIAGNOSIS — I50.22 CHRONIC SYSTOLIC CONGESTIVE HEART FAILURE (HCC): ICD-10-CM

## 2019-07-09 DIAGNOSIS — E78.5 DYSLIPIDEMIA: ICD-10-CM

## 2019-07-09 DIAGNOSIS — N20.0 NEPHROLITHIASIS: ICD-10-CM

## 2019-07-09 DIAGNOSIS — N18.30 CHRONIC KIDNEY DISEASE, STAGE III (MODERATE) (HCC): ICD-10-CM

## 2019-07-09 DIAGNOSIS — R60.0 LOCALIZED EDEMA: ICD-10-CM

## 2019-07-09 DIAGNOSIS — I25.10 ATHEROSCLEROTIC HEART DISEASE OF NATIVE CORONARY ARTERY WITHOUT ANGINA PECTORIS: ICD-10-CM

## 2019-07-09 DIAGNOSIS — I12.9 HYPERTENSIVE CHRONIC KIDNEY DISEASE WITH STAGE 1 THROUGH STAGE 4 CHRONIC KIDNEY DISEASE, OR UNSPECIFIED CHRONIC KIDNEY DISEASE: ICD-10-CM

## 2019-07-09 LAB
25(OH)D3 SERPL-MCNC: 30.7 NG/ML (ref 30–100)
ALBUMIN SERPL BCP-MCNC: 3.4 G/DL (ref 3.5–5)
ALP SERPL-CCNC: 79 U/L (ref 46–116)
ALT SERPL W P-5'-P-CCNC: 38 U/L (ref 12–78)
ANION GAP SERPL CALCULATED.3IONS-SCNC: 7 MMOL/L (ref 4–13)
AST SERPL W P-5'-P-CCNC: 39 U/L (ref 5–45)
BILIRUB SERPL-MCNC: 0.6 MG/DL (ref 0.2–1)
BUN SERPL-MCNC: 23 MG/DL (ref 5–25)
CALCIUM SERPL-MCNC: 9 MG/DL (ref 8.3–10.1)
CHLORIDE SERPL-SCNC: 105 MMOL/L (ref 100–108)
CHOLEST SERPL-MCNC: 142 MG/DL (ref 50–200)
CK MB SERPL-MCNC: 1.7 % (ref 0–2.5)
CK MB SERPL-MCNC: 2.8 NG/ML (ref 0–5)
CK SERPL-CCNC: 163 U/L (ref 39–308)
CO2 SERPL-SCNC: 29 MMOL/L (ref 21–32)
CREAT SERPL-MCNC: 1.78 MG/DL (ref 0.6–1.3)
CREAT UR-MCNC: 147 MG/DL
ERYTHROCYTE [DISTWIDTH] IN BLOOD BY AUTOMATED COUNT: 13.7 % (ref 11.6–15.1)
GFR SERPL CREATININE-BSD FRML MDRD: 35 ML/MIN/1.73SQ M
GLUCOSE P FAST SERPL-MCNC: 115 MG/DL (ref 65–99)
HCT VFR BLD AUTO: 41.9 % (ref 36.5–49.3)
HDLC SERPL-MCNC: 40 MG/DL (ref 40–60)
HGB BLD-MCNC: 13.8 G/DL (ref 12–17)
LDLC SERPL CALC-MCNC: 81 MG/DL (ref 0–100)
MAGNESIUM SERPL-MCNC: 2.1 MG/DL (ref 1.6–2.6)
MCH RBC QN AUTO: 31.9 PG (ref 26.8–34.3)
MCHC RBC AUTO-ENTMCNC: 32.9 G/DL (ref 31.4–37.4)
MCV RBC AUTO: 97 FL (ref 82–98)
PHOSPHATE SERPL-MCNC: 2.7 MG/DL (ref 2.3–4.1)
PLATELET # BLD AUTO: 224 THOUSANDS/UL (ref 149–390)
PMV BLD AUTO: 10 FL (ref 8.9–12.7)
POTASSIUM SERPL-SCNC: 4.3 MMOL/L (ref 3.5–5.3)
PROT SERPL-MCNC: 6.8 G/DL (ref 6.4–8.2)
PROT UR-MCNC: 24 MG/DL
PROT/CREAT UR: 0.16 MG/G{CREAT} (ref 0–0.1)
PTH-INTACT SERPL-MCNC: 105 PG/ML (ref 18.4–80.1)
RBC # BLD AUTO: 4.33 MILLION/UL (ref 3.88–5.62)
SODIUM SERPL-SCNC: 141 MMOL/L (ref 136–145)
TRIGL SERPL-MCNC: 103 MG/DL
WBC # BLD AUTO: 6.47 THOUSAND/UL (ref 4.31–10.16)

## 2019-07-09 PROCEDURE — 82550 ASSAY OF CK (CPK): CPT

## 2019-07-09 PROCEDURE — 36415 COLL VENOUS BLD VENIPUNCTURE: CPT

## 2019-07-09 PROCEDURE — 71046 X-RAY EXAM CHEST 2 VIEWS: CPT

## 2019-07-09 PROCEDURE — 84156 ASSAY OF PROTEIN URINE: CPT

## 2019-07-09 PROCEDURE — 82570 ASSAY OF URINE CREATININE: CPT

## 2019-07-09 PROCEDURE — 82553 CREATINE MB FRACTION: CPT

## 2019-07-09 PROCEDURE — 80061 LIPID PANEL: CPT

## 2019-07-09 PROCEDURE — 83970 ASSAY OF PARATHORMONE: CPT

## 2019-07-09 PROCEDURE — 82306 VITAMIN D 25 HYDROXY: CPT

## 2019-07-09 PROCEDURE — 85027 COMPLETE CBC AUTOMATED: CPT

## 2019-07-09 PROCEDURE — 83735 ASSAY OF MAGNESIUM: CPT

## 2019-07-09 PROCEDURE — 84100 ASSAY OF PHOSPHORUS: CPT

## 2019-07-09 PROCEDURE — 80053 COMPREHEN METABOLIC PANEL: CPT

## 2019-07-22 ENCOUNTER — REMOTE DEVICE CLINIC VISIT (OUTPATIENT)
Dept: CARDIOLOGY CLINIC | Facility: CLINIC | Age: 84
End: 2019-07-22
Payer: MEDICARE

## 2019-07-22 DIAGNOSIS — Z95.810 PRESENCE OF AUTOMATIC CARDIOVERTER/DEFIBRILLATOR (AICD): Primary | ICD-10-CM

## 2019-07-22 PROCEDURE — 93295 DEV INTERROG REMOTE 1/2/MLT: CPT | Performed by: INTERNAL MEDICINE

## 2019-07-22 PROCEDURE — 93296 REM INTERROG EVL PM/IDS: CPT | Performed by: INTERNAL MEDICINE

## 2019-07-22 NOTE — PROGRESS NOTES
Results for orders placed or performed in visit on 07/22/19   Cardiac EP device report    Narrative    [facility] CRT-D  MERLIN TRANSMISSION: BATTERY VOLTAGE NEARING OPAL (5 MOS)  WILL SCHEDULE MONTHLY BATTERY CHECKS  AP: 89%  BP: >99%  ALL AVAILABLE LEAD PARAMETERS WITHIN NORMAL LIMITS  NO SIGNIFICANT HIGH RATE EPISODES  CORVUE IMPEDANCE MONITORING WITHIN NORMAL LIMITS  APPROPRIATELY FUNCTIONING BI-V ICD   [facility]

## 2019-08-15 ENCOUNTER — OFFICE VISIT (OUTPATIENT)
Dept: NEPHROLOGY | Facility: CLINIC | Age: 84
End: 2019-08-15
Payer: MEDICARE

## 2019-08-15 VITALS
BODY MASS INDEX: 32.55 KG/M2 | WEIGHT: 207.4 LBS | HEIGHT: 67 IN | HEART RATE: 66 BPM | DIASTOLIC BLOOD PRESSURE: 70 MMHG | SYSTOLIC BLOOD PRESSURE: 128 MMHG

## 2019-08-15 DIAGNOSIS — N20.0 NEPHROLITHIASIS: ICD-10-CM

## 2019-08-15 DIAGNOSIS — N18.30 CHRONIC KIDNEY DISEASE, STAGE III (MODERATE) (HCC): ICD-10-CM

## 2019-08-15 DIAGNOSIS — R60.0 LOCALIZED EDEMA: ICD-10-CM

## 2019-08-15 DIAGNOSIS — I12.9 HYPERTENSIVE CHRONIC KIDNEY DISEASE WITH STAGE 1 THROUGH STAGE 4 CHRONIC KIDNEY DISEASE, OR UNSPECIFIED CHRONIC KIDNEY DISEASE: Primary | ICD-10-CM

## 2019-08-15 DIAGNOSIS — N25.81 SECONDARY HYPERPARATHYROIDISM OF RENAL ORIGIN (HCC): ICD-10-CM

## 2019-08-15 PROCEDURE — 99214 OFFICE O/P EST MOD 30 MIN: CPT | Performed by: INTERNAL MEDICINE

## 2019-08-15 NOTE — PATIENT INSTRUCTIONS
1  Medication changes today:  · Stop amlodipine/Norvasc now  · Take vitamin-D 1000 units daily over-the-counter    2  Please wait 1-2 weeks after making the above medication changes in take 1 week a blood pressure readings morning evening just sitting:  -the morning readings before any medications  -the evening readings before supper  Please send those in those readings  3  Follow-up labs nonfasting in 3 months  Please send in 1 week a blood pressure readings morning evening just sitting is outlined above    4  Follow-up appointment in 6 months:  -please bring in 1 week a blood pressure readings morning evening, sitting and standing:  · Take the morning readings before any medications  · Take the evening readings before dinner/supper  · When taking standing readings, keep your arm supported at heart level and not dangling    -please go for fasting lab work prior to your appointment    5  General instructions:  -avoid salt  -avoid medications such as Motrin, Naprosyn, ibuprofen, Aleve or Advil or Celebrex as they can affect your kidney function; you can use Tylenol as needed for pain or fevers if you have no liver problems  -avoid medications such as Sudafed or other medications with decongestants as they can raise your blood pressure  -try to exercise at least 30 minutes at least 3 days a week with an ultimate goal of 5 days a week  -try to lose 5-10 lb by your next visit

## 2019-08-15 NOTE — PROGRESS NOTES
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  · Baseline creatinine:  1 5-1 83  · Current creatinine:  1 78 at baseline  · Urine protein creatinine ratio:  0 16 g at goal  Recommendations:  · Treat hypertension-please see below  · Treat dyslipidemia-please see below  · Maintain proteinuria less than 1 g or as low as possible  · Avoid nephrotoxic agents such as NSAIDs, patient counseled as such  2   Volume:  TSH was acceptable; venous duplex negative; no significant edema at this time  3   Hypertension:       Current blood pressure averages:  AM:  118/57, standing 106/57  PM:   120/52, standing 113/49  Heart rate:  60-70     · Goal blood pressure:  Less than 120-125/80 given CAD  Recommendations:  · Push nonmedical regimen including weight loss, isotonic exercise and avoidance of salt, patient counseled as such  · Medication changes today:  Stop amlodipine  He will monitor blood pressures send the min  If he finds his blood pressure is too high he will  try it every other day  4   Electrolytes:  all acceptable  5   Mineral bone disorder:  · Calcium/magnesium/phosphorus:  All acceptable  · PTH intact:  105 0, secondary to CKD monitor for now with slight increase in vitamin-D supplementation  · Vitamin-D:  30 7 at goal continue supplement, but increase  6   Dyslipidemia:  · Goal LDL:  Goal less than 70  · Current lipid profile:  LDL 81/HDL 40/triglycerides 103  Recommendations:  He has discuss this with Cardiology he would prefer not to add any medications including Zetia or switch anything at this point he does understand that the LDL is above goal of 70  He will discuss in work with Cardiology in this regards per his choice  7   Anemia:  Normal hemoglobin at 13 8  8   Nephrolithiasis:  Also followed by urology:  Patient with no further stone passage    24 hour urine for stone risk:  IN THE PAST:  -volume 1350 which is BELOW GOAL  -calcium 104 mg at goal  -citrate which is 265 mg BELOW GOAL  -oxalate 23 mg which is at goal  -sodium excretion 108 millimoles which is at goal  -uric acid:  369 mg which is at goal  Recommendations:  · Modest fluid intake with water, try to remove soda, we are restricted because of ischemic cardiomyopathy and fluid overload  · Potassium citrate  · KUB with multiple bilateral renal calculi similar to previous exam   · Repeat 24 hour urine for stone risk:  NOW NOT DONE AS OF YET  9   Other problems:  · Cardiac:  Followed by Dr Hdz with ischemic cardiomyopathy/ICD/systolic CHF/CABG   Ejection fraction 30% without any reversible ischemia on nuclear stress test   · BPH  · Hypothyroidism          PATIENT INSTRUCTIONS:    Patient Instructions   1  Medication changes today:  · Stop amlodipine/Norvasc now  · Take vitamin-D 1000 units daily over-the-counter    2  Please wait 1-2 weeks after making the above medication changes in take 1 week a blood pressure readings morning evening just sitting:  -the morning readings before any medications  -the evening readings before supper  Please send those in those readings  3  Follow-up labs nonfasting in 3 months  Please send in 1 week a blood pressure readings morning evening just sitting is outlined above    4  Follow-up appointment in 6 months:  -please bring in 1 week a blood pressure readings morning evening, sitting and standing:  · Take the morning readings before any medications  · Take the evening readings before dinner/supper  · When taking standing readings, keep your arm supported at heart level and not dangling    -please go for fasting lab work prior to your appointment    5  General instructions:  -avoid salt  -avoid medications such as Motrin, Naprosyn, ibuprofen, Aleve or Advil or Celebrex as they can affect your kidney function; you can use Tylenol as needed for pain or fevers if you have no liver problems  -avoid medications such as Sudafed or other medications with decongestants as they can raise your blood pressure  -try to exercise at least 30 minutes at least 3 days a week with an ultimate goal of 5 days a week  -try to lose 5-10 lb by your next visit           Subjective: The patient complains of being tired he thinks in particular from low blood pressure  No fevers chills cough or colds    Good appetite and good energy  No urinary symptoms including foamy urine or blood in the urine  No gastrointestinal symptoms  No cardiovascular symptoms including swelling of the legs  No headaches, dizziness or lightheadedness  Blood pressure medications:  -torsemide 40 mg daily in the morning  -Toprol XL 50 mg twice a day  -Imdur 30 mg daily  -amlodipine 2 5 mg in the morning    ROS:  See HPI, otherwise review of systems as completely reviewed with the patient are negative    Past Medical History:   Diagnosis Date    AICD (automatic cardioverter/defibrillator) present 2004    AICD (automatic cardioverter/defibrillator) present 2006    AICD (automatic cardioverter/defibrillator) present 2013    [facility]    Arthritis     R knee and neck    Atrial fibrillation [facility] [address] [facility]     BPH (benign prostatic hyperplasia)     CAD (coronary artery disease) of artery bypass graft     CHF (congestive heart failure) ([facility] [address] [facility]     combine    Coronary artery disease     Disease of thyroid gland     Hyperlipidemia     Hypertension     Ischemic cardiomyopathy     Left ureteral calculus 10/17/2017    Renal disorder     V tach ([facility] [address] [facility]      Past Surgical History:   Procedure Laterality Date    CARDIAC CATHETERIZATION      CARDIAC SURGERY      Pacemaker    CORONARY ARTERY BYPASS GRAFT      PA CYSTOURETHROSCOPY,URETER CATHETER N/A 10/17/2017    Procedure: CYSTOSCOPY, left  RETROGRADE PYELOGRAM WITH LEFT URETEROSCOPY , stone extraction,LEFT STENT INSERTION;  Surgeon: Vi Garrett MD;  Location: [facility];  Service: Urology Family History   Problem Relation Age of Onset    Tuberculosis Father         WWI    Hypertension Mother     Stroke Brother       reports that he has never smoked  He has never used smokeless tobacco  He reports that he does not drink alcohol or use drugs  I COMPLETELY REVIEWED THE PAST MEDICAL HISTORY/PAST SURGICAL HISTORY/SOCIAL HISTORY/FAMILY HISTORY/AND MEDICATIONS  AND UPDATED ALL    Objective:     Vitals:   Vitals:    08/15/19 1029   BP: 128/70   Pulse: 66    BP sitting on left:  132/66 with a heart rate of 72 and regular, same on right  BP standing on left:  128/62 with a heart rate of 68 and regular    Weight (last 2 days)     Date/Time   Weight    08/15/19 1029   94 1 (207 4)            Wt Readings from Last 3 Encounters:   08/15/19 94 1 kg (207 lb 6 4 oz)   05/31/19 93 8 kg (206 lb 11 2 oz)   04/02/19 93 kg (205 lb)       Body mass index is 32 48 kg/m²  Physical Exam: General:  Obese, but in No acute distress  Skin:  No acute rash  Eyes:  No scleral icterus, noninjected  ENT:  Moist mucous membranes  Neck:  Supple, no jugular venous distention  Back   No CVAT  Chest:  Clear to auscultation and percussion, good respiratory effort  CVS:  Regular rate and rhythm without a rub, murmurs or gallops  Abdomen:  Obese, Soft and nontender with normal bowel sounds  Extremities:  No cyanosis and no edema  Neuro:  Grossly intact  Psych:  Alert, oriented x3 and appropriate      Medications:    Current Outpatient Medications:     acetaminophen (TYLENOL) 500 mg tablet, Take 500 mg by mouth every 6 (six) hours as needed for mild pain, Disp: , Rfl:     amiodarone 200 mg tablet, Take 200 mg by mouth daily  , Disp: , Rfl:     aspirin 81 MG tablet, Take 81 mg by mouth daily  , Disp: , Rfl:     isosorbide mononitrate (IMDUR) 30 mg 24 hr tablet, Take 1 tablet (30 mg total) by mouth daily, Disp: 30 tablet, Rfl: 11    levothyroxine 150 mcg tablet, Take 150 mcg by mouth daily , Disp: , Rfl:     LORazepam (ATIVAN) 1 mg tablet, Take 1 mg by mouth as needed for anxiety  , Disp: , Rfl:     metoprolol succinate (TOPROL-XL) 50 mg 24 hr tablet, Take 1 tablet (50 mg total) by mouth 2 (two) times a day, Disp: 180 tablet, Rfl: 3    mexiletine (MEXITIL) 150 mg capsule, Take 150 mg by mouth 2 (two) times a day , Disp: , Rfl:     Multiple Vitamins-Minerals (MULTIVITAMIN WITH MINERALS) tablet, Take 1 tablet by mouth daily  , Disp: , Rfl:     nitroglycerin (NITROSTAT) 0 4 mg SL tablet, Place 0 4 mg under the tongue every 5 (five) minutes as needed for chest pain  , Disp: , Rfl:     potassium citrate (UROCIT-K 10) 10 mEq, Take 2 tablets (20 mEq total) by mouth 2 (two) times a day, Disp: 120 tablet, Rfl: 5    pravastatin (PRAVACHOL) 80 mg tablet, Take 80 mg by mouth daily  , Disp: , Rfl:     tamsulosin (FLOMAX) 0 4 mg, Take 1 capsule (0 4 mg total) by mouth daily with dinner, Disp: 90 capsule, Rfl: 3    torsemide (DEMADEX) 20 mg tablet, Take 2 tablets (40 mg total) by mouth daily, Disp: 180 tablet, Rfl: 3    Lab, Imaging and other studies: I have personally reviewed pertinent labs    Laboratory Results:  Results for orders placed or performed in visit on 07/09/19   Comprehensive metabolic panel   Result Value Ref Range    Sodium 141 136 - 145 mmol/L    Potassium 4 3 3 5 - 5 3 mmol/L    Chloride 105 100 - 108 mmol/L    CO2 29 21 - 32 mmol/L    ANION GAP 7 4 - 13 mmol/L    BUN 23 5 - 25 mg/dL    Creatinine 1 78 (H) 0 60 - 1 30 mg/dL    Glucose, Fasting 115 (H) 65 - 99 mg/dL    Calcium 9 0 8 3 - 10 1 mg/dL    AST 39 5 - 45 U/L    ALT 38 12 - 78 U/L    Alkaline Phosphatase 79 46 - 116 U/L    Total Protein 6 8 6 4 - 8 2 g/dL    Albumin 3 4 (L) 3 5 - 5 0 g/dL    Total Bilirubin 0 60 0 20 - 1 00 mg/dL    eGFR 35 ml/min/1 73sq m   CK   Result Value Ref Range    Total  39 - 308 U/L   CBC   Result Value Ref Range    WBC 6 47 4 31 - 10 16 Thousand/uL    RBC 4 33 3 88 - 5 62 Million/uL    Hemoglobin 13 8 12 0 - 17 0 g/dL    Hematocrit 41 9 36 5 - 49 3 %    MCV 97 82 - 98 fL    MCH 31 9 26 8 - 34 3 pg    MCHC 32 9 31 4 - 37 4 g/dL    RDW 13 7 11 6 - 15 1 %    Platelets 101 338 - 677 Thousands/uL    MPV 10 0 8 9 - 12 7 fL   Lipid Panel with Direct LDL reflex   Result Value Ref Range    Cholesterol 142 50 - 200 mg/dL    Triglycerides 103 <=150 mg/dL    HDL, Direct 40 40 - 60 mg/dL    LDL Calculated 81 0 - 100 mg/dL   Magnesium   Result Value Ref Range    Magnesium 2 1 1 6 - 2 6 mg/dL   Phosphorus   Result Value Ref Range    Phosphorus 2 7 2 3 - 4 1 mg/dL   Protein / creatinine ratio, urine   Result Value Ref Range    Creatinine, Ur 147 0 mg/dL    Protein Urine Random 24 mg/dL    Prot/Creat Ratio, Ur 0 16 (H) 0 00 - 0 10   Vitamin D 25 hydroxy   Result Value Ref Range    Vit D, 25-Hydroxy 30 7 30 0 - 100 0 ng/mL   PTH, intact   Result Value Ref Range     0 (H) 18 4 - 80 1 pg/mL   CKMB   Result Value Ref Range    CK-MB Index 1 7 0 0 - 2 5 %    CK-MB 2 8 0 0 - 5 0 ng/mL             Invalid input(s): ALBUMIN      Radiology review:   chest X-ray    Ultrasound      Portions of the record may have been created with voice recognition software  Occasional wrong word or "sound a like" substitutions may have occurred due to the inherent limitations of voice recognition software  Read the chart carefully and recognize, using context, where substitutions have occurred

## 2019-08-15 NOTE — LETTER
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  · Baseline creatinine:  1 5-1 83  · Current creatinine:  1 78 at baseline  · Urine protein creatinine ratio:  0 16 g at goal  Recommendations:  · Treat hypertension-please see below  · Treat dyslipidemia-please see below  · Maintain proteinuria less than 1 g or as low as possible  · Avoid nephrotoxic agents such as NSAIDs, patient counseled as such  2   Volume:  TSH was acceptable; venous duplex negative; no significant edema at this time  3   Hypertension:       Current blood pressure averages:  AM:  118/57, standing 106/57  PM:    120/52, standing 113/49  Heart rate:  60-70     · Goal blood pressure:  Less than 120-125/80 given CAD  Recommendations:  · Push nonmedical regimen including weight loss, isotonic exercise and avoidance of salt, patient counseled as such  · Medication changes today:  Stop amlodipine  He will monitor blood pressures send the min    If he finds his blood pressure is too high he will  try it every other day  4   Electrolytes:  all acceptable  5   Mineral bone disorder:  · Calcium/magnesium/phosphorus:  All acceptable  · PTH intact:   105 0, secondary to CKD monitor for now with slight increase in vitamin-D supplementation  · Vitamin-D:  30 7 at goal continue supplement, but increase  6   Dyslipidemia:  · Goal LDL:  Goal less than 70  · Current lipid profile:  LDL  81/HDL 40/triglycerides 103  Recommendations:  He has discuss this with Cardiology he would prefer not to add any medications including Zetia or switch anything at this point he does understand that the LDL is above goal of 70  He will discuss in work with Cardiology in this regards per his choice  7   Anemia:  Normal hemoglobin at 13 8  8   Nephrolithiasis:  Also followed by urology:  Patient with no further stone passage  24 hour urine for stone risk:  IN THE PAST:  -volume 1350 which is BELOW GOAL  -calcium 104 mg at goal  -citrate which is 265 mg BELOW GOAL  -oxalate 23 mg which is at goal  -sodium excretion 108 millimoles which is at goal  -uric acid:  369 mg which is at goal  Recommendations:  · Modest fluid intake with water, try to remove soda, we are restricted because of ischemic cardiomyopathy and fluid overload  · Potassium citrate  · KUB with multiple bilateral renal calculi similar to previous exam   · Repeat 24 hour urine for stone risk:  NOW NOT DONE AS OF YET  9   Other problems:  · Cardiac:  Followed by Dr Kvng Tarango with ischemic cardiomyopathy/ICD/systolic CHF/CABG   Ejection fraction 30% without any reversible ischemia on nuclear stress test   · BPH  · Hypothyroidism          PATIENT INSTRUCTIONS:    Patient Instructions   1  Medication changes today:  · Stop amlodipine/Norvasc now  · Take vitamin-D 1000 units daily over-the-counter    2  Please wait 1-2 weeks after making the above medication changes in take 1 week a blood pressure readings morning evening just sitting:  -the morning readings before any medications  -the evening readings before supper  Please send those in those readings  3  Follow-up labs nonfasting in 3 months  Please send in 1 week a blood pressure readings morning evening just sitting is outlined above    4   Follow-up appointment in 6 months:  -please bring in 1 week a blood pressure readings morning evening, sitting and standing:  · Take the morning readings before any medications  · Take the evening readings before dinner/supper  · When taking standing readings, keep your arm supported at heart level and not dangling    -please go for fasting lab work prior to your appointment    5  General instructions:  -avoid salt  -avoid medications such as Motrin, Naprosyn, ibuprofen, Aleve or Advil or Celebrex as they can affect your kidney function; you can use Tylenol as needed for pain or fevers if you have no liver problems  -avoid medications such as Sudafed or other medications with decongestants as they can raise your blood pressure  -try to exercise at least 30 minutes at least 3 days a week with an ultimate goal of 5 days a week  -try to lose 5-10 lb by your next visit           Subjective: The patient complains of being tired he thinks in particular from low blood pressure  No fevers chills cough or colds    Good appetite and good energy  No urinary symptoms including foamy urine or blood in the urine  No gastrointestinal symptoms  No cardiovascular symptoms including swelling of the legs  No headaches, dizziness or lightheadedness  Blood pressure medications:  -torsemide 40 mg daily in the morning  -Toprol XL 50 mg twice a day  -Imdur 30 mg daily  -amlodipine 2 5 mg in the morning    ROS:  See HPI, otherwise review of systems as completely reviewed with the patient are negative    Past Medical History:   Diagnosis Date    AICD (automatic cardioverter/defibrillator) present 2004    AICD (automatic cardioverter/defibrillator) present 2006    AICD (automatic cardioverter/defibrillator) present 2013    [facility]    Arthritis     R knee and neck    Atrial fibrillation [facility] [address] [facility]     BPH (benign prostatic hyperplasia)     CAD (coronary artery disease) of artery bypass graft     CHF (congestive heart failure) ([facility] [address] )     combine    Coronary artery disease     Disease of thyroid gland     Hyperlipidemia     Hypertension     Ischemic cardiomyopathy     Left ureteral calculus 10/17/2017    Renal disorder     V tach [facility])      Past Surgical History:   Procedure Laterality Date    CARDIAC CATHETERIZATION      CARDIAC SURGERY      Pacemaker    CORONARY ARTERY BYPASS GRAFT      MI CYSTOURETHROSCOPY,URETER CATHETER N/A 10/17/2017    Procedure: CYSTOSCOPY, left  RETROGRADE PYELOGRAM WITH LEFT URETEROSCOPY , stone extraction,LEFT STENT INSERTION;  Surgeon: Lora Quiñones MD;  Location: [facility];  Service: Urology     Family History   Problem Relation Age of Onset    Tuberculosis Father         WWI    Hypertension Mother     Stroke Brother       reports that he has never smoked  He has never used smokeless tobacco  He reports that he does not drink alcohol or use drugs  I COMPLETELY REVIEWED THE PAST MEDICAL HISTORY/PAST SURGICAL HISTORY/SOCIAL HISTORY/FAMILY HISTORY/AND MEDICATIONS  AND UPDATED ALL    Objective:     Vitals:   Vitals:    08/15/19 1029   BP: 128/70   Pulse: 66    BP sitting on left:  132/66 with a heart rate of 72 and regular, same on right  BP standing on left:  128/62 with a heart rate of 68 and regular    Weight (last 2 days)     Date/Time   Weight    08/15/19 1029   94 1 (207 4)            Wt Readings from Last 3 Encounters:   08/15/19 94 1 kg (207 lb 6 4 oz)   05/31/19 93 8 kg (206 lb 11 2 oz)   04/02/19 93 kg (205 lb)       Body mass index is 32 48 kg/m²      Physical Exam: General:  Obese, but in No acute distress  Skin:  No acute rash  Eyes:  No scleral icterus, noninjected  ENT:  Moist mucous membranes  Neck:  Supple, no jugular venous distention  Back   No CVAT  Chest:  Clear to auscultation and percussion, good respiratory effort  CVS:  Regular rate and rhythm without a rub, murmurs or gallops  Abdomen:  Obese, Soft and nontender with normal bowel sounds  Extremities:  No cyanosis and no edema  Neuro:  Grossly intact  Psych: Alert, oriented x3 and appropriate      Medications:    Current Outpatient Medications:     acetaminophen (TYLENOL) 500 mg tablet, Take 500 mg by mouth every 6 (six) hours as needed for mild pain, Disp: , Rfl:     amiodarone 200 mg tablet, Take 200 mg by mouth daily  , Disp: , Rfl:     aspirin 81 MG tablet, Take 81 mg by mouth daily  , Disp: , Rfl:     isosorbide mononitrate (IMDUR) 30 mg 24 hr tablet, Take 1 tablet (30 mg total) by mouth daily, Disp: 30 tablet, Rfl: 11    levothyroxine 150 mcg tablet, Take 150 mcg by mouth daily , Disp: , Rfl:     LORazepam (ATIVAN) 1 mg tablet, Take 1 mg by mouth as needed for anxiety  , Disp: , Rfl:     metoprolol succinate (TOPROL-XL) 50 mg 24 hr tablet, Take 1 tablet (50 mg total) by mouth 2 (two) times a day, Disp: 180 tablet, Rfl: 3    mexiletine (MEXITIL) 150 mg capsule, Take 150 mg by mouth 2 (two) times a day , Disp: , Rfl:     Multiple Vitamins-Minerals (MULTIVITAMIN WITH MINERALS) tablet, Take 1 tablet by mouth daily  , Disp: , Rfl:     nitroglycerin (NITROSTAT) 0 4 mg SL tablet, Place 0 4 mg under the tongue every 5 (five) minutes as needed for chest pain  , Disp: , Rfl:     potassium citrate (UROCIT-K 10) 10 mEq, Take 2 tablets (20 mEq total) by mouth 2 (two) times a day, Disp: 120 tablet, Rfl: 5    pravastatin (PRAVACHOL) 80 mg tablet, Take 80 mg by mouth daily  , Disp: , Rfl:     tamsulosin (FLOMAX) 0 4 mg, Take 1 capsule (0 4 mg total) by mouth daily with dinner, Disp: 90 capsule, Rfl: 3    torsemide (DEMADEX) 20 mg tablet, Take 2 tablets (40 mg total) by mouth daily, Disp: 180 tablet, Rfl: 3    Lab, Imaging and other studies: I have personally reviewed pertinent labs    Laboratory Results:  Results for orders placed or performed in visit on 07/09/19   Comprehensive metabolic panel   Result Value Ref Range    Sodium 141 136 - 145 mmol/L    Potassium 4 3 3 5 - 5 3 mmol/L    Chloride 105 100 - 108 mmol/L    CO2 29 21 - 32 mmol/L    ANION GAP 7 4 - 13 mmol/L BUN 23 5 - 25 mg/dL    Creatinine 1 78 (H) 0 60 - 1 30 mg/dL    Glucose, Fasting 115 (H) 65 - 99 mg/dL    Calcium 9 0 8 3 - 10 1 mg/dL    AST 39 5 - 45 U/L    ALT 38 12 - 78 U/L    Alkaline Phosphatase 79 46 - 116 U/L    Total Protein 6 8 6 4 - 8 2 g/dL    Albumin 3 4 (L) 3 5 - 5 0 g/dL    Total Bilirubin 0 60 0 20 - 1 00 mg/dL    eGFR 35 ml/min/1 73sq m   CK   Result Value Ref Range    Total  39 - 308 U/L   CBC   Result Value Ref Range    WBC 6 47 4 31 - 10 16 Thousand/uL    RBC 4 33 3 88 - 5 62 Million/uL    Hemoglobin 13 8 12 0 - 17 0 g/dL    Hematocrit 41 9 36 5 - 49 3 %    MCV 97 82 - 98 fL    MCH 31 9 26 8 - 34 3 pg    MCHC 32 9 31 4 - 37 4 g/dL    RDW 13 7 11 6 - 15 1 %    Platelets 294 143 - 047 Thousands/uL    MPV 10 0 8 9 - 12 7 fL   Lipid Panel with Direct LDL reflex   Result Value Ref Range    Cholesterol 142 50 - 200 mg/dL    Triglycerides 103 <=150 mg/dL    HDL, Direct 40 40 - 60 mg/dL    LDL Calculated 81 0 - 100 mg/dL   Magnesium   Result Value Ref Range    Magnesium 2 1 1 6 - 2 6 mg/dL   Phosphorus   Result Value Ref Range    Phosphorus 2 7 2 3 - 4 1 mg/dL   Protein / creatinine ratio, urine   Result Value Ref Range    Creatinine, Ur 147 0 mg/dL    Protein Urine Random 24 mg/dL    Prot/Creat Ratio, Ur 0 16 (H) 0 00 - 0 10   Vitamin D 25 hydroxy   Result Value Ref Range    Vit D, 25-Hydroxy 30 7 30 0 - 100 0 ng/mL   PTH, intact   Result Value Ref Range     0 (H) 18 4 - 80 1 pg/mL   CKMB   Result Value Ref Range    CK-MB Index 1 7 0 0 - 2 5 %    CK-MB 2 8 0 0 - 5 0 ng/mL             Invalid input(s): ALBUMIN      Radiology review:   chest X-ray    Ultrasound      Portions of the record may have been created with voice recognition software  Occasional wrong word or "sound a like" substitutions may have occurred due to the inherent limitations of voice recognition software  Read the chart carefully and recognize, using context, where substitutions have occurred

## 2019-08-19 ENCOUNTER — IN-CLINIC DEVICE VISIT (OUTPATIENT)
Dept: CARDIOLOGY CLINIC | Facility: CLINIC | Age: 84
End: 2019-08-19
Payer: MEDICARE

## 2019-08-19 DIAGNOSIS — I25.5 ISCHEMIC CARDIOMYOPATHY: ICD-10-CM

## 2019-08-19 DIAGNOSIS — Z95.810 BIVENTRICULAR IMPLANTABLE CARDIOVERTER-DEFIBRILLATOR IN SITU: ICD-10-CM

## 2019-08-19 DIAGNOSIS — I47.2 V TACH (HCC): ICD-10-CM

## 2019-08-19 DIAGNOSIS — I50.22 CHRONIC SYSTOLIC CONGESTIVE HEART FAILURE (HCC): Primary | ICD-10-CM

## 2019-08-19 PROCEDURE — 93284 PRGRMG EVAL IMPLANTABLE DFB: CPT | Performed by: INTERNAL MEDICINE

## 2019-08-19 NOTE — PROGRESS NOTES
Results for orders placed or performed in visit on 08/19/19   Cardiac EP device report    Narrative    [facility] CRT-D  DEVICE INTERROGATED IN THE [facility] OFFICE: BATTERY VOLTAGE NEARING OPAL (~ 5 2 MOS )  WILL SCHEDULE MONTHLY BATTERY CHECKS  AP - 93% BVP - >99%  ALL LEAD PARAMETERS WITHIN NORMAL LIMITS  ALL OTHER TESTING WITHIN NORMAL LIMITS  NO SIGNIFICANT HIGH RATE EPISODES  CORVUE IMPEDANCE MONITORING WITHIN NORMAL LIMITS  NO PROGRAMMING CHANGES MADE TO DEVICE PARAMETERS  APPROPRIATELY FUNCTIONING BI-V ICD      EB

## 2019-09-19 ENCOUNTER — DOCUMENTATION (OUTPATIENT)
Dept: NEPHROLOGY | Facility: CLINIC | Age: 84
End: 2019-09-19

## 2019-09-19 NOTE — PROGRESS NOTES
Home blood pressure readings:  -a m :  135/67  -p m :  126/59  Heart rate 60s    Blood pressure slightly above goal so I would recommend amlodipine 2 5 milligrams at nighttime every other day  Then wait 2 weeks after the above medication change in take an additional week a blood pressure readings as outlined previously and send

## 2019-09-20 ENCOUNTER — DOCUMENTATION (OUTPATIENT)
Dept: NEPHROLOGY | Facility: CLINIC | Age: 84
End: 2019-09-20

## 2019-09-20 ENCOUNTER — REMOTE DEVICE CLINIC VISIT (OUTPATIENT)
Dept: CARDIOLOGY CLINIC | Facility: CLINIC | Age: 84
End: 2019-09-20
Payer: MEDICARE

## 2019-09-20 DIAGNOSIS — Z95.810 AICD (AUTOMATIC CARDIOVERTER/DEFIBRILLATOR) PRESENT: Primary | ICD-10-CM

## 2019-09-20 DIAGNOSIS — I12.9 HYPERTENSIVE CHRONIC KIDNEY DISEASE WITH STAGE 1 THROUGH STAGE 4 CHRONIC KIDNEY DISEASE, OR UNSPECIFIED CHRONIC KIDNEY DISEASE: ICD-10-CM

## 2019-09-20 DIAGNOSIS — N20.0 NEPHROLITHIASIS: ICD-10-CM

## 2019-09-20 DIAGNOSIS — N18.30 CHRONIC KIDNEY DISEASE, STAGE III (MODERATE) (HCC): ICD-10-CM

## 2019-09-20 DIAGNOSIS — R60.0 LOCALIZED EDEMA: ICD-10-CM

## 2019-09-20 DIAGNOSIS — E78.5 DYSLIPIDEMIA: ICD-10-CM

## 2019-09-20 DIAGNOSIS — N20.1 LEFT URETERAL CALCULUS: ICD-10-CM

## 2019-09-20 PROCEDURE — 93297 REM INTERROG DEV EVAL ICPMS: CPT | Performed by: INTERNAL MEDICINE

## 2019-09-20 PROCEDURE — 93299 PR REM INTERROG ICPMS/SCRMS <30 D TECH REVIEW: CPT | Performed by: INTERNAL MEDICINE

## 2019-09-20 RX ORDER — POTASSIUM CITRATE 10 MEQ/1
20 TABLET, EXTENDED RELEASE ORAL 2 TIMES DAILY
Qty: 360 TABLET | Refills: 3 | Status: SHIPPED | OUTPATIENT
Start: 2019-09-20 | End: 2020-07-13

## 2019-09-20 RX ORDER — AMLODIPINE BESYLATE 2.5 MG/1
2.5 TABLET ORAL EVERY OTHER DAY
COMMUNITY
End: 2020-11-12 | Stop reason: SDUPTHER

## 2019-09-20 NOTE — PROGRESS NOTES
I spoke to patient wife and she is aware that Dr Hines would like to add amlodipine again 2 5 mg every other day at nighttime  Pt will wait 2 weeks and send readings in

## 2019-09-20 NOTE — PROGRESS NOTES
Results for orders placed or performed in visit on 09/20/19   Cardiac EP device report    Narrative    [facility] CRT-D  [facility] TRANSMISSION - CORVUE ONLY: BATTERY VOLTAGE NEARING OPAL  WILL SCHEDULE MONTHLY BATTERY CHECKS  AP 90% %  ALL AVAILABLE LEAD PARAMETERS WITHIN NORMAL LIMITS  NO SIGNIFICANT HIGH RATE EPISODES  CORVUE IMPEDANCE MONITORING WITHIN NORMAL LIMITS  NORMAL DEVICE FUNCTION   NC

## 2019-09-25 DIAGNOSIS — I50.22 CHRONIC SYSTOLIC CONGESTIVE HEART FAILURE (HCC): ICD-10-CM

## 2019-09-25 RX ORDER — ISOSORBIDE MONONITRATE 30 MG/1
30 TABLET, EXTENDED RELEASE ORAL DAILY
Qty: 30 TABLET | Refills: 11 | Status: SHIPPED | OUTPATIENT
Start: 2019-09-25 | End: 2019-09-30 | Stop reason: SDUPTHER

## 2019-09-30 DIAGNOSIS — I50.22 CHRONIC SYSTOLIC CONGESTIVE HEART FAILURE (HCC): ICD-10-CM

## 2019-09-30 RX ORDER — ISOSORBIDE MONONITRATE 30 MG/1
30 TABLET, EXTENDED RELEASE ORAL DAILY
Qty: 90 TABLET | Refills: 3 | Status: SHIPPED | OUTPATIENT
Start: 2019-09-30 | End: 2021-04-15 | Stop reason: SDUPTHER

## 2019-10-18 ENCOUNTER — REMOTE DEVICE CLINIC VISIT (OUTPATIENT)
Dept: CARDIOLOGY CLINIC | Facility: CLINIC | Age: 84
End: 2019-10-18
Payer: MEDICARE

## 2019-10-18 ENCOUNTER — TELEPHONE (OUTPATIENT)
Dept: CARDIOLOGY CLINIC | Facility: CLINIC | Age: 84
End: 2019-10-18

## 2019-10-18 DIAGNOSIS — Z95.810 PRESENCE OF AUTOMATIC CARDIOVERTER/DEFIBRILLATOR (AICD): Primary | ICD-10-CM

## 2019-10-18 PROCEDURE — 99024 POSTOP FOLLOW-UP VISIT: CPT | Performed by: INTERNAL MEDICINE

## 2019-10-18 PROCEDURE — 93296 REM INTERROG EVL PM/IDS: CPT | Performed by: INTERNAL MEDICINE

## 2019-10-18 NOTE — TELEPHONE ENCOUNTER
S/w Wesly, denies any CHF complaints  Weighs himself QOD and denies any weight gain  Gave recommendations as to when to call office-- verbally understood

## 2019-10-18 NOTE — TELEPHONE ENCOUNTER
----- Message from [facility] sent at 10/18/2019 11:03 AM EDT -----  Regarding: corvue crossed  Angelina Braun,  Pts corvue has crossed x 21 days  Pt takes Demadex  EF: 30% (echo 4/28/19)  Thanks,  [employer] TRANSMISSION: BATTERY VOLTAGE NEARING OPAL (<3 MOS, 3% REMAINING CAPACITY TO OPAL, CHARGE TIME: 11 9 SECS)  WILL SCHEDULE MONTHLY BATTERY CHECKS  AP: 88%  BP: >99%  ALL AVAILABLE LEAD PARAMETERS WITHIN NORMAL LIMITS  NO SIGNIFICANT HIGH RATE EPISODES  CORVUE IMPEDANCE THRESHOLD CROSSED X 21 DAYS  TASK TO HF RN  APPROPRIATELY FUNCTIONING ICD   [address]

## 2019-10-18 NOTE — PROGRESS NOTES
Results for orders placed or performed in visit on 10/18/19   Cardiac EP device report    Narrative    [facility] CRT-D  [facility] TRANSMISSION: BATTERY VOLTAGE NEARING OPAL (<3 MOS, 3% REMAINING CAPACITY TO OPAL, CHARGE TIME: 11 9 SECS)  WILL SCHEDULE MONTHLY BATTERY CHECKS  AP: 88%  BP: >99%  ALL AVAILABLE LEAD PARAMETERS WITHIN NORMAL LIMITS  NO SIGNIFICANT HIGH RATE EPISODES  CORVUE IMPEDANCE THRESHOLD CROSSED X 21 DAYS  TASK TO HF RN  APPROPRIATELY FUNCTIONING ICD   [address] [place] [address]

## 2019-11-11 ENCOUNTER — TRANSCRIBE ORDERS (OUTPATIENT)
Dept: LAB | Facility: CLINIC | Age: 84
End: 2019-11-11

## 2019-11-11 ENCOUNTER — APPOINTMENT (OUTPATIENT)
Dept: LAB | Facility: CLINIC | Age: 84
End: 2019-11-11
Payer: MEDICARE

## 2019-11-11 DIAGNOSIS — I12.9 HYPERTENSIVE CHRONIC KIDNEY DISEASE WITH STAGE 1 THROUGH STAGE 4 CHRONIC KIDNEY DISEASE, OR UNSPECIFIED CHRONIC KIDNEY DISEASE: ICD-10-CM

## 2019-11-11 DIAGNOSIS — N25.81 SECONDARY HYPERPARATHYROIDISM OF RENAL ORIGIN (HCC): ICD-10-CM

## 2019-11-11 DIAGNOSIS — N20.0 NEPHROLITHIASIS: ICD-10-CM

## 2019-11-11 DIAGNOSIS — N18.30 CHRONIC KIDNEY DISEASE, STAGE III (MODERATE) (HCC): ICD-10-CM

## 2019-11-11 DIAGNOSIS — R60.0 LOCALIZED EDEMA: ICD-10-CM

## 2019-11-11 LAB
ANION GAP SERPL CALCULATED.3IONS-SCNC: 8 MMOL/L (ref 4–13)
BUN SERPL-MCNC: 23 MG/DL (ref 5–25)
CALCIUM SERPL-MCNC: 8.9 MG/DL (ref 8.3–10.1)
CHLORIDE SERPL-SCNC: 105 MMOL/L (ref 100–108)
CO2 SERPL-SCNC: 31 MMOL/L (ref 21–32)
CREAT SERPL-MCNC: 1.63 MG/DL (ref 0.6–1.3)
GFR SERPL CREATININE-BSD FRML MDRD: 38 ML/MIN/1.73SQ M
GLUCOSE SERPL-MCNC: 107 MG/DL (ref 65–140)
POTASSIUM SERPL-SCNC: 3.8 MMOL/L (ref 3.5–5.3)
SODIUM SERPL-SCNC: 144 MMOL/L (ref 136–145)

## 2019-11-11 PROCEDURE — 80048 BASIC METABOLIC PNL TOTAL CA: CPT

## 2019-11-11 PROCEDURE — 36415 COLL VENOUS BLD VENIPUNCTURE: CPT

## 2019-12-05 ENCOUNTER — OFFICE VISIT (OUTPATIENT)
Dept: CARDIOLOGY CLINIC | Facility: CLINIC | Age: 84
End: 2019-12-05
Payer: MEDICARE

## 2019-12-05 VITALS
OXYGEN SATURATION: 94 % | WEIGHT: 210.6 LBS | DIASTOLIC BLOOD PRESSURE: 62 MMHG | BODY MASS INDEX: 33.06 KG/M2 | SYSTOLIC BLOOD PRESSURE: 122 MMHG | HEART RATE: 61 BPM | HEIGHT: 67 IN

## 2019-12-05 DIAGNOSIS — I12.9 HYPERTENSIVE CHRONIC KIDNEY DISEASE WITH STAGE 1 THROUGH STAGE 4 CHRONIC KIDNEY DISEASE, OR UNSPECIFIED CHRONIC KIDNEY DISEASE: ICD-10-CM

## 2019-12-05 DIAGNOSIS — E07.9 DISEASE OF THYROID GLAND: ICD-10-CM

## 2019-12-05 DIAGNOSIS — I48.0 PAROXYSMAL ATRIAL FIBRILLATION (HCC): ICD-10-CM

## 2019-12-05 DIAGNOSIS — N18.30 CHRONIC KIDNEY DISEASE, STAGE III (MODERATE) (HCC): ICD-10-CM

## 2019-12-05 DIAGNOSIS — I50.22 CHRONIC SYSTOLIC CONGESTIVE HEART FAILURE (HCC): Primary | ICD-10-CM

## 2019-12-05 DIAGNOSIS — E78.5 HYPERLIPIDEMIA, UNSPECIFIED HYPERLIPIDEMIA TYPE: ICD-10-CM

## 2019-12-05 DIAGNOSIS — I10 BENIGN ESSENTIAL HYPERTENSION: ICD-10-CM

## 2019-12-05 DIAGNOSIS — I47.2 V TACH (HCC): ICD-10-CM

## 2019-12-05 DIAGNOSIS — E66.9 OBESITY (BMI 30.0-34.9): ICD-10-CM

## 2019-12-05 DIAGNOSIS — I25.5 ISCHEMIC CARDIOMYOPATHY: ICD-10-CM

## 2019-12-05 PROCEDURE — 99205 OFFICE O/P NEW HI 60 MIN: CPT | Performed by: INTERNAL MEDICINE

## 2019-12-05 PROCEDURE — 93000 ELECTROCARDIOGRAM COMPLETE: CPT | Performed by: INTERNAL MEDICINE

## 2019-12-05 NOTE — LETTER
December 6, 2019     Julissa Galloway  [address] [place] [address]    Patient: Becca Damian   YOB: 1935   Date of Visit: 12/5/2019       Dear Dr Raghavendra Dozier:    Thank you for referring Becca Damian to me for evaluation  Below are my notes for this consultation  If you have questions, please do not hesitate to call me  I look forward to following your patient along with you  Sincerely,        Eliceo Gusman MD        CC: MD Jessica López, [facility]  Eliceo Gusman MD  12/6/2019 11:05 PM  Sign at close encounter                                             Cardiology Consultation     Becca Damian  [id number]  1935  [address] [id number]    1  Chronic systolic congestive heart failure (HCC)  POCT ECG   2  Benign essential hypertension     3  Paroxysmal atrial fibrillation (HCC)     4  Ischemic cardiomyopathy     5  V tach ([address] )     6  Hypertensive chronic kidney disease with stage 1 through stage 4 chronic kidney disease, or unspecified chronic kidney disease     7  Chronic kidney disease, stage III (moderate) (HCC)     8  Hyperlipidemia, unspecified hyperlipidemia type     9  Disease of thyroid gland     10  Obesity (BMI 30 0-34  9)            HPI:  Becca Damian is a 80 y o  male who was referred by Dr Lucía Dunham for a generator change consultation  Patient has a history of hypertension, paroxysmal atrial fibrillation, ventricular tachycardia, chronic systolic heart failure, CAD, hyperlipidemia, ischemic cardiomyopathy status post ICD, CKD stage 3  Patient reports no cardiac symptoms although he does experience shortness of breath with exertion  He states he will be traveling to [facility] on December 14, 2019 for a month   He denies chest pain, palpitations, increased shortness of breath, LE edema, pre syncope, or syncope  Patient does not want to have generator change prior to his trip   He is willing to go for generator change only after he comes back  He is not complaining of anginal like chest pain or chest pressure  His not complaining of worsening orthopnea or PND  He does have some chronic leg swelling with no new worsening    Patient is not complaining of palpitations  There is no history of recent presyncope or syncope        /62 (BP Location: Left arm, Patient Position: Sitting, Cuff Size: Standard)   Pulse 61   Ht 5' 7" (1 702 m)   Wt 95 5 kg (210 lb 9 6 oz)   SpO2 94%   BMI 32 98 kg/m²      Patient Active Problem List   Diagnosis    CHF (congestive heart failure) (HCC)    Hyperlipidemia    Benign essential hypertension    Paroxysmal atrial fibrillation (HCC)    BPH (benign prostatic hyperplasia)    CAD (coronary artery disease) of artery bypass graft    Ischemic cardiomyopathy    Hypertensive chronic kidney disease with stage 1 through stage 4 chronic kidney disease, or unspecified chronic kidney disease    V tach ([address] )    Strep pharyngitis    Pain with swallowing    Dehydration    Chronic kidney disease, stage III (moderate) ([facility])    Nephrolithiasis    Hypokalemia    Localized edema    Secondary hyperparathyroidism of renal origin ([address] )    Disease of thyroid gland    Obesity (BMI 30 0-34  9)     Past Medical History:   Diagnosis Date    AICD (automatic cardioverter/defibrillator) present 2004    AICD (automatic cardioverter/defibrillator) present 2006    AICD (automatic cardioverter/defibrillator) present 2013    [facility]    Arthritis     R knee and neck    Atrial fibrillation (HCC)     BPH (benign prostatic hyperplasia)     CAD (coronary artery disease) of artery bypass graft     CHF (congestive heart failure) ([facility])     combine    Coronary artery disease     Disease of thyroid gland     Hyperlipidemia     Hypertension     Ischemic cardiomyopathy     Left ureteral calculus 10/17/2017    Renal disorder     V tach [facility])      Social History     Socioeconomic History    Marital status: /Civil Union     Spouse name: Not on file    Number of children: Not on file    Years of education: Not on file    Highest education level: Not on file   Occupational History    Occupation: Retired   Social Needs    Financial resource strain: Not on file    Food insecurity:     Worry: Not on file     Inability: Not on file   Musicnotes needs:     Medical: Not on file     Non-medical: Not on file   Tobacco Use    Smoking status: Never Smoker    Smokeless tobacco: Never Used   Substance and Sexual Activity    Alcohol use: No    Drug use: No    Sexual activity: Not on file   Lifestyle    Physical activity:     Days per week: Not on file     Minutes per session: Not on file    Stress: Not on file   Relationships    Social connections:     Talks on phone: Not on file     Gets together: Not on file     Attends [employer] service: Not on file     Active member of club or organization: Not on file     Attends meetings of clubs or organizations: Not on file     Relationship status: Not on file    Intimate partner violence:     Fear of current or ex partner: Not on file     Emotionally abused: Not on file     Physically abused: Not on file     Forced sexual activity: Not on file   Other Topics Concern    Not on file   Social History Narrative    Not on file      Family History   Problem Relation Age of Onset    Tuberculosis Father         WWI    Hypertension Mother     Stroke Brother      Past Surgical History:   Procedure Laterality Date    CARDIAC CATHETERIZATION      CARDIAC SURGERY      Pacemaker    CORONARY ARTERY BYPASS GRAFT      [place] [address] N/A 10/17/2017    Procedure: CYSTOSCOPY, left  RETROGRADE PYELOGRAM WITH LEFT URETEROSCOPY , stone extraction,LEFT STENT INSERTION;  Surgeon: Bernardo Merritt MD;  Location: [facility]; Service: Urology       Current Outpatient Medications:     amLODIPine (NORVASC) 2 5 mg tablet, Take 2 5 mg by mouth every other day At nighttime  , Disp: , Rfl:     aspirin 81 MG tablet, Take 81 mg by mouth daily  , Disp: , Rfl:     isosorbide mononitrate (IMDUR) 30 mg 24 hr tablet, Take 1 tablet (30 mg total) by mouth daily, Disp: 90 tablet, Rfl: 3    levothyroxine 150 mcg tablet, Take 150 mcg by mouth daily , Disp: , Rfl:     LORazepam (ATIVAN) 1 mg tablet, Take 1 mg by mouth as needed for anxiety  , Disp: , Rfl:     metoprolol succinate (TOPROL-XL) 50 mg 24 hr tablet, Take 1 tablet (50 mg total) by mouth 2 (two) times a day, Disp: 180 tablet, Rfl: 3    mexiletine (MEXITIL) 150 mg capsule, Take 150 mg by mouth 2 (two) times a day , Disp: , Rfl:     Multiple Vitamins-Minerals (MULTIVITAMIN WITH MINERALS) tablet, Take 1 tablet by mouth daily  , Disp: , Rfl:     nitroglycerin (NITROSTAT) 0 4 mg SL tablet, Place 0 4 mg under the tongue every 5 (five) minutes as needed for chest pain  , Disp: , Rfl:     potassium citrate (UROCIT-K 10) 10 mEq, Take 2 tablets (20 mEq total) by mouth 2 (two) times a day, Disp: 360 tablet, Rfl: 3    pravastatin (PRAVACHOL) 80 mg tablet, Take 80 mg by mouth daily  , Disp: , Rfl:     tamsulosin (FLOMAX) 0 4 mg, Take 1 capsule (0 4 mg total) by mouth daily with dinner, Disp: 90 capsule, Rfl: 3    torsemide (DEMADEX) 20 mg tablet, Take 2 tablets (40 mg total) by mouth daily, Disp: 180 tablet, Rfl: 3    acetaminophen (TYLENOL) 500 mg tablet, Take 500 mg by mouth every 6 (six) hours as needed for mild pain, Disp: , Rfl:     amiodarone 200 mg tablet, Take 200 mg by mouth daily  , Disp: , Rfl:   No Known Allergies  Vitals:    12/05/19 1125   BP: 122/62   BP Location: Left arm   Patient Position: Sitting   Cuff Size: Standard   Pulse: 61   SpO2: 94%   Weight: 95 5 kg (210 lb 9 6 oz)   Height: 5' 7" (1 702 m)       Labs:  Lab Results   Component Value Date     10/19/2015    K 3 8 11/11/2019    K 4 1 10/19/2015     11/11/2019     10/19/2015    CO2 31 11/11/2019    CO2 30 1 10/19/2015    BUN 23 11/11/2019    BUN 20 10/19/2015    CREATININE 1 63 (H) 11/11/2019    CREATININE 1 66 (H) 10/19/2015    GLUCOSE 112 10/19/2015    CALCIUM 8 9 11/11/2019    CALCIUM 9 0 10/19/2015     Lab Results   Component Value Date    CKTOTAL 163 07/09/2019    CKTOTAL 145 09/09/2015    CKMB 2 8 07/09/2019    CKMBINDEX 1 7 07/09/2019    CKMBINDEX 1 3 05/03/2014    TROPONINI 0 02 06/26/2016    TROPONINI 0 18 (H) 09/12/2015     Lab Results   Component Value Date    WBC 6 47 07/09/2019    WBC 6 45 10/19/2015    HGB 13 8 07/09/2019    HGB 13 7 10/19/2015    HCT 41 9 07/09/2019    HCT 42 7 10/19/2015    MCV 97 07/09/2019    MCV 96 10/19/2015     07/09/2019     10/19/2015     Lab Results   Component Value Date    CHOL 122 09/09/2015    TRIG 103 07/09/2019    TRIG 173 09/09/2015    HDL 40 07/09/2019    HDL 35 09/09/2015     Imaging:     Device report November 2019  Result Narrative     [facility] CRT-D  MERLIN ALERT TRANSMISSION: BATTERY VOLTAGE REACHED RRT (11/14/19)  CHG TIME @ 12/5 SECS  WILL SCHEDULE H&P FOR GENERATOR REVISION IN NEAR FUTURE  AP 88%  BP >99%  ATRIAL PULSE AMPLITUDE SAFETY MARGIN <2:1   ALL OTHER AVAILABLE LEAD PARAMETERS WITHIN NORMAL LIMITS  NO SIGNIFICANT HIGH RATE EPISODES  NORMAL DEVICE FUNCTION @ RRT                     Echo April 1018  SUMMARY     LEFT VENTRICLE:  The ventricle was markedly dilated, meauring 7 5cm  Systolic function was moderately to markedly reduced  Ejection fraction was estimated to be 30 %  There was moderate diffuse hypokinesis with regional variations  There was akinesis of the basal-mid inferolateral wall(s)  There was severe hypokinesis of the basal-mid inferior wall(s)  Doppler parameters were consistent with restrictive physiology, indicative of decreased left ventricular diastolic compliance and/or increased left atrial pressure    Doppler parameters were consistent with high ventricular filling pressure      RIGHT VENTRICLE:  The ventricle was dilated      LEFT ATRIUM:  The atrium was dilated      MITRAL VALVE:  There was mild regurgitation      TRICUSPID VALVE:  There was moderate regurgitation  Estimated peak PA pressure was 70 mmHg  The findings suggest severe pulmonary hypertension  NM myocardial perfusion SPECT April 2018  SUMMARY:  -  Stress results: There was no chest pain during stress  -  ECG conclusions: The stress ECG was non-diagnostic due to baseline paced rhythm  -  Perfusion imaging: The left ventricle was severely dilated  There was a large, complete, fixed myocardial perfusion defect of the inferior and inferolateral wall consistent with prior infarct  There was no significant ischemia seen  -  Gated SPECT: The calculated left ventricular ejection fraction was 23 %  Left ventricular ejection fraction was markedly decreased by visual estimate  There was global hypokinesis with more severely reduced myocardial thickening and  motion of the inferior wall and lateral wall of the left ventricle      IMPRESSIONS: Abnormal study after pharmacologic stress          Review of Systems:  Review of Systems   All other systems reviewed and are negative  As described in my history of present illness        Physical Exam:  Physical Exam   Constitutional: He is oriented to person, place, and time  He appears well-developed and well-nourished  No distress  Not in any distress at the current time   HENT:   Head: Normocephalic and atraumatic  Right Ear: External ear normal    Left Ear: External ear normal    Nose: Nose normal    Mouth/Throat: Uvula is midline and mucous membranes are normal    Posterior pharynx is crowded   Eyes: Pupils are equal, round, and reactive to light  Conjunctivae, EOM and lids are normal  No scleral icterus  No pallor  No cyanosis  No icterus   Neck: Trachea normal and normal range of motion   No JVD present  Carotid bruit is not present  No thyromegaly present  No jugular lymphadenopathy  Short thick neck   Cardiovascular: Normal rate, regular rhythm, S1 normal, S2 normal, normal heart sounds, intact distal pulses and normal pulses  PMI is not displaced  Exam reveals no gallop, no S3, no S4 and no friction rub  No murmur heard  Pulmonary/Chest: Effort normal  No accessory muscle usage  No respiratory distress  He has decreased breath sounds in the right lower field and the left lower field  He has no wheezes  He has no rhonchi  He has no rales  He exhibits no tenderness  Abdominal: Soft  Normal appearance and bowel sounds are normal  He exhibits no distension and no mass  There is no splenomegaly or hepatomegaly  There is no tenderness  Central obesity present   Musculoskeletal: Normal range of motion  He exhibits edema  He exhibits no tenderness or deformity  Lymphadenopathy:     He has no cervical adenopathy  Neurological: He is alert and oriented to person, place, and time  Facial symmetry is retained  Extraocular movements are retained  Head neck tongue and palate movement are retained and symmetric   Skin: Skin is intact  No abrasion, no lesion and no rash noted  No erythema  Nails show no clubbing  Psychiatric: He has a normal mood and affect  His speech is normal and behavior is normal  Thought content normal    Vitals reviewed  Discussion/Summary:    1   Bi V device, generator at [facility]  CAD, CABG, ischemic cardiomyopathy  LVEF-30% in April 2018 by echocardiogram, 23% by nuclear scan  NYHA class 2-3  On optimal medical therapy - torsemide, metoprolol succinate; not on Ace inhibitor due to chronic kidney disease      Generator has reached RRT  The device is currently pacing from the right side and hence there is an increased chance of decompensation  The patient however feels well  He does not want to go for a generator change at the current time  He will be returning from [place] in about a month's time and will thereafter proceed to general to change    He was given contact of Dr Mee Hawkins of [facility]   If he is to develop any complications he can proceed there          2  Congestive heart failure  Currently patient is well compensated and not  volume overloaded  Continue with torsemide, metoprolol succinate, isosorbide mononitrate      3  Ventricular tachycardia  Patient has ischemic cardiomyopathy and history of ventricular tachycardia  He has a Bi V ICD in place  He is on metoprolol succinate, amiodarone, mexiletine  Continue with the same      4  Long-term use of amiodarone  Recommended the following follow up  Eye evaluation-look for corneal deposits and optic neuritis   Thyroid evaluation for hypo and hyperthyroidism  If any worsening shortness of breath need to have PFT with DLCO, otherwise screening chest x-ray  Evaluate liver function studies  He does not have skin pigmentation  He does not have any new worsening of extrapyramidal symptoms      5  Hypertension  Blood pressure 122/62  Continue weight torsemide, metoprolol succinate, isosorbide mononitrate, amlodipine        6  Hypothyroidism  TSH was 2 0  in April of 2019  He is on replacement levothyroxine      7  Hyperlipidemia  Patient is on pravastatin  He has tolerated it well      8  Chronic kidney disease stage 3  Creatinine is 1 66  This prevents the use of class 3 antiarrhythmic agents    9  Obesity  BMI is 32 9  Expressed the importance of diet for weight loss  Patient not interested in the same      10   Obstructive sleep apnea  Patient does have history of snoring, occasional apneic spells, morning fatigue and daytime sleepiness  He does have a short thick neck and crowded posterior pharynx  He is not interested in any further evaluation for sleep apnea        Summary of my recommendation for the patient  Bi V ICD currently at RRT  Set up for generator change once patient comes back from [place]    I,:   Alek Tam am acting as a scribe while in the presence of the attending physician :        I,:   Ignacio Estrada MD personally performed the services described in this documentation    as scribed in my presence :

## 2019-12-05 NOTE — PROGRESS NOTES
Cardiology Consultation     Thomas Aguilera  [id number]  1935  [address] [facility] [place] [address] [facility] [id number]    1  Chronic systolic congestive heart failure (HCC)  POCT ECG   2  Benign essential hypertension     3  Paroxysmal atrial fibrillation (HCC)     4  Ischemic cardiomyopathy     5  V tach ([address] )     6  Hypertensive chronic kidney disease with stage 1 through stage 4 chronic kidney disease, or unspecified chronic kidney disease     7  Chronic kidney disease, stage III (moderate) (HCC)     8  Hyperlipidemia, unspecified hyperlipidemia type     9  Disease of thyroid gland     10  Obesity (BMI 30 0-34  9)            HPI:  Thomas Aguilera is a 80 y o  male who was referred by Dr Mansoor Weeks for a generator change consultation  Patient has a history of hypertension, paroxysmal atrial fibrillation, ventricular tachycardia, chronic systolic heart failure, CAD, hyperlipidemia, ischemic cardiomyopathy status post ICD, CKD stage 3  Patient reports no cardiac symptoms although he does experience shortness of breath with exertion  He states he will be traveling to [facility] on December 14, 2019 for a month  He denies chest pain, palpitations, increased shortness of breath, LE edema, pre syncope, or syncope  Patient does not want to have generator change prior to his trip   He is willing to go for generator change only after he comes back       He is not complaining of anginal like chest pain or chest pressure  His not complaining of worsening orthopnea or PND  He does have some chronic leg swelling with no new worsening    Patient is not complaining of palpitations  There is no history of recent presyncope or syncope        /62 (BP Location: Left arm, Patient Position: Sitting, Cuff Size: Standard)   Pulse 61   Ht 5' 7" (1 702 m)   Wt 95 5 kg (210 lb 9 6 oz)   SpO2 94%   BMI 32 98 kg/m²     Patient Active Problem List   Diagnosis    CHF (congestive heart failure) (HCC)    Hyperlipidemia    Benign essential hypertension    Paroxysmal atrial fibrillation (HCC)    BPH (benign prostatic hyperplasia)    CAD (coronary artery disease) of artery bypass graft    Ischemic cardiomyopathy    Hypertensive chronic kidney disease with stage 1 through stage 4 chronic kidney disease, or unspecified chronic kidney disease    V tach ([place] [address] )    Strep pharyngitis    Pain with swallowing    Dehydration    Chronic kidney disease, stage III (moderate) (HCC)    Nephrolithiasis    Hypokalemia    Localized edema    Secondary hyperparathyroidism of renal origin ([place] [address] )    Disease of thyroid gland    Obesity (BMI 30 0-34  9)     Past Medical History:   Diagnosis Date    AICD (automatic cardioverter/defibrillator) present 2004    AICD (automatic cardioverter/defibrillator) present 2006    AICD (automatic cardioverter/defibrillator) present 2013    [facility]    Arthritis     R knee and neck    Atrial fibrillation (HCC)     BPH (benign prostatic hyperplasia)     CAD (coronary artery disease) of artery bypass graft     CHF (congestive heart failure) (HCC)     combine    Coronary artery disease     Disease of thyroid gland     Hyperlipidemia     Hypertension     Ischemic cardiomyopathy     Left ureteral calculus 10/17/2017    Renal disorder     V tach ([place] [address] )      Social History     Socioeconomic History    Marital status: /Civil Union     Spouse name: Not on file    Number of children: Not on file    Years of education: Not on file    Highest education level: Not on file   Occupational History    Occupation: Retired   Social Needs    Financial resource strain: Not on file    Food insecurity:     Worry: Not on file     Inability: Not on file   Reelation needs:     Medical: Not on file     Non-medical: Not on file   Tobacco Use    Smoking status: Never Smoker    Smokeless tobacco: Never Used   Substance and Sexual Activity    Alcohol use: No    Drug use: No    Sexual activity: Not on file   Lifestyle    Physical activity:     Days per week: Not on file     Minutes per session: Not on file    Stress: Not on file   Relationships    Social connections:     Talks on phone: Not on file     Gets together: Not on file     Attends [employer] service: Not on file     Active member of club or organization: Not on file     Attends meetings of clubs or organizations: Not on file     Relationship status: Not on file    Intimate partner violence:     Fear of current or ex partner: Not on file     Emotionally abused: Not on file     Physically abused: Not on file     Forced sexual activity: Not on file   Other Topics Concern    Not on file   Social History Narrative    Not on file      Family History   Problem Relation Age of Onset    Tuberculosis Father         WWI    Hypertension Mother     Stroke Brother      Past Surgical History:   Procedure Laterality Date    CARDIAC CATHETERIZATION      CARDIAC SURGERY      Pacemaker    CORONARY ARTERY BYPASS GRAFT      NM CYSTOURETHROSCOPY,URETER CATHETER N/A 10/17/2017    Procedure: CYSTOSCOPY, left  RETROGRADE PYELOGRAM WITH LEFT URETEROSCOPY , stone extraction,LEFT STENT INSERTION;  Surgeon: Artem Singh MD;  [facility];  Service: Urology       Current Outpatient Medications:     amLODIPine (NORVASC) 2 5 mg tablet, Take 2 5 mg by mouth every other day At nighttime  , Disp: , Rfl:     aspirin 81 MG tablet, Take 81 mg by mouth daily  , Disp: , Rfl:     isosorbide mononitrate (IMDUR) 30 mg 24 hr tablet, Take 1 tablet (30 mg total) by mouth daily, Disp: 90 tablet, Rfl: 3    levothyroxine 150 mcg tablet, Take 150 mcg by mouth daily , Disp: , Rfl:     LORazepam (ATIVAN) 1 mg tablet, Take 1 mg by mouth as needed for anxiety  , Disp: , Rfl:     metoprolol succinate (TOPROL-XL) 50 mg 24 hr tablet, Take 1 tablet (50 mg total) by mouth 2 (two) times a day, Disp: 180 tablet, Rfl: 3    mexiletine (MEXITIL) 150 mg capsule, Take 150 mg by mouth 2 (two) times a day , Disp: , Rfl:     Multiple Vitamins-Minerals (MULTIVITAMIN WITH MINERALS) tablet, Take 1 tablet by mouth daily  , Disp: , Rfl:     nitroglycerin (NITROSTAT) 0 4 mg SL tablet, Place 0 4 mg under the tongue every 5 (five) minutes as needed for chest pain  , Disp: , Rfl:     potassium citrate (UROCIT-K 10) 10 mEq, Take 2 tablets (20 mEq total) by mouth 2 (two) times a day, Disp: 360 tablet, Rfl: 3    pravastatin (PRAVACHOL) 80 mg tablet, Take 80 mg by mouth daily  , Disp: , Rfl:     tamsulosin (FLOMAX) 0 4 mg, Take 1 capsule (0 4 mg total) by mouth daily with dinner, Disp: 90 capsule, Rfl: 3    torsemide (DEMADEX) 20 mg tablet, Take 2 tablets (40 mg total) by mouth daily, Disp: 180 tablet, Rfl: 3    acetaminophen (TYLENOL) 500 mg tablet, Take 500 mg by mouth every 6 (six) hours as needed for mild pain, Disp: , Rfl:     amiodarone 200 mg tablet, Take 200 mg by mouth daily  , Disp: , Rfl:   No Known Allergies  Vitals:    12/05/19 1125   BP: 122/62   BP Location: Left arm   Patient Position: Sitting   Cuff Size: Standard   Pulse: 61   SpO2: 94%   Weight: 95 5 kg (210 lb 9 6 oz)   Height: 5' 7" (1 702 m)       Labs:  Lab Results   Component Value Date     10/19/2015    K 3 8 11/11/2019    K 4 1 10/19/2015     11/11/2019     10/19/2015    CO2 31 11/11/2019    CO2 30 1 10/19/2015    BUN 23 11/11/2019    BUN 20 10/19/2015    CREATININE 1 63 (H) 11/11/2019    CREATININE 1 66 (H) 10/19/2015    GLUCOSE 112 10/19/2015    CALCIUM 8 9 11/11/2019    CALCIUM 9 0 10/19/2015     Lab Results   Component Value Date    CKTOTAL 163 07/09/2019    CKTOTAL 145 09/09/2015    CKMB 2 8 07/09/2019    CKMBINDEX 1 7 07/09/2019    CKMBINDEX 1 3 05/03/2014    TROPONINI 0 02 06/26/2016    TROPONINI 0 18 (H) 09/12/2015     Lab Results   Component Value Date    WBC 6 47 07/09/2019    WBC 6 45 10/19/2015    HGB 13 8 07/09/2019    HGB 13 7 10/19/2015    HCT 41 9 07/09/2019    HCT 42 7 10/19/2015    MCV 97 07/09/2019    MCV 96 10/19/2015     07/09/2019     10/19/2015     Lab Results   Component Value Date    CHOL 122 09/09/2015    TRIG 103 07/09/2019    TRIG 173 09/09/2015    HDL 40 07/09/2019    HDL 35 09/09/2015     Imaging:     Device report November 2019  Result Narrative     SJM CRT-D  MERLIN ALERT TRANSMISSION: BATTERY VOLTAGE REACHED RRT (11/14/19)  CHG TIME @ 12/5 SECS  WILL SCHEDULE H&P FOR GENERATOR REVISION IN NEAR FUTURE  AP 88%  BP >99%  ATRIAL PULSE AMPLITUDE SAFETY MARGIN <2:1   ALL OTHER AVAILABLE LEAD PARAMETERS WITHIN NORMAL LIMITS  NO SIGNIFICANT HIGH RATE EPISODES  NORMAL DEVICE FUNCTION @ RRT      EB               Echo April 1018  SUMMARY     LEFT VENTRICLE:  The ventricle was markedly dilated, meauring 7 5cm  Systolic function was moderately to markedly reduced  Ejection fraction was estimated to be 30 %  There was moderate diffuse hypokinesis with regional variations  There was akinesis of the basal-mid inferolateral wall(s)  There was severe hypokinesis of the basal-mid inferior wall(s)  Doppler parameters were consistent with restrictive physiology, indicative of decreased left ventricular diastolic compliance and/or increased left atrial pressure  Doppler parameters were consistent with high ventricular filling pressure      RIGHT VENTRICLE:  The ventricle was dilated      LEFT ATRIUM:  The atrium was dilated      MITRAL VALVE:  There was mild regurgitation      TRICUSPID VALVE:  There was moderate regurgitation  Estimated peak PA pressure was 70 mmHg  The findings suggest severe pulmonary hypertension  NM myocardial perfusion SPECT April 2018  SUMMARY:  -  Stress results: There was no chest pain during stress  -  ECG conclusions: The stress ECG was non-diagnostic due to baseline paced rhythm  -  Perfusion imaging:  The left ventricle was severely dilated  There was a large, complete, fixed myocardial perfusion defect of the inferior and inferolateral wall consistent with prior infarct  There was no significant ischemia seen  -  Gated SPECT: The calculated left ventricular ejection fraction was 23 %  Left ventricular ejection fraction was markedly decreased by visual estimate  There was global hypokinesis with more severely reduced myocardial thickening and  motion of the inferior wall and lateral wall of the left ventricle      IMPRESSIONS: Abnormal study after pharmacologic stress          Review of Systems:  Review of Systems   All other systems reviewed and are negative  As described in my history of present illness        Physical Exam:  Physical Exam   Constitutional: He is oriented to person, place, and time  He appears well-developed and well-nourished  No distress  Not in any distress at the current time   HENT:   Head: Normocephalic and atraumatic  Right Ear: External ear normal    Left Ear: External ear normal    Nose: Nose normal    Mouth/Throat: Uvula is midline and mucous membranes are normal    Posterior pharynx is crowded   Eyes: Pupils are equal, round, and reactive to light  Conjunctivae, EOM and lids are normal  No scleral icterus  No pallor  No cyanosis  No icterus   Neck: Trachea normal and normal range of motion  No JVD present  Carotid bruit is not present  No thyromegaly present  No jugular lymphadenopathy  Short thick neck   Cardiovascular: Normal rate, regular rhythm, S1 normal, S2 normal, normal heart sounds, intact distal pulses and normal pulses  PMI is not displaced  Exam reveals no gallop, no S3, no S4 and no friction rub  No murmur heard  Pulmonary/Chest: Effort normal  No accessory muscle usage  No respiratory distress  He has decreased breath sounds in the right lower field and the left lower field  He has no wheezes  He has no rhonchi  He has no rales  He exhibits no tenderness  Abdominal: Soft  Normal appearance and bowel sounds are normal  He exhibits no distension and no mass  There is no splenomegaly or hepatomegaly  There is no tenderness  Central obesity present   Musculoskeletal: Normal range of motion  He exhibits edema  He exhibits no tenderness or deformity  Lymphadenopathy:     He has no cervical adenopathy  Neurological: He is alert and oriented to person, place, and time  Facial symmetry is retained  Extraocular movements are retained  Head neck tongue and palate movement are retained and symmetric   Skin: Skin is intact  No abrasion, no lesion and no rash noted  No erythema  Nails show no clubbing  Psychiatric: He has a normal mood and affect  His speech is normal and behavior is normal  Thought content normal    Vitals reviewed  Discussion/Summary:    1  Bi V device, generator at [facility]  CAD, CABG, ischemic cardiomyopathy  LVEF-30% in April 2018 by echocardiogram, 23% by nuclear scan  NYHA class 2-3  On optimal medical therapy - torsemide, metoprolol succinate; not on Ace inhibitor due to chronic kidney disease      Generator has reached RRT  The device is currently pacing from the right side and hence there is an increased chance of decompensation  The patient however feels well  He does not want to go for a generator change at the current time  He will be returning from [facility] in about a month's time and will thereafter proceed to general to change    He was given contact of Dr Haylee Marsh of [facility]   If he is to develop any complications he can proceed there          2  Congestive heart failure  Currently patient is well compensated and not  volume overloaded  Continue with torsemide, metoprolol succinate, isosorbide mononitrate      3   Ventricular tachycardia  Patient has ischemic cardiomyopathy and history of ventricular tachycardia  He has a Bi V ICD in place  He is on metoprolol succinate, amiodarone, mexiletine  Continue with the same      4  Long-term use of amiodarone  Recommended the following follow up  Eye evaluation-look for corneal deposits and optic neuritis   Thyroid evaluation for hypo and hyperthyroidism  If any worsening shortness of breath need to have PFT with DLCO, otherwise screening chest x-ray  Evaluate liver function studies  He does not have skin pigmentation  He does not have any new worsening of extrapyramidal symptoms      5  Hypertension  Blood pressure 122/62  Continue weight torsemide, metoprolol succinate, isosorbide mononitrate, amlodipine        6  Hypothyroidism  TSH was 2 0  in April of 2019  He is on replacement levothyroxine      7  Hyperlipidemia  Patient is on pravastatin  He has tolerated it well      8  Chronic kidney disease stage 3  Creatinine is 1 66  This prevents the use of class 3 antiarrhythmic agents    9  Obesity  BMI is 32 9  Expressed the importance of diet for weight loss  Patient not interested in the same      10   Obstructive sleep apnea  Patient does have history of snoring, occasional apneic spells, morning fatigue and daytime sleepiness  He does have a short thick neck and crowded posterior pharynx  He is not interested in any further evaluation for sleep apnea        Summary of my recommendation for the patient  Bi V ICD currently at [facility]  Set up for generator change once patient comes back from [place]    I,:   Prince Gatica am acting as a scribe while in the presence of the attending physician :        I,:   Zeferino Wheat MD personally performed the services described in this documentation    as scribed in my presence :

## 2019-12-09 ENCOUNTER — TELEPHONE (OUTPATIENT)
Dept: CARDIOLOGY CLINIC | Facility: CLINIC | Age: 84
End: 2019-12-09

## 2019-12-09 NOTE — TELEPHONE ENCOUNTER
Patient's wife called questioning if patient should take their device monitor to [place] as they will be gone for a total of 4 weeks  I know patient is OPAL and due to have gen change when returning from [place]  I told them that they could take the monitor  They had question regarding urgent issues while they are away  They said you mentioned the [facility] in [facility], but there is a process to get in to the [facility] which requires a referral, letter from referring doctor and records send ahead of time  They stated you gave them a particular physicians name and I wondered if this was someone you spoke to at [facility] that will see him in case there is a need  Or did you want him to go to a larger hospital in [facility] if there is an issue? They would like to just clarify this type of information before they leave on Saturday

## 2019-12-10 NOTE — TELEPHONE ENCOUNTER
Can they present to [facility] on an emergent basis? I just don't want to give them incorrect information

## 2019-12-11 ENCOUNTER — TELEPHONE (OUTPATIENT)
Dept: CARDIOLOGY CLINIC | Facility: CLINIC | Age: 84
End: 2019-12-11

## 2019-12-11 NOTE — TELEPHONE ENCOUNTER
Did explain to patient that he can go to [address] [place] [address] Pkwy if necessary while in [place]

## 2019-12-11 NOTE — TELEPHONE ENCOUNTER
Pt is going to call back when he returns from [place] in January for his gen change with Dr Sanjay Puente

## 2019-12-11 NOTE — TELEPHONE ENCOUNTER
If it were to become an emergency then he has to go to [facility]  If he goes there, he can ask Dr Harp    None of this may be needed  He is not going there for a routine procedure  Hence do not want to call and set up for any procedure      If he gets symptoms and is sick, he is advised to go to the [facility] ER

## 2019-12-12 ENCOUNTER — OFFICE VISIT (OUTPATIENT)
Dept: CARDIOLOGY CLINIC | Facility: CLINIC | Age: 84
End: 2019-12-12
Payer: MEDICARE

## 2019-12-12 VITALS
DIASTOLIC BLOOD PRESSURE: 68 MMHG | BODY MASS INDEX: 33.27 KG/M2 | HEIGHT: 66 IN | WEIGHT: 207 LBS | OXYGEN SATURATION: 92 % | HEART RATE: 96 BPM | SYSTOLIC BLOOD PRESSURE: 128 MMHG

## 2019-12-12 DIAGNOSIS — I47.2 V TACH (HCC): ICD-10-CM

## 2019-12-12 DIAGNOSIS — E78.5 HYPERLIPIDEMIA, UNSPECIFIED HYPERLIPIDEMIA TYPE: ICD-10-CM

## 2019-12-12 DIAGNOSIS — I25.5 ISCHEMIC CARDIOMYOPATHY: ICD-10-CM

## 2019-12-12 DIAGNOSIS — I10 BENIGN ESSENTIAL HYPERTENSION: ICD-10-CM

## 2019-12-12 DIAGNOSIS — I25.810 CORONARY ARTERY DISEASE INVOLVING CORONARY BYPASS GRAFT OF NATIVE HEART WITHOUT ANGINA PECTORIS: ICD-10-CM

## 2019-12-12 DIAGNOSIS — N18.30 CHRONIC KIDNEY DISEASE, STAGE III (MODERATE) (HCC): ICD-10-CM

## 2019-12-12 DIAGNOSIS — I48.0 PAROXYSMAL ATRIAL FIBRILLATION (HCC): Primary | ICD-10-CM

## 2019-12-12 DIAGNOSIS — I50.22 CHRONIC SYSTOLIC CONGESTIVE HEART FAILURE (HCC): ICD-10-CM

## 2019-12-12 PROCEDURE — 99215 OFFICE O/P EST HI 40 MIN: CPT | Performed by: INTERNAL MEDICINE

## 2019-12-12 NOTE — PROGRESS NOTES
Follow-up - Cardiology   Wander Phelps 80 y o  male MRN: [id number]        Problems    1  Paroxysmal atrial fibrillation (HCC)  CANCELED: POCT ECG   2  Hyperlipidemia, unspecified hyperlipidemia type     3  Chronic systolic congestive heart failure ([address] )     4  Coronary artery disease involving coronary bypass graft of native heart without angina pectoris     5  Ischemic cardiomyopathy     6  V tach ([facility] [address] )     7  Benign essential hypertension     8  Chronic kidney disease, stage III (moderate) (HCC)       Jim Lennon returns for a follow-up visit to the [facility] office  Coronary artery disease  Ischemic cardiomyopathy  No ischemic symptoms  LVEF 30%  Prior history of CABG    Chronic systolic CHF  LVEF 65%  ICD in place, pacer dependent, wide QRS  Generator at RT  No recent decompensation, and would like to decrease torsemide to every other day  On beta-blocker therapy, but avoiding ACE-inhibitor/ARB due to CKD    Hypertension  Controlled, recent low blood pressures, mild orthostatic drops, amlodipine has been decreased, recommended by Nephrology to stop taking it, but he takes it every other day  Hyperlipidemia  Controlled on pravastatin    Ventricular tachycardia  No recent device shocks  No recent episodes  On mexiletine and amiodarone  Chest x-ray earlier this year was unremarkable  TSH, LFTs normal    Plan    Will not make any changes at this time  Did discuss his twitches and tremors, could be amiodarone, but apprehensive to make changes at this time, can discuss this in the future, possibly decreasing to a half a tablet daily, but will always run the risk of possibly under treating therefore exposing him to VT  Will need to discuss a chest x-ray at our next visit  He will be leaving for [place]  ICD recently at our RT  He discussed this with the electrophysiologist, and the pacing clinic, he will call and schedule his device generator change when he returns    He will try decreasing torsemide to 40 mg every other day, understands that any ankle edema, shortness of breath or orthopnea should prompt resumption of daily dosing      HPI: Lizette Alexander is a 80y o  year old male  With a longstanding history of CAD, CABG, ischemic cardiomyopathy with last known ejection fraction 77%, chronic systolic/ diastolic CHF, ventricular tachycardia and paroxysmal atrial fibrillation, on long-term amiodarone and mexiletine  Device checks do not show any arrhythmias, but he is at our RT in needs generator change  He is leaving for [place] on Monday and could not cooperate with electrophysiology recommendations, will be in touch when he returns in a month  He denies edema, worsening orthopnea or dyspnea  Continues on torsemide 40 mg daily from a CHF standpoint, creatinine 1 63 and stable in CKD stage 3  Lipids remain well controlled on pravastatin, LDL 81  From amiodarone monitoring standpoint, chest x-ray early this year was normal, TSH normal, LFTs normal     Review of Systems   Constitutional: Negative  Negative for appetite change, diaphoresis, fatigue and fever  HENT: Negative  Eyes: Negative  Respiratory: Negative  Negative for chest tightness, shortness of breath and wheezing  Cardiovascular: Negative  Negative for chest pain, palpitations and leg swelling  Gastrointestinal: Negative  Negative for abdominal pain and blood in stool  Endocrine: Negative  Genitourinary: Negative  Musculoskeletal: Negative  Negative for arthralgias and joint swelling  Skin: Negative  Negative for rash  Neurological: Negative  Negative for dizziness, syncope and light-headedness  Hematological: Negative  Psychiatric/Behavioral: Negative            Past Medical History:   Diagnosis Date    AICD (automatic cardioverter/defibrillator) present 2004    AICD (automatic cardioverter/defibrillator) present 2006    AICD (automatic cardioverter/defibrillator) present 2013    [facility]    Arthritis     R knee and neck    Atrial fibrillation (HCC)     BPH (benign prostatic hyperplasia)     CAD (coronary artery disease) of artery bypass graft     CHF (congestive heart failure) (HCC)     combine    Coronary artery disease     Disease of thyroid gland     Hyperlipidemia     Hypertension     Ischemic cardiomyopathy     Left ureteral calculus 10/17/2017    Renal disorder     V tach (HCC)      Social History     Substance and Sexual Activity   Alcohol Use No     Social History     Substance and Sexual Activity   Drug Use No     Social History     Tobacco Use   Smoking Status Never Smoker   Smokeless Tobacco Never Used       Allergies:  No Known Allergies    Medications:     Current Outpatient Medications:     acetaminophen (TYLENOL) 500 mg tablet, Take 500 mg by mouth every 6 (six) hours as needed for mild pain, Disp: , Rfl:     amiodarone 200 mg tablet, Take 200 mg by mouth daily  , Disp: , Rfl:     amLODIPine (NORVASC) 2 5 mg tablet, Take 2 5 mg by mouth every other day At nighttime  , Disp: , Rfl:     aspirin 81 MG tablet, Take 81 mg by mouth daily  , Disp: , Rfl:     isosorbide mononitrate (IMDUR) 30 mg 24 hr tablet, Take 1 tablet (30 mg total) by mouth daily, Disp: 90 tablet, Rfl: 3    levothyroxine 150 mcg tablet, Take 150 mcg by mouth daily , Disp: , Rfl:     LORazepam (ATIVAN) 1 mg tablet, Take 1 mg by mouth as needed for anxiety  , Disp: , Rfl:     metoprolol succinate (TOPROL-XL) 50 mg 24 hr tablet, Take 1 tablet (50 mg total) by mouth 2 (two) times a day, Disp: 180 tablet, Rfl: 3    mexiletine (MEXITIL) 150 mg capsule, Take 150 mg by mouth 2 (two) times a day , Disp: , Rfl:     Multiple Vitamins-Minerals (MULTIVITAMIN WITH MINERALS) tablet, Take 1 tablet by mouth daily  , Disp: , Rfl:     nitroglycerin (NITROSTAT) 0 4 mg SL tablet, Place 0 4 mg under the tongue every 5 (five) minutes as needed for chest pain  , Disp: , Rfl:     potassium citrate (UROCIT-K 10) 10 mEq, Take 2 tablets (20 mEq total) by mouth 2 (two) times a day, Disp: 360 tablet, Rfl: 3    pravastatin (PRAVACHOL) 80 mg tablet, Take 80 mg by mouth daily  , Disp: , Rfl:     tamsulosin (FLOMAX) 0 4 mg, Take 1 capsule (0 4 mg total) by mouth daily with dinner, Disp: 90 capsule, Rfl: 3    torsemide (DEMADEX) 20 mg tablet, Take 2 tablets (40 mg total) by mouth daily, Disp: 180 tablet, Rfl: 3      Vitals:    12/12/19 1606   BP: 128/68   Pulse: 96   SpO2: 92%     Weight (last 2 days)     Date/Time   Weight    12/12/19 1606   93 9 (207)            Physical Exam   Constitutional: He is oriented to person, place, and time  He appears well-developed and well-nourished  No distress  HENT:   Head: Normocephalic and atraumatic  Mouth/Throat: Oropharynx is clear and moist    Eyes: Pupils are equal, round, and reactive to light  Conjunctivae and EOM are normal  No scleral icterus  Neck: Normal range of motion  Neck supple  No JVD present  No thyromegaly present  Cardiovascular: Normal rate, regular rhythm, normal heart sounds and intact distal pulses  Exam reveals no gallop and no friction rub  No murmur heard  Pulmonary/Chest: Effort normal and breath sounds normal  No respiratory distress  He has no decreased breath sounds  He has no wheezes  He has no rhonchi  He has no rales  Abdominal: Soft  Bowel sounds are normal  He exhibits no distension  There is no tenderness  Musculoskeletal: Normal range of motion  He exhibits no edema or deformity  Right lower leg: Normal  He exhibits no edema  Left lower leg: Normal  He exhibits no edema  Neurological: He is alert and oriented to person, place, and time  No cranial nerve deficit  Skin: Skin is warm and dry  No rash noted  He is not diaphoretic  No erythema  Psychiatric: He has a normal mood and affect           Laboratory Studies:      NT-proBNP:   Lab Results   Component Value Date    NTBNP 3,682 (H) 06/26/2016      Coags:    Lipid Profile:   Lab Results   Component Value Date    CHOL 122 2015     Lab Results   Component Value Date    HDL 40 2019     Lab Results   Component Value Date    LDLCALC 81 2019     Lab Results   Component Value Date    TRIG 103 2019       Cardiac testing:   EKG reviewed personally:       Results for orders placed in visit on 09/11/15   Echo complete with contrast if indicated    Narrative [address]   [facility] [place], [address] [facility]   Phone: [phone]   TRANSTHORACIC ECHOCARDIOGRAM   2D, M-MODE, DOPPLER, AND COLOR DOPPLER   Study date:  12-Sep-2015   Patient: Josi Aaron   MR number: [id number]   Account number: [de-identified]   : 1935   Age: 78 years   Gender: Male   Status: Inpatient   Location: Bedside   Height: 68 in   Weight: 207 5 lb   BP: 134/ 63 mmHg   Indications: Malfunc cardiac device; Cardiomyopathy   Diagnoses: 425 8 - CARDIOMYOPATH IN OTH DIS, 996 01 - [address] [facility]   Sonographer:  Juliet Marrero   Primary Physician:  Adalid Rolle MD   Referring Physician:  Adalid Rolle MD   Group:  [facility]   Interpreting Physician:  Denney Phoenix, MD   SUMMARY   LEFT VENTRICLE:   The ventricle was moderately dilated  Systolic function was markedly reduced  Ejection fraction was estimated to be   30 %  There was moderate diffuse hypokinesis with distinct regional wall    motion   abnormalities  There was akinesis of the entire inferior and inferolateral   walls  Doppler parameters were consistent with a restrictive pattern,   indicative of decreased left ventricular diastolic compliance and/or increased   left atrial pressure (grade 3 diastolic dysfunction)  LEFT ATRIUM:   The atrium was mildly to moderately dilated  MITRAL VALVE:   There was mild to moderate regurgitation  TRICUSPID VALVE:   There was mild regurgitation     HISTORY: PRIOR HISTORY: HTN; Ischemic CM; VTACH; PICD   PROCEDURE: The procedure was performed at the bedside  This was a routine   TRANSTHORACIC ECHOCARDIOGRAM   Patient: Ilana Osborn   MR number: [id number]    ------ Page 2   study  The transthoracic approach was used  The study included complete 2D   imaging, M-mode, complete spectral Doppler, and color Doppler  Echocardiographic views were limited due to poor acoustic window availability,   decreased penetration, and lung interference  This was a technically difficult   study  LEFT VENTRICLE: The ventricle was moderately dilated  Systolic function was   markedly reduced  Ejection fraction was estimated to be 30 %  There was   moderate diffuse hypokinesis with distinct regional wall motion abnormalities  There was akinesis of the entire inferior and inferolateral walls  Wall   thickness was normal  DOPPLER: Doppler parameters were consistent with a   restrictive pattern, indicative of decreased left ventricular diastolic   compliance and/or increased left atrial pressure (grade 3 diastolic   dysfunction)  RIGHT VENTRICLE: The size was normal  Systolic function was normal  A pacing   wire was present  LEFT ATRIUM: The atrium was mildly to moderately dilated  RIGHT ATRIUM: Size was normal    MITRAL VALVE: Valve structure was normal  There was mild thickening  There was   normal leaflet separation  DOPPLER: The transmitral velocity was within the   normal range  There was no evidence for stenosis  There was mild to moderate   regurgitation  AORTIC VALVE: The valve was probably trileaflet  Leaflets exhibited normal   thickness and normal cuspal separation  DOPPLER: Transaortic velocity was   within the normal range  There was no evidence for stenosis  There was no   regurgitation  TRICUSPID VALVE: The valve structure was normal  There was normal leaflet   separation  DOPPLER: The transtricuspid velocity was within the normal range  There was no evidence for stenosis  There was mild regurgitation   Estimated   peak PA pressure was 30 mmHg  PULMONIC VALVE: Not well visualized  PERICARDIUM: There was no pericardial effusion  AORTA: The root exhibited normal size  SYSTEMIC VEINS: IVC: The inferior vena cava was not well visualized  SYSTEM MEASUREMENT TABLES   2D   %FS: 14 77 %   AV Diam: 3 02 cm   TRANSTHORACIC ECHOCARDIOGRAM   Patient: Leno Banks   MR number: [id number]    ------ Page 3   EDV(Teich): 182 1 ml   EF Biplane: 32 05 %   EF(Teich): 30 76 %   ESV(Teich): 126 07 ml   IVSd: 0 58 cm   LA Area: 23 12 cm2   LA Diam: 4 72 cm   LVEDV MOD A2C: 130 74 ml   LVEDV MOD A4C: 190 68 ml   LVEDV MOD BP: 165 48 ml   LVEF MOD A2C: 25 79 %   LVEF MOD A4C: 37 68 %   LVESV MOD A2C: 97 02 ml   LVESV MOD A4C: 118 83 ml   LVESV MOD BP: 112 45 ml   LVIDd: 6 03 cm   LVIDs: 5 14 cm   LVLd A2C: 8 07 cm   LVLd A4C: 9 28 cm   LVLs A2C: 7 38 cm   LVLs A4C: 6 38 cm   LVPWd: 0 59 cm   RA Area: 15 54 cm2   RVIDd: 3 2 cm   SV MOD A2C: 33 72 ml   SV MOD A4C: 71 85 ml   SV(Teich): 56 02 ml   CW   RAP: 10 mmHg   TR Vmax: 2 5 m/s   TR maxP 96 mmHg   MM   TAPSE: 1 98 cm   PW   E': 0 07 m/s   E/E': 12 63   MV A Hemanth: 0 54 m/s   MV Dec Henry: 5 44 m/s2   MV DecT: 168 43 ms   MV E Hemanth: 0 92 m/s   MV E/A Ratio: 1 71   MV PHT: 48 85 ms   MVA By PHT: 4 5 cm2   RVSP: 34 96 mmHg   [facility] Accredited Echocardiography Laboratory   TRANSTHORACIC ECHOCARDIOGRAM   Patient: Leno Banks   MR number: [id number]    ------ Page 4   Prepared and electronically signed by   Ming Robert MD   Signed 12-Sep-2015 15:47:33      No results found for this or any previous visit  No results found for this or any previous visit    Results for orders placed in visit on 09/11/15   NM myocardial perfusion spect (stress and/or rest)    Narrative PHARMACOLOGIC STRESS TEST, LEXISCAN          INDICATION-  Shortness of breath, fatigue, abnormal EKG, old MI   COMPARISON- 2014   TECHNIQUE-  Pharmacologic stress testing was performed utilizing Nura Duran  SPECT myocardial perfusion images was performed  Dosages of   TC-99-mm Myoview were 11 mCi and 33 mCi IV  Time peak imaging for rest   70 minutes and stress 70 minutes  Both rest and stress images were   performed  Images were then reconstructed in the short, vertical and   horizontal long axes projections  Baseline heart rate 61 beats per minute  Peak heart of 74 beats per minute  Baseline blood pressure 117/69 mmHg  Peak blood pressure 103/50 mmHg  Symptoms-   Belly cramps   Please see cardiology report of physiologic data  FINDINGS-   OVERALL QUALITY-   Acceptable  ARTIFACT-  None  PERFUSION IMAGES-   Stable large fixed defect in the inferior and   inferolateral wall compatible with scarring  No reversible ischemia   demonstrated  WALL MOTION-  Akinesis in the inferior and inferolateral walls   compatible with scarring  Additional global hypokinesis  EJECTION FRACTION-  43 %   IMPRESSION-   1   Stable large fixed defect in the inferior and inferolateral wall   compatible with scarring  No reversible ischemia demonstrated  2  Left ventricular ejection fraction- 43%   Transcribed on- [id number]           - CHUNG Jauregui MD        Reading Radiologist- CHUNG Jauregui MD        Electronically Signed- CHUNG Jauregui MD        Released Date Time- 09/14/15 1312      ------------------------------------------------------------------------------   Amos Rodriguez MD    Portions of the record may have been created with voice recognition software   Occasional wrong word or "sound a like" substitutions may have occurred due to the inherent limitations of voice recognition software   Read the chart carefully and recognize, using context, where substitutions have occurred

## 2020-01-17 NOTE — TELEPHONE ENCOUNTER
Pt is scheduled on 02/14/20 for a Gen Change of his [facility] BIV ICD with Dr Guerda Nolasco at [facility]  Pt is aware of instructions  Pt has Medicare as the primary insurance

## 2020-01-21 ENCOUNTER — TELEPHONE (OUTPATIENT)
Dept: CARDIOLOGY CLINIC | Facility: CLINIC | Age: 85
End: 2020-01-21

## 2020-01-21 NOTE — TELEPHONE ENCOUNTER
Patient's wife called, patient saw Dr Colette Cao for H&P for OPAL and gen change is scheduled for 2/14/20  However, patient has seen Dr Dania Quiñones in the past and is asking if Dr Dania Quiñones could do the gen change as he would feel more comfortable as he knows Dr Dania Quiñones  I discussed this with Magdi Purvis PA-C, and it was felt that this would not be an issue as this is patient's preference  Can you please call patient's wife at [phone] and make appropriate change  Thanks

## 2020-01-28 ENCOUNTER — ANESTHESIA EVENT (OUTPATIENT)
Dept: NON INVASIVE DIAGNOSTICS | Facility: HOSPITAL | Age: 85
End: 2020-01-28
Payer: MEDICARE

## 2020-01-28 ENCOUNTER — ANESTHESIA (OUTPATIENT)
Dept: ANESTHESIOLOGY | Facility: HOSPITAL | Age: 85
End: 2020-01-28

## 2020-01-28 ENCOUNTER — ANESTHESIA EVENT (OUTPATIENT)
Dept: ANESTHESIOLOGY | Facility: HOSPITAL | Age: 85
End: 2020-01-28

## 2020-01-28 RX ORDER — CEFAZOLIN SODIUM 2 G/50ML
2000 SOLUTION INTRAVENOUS ONCE
Status: CANCELLED | OUTPATIENT
Start: 2020-01-28 | End: 2020-01-28

## 2020-01-29 ENCOUNTER — ANESTHESIA (OUTPATIENT)
Dept: NON INVASIVE DIAGNOSTICS | Facility: HOSPITAL | Age: 85
End: 2020-01-29
Payer: MEDICARE

## 2020-01-29 ENCOUNTER — HOSPITAL ENCOUNTER (OUTPATIENT)
Dept: NON INVASIVE DIAGNOSTICS | Facility: HOSPITAL | Age: 85
Discharge: HOME | End: 2020-01-29
Attending: INTERNAL MEDICINE | Admitting: INTERNAL MEDICINE
Payer: MEDICARE

## 2020-01-29 VITALS
HEART RATE: 64 BPM | SYSTOLIC BLOOD PRESSURE: 131 MMHG | OXYGEN SATURATION: 95 % | WEIGHT: 203 LBS | BODY MASS INDEX: 31.86 KG/M2 | RESPIRATION RATE: 18 BRPM | DIASTOLIC BLOOD PRESSURE: 65 MMHG | HEIGHT: 67 IN

## 2020-01-29 DIAGNOSIS — I50.22 CHRONIC SYSTOLIC HEART FAILURE (HCC): ICD-10-CM

## 2020-01-29 LAB
ANION GAP SERPL CALCULATED.3IONS-SCNC: 3 MMOL/L (ref 4–13)
ATRIAL RATE: 60 BPM
BASOPHILS # BLD AUTO: 0.03 THOUSANDS/ΜL (ref 0–0.1)
BASOPHILS NFR BLD AUTO: 1 % (ref 0–1)
BUN SERPL-MCNC: 20 MG/DL (ref 5–25)
CALCIUM SERPL-MCNC: 9.3 MG/DL (ref 8.3–10.1)
CHLORIDE SERPL-SCNC: 109 MMOL/L (ref 100–108)
CO2 SERPL-SCNC: 30 MMOL/L (ref 21–32)
CREAT SERPL-MCNC: 1.62 MG/DL (ref 0.6–1.3)
EOSINOPHIL # BLD AUTO: 0.23 THOUSAND/ΜL (ref 0–0.61)
EOSINOPHIL NFR BLD AUTO: 4 % (ref 0–6)
ERYTHROCYTE [DISTWIDTH] IN BLOOD BY AUTOMATED COUNT: 13.6 % (ref 11.6–15.1)
GFR SERPL CREATININE-BSD FRML MDRD: 38 ML/MIN/1.73SQ M
GLUCOSE P FAST SERPL-MCNC: 115 MG/DL (ref 65–99)
GLUCOSE SERPL-MCNC: 115 MG/DL (ref 65–140)
HCT VFR BLD AUTO: 40.4 % (ref 36.5–49.3)
HGB BLD-MCNC: 13.4 G/DL (ref 12–17)
IMM GRANULOCYTES # BLD AUTO: 0.02 THOUSAND/UL (ref 0–0.2)
IMM GRANULOCYTES NFR BLD AUTO: 0 % (ref 0–2)
INR PPP: 1.1 (ref 0.84–1.19)
LYMPHOCYTES # BLD AUTO: 1.16 THOUSANDS/ΜL (ref 0.6–4.47)
LYMPHOCYTES NFR BLD AUTO: 19 % (ref 14–44)
MCH RBC QN AUTO: 32 PG (ref 26.8–34.3)
MCHC RBC AUTO-ENTMCNC: 33.2 G/DL (ref 31.4–37.4)
MCV RBC AUTO: 96 FL (ref 82–98)
MONOCYTES # BLD AUTO: 0.86 THOUSAND/ΜL (ref 0.17–1.22)
MONOCYTES NFR BLD AUTO: 14 % (ref 4–12)
NEUTROPHILS # BLD AUTO: 3.87 THOUSANDS/ΜL (ref 1.85–7.62)
NEUTS SEG NFR BLD AUTO: 62 % (ref 43–75)
NRBC BLD AUTO-RTO: 0 /100 WBCS
PLATELET # BLD AUTO: 211 THOUSANDS/UL (ref 149–390)
PMV BLD AUTO: 10.4 FL (ref 8.9–12.7)
POTASSIUM SERPL-SCNC: 3.9 MMOL/L (ref 3.5–5.3)
PR INTERVAL: 138 MS
PROTHROMBIN TIME: 13.8 SECONDS (ref 11.6–14.5)
QRS AXIS: 256 DEGREES
QRSD INTERVAL: 184 MS
QT INTERVAL: 536 MS
QTC INTERVAL: 536 MS
RBC # BLD AUTO: 4.19 MILLION/UL (ref 3.88–5.62)
SODIUM SERPL-SCNC: 142 MMOL/L (ref 136–145)
T WAVE AXIS: 161 DEGREES
VENTRICULAR RATE: 60 BPM
WBC # BLD AUTO: 6.17 THOUSAND/UL (ref 4.31–10.16)

## 2020-01-29 PROCEDURE — 33264 RMVL & RPLCMT DFB GEN MLT LD: CPT | Performed by: INTERNAL MEDICINE

## 2020-01-29 PROCEDURE — 99024 POSTOP FOLLOW-UP VISIT: CPT | Performed by: INTERNAL MEDICINE

## 2020-01-29 PROCEDURE — 93010 ELECTROCARDIOGRAM REPORT: CPT | Performed by: INTERNAL MEDICINE

## 2020-01-29 PROCEDURE — 93005 ELECTROCARDIOGRAM TRACING: CPT

## 2020-01-29 PROCEDURE — 85025 COMPLETE CBC W/AUTO DIFF WBC: CPT | Performed by: PHYSICIAN ASSISTANT

## 2020-01-29 PROCEDURE — C1882 AICD, OTHER THAN SING/DUAL: HCPCS

## 2020-01-29 PROCEDURE — 85610 PROTHROMBIN TIME: CPT | Performed by: PHYSICIAN ASSISTANT

## 2020-01-29 PROCEDURE — 80048 BASIC METABOLIC PNL TOTAL CA: CPT | Performed by: PHYSICIAN ASSISTANT

## 2020-01-29 RX ORDER — GENTAMICIN SULFATE 40 MG/ML
INJECTION, SOLUTION INTRAMUSCULAR; INTRAVENOUS CODE/TRAUMA/SEDATION MEDICATION
Status: COMPLETED | OUTPATIENT
Start: 2020-01-29 | End: 2020-01-29

## 2020-01-29 RX ORDER — LIDOCAINE HYDROCHLORIDE 10 MG/ML
INJECTION, SOLUTION EPIDURAL; INFILTRATION; INTRACAUDAL; PERINEURAL CODE/TRAUMA/SEDATION MEDICATION
Status: COMPLETED | OUTPATIENT
Start: 2020-01-29 | End: 2020-01-29

## 2020-01-29 RX ORDER — PROPOFOL 10 MG/ML
INJECTION, EMULSION INTRAVENOUS CONTINUOUS PRN
Status: DISCONTINUED | OUTPATIENT
Start: 2020-01-29 | End: 2020-01-29 | Stop reason: SURG

## 2020-01-29 RX ORDER — SODIUM CHLORIDE 9 MG/ML
INJECTION, SOLUTION INTRAVENOUS CONTINUOUS PRN
Status: DISCONTINUED | OUTPATIENT
Start: 2020-01-29 | End: 2020-01-29 | Stop reason: SURG

## 2020-01-29 RX ORDER — FENTANYL CITRATE 50 UG/ML
INJECTION, SOLUTION INTRAMUSCULAR; INTRAVENOUS AS NEEDED
Status: DISCONTINUED | OUTPATIENT
Start: 2020-01-29 | End: 2020-01-29 | Stop reason: SURG

## 2020-01-29 RX ORDER — CEFAZOLIN SODIUM 2 G/50ML
2000 SOLUTION INTRAVENOUS ONCE
Status: COMPLETED | OUTPATIENT
Start: 2020-01-29 | End: 2020-01-29

## 2020-01-29 RX ADMIN — SODIUM CHLORIDE: 9 INJECTION, SOLUTION INTRAVENOUS at 12:45

## 2020-01-29 RX ADMIN — FENTANYL CITRATE 50 MCG: 50 INJECTION, SOLUTION INTRAMUSCULAR; INTRAVENOUS at 13:14

## 2020-01-29 RX ADMIN — FENTANYL CITRATE 25 MCG: 50 INJECTION, SOLUTION INTRAMUSCULAR; INTRAVENOUS at 13:55

## 2020-01-29 RX ADMIN — FENTANYL CITRATE 25 MCG: 50 INJECTION, SOLUTION INTRAMUSCULAR; INTRAVENOUS at 13:45

## 2020-01-29 RX ADMIN — SODIUM CHLORIDE: 9 INJECTION, SOLUTION INTRAVENOUS at 13:06

## 2020-01-29 RX ADMIN — PHENYLEPHRINE HYDROCHLORIDE 10 MCG/MIN: 10 INJECTION INTRAVENOUS at 13:19

## 2020-01-29 RX ADMIN — GENTAMICIN SULFATE 80 MG: 40 INJECTION, SOLUTION INTRAMUSCULAR; INTRAVENOUS at 13:41

## 2020-01-29 RX ADMIN — PROPOFOL 50 MCG/KG/MIN: 10 INJECTION, EMULSION INTRAVENOUS at 13:14

## 2020-01-29 RX ADMIN — LIDOCAINE HYDROCHLORIDE 20 ML: 10 INJECTION, SOLUTION EPIDURAL; INFILTRATION; INTRACAUDAL; PERINEURAL at 13:32

## 2020-01-29 RX ADMIN — CEFAZOLIN SODIUM 2000 MG: 2 SOLUTION INTRAVENOUS at 13:19

## 2020-01-29 NOTE — ANESTHESIA POSTPROCEDURE EVALUATION
Post-Op Assessment Note    CV Status:  Stable  Pain Score: 0    Pain management: adequate     Mental Status:  Alert and awake   Hydration Status:  Euvolemic and stable   PONV Controlled:  None   Airway Patency:  Patent   Post Op Vitals Reviewed: Yes      Staff: CRNA           BP     Temp      Pulse     Resp      SpO2

## 2020-01-29 NOTE — ANESTHESIA PREPROCEDURE EVALUATION
Review of Systems/Medical History          Cardiovascular  EKG reviewed, Pacemaker/AICD, Hyperlipidemia, Hypertension , CAD , History of CABG, Dysrhythmias , ventricular tachycardia, CHF ,   Comment: Isch  Cardiomyopathy  End of life AICD generator,  Pulmonary  Not a smoker ,        GI/Hepatic       Kidney stones,        Endo/Other     GYN       Hematology   Musculoskeletal    Arthritis     Neurology   Psychology           Physical Exam    Airway    Mallampati score: II  TM Distance: >3 FB  Neck ROM: full     Dental   Comment: Multiple Cracked and Broken teeth ,     Cardiovascular      Pulmonary      Other Findings        Anesthesia Plan  ASA Score- 3     Anesthesia Type- IV sedation with anesthesia with ASA Monitors  Additional Monitors:   Airway Plan:         Plan Factors-Patient not instructed to abstain from smoking on day of procedure       Induction- intravenous  Postoperative Plan-     Informed Consent- Anesthetic plan and risks discussed with patient and spouse  I personally reviewed this patient with the CRNA  Discussed and agreed on the Anesthesia Plan with the CRNA               Lab Results   Component Value Date    GLUC 115 01/29/2020    GLUF 115 (H) 01/29/2020    ALT 38 07/09/2019    AST 39 07/09/2019    BUN 20 01/29/2020    CALCIUM 9 3 01/29/2020     (H) 01/29/2020    CHOL 122 09/09/2015    CO2 30 01/29/2020    CREATININE 1 62 (H) 01/29/2020    HDL 40 07/09/2019    INR 1 10 01/29/2020    HCT 40 4 01/29/2020    HGB 13 4 01/29/2020    PROT 5 9 (L) 09/12/2015    MG 2 1 07/09/2019    PHOS 2 7 07/09/2019     01/29/2020    K 3 9 01/29/2020    PSA 0 9 03/08/2016     10/19/2015    TRIG 103 07/09/2019    WBC 6 17 01/29/2020

## 2020-01-29 NOTE — ANESTHESIA PREPROCEDURE EVALUATION
Review of Systems/Medical History          Cardiovascular  EKG reviewed, Pacemaker/AICD, Hyperlipidemia, Hypertension , CAD , History of CABG, Dysrhythmias , ventricular tachycardia, CHF ,   Comment: Isch  Cardiomyopathy  End of life AICD generator,  Pulmonary  Not a smoker ,        GI/Hepatic       Kidney stones,        Endo/Other     GYN       Hematology   Musculoskeletal    Arthritis     Neurology   Psychology                Anesthesia Plan  ASA Score- 3     Anesthesia Type- IV sedation with anesthesia with ASA Monitors  Additional Monitors:   Airway Plan:         Plan Factors-Patient not instructed to abstain from smoking on day of procedure       Induction- intravenous  Postoperative Plan-     Informed Consent- Anesthetic plan and risks discussed with patient  I personally reviewed this patient with the CRNA  Discussed and agreed on the Anesthesia Plan with the CRNA  [place]

## 2020-01-29 NOTE — DISCHARGE INSTRUCTIONS
Please refer to post ICD implantation discharge instructions and restrictions below and your ICD booklet/temporary card  Keep incision dry for one week  Do not use lotions/powders/creams on incision  Leave outer bandage in place for 1 week - it is water proof, and as long as it is fully adhered to your skin you may shower with it  If it appears as though the bandage is coming off and/or there is any communication to the area of device incision, please then keep the whole area dry for the remaining week  After 1 week, please remove by pulling all edges away from the center of the bandage  Please call the office if you notice redness, swelling, bleeding, or drainage from incision or if you develop fevers  Implantable Cardioverter Defibrillator      If you have any questions, please call [phone] to speak with a nurse (8:30am-4pm, or [phone] after hours)  For appointments, please call [phone]  WHAT YOU SHOULD KNOW:    An implantable cardioverter defibrillator (ICD) is a small device that monitors your heart rate and rhythm  It is commonly placed inside your upper chest region  It may be used if you have a ventricular arrhythmia, which is an irregular, dangerous rhythm from the bottom chamber of your heart  Some arrhythmias may cause your heart to suddenly stop beating  An ICD can give a shock to your heart to make it start beating again, or it can give pacing therapy (also known as pain-free therapy) to return your heart to normal rhythm  It is also a pacemaker, so it will pace your heart if needed to prevent it from beating too slowly  AFTER YOU LEAVE:      Follow up with your primary healthcare provider or cardiologist as directed: You will need to follow-up to have your ICD checked and make sure you are not having problems  Write down your questions so you remember to ask them during your visits      Self-care:   · Ask about activity: Ask if you need to avoid moving your shoulder or arm, and for how long  Ask if you should avoid lifting heavy objects  Do not play any contact sports, such as football or wrestling, until your primary healthcare provider [facility] or cardiologist tells you it is okay  You may only be able to drive for a certain amount of time per day, or during certain hours  Ask when you can return to work  · Care for your skin over the ICD: Ask your PHP or cardiologist when it is okay for you to bathe  Do not put any lotion or powder on the incision area  Do not rub or wear tight clothing over the ICD area until your PHP or cardiologist tells you it is okay  When you get a shock from your ICD: A shock may feel like someone has hit you, or you may feel a thump in the chest  If someone is touching you when you get a shock, they may feel a tingling feeling  The first time you feel a shock, it may scare you  Sit or lie down and stay calm  Ask someone to stay with you if possible  Please either call your cardiologist or report to an emergency room  Safety instructions when you have an ICD:   · Carry an ID card for the ICD: This card has important information about your ICD  · Stay away from magnets or machines with electric fields: This includes MRI machines  Avoid leaning into a car engine or doing welding  These things can interfere with how your ICD works  · Tell airport security you have an ICD: You may need to be searched by hand when you go through a security gate  The security gate or handheld wand could harm your ICD  · Keep an ICD diary: Record when you get a shock and what you were doing before you got the shock  Keep track of how you felt before and after the shock, as well as how many shocks you received  Write down the day and time of each shock  Bring the diary with you when you see your PHP or cardiologist    · Carry medical alert identification: Wear medical alert jewelry or carry a card that says you have an ICD   Ask your PHP or cardiologist where to get these items  Contact your primary healthcare provider or cardiologist if:   · You have a fever  · You feel 1 or more shocks from your ICD and feel fine afterwards  · Your feet or ankles swell  · The area around your ICD is painful or tender after surgery  · The skin around your stitches or staples is red, swollen, or draining pus or fluid  · You have chills, a cough, and feel weak or achy  · You are sad or anxious and find it hard to do your usual activities  · You have questions or concerns about your condition or care  Seek care immediately or call 911 if:   · Your stitches or staples come apart  · Blood soaks through your bandage  · You feel more than 3 shocks in a row from your ICD  · You become weak, dizzy, or faint  · You feel your heart skip beats or beat very fast or slow, but you do not feel a shock from your ICD  · You have chest pain that does not go away with rest or medicine  © 2014 [employer] is for End User's use only and may not be sold, redistributed or otherwise used for commercial purposes  All illustrations and images included in CareNotes® are the copyrighted property of [employer]  or [employer]  The above information is an  only  It is not intended as medical advice for individual conditions or treatments  Talk to your doctor, nurse or pharmacist before following any medical regimen to see if it is safe and effective for you

## 2020-01-29 NOTE — H&P
H&P Exam - Cardiology   Becky Moreno 80 y o  male MRN: [id number]  Unit/Bed#: SSC  Encounter: [id number]    Assessment/Plan   1  ICMP s/p SJM BIV ICD generator RRT   * patient presents to [facility] under direction of Dr Kristen Paulson for and Dr Yoanna Resendiz for SJM generator change due to device reaching RRT on 2019  * this AM patient without concerns or complaints   * labs WNL    * plan for gen change then discharge home post   * on GDMT w/ BB as he is unable to take ACEI/ARB/ARNI   * EF 30% on last echo         Imaging: I have personally reviewed pertinent reports  Results for orders placed during the hospital encounter of 18   Echo complete with contrast if indicated    [facility]ative [facility]  [address]  [phone]    Transthoracic Echocardiogram  2D, M-mode, Doppler, and Color Doppler    Study date:  2018    Patient: Anshul Le  MR number: [id number]  Account number: [de-identified]  : 1935  Age: 80 years  Gender: Male  Status: Outpatient  Location: Echo lab  Height: 68 in  Weight: 196 7 lb  BP: 124/ 76 mmHg    Indications: Shortness of breath    Diagnoses: R06 02 - Shortness of breath    Sonographer:  [address]  Primary Physician:  Betsy Marquis MD  Referring Physician:  Nate Reveles MD  Group:  [id number]  Interpreting Physician:  Osmani Chappell MD    SUMMARY    LEFT VENTRICLE:  The ventricle was markedly dilated, meauring 7 5cm  Systolic function was moderately to markedly reduced  Ejection fraction was estimated to be 30 %  There was moderate diffuse hypokinesis with regional variations  There was akinesis of the basal-mid inferolateral wall(s)  There was severe hypokinesis of the basal-mid inferior wall(s)    Doppler parameters were consistent with restrictive physiology, indicative of decreased left ventricular diastolic compliance and/or increased left atrial pressure  Doppler parameters were consistent with high ventricular filling pressure  RIGHT VENTRICLE:  The ventricle was dilated  LEFT ATRIUM:  The atrium was dilated  MITRAL VALVE:  There was mild regurgitation  TRICUSPID VALVE:  There was moderate regurgitation  Estimated peak PA pressure was 70 mmHg  The findings suggest severe pulmonary hypertension  HISTORY: PRIOR HISTORY: CAD, CABG x3 (2004), HTN, ICM, VT, ICD, lower extremity edema    PROCEDURE: The procedure was performed in the echo lab  This was a routine study  The transthoracic approach was used  The study included complete 2D imaging, M-mode, complete spectral Doppler, and color Doppler  The heart rate was 60 bpm,  at the start of the study  Image quality was adequate  LEFT VENTRICLE: The ventricle was markedly dilated, meauring 7 5cm  Systolic function was moderately to markedly reduced  Ejection fraction was estimated to be 30 %  There was moderate diffuse hypokinesis with regional variations  There  was akinesis of the basal-mid inferolateral wall(s)  There was severe hypokinesis of the basal-mid inferior wall(s)  Wall thickness was normal  DOPPLER: There was a reduced contribution of atrial contraction to ventricular filling, due to  increased ventricular diastolic pressure or atrial contractile dysfunction  Doppler parameters were consistent with restrictive physiology, indicative of decreased left ventricular diastolic compliance and/or increased left atrial  pressure  Doppler parameters were consistent with high ventricular filling pressure  RIGHT VENTRICLE: The ventricle was dilated  Systolic function was normal  Wall thickness was normal     LEFT ATRIUM: The atrium was dilated  RIGHT ATRIUM: Size was normal     MITRAL VALVE: Valve structure was normal  There was normal leaflet separation  DOPPLER: The transmitral velocity was within the normal range  There was no evidence for stenosis   There was mild regurgitation  AORTIC VALVE: The valve was trileaflet  Leaflets exhibited normal thickness and normal cuspal separation  DOPPLER: Transaortic velocity was within the normal range  There was no evidence for stenosis  There was trace regurgitation  TRICUSPID VALVE: The valve structure was normal  There was normal leaflet separation  DOPPLER: The transtricuspid velocity was within the normal range  There was no evidence for stenosis  There was moderate regurgitation  Estimated peak PA  pressure was 70 mmHg  The findings suggest severe pulmonary hypertension  PULMONIC VALVE: Leaflets exhibited normal thickness, no calcification, and normal cuspal separation  DOPPLER: The transpulmonic velocity was within the normal range  There was trace regurgitation  PERICARDIUM: There was no pericardial effusion  The pericardium was normal in appearance  AORTA: The root exhibited normal size  SYSTEMIC VEINS: IVC: The inferior vena cava was dilated  Respirophasic changes in dimension were absent  SYSTEM MEASUREMENT TABLES    2D  %FS: 10 28 %  Ao Diam: 3 cm  EF Biplane: 34 48 %  EF(Teich): 21 69 %  IVSd: 0 99 cm  LA Diam: 4 78 cm  LVEF MOD A2C: 32 61 %  LVEF MOD A4C: 30 33 %  LVIDd: 7 46 cm  LVIDs: 6 69 cm  LVPWd: 0 83 cm    CW  TR Vmax: 3 71 m/s  TR maxP 11 mmHg    PW  E/E': 35 44  MV E/A Ratio: 3 91    [facility] Accredited Echocardiography Laboratory    Prepared and electronically signed by    Noemy Cummings MD  Signed 2018 12:55:39         EKG:        History of Present Illness   HPI:  Chaitanya Stovall is a 80y o  year old male with a history of CAD w/ CABG, ICMP EF 30% s/p SJM CRT (RV lead , A lead , LV lead  w/ gen change ), CKD who presents to [facility] for generator change of his defibrillator       From Dr Lucero Mccormick last office note:   "Chaitanya Stovall is a 80y o  year old male  With a longstanding history of CAD, CABG, ischemic cardiomyopathy with last known ejection fraction 61%, chronic systolic/ diastolic CHF, ventricular tachycardia and paroxysmal atrial fibrillation, on long-term amiodarone and mexiletine         Device checks do not show any arrhythmias, but he is at our RT in needs generator change  He is leaving for [place] on Monday and could not cooperate with electrophysiology recommendations, will be in touch when he returns in a month  He denies edema, worsening orthopnea or dyspnea  Continues on torsemide 40 mg daily from a CHF standpoint, creatinine 1 63 and stable in CKD stage 3  Lipids remain well controlled on pravastatin, LDL 81  From amiodarone monitoring standpoint, chest x-ray early this year was normal, TSH normal, LFTs normal    "     Since this office visit no new medical changes have occurred  Plan for gen change with discharge post      Review of Systems  ROS as noted above, otherwise 12 point review of systems was performed and is negative         Historical Information   Past Medical History:   Diagnosis Date    AICD (automatic cardioverter/defibrillator) present 2004    AICD (automatic cardioverter/defibrillator) present 2006    AICD (automatic cardioverter/defibrillator) present 2013    [facility]    Arthritis     R knee and neck    Atrial fibrillation (HCC)     BPH (benign prostatic hyperplasia)     CAD (coronary artery disease) of artery bypass graft     CHF (congestive heart failure) (HCC)     combine    Coronary artery disease     Disease of thyroid gland     Hyperlipidemia     Hypertension     Ischemic cardiomyopathy     Left ureteral calculus 10/17/2017    Renal disorder     V tach ([address] )      Past Surgical History:   Procedure Laterality Date    CARDIAC CATHETERIZATION      CARDIAC SURGERY      Pacemaker    CORONARY ARTERY BYPASS GRAFT      [place] CYSTOURETHROSCOPY,URETER CATHETER N/A 10/17/2017    Procedure: CYSTOSCOPY, left  RETROGRADE PYELOGRAM WITH LEFT URETEROSCOPY , stone extraction,LEFT STENT INSERTION; Surgeon: Priyanka Mccoy MD;  Location: [facility];  Service: Urology     Family History:   Family History   Problem Relation Age of Onset    Tuberculosis Father         WWI    Hypertension Mother     Stroke Brother        Social History   Social History     Substance and Sexual Activity   Alcohol Use No     Social History     Substance and Sexual Activity   Drug Use No     Social History     Tobacco Use   Smoking Status Never Smoker   Smokeless Tobacco Never Used         Meds/Allergies   all medications and allergies reviewed  Home Medications:   Medications Prior to Admission   Medication    amiodarone 200 mg tablet    amLODIPine (NORVASC) 2 5 mg tablet    aspirin 81 MG tablet    isosorbide mononitrate (IMDUR) 30 mg 24 hr tablet    levothyroxine 150 mcg tablet    metoprolol succinate (TOPROL-XL) 50 mg 24 hr tablet    mexiletine (MEXITIL) 150 mg capsule    potassium citrate (UROCIT-K 10) 10 mEq    pravastatin (PRAVACHOL) 80 mg tablet    tamsulosin (FLOMAX) 0 4 mg    torsemide (DEMADEX) 20 mg tablet    acetaminophen (TYLENOL) 500 mg tablet    LORazepam (ATIVAN) 1 mg tablet    Multiple Vitamins-Minerals (MULTIVITAMIN WITH MINERALS) tablet    nitroglycerin (NITROSTAT) 0 4 mg SL tablet       No Known Allergies    Objective   Vitals: Blood pressure (!) 175/82, pulse 60, resp  rate 18, height 5' 7" (1 702 m), weight 92 1 kg (203 lb), SpO2 95 %  Orthostatic Blood Pressures      Most Recent Value   Blood Pressure  (!) 175/82 filed at 01/29/2020 0930   Patient Position - Orthostatic VS  Lying filed at 01/29/2020 0930          No intake or output data in the 24 hours ending 01/29/20 1052    Invasive Devices     Peripheral Intravenous Line            Peripheral IV 01/29/20 Left Forearm less than 1 day                Physical Exam   Constitutional: He is oriented to person, place, and time  He appears well-developed and well-nourished  HENT:   Head: Normocephalic and atraumatic     Eyes: Pupils are equal, round, and reactive to light  EOM are normal    Neck: Normal range of motion  Neck supple  Cardiovascular: Normal rate and regular rhythm  Pulmonary/Chest: Effort normal and breath sounds normal    Abdominal: Soft  Bowel sounds are normal    Musculoskeletal: Normal range of motion  Neurological: He is alert and oriented to person, place, and time  Skin: Skin is warm and dry  Psychiatric: He has a normal mood and affect  Lab Results: I have personally reviewed pertinent lab results      Results from last 7 days   Lab Units 01/29/20  0943   WBC Thousand/uL 6 17   HEMOGLOBIN g/dL 13 4   HEMATOCRIT % 40 4   PLATELETS Thousands/uL 211     Results from last 7 days   Lab Units 01/29/20  0943   POTASSIUM mmol/L 3 9   CHLORIDE mmol/L 109*   CO2 mmol/L 30   BUN mg/dL 20   CREATININE mg/dL 1 62*   CALCIUM mg/dL 9 3                   Code Status: Level 1 - Full Code

## 2020-02-03 ENCOUNTER — EPISODE CHANGES (OUTPATIENT)
Dept: CASE MANAGEMENT | Facility: HOSPITAL | Age: 85
End: 2020-02-03

## 2020-02-04 ENCOUNTER — PATIENT OUTREACH (OUTPATIENT)
Dept: CASE MANAGEMENT | Facility: OTHER | Age: 85
End: 2020-02-04

## 2020-02-04 NOTE — PROGRESS NOTES
Ramesh Burgos is feeling well post hospitalization for gen change of ICD  The dressing to his chest is D&I with no redness or drainage present  He denies pain or discomfort  He has f/u scheduled on 2/13 with cardiology  He does not weigh regularly, encouraged him to weigh daily in am and record  Reviewed weight parameters to report  He denies chest pain, sob or LE edema  Nutritional intake adequate, he is following a low sodium diet  He is taking all medications as prescribed  Follow up appointments scheduled  He denies needing additional assistance at home  He declined further outreach

## 2020-02-06 ENCOUNTER — TELEPHONE (OUTPATIENT)
Dept: CARDIOLOGY CLINIC | Facility: CLINIC | Age: 85
End: 2020-02-06

## 2020-02-06 NOTE — TELEPHONE ENCOUNTER
Lopez Dyer are removed at the two week site check  Did he take his aquacell dressing off?     Ronna Jamison

## 2020-02-06 NOTE — TELEPHONE ENCOUNTER
Patients wife called asking when the staples will be removed? She wanted to make sure it didn't need to be done before their two device check    Thank you

## 2020-02-13 ENCOUNTER — IN-CLINIC DEVICE VISIT (OUTPATIENT)
Dept: CARDIOLOGY CLINIC | Facility: CLINIC | Age: 85
End: 2020-02-13

## 2020-02-13 DIAGNOSIS — Z95.810 PRESENCE OF AUTOMATIC CARDIOVERTER/DEFIBRILLATOR (AICD): Primary | ICD-10-CM

## 2020-02-13 PROCEDURE — 99024 POSTOP FOLLOW-UP VISIT: CPT | Performed by: INTERNAL MEDICINE

## 2020-02-13 NOTE — PROGRESS NOTES
Results for orders placed or performed in visit on 02/13/20   Cardiac EP device report    Narrative    SJM CRT-D/DDDR MODENOT MRI CONDITIONAL  WOUND CHECK: INCISION CLEAN AND DRY WITH EDGES APPROXIMATED; STAPLES REMOVED; WOUND CARE AND RESTRICTIONS REVIEWED WITH PATIENT  DEVICE INTERROGATED IN THE [facility] OFFICE  BATTERY ADEQUATE (6 6 YRS)  AP 88%; %  ALL LEAD PARAMETERS WITHIN NORMAL LIMITS  NO EPISODES  NO PROGRAMMING CHANGES MADE TO DEVICE PARAMETERS  NORMAL DEVICE FUNCTION   PL

## 2020-02-14 ENCOUNTER — TELEPHONE (OUTPATIENT)
Dept: NEPHROLOGY | Facility: CLINIC | Age: 85
End: 2020-02-14

## 2020-02-14 ENCOUNTER — APPOINTMENT (OUTPATIENT)
Dept: LAB | Facility: CLINIC | Age: 85
End: 2020-02-14
Payer: MEDICARE

## 2020-02-14 ENCOUNTER — TRANSCRIBE ORDERS (OUTPATIENT)
Dept: LAB | Facility: CLINIC | Age: 85
End: 2020-02-14

## 2020-02-14 DIAGNOSIS — N18.30 CHRONIC KIDNEY DISEASE, STAGE III (MODERATE) (HCC): ICD-10-CM

## 2020-02-14 DIAGNOSIS — R60.0 LOCALIZED EDEMA: ICD-10-CM

## 2020-02-14 DIAGNOSIS — I12.9 HYPERTENSIVE CHRONIC KIDNEY DISEASE WITH STAGE 1 THROUGH STAGE 4 CHRONIC KIDNEY DISEASE, OR UNSPECIFIED CHRONIC KIDNEY DISEASE: Primary | ICD-10-CM

## 2020-02-14 DIAGNOSIS — E03.4 IDIOPATHIC ATROPHIC HYPOTHYROIDISM: ICD-10-CM

## 2020-02-14 DIAGNOSIS — Z51.81 ENCOUNTER FOR THERAPEUTIC DRUG MONITORING: Primary | ICD-10-CM

## 2020-02-14 DIAGNOSIS — N25.81 SECONDARY HYPERPARATHYROIDISM OF RENAL ORIGIN (HCC): ICD-10-CM

## 2020-02-14 DIAGNOSIS — I10 ESSENTIAL HYPERTENSION, MALIGNANT: ICD-10-CM

## 2020-02-14 DIAGNOSIS — Z51.81 ENCOUNTER FOR THERAPEUTIC DRUG MONITORING: ICD-10-CM

## 2020-02-14 DIAGNOSIS — I12.9 HYPERTENSIVE CHRONIC KIDNEY DISEASE WITH STAGE 1 THROUGH STAGE 4 CHRONIC KIDNEY DISEASE, OR UNSPECIFIED CHRONIC KIDNEY DISEASE: ICD-10-CM

## 2020-02-14 DIAGNOSIS — N20.0 NEPHROLITHIASIS: ICD-10-CM

## 2020-02-14 LAB
ALBUMIN SERPL BCP-MCNC: 3.3 G/DL (ref 3.5–5)
ALP SERPL-CCNC: 78 U/L (ref 46–116)
ALT SERPL W P-5'-P-CCNC: 44 U/L (ref 12–78)
ANION GAP SERPL CALCULATED.3IONS-SCNC: 8 MMOL/L (ref 4–13)
AST SERPL W P-5'-P-CCNC: 46 U/L (ref 5–45)
BACTERIA UR QL AUTO: ABNORMAL /HPF
BASOPHILS # BLD AUTO: 0.03 THOUSANDS/ΜL (ref 0–0.1)
BASOPHILS NFR BLD AUTO: 0 % (ref 0–1)
BILIRUB SERPL-MCNC: 0.57 MG/DL (ref 0.2–1)
BILIRUB UR QL STRIP: NEGATIVE
BUN SERPL-MCNC: 22 MG/DL (ref 5–25)
CALCIUM SERPL-MCNC: 8.9 MG/DL (ref 8.3–10.1)
CHLORIDE SERPL-SCNC: 107 MMOL/L (ref 100–108)
CHOLEST SERPL-MCNC: 148 MG/DL (ref 50–200)
CK SERPL-CCNC: 151 U/L (ref 39–308)
CLARITY UR: CLEAR
CO2 SERPL-SCNC: 31 MMOL/L (ref 21–32)
COLOR UR: YELLOW
CREAT SERPL-MCNC: 1.79 MG/DL (ref 0.6–1.3)
CREAT UR-MCNC: 161 MG/DL
EOSINOPHIL # BLD AUTO: 0.28 THOUSAND/ΜL (ref 0–0.61)
EOSINOPHIL NFR BLD AUTO: 4 % (ref 0–6)
ERYTHROCYTE [DISTWIDTH] IN BLOOD BY AUTOMATED COUNT: 13.6 % (ref 11.6–15.1)
GFR SERPL CREATININE-BSD FRML MDRD: 34 ML/MIN/1.73SQ M
GLUCOSE P FAST SERPL-MCNC: 112 MG/DL (ref 65–99)
GLUCOSE UR STRIP-MCNC: NEGATIVE MG/DL
HCT VFR BLD AUTO: 41.3 % (ref 36.5–49.3)
HDLC SERPL-MCNC: 43 MG/DL
HGB BLD-MCNC: 13.6 G/DL (ref 12–17)
HGB UR QL STRIP.AUTO: NEGATIVE
IMM GRANULOCYTES # BLD AUTO: 0.02 THOUSAND/UL (ref 0–0.2)
IMM GRANULOCYTES NFR BLD AUTO: 0 % (ref 0–2)
KETONES UR STRIP-MCNC: NEGATIVE MG/DL
LDLC SERPL CALC-MCNC: 79 MG/DL (ref 0–100)
LEUKOCYTE ESTERASE UR QL STRIP: ABNORMAL
LYMPHOCYTES # BLD AUTO: 1.19 THOUSANDS/ΜL (ref 0.6–4.47)
LYMPHOCYTES NFR BLD AUTO: 17 % (ref 14–44)
MAGNESIUM SERPL-MCNC: 2 MG/DL (ref 1.6–2.6)
MCH RBC QN AUTO: 31.8 PG (ref 26.8–34.3)
MCHC RBC AUTO-ENTMCNC: 32.9 G/DL (ref 31.4–37.4)
MCV RBC AUTO: 97 FL (ref 82–98)
MONOCYTES # BLD AUTO: 0.93 THOUSAND/ΜL (ref 0.17–1.22)
MONOCYTES NFR BLD AUTO: 14 % (ref 4–12)
NEUTROPHILS # BLD AUTO: 4.39 THOUSANDS/ΜL (ref 1.85–7.62)
NEUTS SEG NFR BLD AUTO: 65 % (ref 43–75)
NITRITE UR QL STRIP: NEGATIVE
NON-SQ EPI CELLS URNS QL MICRO: ABNORMAL /HPF
NONHDLC SERPL-MCNC: 105 MG/DL
NRBC BLD AUTO-RTO: 0 /100 WBCS
PH UR STRIP.AUTO: 7 [PH]
PHOSPHATE SERPL-MCNC: 3.2 MG/DL (ref 2.3–4.1)
PLATELET # BLD AUTO: 230 THOUSANDS/UL (ref 149–390)
PMV BLD AUTO: 10 FL (ref 8.9–12.7)
POTASSIUM SERPL-SCNC: 4.2 MMOL/L (ref 3.5–5.3)
PROT SERPL-MCNC: 6.7 G/DL (ref 6.4–8.2)
PROT UR STRIP-MCNC: NEGATIVE MG/DL
PROT UR-MCNC: 18 MG/DL
PROT/CREAT UR: 0.11 MG/G{CREAT} (ref 0–0.1)
PTH-INTACT SERPL-MCNC: 105.5 PG/ML (ref 18.4–80.1)
RBC # BLD AUTO: 4.28 MILLION/UL (ref 3.88–5.62)
RBC #/AREA URNS AUTO: ABNORMAL /HPF
SODIUM SERPL-SCNC: 146 MMOL/L (ref 136–145)
SP GR UR STRIP.AUTO: 1.01 (ref 1–1.03)
TRIGL SERPL-MCNC: 130 MG/DL
TSH SERPL DL<=0.05 MIU/L-ACNC: 1 UIU/ML (ref 0.36–3.74)
UROBILINOGEN UR QL STRIP.AUTO: 0.2 E.U./DL
WBC # BLD AUTO: 6.84 THOUSAND/UL (ref 4.31–10.16)
WBC #/AREA URNS AUTO: ABNORMAL /HPF

## 2020-02-14 PROCEDURE — 84156 ASSAY OF PROTEIN URINE: CPT

## 2020-02-14 PROCEDURE — 82306 VITAMIN D 25 HYDROXY: CPT

## 2020-02-14 PROCEDURE — 84443 ASSAY THYROID STIM HORMONE: CPT

## 2020-02-14 PROCEDURE — 85025 COMPLETE CBC W/AUTO DIFF WBC: CPT

## 2020-02-14 PROCEDURE — 83970 ASSAY OF PARATHORMONE: CPT

## 2020-02-14 PROCEDURE — 80061 LIPID PANEL: CPT

## 2020-02-14 PROCEDURE — 80053 COMPREHEN METABOLIC PANEL: CPT

## 2020-02-14 PROCEDURE — 81001 URINALYSIS AUTO W/SCOPE: CPT | Performed by: INTERNAL MEDICINE

## 2020-02-14 PROCEDURE — 84100 ASSAY OF PHOSPHORUS: CPT

## 2020-02-14 PROCEDURE — 82550 ASSAY OF CK (CPK): CPT

## 2020-02-14 PROCEDURE — 83735 ASSAY OF MAGNESIUM: CPT

## 2020-02-14 PROCEDURE — 82570 ASSAY OF URINE CREATININE: CPT

## 2020-02-14 PROCEDURE — 36415 COLL VENOUS BLD VENIPUNCTURE: CPT

## 2020-02-14 NOTE — TELEPHONE ENCOUNTER
----- Message from Asya Isaac MD sent at 2/14/2020  8:36 AM EST -----  Creatinine is only slightly higher but within his range  However, AST was slightly elevated  I would repeat a CMP nonfasting in about 1 week

## 2020-02-18 LAB
25(OH)D2 SERPL-MCNC: <1 NG/ML
25(OH)D3 SERPL-MCNC: 36 NG/ML
25(OH)D3+25(OH)D2 SERPL-MCNC: 36 NG/ML

## 2020-02-21 ENCOUNTER — APPOINTMENT (OUTPATIENT)
Dept: LAB | Facility: CLINIC | Age: 85
End: 2020-02-21
Payer: MEDICARE

## 2020-02-21 DIAGNOSIS — I12.9 HYPERTENSIVE CHRONIC KIDNEY DISEASE WITH STAGE 1 THROUGH STAGE 4 CHRONIC KIDNEY DISEASE, OR UNSPECIFIED CHRONIC KIDNEY DISEASE: ICD-10-CM

## 2020-02-21 DIAGNOSIS — N18.30 CHRONIC KIDNEY DISEASE, STAGE III (MODERATE) (HCC): ICD-10-CM

## 2020-02-21 LAB
ALBUMIN SERPL BCP-MCNC: 3.2 G/DL (ref 3.5–5)
ALP SERPL-CCNC: 77 U/L (ref 46–116)
ALT SERPL W P-5'-P-CCNC: 44 U/L (ref 12–78)
ANION GAP SERPL CALCULATED.3IONS-SCNC: 6 MMOL/L (ref 4–13)
AST SERPL W P-5'-P-CCNC: 37 U/L (ref 5–45)
BILIRUB SERPL-MCNC: 0.48 MG/DL (ref 0.2–1)
BUN SERPL-MCNC: 24 MG/DL (ref 5–25)
CALCIUM SERPL-MCNC: 8.9 MG/DL (ref 8.3–10.1)
CHLORIDE SERPL-SCNC: 106 MMOL/L (ref 100–108)
CO2 SERPL-SCNC: 32 MMOL/L (ref 21–32)
CREAT SERPL-MCNC: 1.86 MG/DL (ref 0.6–1.3)
GFR SERPL CREATININE-BSD FRML MDRD: 32 ML/MIN/1.73SQ M
GLUCOSE SERPL-MCNC: 138 MG/DL (ref 65–140)
POTASSIUM SERPL-SCNC: 4 MMOL/L (ref 3.5–5.3)
PROT SERPL-MCNC: 6.5 G/DL (ref 6.4–8.2)
SODIUM SERPL-SCNC: 144 MMOL/L (ref 136–145)

## 2020-02-21 PROCEDURE — 36415 COLL VENOUS BLD VENIPUNCTURE: CPT

## 2020-02-21 PROCEDURE — 80053 COMPREHEN METABOLIC PANEL: CPT

## 2020-03-04 PROBLEM — E55.9 VITAMIN D DEFICIENCY: Status: ACTIVE | Noted: 2020-03-04

## 2020-03-04 NOTE — PROGRESS NOTES
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  · Baseline creatinine:  1 5-1 83  · Current creatinine:  1 86 essentially at baseline  · Urine protein creatinine ratio:  0 11 g at goal  · UA:  0-1 RBC/2-4 WBC/negative proteinuria and negative occult blood  Recommendations:  · Treat hypertension-please see below  · Treat dyslipidemia-please see below  · Maintain proteinuria less than 1 g or as low as possible  · Avoid nephrotoxic agents such as NSAIDs, patient counseled as such  2   Volume:  TSH was acceptable; venous duplex negative; no significant edema at this time  3   Hypertension:       Current blood pressure averages:  Patient has no readings today  · Goal blood pressure:  Less than 125/80 given CAD  Recommendations:  · Push nonmedical regimen including weight loss, isotonic exercise and avoidance of salt, patient counseled as such  · Medication changes today:  Have the patient take 1 week a blood pressure readings at home  We may have to increase his amlodipine to 2 5 mg daily as his systolic reading is slightly high today  4   Electrolytes:  all acceptable  5   Mineral bone disorder:  · Calcium/magnesium/phosphorus:  All acceptable  · PTH intact:  105 5, secondary to CKD monitor for now as it has not changed  · Vitamin-D:  36: At goal continue supplementation  6   Dyslipidemia:  · Goal LDL:  Goal less than 70  · Current lipid profile:  LDL 79/HDL 43/triglycerides 130  Recommendations:  He has discussed this with Cardiology he would prefer not to add any medications including Zetia or switch anything at this point he does understand that the LDL is above goal of 70   7   Anemia:  Normal hemoglobin at 13 6  8   Nephrolithiasis:  Also followed by urology:  Patient with no further stone passage    24 hour urine for stone risk:  IN THE PAST:  · volume 1350 which is BELOW GOAL  · calcium 104 mg at goal  · citrate which is 265 mg BELOW GOAL  · oxalate 23 mg which is at goal  · sodium excretion 108 millimoles which is at goal  · uric acid:  369 mg which is at goal  Recommendations:  · Modest fluid intake with water, try to remove soda, we are restricted because of ischemic cardiomyopathy and fluid overload  · Potassium citrate  · KUB with multiple bilateral renal calculi similar to previous exam   · Repeat 24 hour urine for stone risk:   HAS NOT DONE AS OF YET:  Have the patient repeat 24 hour urine at this time to make sure we are doing maximal stone prevention  9   Other problems:  · Cardiac:  Followed by Dr Libra Reveles with ischemic cardiomyopathy/ICD/systolic CHF/CABG   Ejection fraction 30% without any reversible ischemia on nuclear stress test   He has had recent biventricular pacemaker placement  · BPH  · Hypothyroidism          PATIENT INSTRUCTIONS:    Patient Instructions   1  No medication changes today  2  Please call with the dose of the Urocit-K    3  Please do a 24 hour urine collection for stone prevention at your convenience at this time  4  Please send in 1 week a blood pressure readings morning evening, sitting and standing as follows at this time:  · Take the morning readings before any medications  · Take the evening readings before supper and before taking any medications at that time  · When taking standing readings, keep your arm supported at heart level and not dangling  · Make sure you are sitting with your back supported in feet on the ground on crossed  · Make sure you have not taken any coffee or caffeine products or exercised or smoke cigarettes at least 30 minutes before taking your blood pressure    5  Follow-up in 4 months:  -please bring in 1 week a blood pressure readings morning evening, sitting and standing as outlined above  -PLEASE BRING IN YOUR BLOOD PRESSURE MACHINE FOR CORRELATION WITH THE OFFICE MACHINE    -please go for fasting lab work 1-2 weeks prior to your appointment    6  General instructions:   AVOID SALT BUT NOT ADDING AN READING LABELS TO MAKE SURE THERE IS LOW-SALT IN THE FOOD THAT YOU ARE EATING     Avoid nonsteroidal anti-inflammatory drugs such as Naprosyn, ibuprofen, Aleve, Advil, Celebrex, etc   You can use Tylenol as needed if you do not have any liver condition to be concerned about     Avoid medications such as Sudafed or decongestants and antihistamines that contained the D component which is the decongestant  You can take antihistamines without the decongestant or D component   Try to avoid medications such as pantoprazole or  Protonix/Nexium or Esomeprazole)/Prilosec or omeprazole/Prevacid or lansoprazole/AcipHex or Rabeprazole  If you are able to, use Pepcid as this is safer for your kidneys   Try to exercise at least 30 minutes 3 days a week to begin with with an ultimate goal of 5 days a week for at least 30 minutes     Try to lose 5-10 lb by your next visit     Please do not drink more than 2 glasses of alcohol/wine on a daily basis as this may contribute to your high blood pressure  7 Measures to reduce stone development:    · Please Drink 96 oz of water or 3 L a day throughout the day  · Avoid salt/low-salt diet   · Try to decrease animal protein intake, dairy protein and vegetable base protein are better  · Increase fruits and vegetables as much as possible  · Avoid calcium products such as Tums or other types of calcium containing antacids, you can use Pepcid for indigestion (but you do not have to restrict your dietary calcium)  · Avoid excessive vitamin D   · Avoid excessive vitamin C  · Try to avoid oxalate products (please refer to the diet sheet)  · Limit fructose and sucrose type drinks such as coke            Subjective: There has been no hospitalizations or acute illnesses since last visit  He has had a mild URI with minimal productive cough  No fevers chills cough or colds    Good appetite and good energy  No hematuria, dysuria, voiding symptoms or foamy urine  No gastrointestinal symptoms  No cardiovascular symptoms including swelling of the legs   headaches, dizziness or lightheadedness  Blood pressure medications:  · Torsemide 40 mg daily in the morning  · Urocit-K 10 mEq twice a day  · Toprol XL 50 mg twice a day  · Imdur 30 mg daily  · Amlodipine 2 5 mg daily  ·     ROS:  See HPI, otherwise review of systems as completely reviewed with the patient are negative    Past Medical History:   Diagnosis Date    AICD (automatic cardioverter/defibrillator) present 2004    AICD (automatic cardioverter/defibrillator) present 2006    AICD (automatic cardioverter/defibrillator) present 2013    [facility]    Arthritis     R knee and neck    [facility]rial [facility] [address] [facility]     BPH (benign prostatic hyperplasia)     CAD (coronary artery disease) of artery bypass graft     CHF (congestive heart failure) ([facility] [address] [facility]     combine    Coronary artery disease     Disease of thyroid gland     Hyperlipidemia     Hypertension     Ischemic cardiomyopathy     Left ureteral calculus 10/17/2017    Renal disorder     V tach ([facility] [address] )      Past Surgical History:   Procedure Laterality Date    CARDIAC CATHETERIZATION      CARDIAC SURGERY      Pacemaker    CORONARY ARTERY BYPASS GRAFT      CA CYSTOURETHROSCOPY,URETER CATHETER N/A 10/17/2017    Procedure: CYSTOSCOPY, left  RETROGRADE PYELOGRAM WITH LEFT URETEROSCOPY , stone extraction,LEFT STENT INSERTION;  Surgeon: Melvina Cole MD;  Location: [facility];  Service: Urology     Family History   Problem Relation Age of Onset    Tuberculosis Father         WWI    Hypertension Mother     Stroke Brother       reports that he has never smoked  He has never used smokeless tobacco  He reports that he does not drink alcohol or use drugs      I COMPLETELY REVIEWED THE PAST MEDICAL HISTORY/PAST SURGICAL HISTORY/SOCIAL HISTORY/FAMILY HISTORY/AND MEDICATIONS  AND UPDATED ALL    Objective:     Vitals: Vitals:    03/06/20 0740   BP: 156/67   Pulse: 60   BP sitting on left:  150/68 with a heart rate of 60 and regular  BP standing on left:  148/60 with a heart rate of 60 and regular    Weight (last 2 days)     Date/Time   Weight    03/06/20 0740   94 6 (208 6)            Wt Readings from Last 3 Encounters:   03/06/20 94 6 kg (208 lb 9 6 oz)   01/29/20 92 1 kg (203 lb)   12/12/19 93 9 kg (207 lb)       Body mass index is 32 67 kg/m²  Physical Exam: General:  Obese, No acute distress  Skin:  No acute rash  Eyes:  No scleral icterus, noninjected, no discharge from eyes  ENT:  Moist mucous membranes  Neck:  Supple, no jugular venous distention, trachea is midline, no lymphadenopathy and no thyromegaly  Back   No CVAT  Chest:  Clear to auscultation and percussion, good respiratory effort  CVS:  Regular rate and rhythm without a rub, or gallops or murmurs  Abdomen:  Obese, Soft and nontender with normal bowel sounds  Extremities:  No cyanosis and no edema, no arthritic changes, normal range of motion  Neuro:  Grossly intact  Psych:  Alert, oriented x3 and appropriate      Medications:    Current Outpatient Medications:     acetaminophen (TYLENOL) 500 mg tablet, Take 500 mg by mouth every 6 (six) hours as needed for mild pain, Disp: , Rfl:     amiodarone 200 mg tablet, Take 200 mg by mouth daily  , Disp: , Rfl:     amLODIPine (NORVASC) 2 5 mg tablet, Take 2 5 mg by mouth every other day At nighttime  , Disp: , Rfl:     aspirin 81 MG tablet, Take 81 mg by mouth daily  , Disp: , Rfl:     isosorbide mononitrate (IMDUR) 30 mg 24 hr tablet, Take 1 tablet (30 mg total) by mouth daily, Disp: 90 tablet, Rfl: 3    levothyroxine 150 mcg tablet, Take 150 mcg by mouth daily , Disp: , Rfl:     LORazepam (ATIVAN) 1 mg tablet, Take 1 mg by mouth as needed for anxiety  , Disp: , Rfl:     metoprolol succinate (TOPROL-XL) 50 mg 24 hr tablet, Take 1 tablet (50 mg total) by mouth 2 (two) times a day, Disp: 180 tablet, Rfl: 3   mexiletine (MEXITIL) 150 mg capsule, Take 150 mg by mouth 2 (two) times a day , Disp: , Rfl:     Multiple Vitamins-Minerals (MULTIVITAMIN WITH MINERALS) tablet, Take 1 tablet by mouth daily  , Disp: , Rfl:     nitroglycerin (NITROSTAT) 0 4 mg SL tablet, Place 0 4 mg under the tongue every 5 (five) minutes as needed for chest pain  , Disp: , Rfl:     potassium citrate (UROCIT-K 10) 10 mEq, Take 2 tablets (20 mEq total) by mouth 2 (two) times a day, Disp: 360 tablet, Rfl: 3    pravastatin (PRAVACHOL) 80 mg tablet, Take 80 mg by mouth daily  , Disp: , Rfl:     tamsulosin (FLOMAX) 0 4 mg, Take 1 capsule (0 4 mg total) by mouth daily with dinner, Disp: 90 capsule, Rfl: 3    torsemide (DEMADEX) 20 mg tablet, Take 2 tablets (40 mg total) by mouth daily, Disp: 180 tablet, Rfl: 3    Lab, Imaging and other studies: I have personally reviewed pertinent labs  Laboratory Results:  Results for orders placed or performed in visit on 02/21/20   Comprehensive metabolic panel   Result Value Ref Range    Sodium 144 136 - 145 mmol/L    Potassium 4 0 3 5 - 5 3 mmol/L    Chloride 106 100 - 108 mmol/L    CO2 32 21 - 32 mmol/L    ANION GAP 6 4 - 13 mmol/L    BUN 24 5 - 25 mg/dL    Creatinine 1 86 (H) 0 60 - 1 30 mg/dL    Glucose 138 65 - 140 mg/dL    Calcium 8 9 8 3 - 10 1 mg/dL    AST 37 5 - 45 U/L    ALT 44 12 - 78 U/L    Alkaline Phosphatase 77 46 - 116 U/L    Total Protein 6 5 6 4 - 8 2 g/dL    Albumin 3 2 (L) 3 5 - 5 0 g/dL    Total Bilirubin 0 48 0 20 - 1 00 mg/dL    eGFR 32 ml/min/1 73sq m             Invalid input(s): ALBUMIN      Radiology review:   chest X-ray    Ultrasound      Portions of the record may have been created with voice recognition software  Occasional wrong word or "sound a like" substitutions may have occurred due to the inherent limitations of voice recognition software  Read the chart carefully and recognize, using context, where substitutions have occurred

## 2020-03-06 ENCOUNTER — TELEPHONE (OUTPATIENT)
Dept: NEPHROLOGY | Facility: CLINIC | Age: 85
End: 2020-03-06

## 2020-03-06 ENCOUNTER — OFFICE VISIT (OUTPATIENT)
Dept: NEPHROLOGY | Facility: CLINIC | Age: 85
End: 2020-03-06
Payer: MEDICARE

## 2020-03-06 VITALS
SYSTOLIC BLOOD PRESSURE: 156 MMHG | WEIGHT: 208.6 LBS | HEIGHT: 67 IN | DIASTOLIC BLOOD PRESSURE: 67 MMHG | HEART RATE: 60 BPM | BODY MASS INDEX: 32.74 KG/M2

## 2020-03-06 DIAGNOSIS — E55.9 VITAMIN D DEFICIENCY: ICD-10-CM

## 2020-03-06 DIAGNOSIS — I12.9 HYPERTENSIVE CHRONIC KIDNEY DISEASE WITH STAGE 1 THROUGH STAGE 4 CHRONIC KIDNEY DISEASE, OR UNSPECIFIED CHRONIC KIDNEY DISEASE: Primary | ICD-10-CM

## 2020-03-06 DIAGNOSIS — N20.0 NEPHROLITHIASIS: ICD-10-CM

## 2020-03-06 DIAGNOSIS — N18.30 CHRONIC KIDNEY DISEASE, STAGE III (MODERATE) (HCC): ICD-10-CM

## 2020-03-06 DIAGNOSIS — R60.0 LOCALIZED EDEMA: ICD-10-CM

## 2020-03-06 DIAGNOSIS — N25.81 SECONDARY HYPERPARATHYROIDISM OF RENAL ORIGIN (HCC): ICD-10-CM

## 2020-03-06 PROCEDURE — 99214 OFFICE O/P EST MOD 30 MIN: CPT | Performed by: INTERNAL MEDICINE

## 2020-03-06 NOTE — LETTER
March 6, 2020     Rosa Isela Mast  [address] [place] [address]    Patient: Shanti Orr   YOB: 1935   Date of Visit: 3/6/2020       Dear Dr Deidra Wheat:    Thank you for referring Shanti Orr to me for evaluation  Below are my notes for this consultation  If you have questions, please do not hesitate to call me  I look forward to following your patient along with you  Sincerely,        Perri Worley MD        CC: DO Lefty Lopez MD Edna Boston, MD Leldon Anderson, MD  3/6/2020  8:23 AM  Sign at close encounter  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  · Baseline creatinine:  1 5-1 83  · Current creatinine:  1 86 essentially at baseline  · Urine protein creatinine ratio:  0 11 g at goal  · UA:  0-1 RBC/2-4 WBC/negative proteinuria and negative occult blood  Recommendations:  · Treat hypertension-please see below  · Treat dyslipidemia-please see below  · Maintain proteinuria less than 1 g or as low as possible  · Avoid nephrotoxic agents such as NSAIDs, patient counseled as such  2   Volume:  TSH was acceptable; venous duplex negative; no significant edema at this time  3   Hypertension:       Current blood pressure averages:  Patient has no readings today  · Goal blood pressure:  Less than 125/80 given CAD  Recommendations:  · Push nonmedical regimen including weight loss, isotonic exercise and avoidance of salt, patient counseled as such  · Medication changes today:  Have the patient take 1 week a blood pressure readings at home  We may have to increase his amlodipine to 2 5 mg daily as his systolic reading is slightly high today    4   Electrolytes:  all acceptable  5   Mineral bone disorder:  · Calcium/magnesium/phosphorus:  All acceptable  · PTH intact:  105  5, secondary to CKD monitor for now as it has not changed  · Vitamin-D:  36: At goal continue supplementation  6   Dyslipidemia:  · Goal LDL:  Goal less than 70  · Current lipid profile:  LDL 79/HDL 43/triglycerides 130  Recommendations:  He has discussed this with Cardiology he would prefer not to add any medications including Zetia or switch anything at this point he does understand that the LDL is above goal of 70   7   Anemia:  Normal hemoglobin at 13 6  8   Nephrolithiasis:  Also followed by urology:  Patient with no further stone passage  24 hour urine for stone risk:  IN THE PAST:  · volume 1350 which is BELOW GOAL  · calcium 104 mg at goal  · citrate which is 265 mg BELOW GOAL  · oxalate 23 mg which is at goal  · sodium excretion 108 millimoles which is at goal  · uric acid:  369 mg which is at goal  Recommendations:  · Modest fluid intake with water, try to remove soda, we are restricted because of ischemic cardiomyopathy and fluid overload  · Potassium citrate  · KUB with multiple bilateral renal calculi similar to previous exam   · Repeat 24 hour urine for stone risk:    HAS NOT DONE AS OF YET:  Have the patient repeat 24 hour urine at this time to make sure we are doing maximal stone prevention  9   Other problems:  · Cardiac:  Followed by Dr Parr Mom with ischemic cardiomyopathy/ICD/systolic CHF/CABG   Ejection fraction 30% without any reversible ischemia on nuclear stress test   He has had recent biventricular pacemaker placement  · BPH  · Hypothyroidism          PATIENT INSTRUCTIONS:    Patient Instructions   1  No medication changes today  2  Please call with the dose of the Urocit-K    3  Please do a 24 hour urine collection for stone prevention at your convenience at this time      4  Please send in 1 week a blood pressure readings morning evening, sitting and standing as follows at this time:  · Take the morning readings before any medications  · Take the evening readings before supper and before taking any medications at that time  · When taking standing readings, keep your arm supported at heart level and not dangling  · Make sure you are sitting with your back supported in feet on the ground on crossed  · Make sure you have not taken any coffee or caffeine products or exercised or smoke cigarettes at least 30 minutes before taking your blood pressure    5  Follow-up in 4 months:  -please bring in 1 week a blood pressure readings morning evening, sitting and standing as outlined above  -PLEASE BRING IN YOUR BLOOD PRESSURE MACHINE FOR CORRELATION WITH THE OFFICE MACHINE  -please go for fasting lab work 1-2 weeks prior to your appointment    6  General instructions:   AVOID SALT BUT NOT ADDING AN READING LABELS TO MAKE SURE THERE IS LOW-SALT IN THE FOOD THAT YOU ARE EATING     Avoid nonsteroidal anti-inflammatory drugs such as Naprosyn, ibuprofen, Aleve, Advil, Celebrex, etc   You can use Tylenol as needed if you do not have any liver condition to be concerned about     Avoid medications such as Sudafed or decongestants and antihistamines that contained the D component which is the decongestant  You can take antihistamines without the decongestant or D component   Try to avoid medications such as pantoprazole or  Protonix/Nexium or Esomeprazole)/Prilosec or omeprazole/Prevacid or lansoprazole/AcipHex or Rabeprazole  If you are able to, use Pepcid as this is safer for your kidneys   Try to exercise at least 30 minutes 3 days a week to begin with with an ultimate goal of 5 days a week for at least 30 minutes     Try to lose 5-10 lb by your next visit     Please do not drink more than 2 glasses of alcohol/wine on a daily basis as this may contribute to your high blood pressure      7 Measures to reduce stone development:    · Please Drink 96 oz of water or 3 L a day throughout the day  · Avoid salt/low-salt diet   · Try to decrease animal protein intake, dairy protein and vegetable base protein are better  · Increase fruits and vegetables as much as possible  · Avoid calcium products such as Tums or other types of calcium containing antacids, you can use Pepcid for indigestion (but you do not have to restrict your dietary calcium)  · Avoid excessive vitamin D   · Avoid excessive vitamin C  · Try to avoid oxalate products (please refer to the diet sheet)  · Limit fructose and sucrose type drinks such as coke            Subjective: There has been no hospitalizations or acute illnesses since last visit  He has had a mild URI with minimal productive cough  No fevers chills cough or colds    Good appetite and good energy  No hematuria, dysuria, voiding symptoms or foamy urine  No gastrointestinal symptoms  No cardiovascular symptoms including swelling of the legs   headaches, dizziness or lightheadedness  Blood pressure medications:  · Torsemide 40 mg daily in the morning  · Urocit-K 10 mEq twice a day  · Toprol XL 50 mg twice a day  · Imdur 30 mg daily  · Amlodipine 2 5 mg daily  ·     ROS:  See HPI, otherwise review of systems as completely reviewed with the patient are negative    Past Medical History:   Diagnosis Date    AICD (automatic cardioverter/defibrillator) present 2004    AICD (automatic cardioverter/defibrillator) present 2006    AICD (automatic cardioverter/defibrillator) present 2013    [facility]    Arthritis     R knee and neck    Atrial fibrillation [facility] [address] [facility]     BPH (benign prostatic hyperplasia)     CAD (coronary artery disease) of artery bypass graft     CHF (congestive heart failure) ([facility] [address] )     combine    Coronary artery disease     Disease of thyroid gland     Hyperlipidemia     Hypertension     Ischemic cardiomyopathy     Left ureteral calculus 10/17/2017    Renal disorder     V tach ([facility] [address] )      Past Surgical History:   Procedure Laterality Date    CARDIAC CATHETERIZATION      CARDIAC SURGERY      Pacemaker    CORONARY ARTERY BYPASS GRAFT      [place] CYSTOURETHROSCOPY,URETER CATHETER N/A 10/17/2017 Procedure: CYSTOSCOPY, left  RETROGRADE PYELOGRAM WITH LEFT URETEROSCOPY , stone extraction,LEFT STENT INSERTION;  Surgeon: Shauna Mccormack MD;  Location: [facility];  Service: Urology     Family History   Problem Relation Age of Onset    Tuberculosis Father         WWI    Hypertension Mother     Stroke Brother       reports that he has never smoked  He has never used smokeless tobacco  He reports that he does not drink alcohol or use drugs  I COMPLETELY REVIEWED THE PAST MEDICAL HISTORY/PAST SURGICAL HISTORY/SOCIAL HISTORY/FAMILY HISTORY/AND MEDICATIONS  AND UPDATED ALL    Objective:     Vitals:   Vitals:    03/06/20 0740   BP: 156/67   Pulse: 60   BP sitting on left:  150/68 with a heart rate of 60 and regular  BP standing on left:  148/60 with a heart rate of 60 and regular    Weight (last 2 days)     Date/Time   Weight    03/06/20 0740   94 6 (208 6)            Wt Readings from Last 3 Encounters:   03/06/20 94 6 kg (208 lb 9 6 oz)   01/29/20 92 1 kg (203 lb)   12/12/19 93 9 kg (207 lb)       Body mass index is 32 67 kg/m²  Physical Exam: General:  Obese, No acute distress  Skin:  No acute rash  Eyes:  No scleral icterus, noninjected, no discharge from eyes  ENT:  Moist mucous membranes  Neck:  Supple, no jugular venous distention, trachea is midline, no lymphadenopathy and no thyromegaly  Back   No CVAT  Chest:  Clear to auscultation and percussion, good respiratory effort  CVS:  Regular rate and rhythm without a rub, or gallops or murmurs  Abdomen:  Obese, Soft and nontender with normal bowel sounds  Extremities:  No cyanosis and no edema, no arthritic changes, normal range of motion  Neuro:  Grossly intact  Psych:  Alert, oriented x3 and appropriate      Medications:    Current Outpatient Medications:     acetaminophen (TYLENOL) 500 mg tablet, Take 500 mg by mouth every 6 (six) hours as needed for mild pain, Disp: , Rfl:     amiodarone 200 mg tablet, Take 200 mg by mouth daily  , Disp: , Rfl:    amLODIPine (NORVASC) 2 5 mg tablet, Take 2 5 mg by mouth every other day At nighttime  , Disp: , Rfl:     aspirin 81 MG tablet, Take 81 mg by mouth daily  , Disp: , Rfl:     isosorbide mononitrate (IMDUR) 30 mg 24 hr tablet, Take 1 tablet (30 mg total) by mouth daily, Disp: 90 tablet, Rfl: 3    levothyroxine 150 mcg tablet, Take 150 mcg by mouth daily , Disp: , Rfl:     LORazepam (ATIVAN) 1 mg tablet, Take 1 mg by mouth as needed for anxiety  , Disp: , Rfl:     metoprolol succinate (TOPROL-XL) 50 mg 24 hr tablet, Take 1 tablet (50 mg total) by mouth 2 (two) times a day, Disp: 180 tablet, Rfl: 3    mexiletine (MEXITIL) 150 mg capsule, Take 150 mg by mouth 2 (two) times a day , Disp: , Rfl:     Multiple Vitamins-Minerals (MULTIVITAMIN WITH MINERALS) tablet, Take 1 tablet by mouth daily  , Disp: , Rfl:     nitroglycerin (NITROSTAT) 0 4 mg SL tablet, Place 0 4 mg under the tongue every 5 (five) minutes as needed for chest pain  , Disp: , Rfl:     potassium citrate (UROCIT-K 10) 10 mEq, Take 2 tablets (20 mEq total) by mouth 2 (two) times a day, Disp: 360 tablet, Rfl: 3    pravastatin (PRAVACHOL) 80 mg tablet, Take 80 mg by mouth daily  , Disp: , Rfl:     tamsulosin (FLOMAX) 0 4 mg, Take 1 capsule (0 4 mg total) by mouth daily with dinner, Disp: 90 capsule, Rfl: 3    torsemide (DEMADEX) 20 mg tablet, Take 2 tablets (40 mg total) by mouth daily, Disp: 180 tablet, Rfl: 3    Lab, Imaging and other studies: I have personally reviewed pertinent labs    Laboratory Results:  Results for orders placed or performed in visit on 02/21/20   Comprehensive metabolic panel   Result Value Ref Range    Sodium 144 136 - 145 mmol/L    Potassium 4 0 3 5 - 5 3 mmol/L    Chloride 106 100 - 108 mmol/L    CO2 32 21 - 32 mmol/L    ANION GAP 6 4 - 13 mmol/L    BUN 24 5 - 25 mg/dL    Creatinine 1 86 (H) 0 60 - 1 30 mg/dL    Glucose 138 65 - 140 mg/dL    Calcium 8 9 8 3 - 10 1 mg/dL    AST 37 5 - 45 U/L    ALT 44 12 - 78 U/L    Alkaline Phosphatase 77 46 - 116 U/L    Total Protein 6 5 6 4 - 8 2 g/dL    Albumin 3 2 (L) 3 5 - 5 0 g/dL    Total Bilirubin 0 48 0 20 - 1 00 mg/dL    eGFR 32 ml/min/1 73sq m             Invalid input(s): ALBUMIN      Radiology review:   chest X-ray    Ultrasound      Portions of the record may have been created with voice recognition software  Occasional wrong word or "sound a like" substitutions may have occurred due to the inherent limitations of voice recognition software  Read the chart carefully and recognize, using context, where substitutions have occurred

## 2020-03-06 NOTE — PATIENT INSTRUCTIONS
1  No medication changes today  2  Please call with the dose of the Urocit-K    3  Please do a 24 hour urine collection for stone prevention at your convenience at this time  4  Please send in 1 week a blood pressure readings morning evening, sitting and standing as follows at this time:  · Take the morning readings before any medications  · Take the evening readings before supper and before taking any medications at that time  · When taking standing readings, keep your arm supported at heart level and not dangling  · Make sure you are sitting with your back supported in feet on the ground on crossed  · Make sure you have not taken any coffee or caffeine products or exercised or smoke cigarettes at least 30 minutes before taking your blood pressure    5  Follow-up in 4 months:  -please bring in 1 week a blood pressure readings morning evening, sitting and standing as outlined above  -PLEASE BRING IN YOUR BLOOD PRESSURE MACHINE FOR CORRELATION WITH THE OFFICE MACHINE  -please go for fasting lab work 1-2 weeks prior to your appointment    6  General instructions:   AVOID SALT BUT NOT ADDING AN READING LABELS TO MAKE SURE THERE IS LOW-SALT IN THE FOOD THAT YOU ARE EATING     Avoid nonsteroidal anti-inflammatory drugs such as Naprosyn, ibuprofen, Aleve, Advil, Celebrex, etc   You can use Tylenol as needed if you do not have any liver condition to be concerned about     Avoid medications such as Sudafed or decongestants and antihistamines that contained the D component which is the decongestant  You can take antihistamines without the decongestant or D component   Try to avoid medications such as pantoprazole or  Protonix/Nexium or Esomeprazole)/Prilosec or omeprazole/Prevacid or lansoprazole/AcipHex or Rabeprazole  If you are able to, use Pepcid as this is safer for your kidneys       Try to exercise at least 30 minutes 3 days a week to begin with with an ultimate goal of 5 days a week for at least 30 minutes     Try to lose 5-10 lb by your next visit     Please do not drink more than 2 glasses of alcohol/wine on a daily basis as this may contribute to your high blood pressure      7 Measures to reduce stone development:    · Please Drink 96 oz of water or 3 L a day throughout the day  · Avoid salt/low-salt diet   · Try to decrease animal protein intake, dairy protein and vegetable base protein are better  · Increase fruits and vegetables as much as possible  · Avoid calcium products such as Tums or other types of calcium containing antacids, you can use Pepcid for indigestion (but you do not have to restrict your dietary calcium)  · Avoid excessive vitamin D   · Avoid excessive vitamin C  · Try to avoid oxalate products (please refer to the diet sheet)  · Limit fructose and sucrose type drinks such as coke

## 2020-03-10 ENCOUNTER — TELEPHONE (OUTPATIENT)
Dept: CARDIOLOGY CLINIC | Facility: CLINIC | Age: 85
End: 2020-03-10

## 2020-03-10 NOTE — TELEPHONE ENCOUNTER
Left message for patient to call office regarding change in medication per Dr Crystal Pappas note -     Could you please request this patient switch his pravastatin to rosuvastatin 20 mg daily   Is nephrologist reached out to me, he is concerned that his lipids are not well controlled, in the could use more intensive lowering, the better lipids are lowered, the less risk of future heart disease   Thank you   If he is agreeable, please put it through and I will sign off

## 2020-03-11 DIAGNOSIS — E78.2 MIXED HYPERLIPIDEMIA: Primary | ICD-10-CM

## 2020-03-11 RX ORDER — ROSUVASTATIN CALCIUM 20 MG/1
20 TABLET, COATED ORAL DAILY
Qty: 30 TABLET | Refills: 11 | Status: SHIPPED | OUTPATIENT
Start: 2020-03-11 | End: 2020-07-13

## 2020-03-18 ENCOUNTER — APPOINTMENT (OUTPATIENT)
Dept: LAB | Facility: CLINIC | Age: 85
End: 2020-03-18
Payer: MEDICARE

## 2020-03-18 PROCEDURE — 84392 ASSAY OF URINE SULFATE: CPT | Performed by: INTERNAL MEDICINE

## 2020-03-18 PROCEDURE — 82340 ASSAY OF CALCIUM IN URINE: CPT | Performed by: INTERNAL MEDICINE

## 2020-03-18 PROCEDURE — 82140 ASSAY OF AMMONIA: CPT | Performed by: INTERNAL MEDICINE

## 2020-03-18 PROCEDURE — 84133 ASSAY OF URINE POTASSIUM: CPT | Performed by: INTERNAL MEDICINE

## 2020-03-18 PROCEDURE — 83945 ASSAY OF OXALATE: CPT | Performed by: INTERNAL MEDICINE

## 2020-03-18 PROCEDURE — 84105 ASSAY OF URINE PHOSPHORUS: CPT | Performed by: INTERNAL MEDICINE

## 2020-03-18 PROCEDURE — 81003 URINALYSIS AUTO W/O SCOPE: CPT | Performed by: INTERNAL MEDICINE

## 2020-03-18 PROCEDURE — 82436 ASSAY OF URINE CHLORIDE: CPT | Performed by: INTERNAL MEDICINE

## 2020-03-18 PROCEDURE — 82131 AMINO ACIDS SINGLE QUANT: CPT | Performed by: INTERNAL MEDICINE

## 2020-03-18 PROCEDURE — 82507 ASSAY OF CITRATE: CPT | Performed by: INTERNAL MEDICINE

## 2020-03-18 PROCEDURE — 84560 ASSAY OF URINE/URIC ACID: CPT | Performed by: INTERNAL MEDICINE

## 2020-03-18 PROCEDURE — 84300 ASSAY OF URINE SODIUM: CPT | Performed by: INTERNAL MEDICINE

## 2020-03-18 PROCEDURE — 83935 ASSAY OF URINE OSMOLALITY: CPT | Performed by: INTERNAL MEDICINE

## 2020-03-18 PROCEDURE — 82570 ASSAY OF URINE CREATININE: CPT | Performed by: INTERNAL MEDICINE

## 2020-03-18 PROCEDURE — 83735 ASSAY OF MAGNESIUM: CPT | Performed by: INTERNAL MEDICINE

## 2020-03-23 ENCOUNTER — TELEPHONE (OUTPATIENT)
Dept: CARDIOLOGY CLINIC | Facility: CLINIC | Age: 85
End: 2020-03-23

## 2020-03-23 DIAGNOSIS — I48.0 PAROXYSMAL ATRIAL FIBRILLATION (HCC): Primary | ICD-10-CM

## 2020-03-23 RX ORDER — MEXILETINE HYDROCHLORIDE 150 MG/1
150 CAPSULE ORAL 2 TIMES DAILY
Qty: 180 CAPSULE | Refills: 3 | Status: SHIPPED | OUTPATIENT
Start: 2020-03-23 | End: 2021-04-15 | Stop reason: SDUPTHER

## 2020-03-23 NOTE — TELEPHONE ENCOUNTER
Received a call from PCP office asking if pt should be taking Mexitil 150mg and Amiodarone 200mg? (Pt called their office asking for a refill on Mexitil )    Called pt he has been taking both medications for a long time and requesting a refill to be sent to [facility] if ok to fill?

## 2020-03-25 LAB
AMMONIA 24H UR-MRATE: 8 MEQ/24 HR
AMMONIA UR-SCNC: 8850 UG/DL
CA H2 PHOS DIHYD CRY URNS QL MICRO: 0.7 RATIO (ref 0–3)
CALCIUM 24H UR-MCNC: 6 MG/DL
CALCIUM 24H UR-MRATE: 90 MG/24 HR (ref 100–300)
CHLORIDE 24H UR-SCNC: 77 MMOL/L
CHLORIDE 24H UR-SRATE: 116 MMOL/24 HR (ref 110–250)
CITRATE 24H UR-MCNC: 217 MG/L
CITRATE 24H UR-MRATE: 326 MG/24 HR (ref 320–1240)
COM CRY STONE QL IR: 5.83 RATIO (ref 0–6)
CREAT 24H UR-MCNC: 93.4 MG/DL
CREAT 24H UR-MRATE: 1401 MG/24 HR (ref 1000–2000)
CYSTINE 24H UR-MCNC: 5.65 MG/L
CYSTINE 24H UR-MRATE: 8.48 MG/24 HR (ref 10–100)
MAGNESIUM 24H UR-MRATE: 65 MG/24 HR (ref 12–293)
MAGNESIUM UR-MCNC: 4.3 MG/DL
NA URATE CRY STONE QL IR: 3.27 RATIO (ref 0–4)
OSMOLALITY UR: 429 MOSMOL/KG (ref 300–900)
OXALATE 24H UR-MRATE: 44 MG/24 HR (ref 7–44)
OXALATE UR-MCNC: 29 MG/L
PH 24H UR: 6.1 [PH]
PHOSPHATE 24H UR-MCNC: 40.4 MG/DL
PHOSPHATE 24H UR-MRATE: 606 MG/24 HR (ref 400–1300)
PLEASE NOTE (STONE RISK): ABNORMAL
POTASSIUM 24H UR-SCNC: 57.8 MMOL/24 HR (ref 25–125)
POTASSIUM UR-SCNC: 38.5 MMOL/L
PRESERVED URINE: 1500 ML/24 HR (ref 800–1800)
SODIUM 24H UR-SCNC: 99 MMOL/L
SODIUM 24H UR-SRATE: 149 MMOL/24 HR (ref 23–207)
SPECIMEN VOL 24H UR: 1500 ML/24 HR (ref 800–1800)
SULFATE 24H UR-MCNC: 17 MEQ/24 HR (ref 0–30)
SULFATE UR-MCNC: 11 MEQ/L
TRI-PHOS CRY STONE MICRO: 0.01 RATIO (ref 0–1)
URATE 24H UR-MCNC: 30.7 MG/DL
URATE 24H UR-MRATE: 461 MG/24 HR (ref 250–750)
URATE DIHYD CRY STONE QL IR: 1.03 RATIO (ref 0–1.2)

## 2020-03-26 ENCOUNTER — TELEPHONE (OUTPATIENT)
Dept: NEPHROLOGY | Facility: CLINIC | Age: 85
End: 2020-03-26

## 2020-03-26 NOTE — TELEPHONE ENCOUNTER
----- Message from Marlen Maradiaga MD sent at 3/25/2020  4:51 PM EDT -----  Based on the 24 hour urine for stone prevention:   1  Volume:  LOW AT 1500 ML  2  Calcium:  90 mg which is acceptable  3  Citrate is 326 slightly low  4  Cystine negative  5  Oxalate borderline elevated of 44  6  Uric acid:  461 mg which is normal  7   Urine sodium 149 mEq which is fair    Based on these values I would make the following recommendations:        -Please Drink 96 oz of water or 3 L a day throughout the day :  THIS IS EXTREMELY IMPORTANT  -Avoid salt/low-salt diet   -Try to decrease animal protein intake, dairy protein and vegetable base protein are better  -Increase fruits and vegetables as much as possible  -Avoid calcium products such as Tums or other types of calcium containing antacids, you can use Pepcid for indigestion (but you do not have to restrict your dietary calcium)  -Avoid excessive vitamin D   -Avoid excessive vitamin C  -Try to avoid oxalate products (please refer to the diet sheet)  -Limit fructose and sucrose type drinks such as coke    CONTINUE CURRENT POTASSIUM CITRATE AS WELL

## 2020-03-27 NOTE — TELEPHONE ENCOUNTER
I spoke to patiens wife discussed all results and instructions  Patients wife made herself a note of what to avoid and increase in his diet  Over all no questions at this time

## 2020-04-09 LAB
AMMONIA UR-SCNC: NORMAL UMOL/L
CYSTINE 24H UR-MRATE: NORMAL MG/(24.H)
MAGNESIUM 24H UR-MRATE: NORMAL MG/(24.H)

## 2020-04-28 ENCOUNTER — PATIENT OUTREACH (OUTPATIENT)
Dept: CASE MANAGEMENT | Facility: OTHER | Age: 85
End: 2020-04-28

## 2020-05-27 ENCOUNTER — REMOTE DEVICE CLINIC VISIT (OUTPATIENT)
Dept: CARDIOLOGY CLINIC | Facility: CLINIC | Age: 85
End: 2020-05-27
Payer: MEDICARE

## 2020-05-27 DIAGNOSIS — Z95.810 PRESENCE OF AUTOMATIC CARDIOVERTER/DEFIBRILLATOR (AICD): Primary | ICD-10-CM

## 2020-05-27 PROCEDURE — 93296 REM INTERROG EVL PM/IDS: CPT | Performed by: INTERNAL MEDICINE

## 2020-05-27 PROCEDURE — 93295 DEV INTERROG REMOTE 1/2/MLT: CPT | Performed by: INTERNAL MEDICINE

## 2020-05-28 DIAGNOSIS — I50.22 CHRONIC SYSTOLIC CONGESTIVE HEART FAILURE (HCC): ICD-10-CM

## 2020-05-28 RX ORDER — METOPROLOL SUCCINATE 50 MG/1
50 TABLET, EXTENDED RELEASE ORAL 2 TIMES DAILY
Qty: 180 TABLET | Refills: 3 | Status: SHIPPED | OUTPATIENT
Start: 2020-05-28 | End: 2021-04-15 | Stop reason: SDUPTHER

## 2020-06-29 ENCOUNTER — TRANSCRIBE ORDERS (OUTPATIENT)
Dept: LAB | Facility: CLINIC | Age: 85
End: 2020-06-29

## 2020-06-29 ENCOUNTER — APPOINTMENT (OUTPATIENT)
Dept: LAB | Facility: CLINIC | Age: 85
End: 2020-06-29
Payer: MEDICARE

## 2020-06-29 DIAGNOSIS — N18.30 CHRONIC KIDNEY DISEASE, STAGE III (MODERATE) (HCC): ICD-10-CM

## 2020-06-29 DIAGNOSIS — N25.81 SECONDARY HYPERPARATHYROIDISM OF RENAL ORIGIN (HCC): ICD-10-CM

## 2020-06-29 DIAGNOSIS — R60.0 LOCALIZED EDEMA: ICD-10-CM

## 2020-06-29 DIAGNOSIS — I12.9 HYPERTENSIVE CHRONIC KIDNEY DISEASE WITH STAGE 1 THROUGH STAGE 4 CHRONIC KIDNEY DISEASE, OR UNSPECIFIED CHRONIC KIDNEY DISEASE: ICD-10-CM

## 2020-06-29 DIAGNOSIS — N20.0 NEPHROLITHIASIS: ICD-10-CM

## 2020-06-29 DIAGNOSIS — E55.9 VITAMIN D DEFICIENCY: ICD-10-CM

## 2020-06-29 LAB
25(OH)D3 SERPL-MCNC: 34.4 NG/ML (ref 30–100)
ALBUMIN SERPL BCP-MCNC: 3.4 G/DL (ref 3.5–5)
ALP SERPL-CCNC: 69 U/L (ref 46–116)
ALT SERPL W P-5'-P-CCNC: 42 U/L (ref 12–78)
ANION GAP SERPL CALCULATED.3IONS-SCNC: 4 MMOL/L (ref 4–13)
AST SERPL W P-5'-P-CCNC: 42 U/L (ref 5–45)
BILIRUB SERPL-MCNC: 0.64 MG/DL (ref 0.2–1)
BUN SERPL-MCNC: 23 MG/DL (ref 5–25)
CALCIUM SERPL-MCNC: 9.2 MG/DL (ref 8.3–10.1)
CHLORIDE SERPL-SCNC: 105 MMOL/L (ref 100–108)
CHOLEST SERPL-MCNC: 106 MG/DL (ref 50–200)
CK SERPL-CCNC: 146 U/L (ref 39–308)
CO2 SERPL-SCNC: 30 MMOL/L (ref 21–32)
CREAT SERPL-MCNC: 1.71 MG/DL (ref 0.6–1.3)
CREAT UR-MCNC: 149 MG/DL
ERYTHROCYTE [DISTWIDTH] IN BLOOD BY AUTOMATED COUNT: 13.9 % (ref 11.6–15.1)
GFR SERPL CREATININE-BSD FRML MDRD: 36 ML/MIN/1.73SQ M
GLUCOSE P FAST SERPL-MCNC: 107 MG/DL (ref 65–99)
HCT VFR BLD AUTO: 39.9 % (ref 36.5–49.3)
HDLC SERPL-MCNC: 42 MG/DL
HGB BLD-MCNC: 13.1 G/DL (ref 12–17)
LDLC SERPL CALC-MCNC: 43 MG/DL (ref 0–100)
MAGNESIUM SERPL-MCNC: 2 MG/DL (ref 1.6–2.6)
MCH RBC QN AUTO: 32.1 PG (ref 26.8–34.3)
MCHC RBC AUTO-ENTMCNC: 32.8 G/DL (ref 31.4–37.4)
MCV RBC AUTO: 98 FL (ref 82–98)
PHOSPHATE SERPL-MCNC: 3.4 MG/DL (ref 2.3–4.1)
PLATELET # BLD AUTO: 207 THOUSANDS/UL (ref 149–390)
PMV BLD AUTO: 10.2 FL (ref 8.9–12.7)
POTASSIUM SERPL-SCNC: 3.8 MMOL/L (ref 3.5–5.3)
PROT SERPL-MCNC: 6.5 G/DL (ref 6.4–8.2)
PROT UR-MCNC: 22 MG/DL
PROT/CREAT UR: 0.15 MG/G{CREAT} (ref 0–0.1)
PTH-INTACT SERPL-MCNC: 94 PG/ML (ref 18.4–80.1)
RBC # BLD AUTO: 4.08 MILLION/UL (ref 3.88–5.62)
SODIUM SERPL-SCNC: 139 MMOL/L (ref 136–145)
TRIGL SERPL-MCNC: 103 MG/DL
WBC # BLD AUTO: 6.52 THOUSAND/UL (ref 4.31–10.16)

## 2020-06-29 PROCEDURE — 82570 ASSAY OF URINE CREATININE: CPT

## 2020-06-29 PROCEDURE — 83735 ASSAY OF MAGNESIUM: CPT

## 2020-06-29 PROCEDURE — 85027 COMPLETE CBC AUTOMATED: CPT

## 2020-06-29 PROCEDURE — 84156 ASSAY OF PROTEIN URINE: CPT

## 2020-06-29 PROCEDURE — 36415 COLL VENOUS BLD VENIPUNCTURE: CPT

## 2020-06-29 PROCEDURE — 83970 ASSAY OF PARATHORMONE: CPT

## 2020-06-29 PROCEDURE — 80053 COMPREHEN METABOLIC PANEL: CPT

## 2020-06-29 PROCEDURE — 82550 ASSAY OF CK (CPK): CPT

## 2020-06-29 PROCEDURE — 82306 VITAMIN D 25 HYDROXY: CPT

## 2020-06-29 PROCEDURE — 80061 LIPID PANEL: CPT

## 2020-06-29 PROCEDURE — 84100 ASSAY OF PHOSPHORUS: CPT

## 2020-07-09 NOTE — PROGRESS NOTES
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  · Baseline creatinine:  1 5-1 83  · Current creatinine:  1 71 at baseline  · Urine protein creatinine ratio:  0 15 g at goal  · UA:  0-1 RBC/2-4 WBC/negative proteinuria and negative occult blood  Recommendations:  · Treat hypertension-please see below  · Treat dyslipidemia-please see below  · Maintain proteinuria less than 1 g or as low as possible  · Avoid nephrotoxic agents such as NSAIDs, patient counseled as such  2   Volume:  TSH was acceptable; venous duplex negative; no significant edema at this time  3   Hypertension:       Current blood pressure averages:   Blood pressure readings:  A m :  124/64, standing 124/58  P m :  120/57, standing 114/48  Heart rate:  60-70    · Goal blood pressure:  Less than 125/80 given CAD    Recommendations:  · Push nonmedical regimen including weight loss, isotonic exercise and avoidance of salt, patient counseled as such  · Medication changes today:   at goal so no changes at this time  4   Electrolytes:  all acceptable  5   Mineral bone disorder:  · Calcium/magnesium/phosphorus:  All acceptable  · PTH intact:  94 0, secondary to CKD monitor for now as it has improved slightly  · Vitamin-D:  34 4:  At goal continue supplementation  6   Dyslipidemia:  · Goal LDL:  Goal less than 70  · Current lipid profile:  LDL  43/HDL 42/triglycerides 103  Recommendations:  At goal so no changes  7   Anemia:  Normal hemoglobin at 13 1  8   Nephrolithiasis:  Also followed by urology:  Patient with no further stone passage  · 24 hour urine for stone risk:  Based on the 24 hour urine for stone prevention:  Done March 2020  1  Volume:  LOW AT 1500 ML  2  Calcium:  90 mg which is acceptable  3  Citrate is 326 slightly low  4  Cystine negative  5  Oxalate borderline elevated of 44  6  Uric acid:  461 mg which is normal  7   Urine sodium 149 mEq which is fair  Recommendations:  · Modest fluid intake with water, try to remove soda, we are restricted because of ischemic cardiomyopathy and fluid overload  · General stone dietary recommendations please see patient's recommendations  · Potassium citrate  · Recheck 24 hour urine next visit to make sure he is improved    9   Other problems:  · Cardiac:  Followed by Dr Patricia Alex with ischemic cardiomyopathy/ICD/systolic CHF/CABG   Ejection fraction 30% without any reversible ischemia on nuclear stress test   He has had recent biventricular pacemaker placement  · BPH  · Hypothyroidism       PATIENT INSTRUCTIONS:    Patient Instructions   1  No medication changes today  2 Follow-up in 4  months   Please bring in 1 week a blood pressure readings morning evening, sitting and standing is outlined above   PLEASE BRING IN YOUR BLOOD PRESSURE MACHINE FOR CORRELATION WITH THE OFFICE MACHINE AT THE NEXT VISIT   Please go for fasting lab work 1-2 weeks prior to your appointment  This will include a 24 hour urine for stone risk      3  Measures to reduce stone development:    · Please Drink 80-96 oz of water or 2 5-3 L a day throughout the day  · Avoid salt/low-salt diet   · Try to decrease animal protein intake, dairy protein and vegetable base protein are better  · Increase fruits and vegetables as much as possible  · Avoid calcium products such as Tums or other types of calcium containing antacids, you can use Pepcid for indigestion (but you do not have to restrict your dietary calcium)  · Avoid excessive vitamin D   · Avoid excessive vitamin C  · Try to avoid oxalate products (please refer to the diet sheet)  · Limit fructose and sucrose type drinks such as coke  4  General instructions:   AVOID SALT BUT NOT ADDING AN READING LABELS TO MAKE SURE THERE IS LOW-SALT IN THE FOOD THAT YOU ARE EATING     Avoid nonsteroidal anti-inflammatory drugs such as Naprosyn, ibuprofen, Aleve, Advil, Celebrex, Meloxicam (Mobic) etc   You can use Tylenol as needed if you do not have any liver condition to be concerned about     Avoid medications such as Sudafed or decongestants and antihistamines that contained the D component which is the decongestant  You can take antihistamines without the decongestant or D component   Try to avoid medications such as pantoprazole or  Protonix/Nexium or Esomeprazole)/Prilosec or omeprazole/Prevacid or lansoprazole/AcipHex or Rabeprazole  If you are able to, use Pepcid as this is safer for your kidneys   Try to exercise at least 30 minutes 3 days a week to begin with with an ultimate goal of 5 days a week for at least 30 minutes     Try to lose 5-10 lb by your next visit     Please do not drink more than 2 glasses of alcohol/wine on a daily basis as this may contribute to your high blood pressure  Subjective: There has been no hospitalizations or acute illnesses since last visit  The patient overall is feeling well  No fevers, chills, or colds:  Chronic nasal congestion    Good appetite and fair energy  No hematuria, dysuria, voiding symptoms or foamy urine  No gastrointestinal symptoms  No chest pain, mild dyspnea on exertion which is chronic and unchanged, no swelling of the legs except for his knees which is chronic  No headaches, there is dizziness or lightheadedness on occasion not necessarily associated with change in position  Blood pressure medications:  · Torsemide 40 mg daily  · Amlodipine 2 5 mg every other day in the morning  · Imdur 30 mg daily in the morning  · Toprol XL 50 mg twice a day    ROS:  See HPI, otherwise review of systems as completely reviewed with the patient are negative    Past Medical History:   Diagnosis Date    AICD (automatic cardioverter/defibrillator) present 2004    AICD (automatic cardioverter/defibrillator) present 2006    AICD (automatic cardioverter/defibrillator) present 2013    [facility]    Arthritis     R knee and neck    Atrial fibrillation (HCC)     BPH (benign prostatic hyperplasia)     CAD (coronary artery disease) of artery bypass graft     CHF (congestive heart failure) (HCC)     combine    Coronary artery disease     Disease of thyroid gland     Hyperlipidemia     Hypertension     Ischemic cardiomyopathy     Left ureteral calculus 10/17/2017    Renal disorder     V tach ([address] )      Past Surgical History:   Procedure Laterality Date    CARDIAC CATHETERIZATION      CARDIAC SURGERY      Pacemaker    CORONARY ARTERY BYPASS GRAFT      UT CYSTOURETHROSCOPY,URETER CATHETER N/A 10/17/2017    Procedure: CYSTOSCOPY, left  RETROGRADE PYELOGRAM WITH LEFT URETEROSCOPY , stone extraction,LEFT STENT INSERTION;  Surgeon: Deepthi Lopez MD;  Location: [facility];  Service: Urology     Family History   Problem Relation Age of Onset    Tuberculosis Father         WWI    Hypertension Mother     Stroke Brother       reports that he has never smoked  He has never used smokeless tobacco  He reports that he does not drink alcohol or use drugs  I COMPLETELY REVIEWED THE PAST MEDICAL HISTORY/PAST SURGICAL HISTORY/SOCIAL HISTORY/FAMILY HISTORY/AND MEDICATIONS  AND UPDATED ALL    Objective:   BP sitting on left:  152/70 with a heart rate of 64 and regular  BP standing on left:  150/70 with a heart rate of 64 regular    Vitals:   Vitals:    07/13/20 0854   Temp: 98 2 °F (36 8 °C)       Weight (last 2 days)     Date/Time   Weight    07/13/20 0854   93 7 (206 5)            Wt Readings from Last 3 Encounters:   07/13/20 93 7 kg (206 lb 8 oz)   03/06/20 94 6 kg (208 lb 9 6 oz)   01/29/20 92 1 kg (203 lb)       Body mass index is 32 34 kg/m²      Physical Exam: General:  Obese, No acute distress  Skin:  No acute rash  Eyes:  No scleral icterus, noninjected, no discharge from eyes  ENT:  Moist mucous membranes  Neck:  Supple, no jugular venous distention, trachea is midline, no lymphadenopathy and no thyromegaly  Back   No CVAT  Chest: Clear to auscultation and percussion, good respiratory effort  CVS:  Regular rate and rhythm without a rub, or gallops or murmurs  Abdomen:  Obese, Soft and nontender with normal bowel sounds  Extremities:  No cyanosis and no edema, no arthritic changes, normal range of motion  Neuro:  Grossly intact  Psych:  Alert, oriented x3 and appropriate      Medications:    Current Outpatient Medications:     acetaminophen (TYLENOL) 500 mg tablet, Take 500 mg by mouth every 6 (six) hours as needed for mild pain, Disp: , Rfl:     amiodarone 200 mg tablet, Take 200 mg by mouth daily  , Disp: , Rfl:     amLODIPine (NORVASC) 2 5 mg tablet, Take 2 5 mg by mouth every other day At Morning , Disp: , Rfl:     aspirin 81 MG tablet, Take 81 mg by mouth daily  , Disp: , Rfl:     isosorbide mononitrate (IMDUR) 30 mg 24 hr tablet, Take 1 tablet (30 mg total) by mouth daily, Disp: 90 tablet, Rfl: 3    levothyroxine 150 mcg tablet, Take 150 mcg by mouth daily , Disp: , Rfl:     LORazepam (ATIVAN) 1 mg tablet, Take 1 mg by mouth as needed for anxiety  , Disp: , Rfl:     metoprolol succinate (TOPROL-XL) 50 mg 24 hr tablet, Take 1 tablet (50 mg total) by mouth 2 (two) times a day, Disp: 180 tablet, Rfl: 3    mexiletine (MEXITIL) 150 mg capsule, Take 1 capsule (150 mg total) by mouth 2 (two) times a day, Disp: 180 capsule, Rfl: 3    Multiple Vitamins-Minerals (MULTIVITAMIN WITH MINERALS) tablet, Take 1 tablet by mouth daily  , Disp: , Rfl:     nitroglycerin (NITROSTAT) 0 4 mg SL tablet, Place 0 4 mg under the tongue every 5 (five) minutes as needed for chest pain  , Disp: , Rfl:     potassium citrate (Urocit-K 10) 10 mEq, Take 2 tablets (20 mEq total) by mouth 2 (two) times a day, Disp: 360 tablet, Rfl: 3    pravastatin (PRAVACHOL) 80 mg tablet, Take 80 mg by mouth daily  , Disp: , Rfl:     tamsulosin (FLOMAX) 0 4 mg, Take 1 capsule (0 4 mg total) by mouth daily with dinner, Disp: 90 capsule, Rfl: 3    torsemide (DEMADEX) 20 mg tablet, Take 2 tablets (40 mg total) by mouth daily, Disp: 180 tablet, Rfl: 3    Lab, Imaging and other studies: I have personally reviewed pertinent labs    Laboratory Results:  Results for orders placed or performed in visit on 06/29/20   Comprehensive metabolic panel   Result Value Ref Range    Sodium 139 136 - 145 mmol/L    Potassium 3 8 3 5 - 5 3 mmol/L    Chloride 105 100 - 108 mmol/L    CO2 30 21 - 32 mmol/L    ANION GAP 4 4 - 13 mmol/L    BUN 23 5 - 25 mg/dL    Creatinine 1 71 (H) 0 60 - 1 30 mg/dL    Glucose, Fasting 107 (H) 65 - 99 mg/dL    Calcium 9 2 8 3 - 10 1 mg/dL    AST 42 5 - 45 U/L    ALT 42 12 - 78 U/L    Alkaline Phosphatase 69 46 - 116 U/L    Total Protein 6 5 6 4 - 8 2 g/dL    Albumin 3 4 (L) 3 5 - 5 0 g/dL    Total Bilirubin 0 64 0 20 - 1 00 mg/dL    eGFR 36 ml/min/1 73sq m   CK   Result Value Ref Range    Total  39 - 308 U/L   CBC   Result Value Ref Range    WBC 6 52 4 31 - 10 16 Thousand/uL    RBC 4 08 3 88 - 5 62 Million/uL    Hemoglobin 13 1 12 0 - 17 0 g/dL    Hematocrit 39 9 36 5 - 49 3 %    MCV 98 82 - 98 fL    MCH 32 1 26 8 - 34 3 pg    MCHC 32 8 31 4 - 37 4 g/dL    RDW 13 9 11 6 - 15 1 %    Platelets 714 545 - 978 Thousands/uL    MPV 10 2 8 9 - 12 7 fL   Lipid Panel with Direct LDL reflex   Result Value Ref Range    Cholesterol 106 50 - 200 mg/dL    Triglycerides 103 <=150 mg/dL    HDL, Direct 42 >=40 mg/dL    LDL Calculated 43 0 - 100 mg/dL   Magnesium   Result Value Ref Range    Magnesium 2 0 1 6 - 2 6 mg/dL   Phosphorus   Result Value Ref Range    Phosphorus 3 4 2 3 - 4 1 mg/dL   Protein / creatinine ratio, urine   Result Value Ref Range    Creatinine, Ur 149 0 mg/dL    Protein Urine Random 22 mg/dL    Prot/Creat Ratio, Ur 0 15 (H) 0 00 - 0 10   PTH, intact   Result Value Ref Range    PTH 94 0 (H) 18 4 - 80 1 pg/mL   Vitamin D 25 hydroxy   Result Value Ref Range    Vit D, 25-Hydroxy 34 4 30 0 - 100 0 ng/mL             Invalid input(s): ALBUMIN      Radiology review:   chest X-ray    Ultrasound      Portions of the record may have been created with voice recognition software  Occasional wrong word or "sound a like" substitutions may have occurred due to the inherent limitations of voice recognition software  Read the chart carefully and recognize, using context, where substitutions have occurred

## 2020-07-13 ENCOUNTER — TELEPHONE (OUTPATIENT)
Dept: NEPHROLOGY | Facility: CLINIC | Age: 85
End: 2020-07-13

## 2020-07-13 ENCOUNTER — OFFICE VISIT (OUTPATIENT)
Dept: NEPHROLOGY | Facility: CLINIC | Age: 85
End: 2020-07-13
Payer: MEDICARE

## 2020-07-13 VITALS — TEMPERATURE: 98.2 F | WEIGHT: 206.5 LBS | HEIGHT: 67 IN | BODY MASS INDEX: 32.41 KG/M2

## 2020-07-13 DIAGNOSIS — N25.81 SECONDARY HYPERPARATHYROIDISM OF RENAL ORIGIN (HCC): ICD-10-CM

## 2020-07-13 DIAGNOSIS — E78.2 MIXED HYPERLIPIDEMIA: ICD-10-CM

## 2020-07-13 DIAGNOSIS — N20.0 NEPHROLITHIASIS: ICD-10-CM

## 2020-07-13 DIAGNOSIS — E55.9 VITAMIN D DEFICIENCY: ICD-10-CM

## 2020-07-13 DIAGNOSIS — N20.1 LEFT URETERAL CALCULUS: ICD-10-CM

## 2020-07-13 DIAGNOSIS — R60.0 LOCALIZED EDEMA: ICD-10-CM

## 2020-07-13 DIAGNOSIS — E78.5 DYSLIPIDEMIA: ICD-10-CM

## 2020-07-13 DIAGNOSIS — I12.9 HYPERTENSIVE CHRONIC KIDNEY DISEASE WITH STAGE 1 THROUGH STAGE 4 CHRONIC KIDNEY DISEASE, OR UNSPECIFIED CHRONIC KIDNEY DISEASE: Primary | ICD-10-CM

## 2020-07-13 DIAGNOSIS — N18.30 CHRONIC KIDNEY DISEASE, STAGE III (MODERATE) (HCC): ICD-10-CM

## 2020-07-13 PROCEDURE — 99214 OFFICE O/P EST MOD 30 MIN: CPT | Performed by: INTERNAL MEDICINE

## 2020-07-13 RX ORDER — ROSUVASTATIN CALCIUM 20 MG/1
20 TABLET, COATED ORAL DAILY
Qty: 30 TABLET | Refills: 11 | Status: SHIPPED | OUTPATIENT
Start: 2020-07-13 | End: 2020-11-12

## 2020-07-13 RX ORDER — POTASSIUM CITRATE 10 MEQ/1
20 TABLET, EXTENDED RELEASE ORAL 2 TIMES DAILY
Qty: 360 TABLET | Refills: 3 | Status: SHIPPED | OUTPATIENT
Start: 2020-07-13 | End: 2021-04-15 | Stop reason: SDUPTHER

## 2020-07-13 NOTE — LETTER
July 13, 2020     Johnson Brooks  [address] [address]    Patient: Nishi Guevara   YOB: 1935   Date of Visit: 7/13/2020       Dear Dr Scruggs Ahr:    Thank you for referring Nishi Guevara to me for evaluation  Below are my notes for this consultation  If you have questions, please do not hesitate to call me  I look forward to following your patient along with you           Sincerely,        Jason Davenport MD        CC: DO Lefty Meneses MD Janean Pupa, PA-C Ward Greathouse, MD  7/13/2020  9:22 AM  Sign at close encounter  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  · Baseline creatinine:  1 5-1 83  · Current creatinine:  1 71 at baseline  · Urine protein creatinine ratio:  0 15 g at goal  · UA:  0-1 RBC/2-4 WBC/negative proteinuria and negative occult blood  Recommendations:  · Treat hypertension-please see below  · Treat dyslipidemia-please see below  · Maintain proteinuria less than 1 g or as low as possible  · Avoid nephrotoxic agents such as NSAIDs, patient counseled as such  2   Volume:  TSH was acceptable; venous duplex negative; no significant edema at this time  3   Hypertension:       Current blood pressure averages:   Blood pressure readings:  A m :  124/64, standing 124/58  P m :  120/57, standing 114/48  Heart rate:  60-70    · Goal blood pressure:  Less than 125/80 given CAD    Recommendations:  · Push nonmedical regimen including weight loss, isotonic exercise and avoidance of salt, patient counseled as such  · Medication changes today:    at goal so no changes at this time  4   Electrolytes:  all acceptable  5   Mineral bone disorder:  · Calcium/magnesium/phosphorus:  All acceptable  · PTH intact:  94 0, secondary to CKD monitor for now as it has improved slightly  · Vitamin-D:  3 4 4:  At goal continue supplementation  6   Dyslipidemia:  · Goal LDL:  Goal less than 70  · Current lipid profile:  LDL   43/HDL 42/triglycerides 103  Recommendations:  At goal so no changes  7   Anemia:  Normal hemoglobin at 13 1  8   Nephrolithiasis:  Also followed by urology:  Patient with no further stone passage  · 24 hour urine for stone risk:  Based on the 24 hour urine for stone prevention:  Done March 2020  1  Volume:  LOW AT 1500 ML  2  Calcium:  90 mg which is acceptable  3  Citrate is 326 slightly low  4  Cystine negative  5  Oxalate borderline elevated of 44  6  Uric acid:  461 mg which is normal  7  Urine sodium 149 mEq which is fair  Recommendations:  · Modest fluid intake with water, try to remove soda, we are restricted because of ischemic cardiomyopathy and fluid overload  · General stone dietary recommendations please see patient's recommendations  · Potassium citrate  · Recheck 24 hour urine next visit to make sure he is improved    9   Other problems:  · Cardiac:  Followed by Dr Apolinar Orlando with ischemic cardiomyopathy/ICD/systolic CHF/CABG   Ejection fraction 30% without any reversible ischemia on nuclear stress test   He has had recent biventricular pacemaker placement  · BPH  · Hypothyroidism       PATIENT INSTRUCTIONS:    Patient Instructions   1  No medication changes today  2 Follow-up in 4  months   Please bring in 1 week a blood pressure readings morning evening, sitting and standing is outlined above   PLEASE BRING IN YOUR BLOOD PRESSURE MACHINE FOR CORRELATION WITH THE OFFICE MACHINE AT THE NEXT VISIT   Please go for fasting lab work 1-2 weeks prior to your appointment  This will include a 24 hour urine for stone risk      3   Measures to reduce stone development:    · Please Drink 80-96 oz of water or 2 5-3 L a day throughout the day  · Avoid salt/low-salt diet   · Try to decrease animal protein intake, dairy protein and vegetable base protein are better  · Increase fruits and vegetables as much as possible  · Avoid calcium products such as Tums or other types of calcium containing antacids, you can use Pepcid for indigestion (but you do not have to restrict your dietary calcium)  · Avoid excessive vitamin D   · Avoid excessive vitamin C  · Try to avoid oxalate products (please refer to the diet sheet)  · Limit fructose and sucrose type drinks such as coke  4  General instructions:   AVOID SALT BUT NOT ADDING AN READING LABELS TO MAKE SURE THERE IS LOW-SALT IN THE FOOD THAT YOU ARE EATING     Avoid nonsteroidal anti-inflammatory drugs such as Naprosyn, ibuprofen, Aleve, Advil, Celebrex, Meloxicam (Mobic) etc   You can use Tylenol as needed if you do not have any liver condition to be concerned about     Avoid medications such as Sudafed or decongestants and antihistamines that contained the D component which is the decongestant  You can take antihistamines without the decongestant or D component   Try to avoid medications such as pantoprazole or  Protonix/Nexium or Esomeprazole)/Prilosec or omeprazole/Prevacid or lansoprazole/AcipHex or Rabeprazole  If you are able to, use Pepcid as this is safer for your kidneys   Try to exercise at least 30 minutes 3 days a week to begin with with an ultimate goal of 5 days a week for at least 30 minutes     Try to lose 5-10 lb by your next visit     Please do not drink more than 2 glasses of alcohol/wine on a daily basis as this may contribute to your high blood pressure  Subjective: There has been no hospitalizations or acute illnesses since last visit  The patient overall is feeling well  No fevers, chills, or colds:  Chronic nasal congestion    Good appetite and fair energy  No hematuria, dysuria, voiding symptoms or foamy urine  No gastrointestinal symptoms  No chest pain, mild dyspnea on exertion which is chronic and unchanged, no swelling of the legs except for his knees which is chronic  No headaches, there is dizziness or lightheadedness on occasion not necessarily associated with change in position  Blood pressure medications:  · Torsemide 40 mg daily  · Amlodipine 2 5 mg every other day in the morning  · Imdur 30 mg daily in the morning  · Toprol XL 50 mg twice a day    ROS:  See HPI, otherwise review of systems as completely reviewed with the patient are negative    Past Medical History:   Diagnosis Date    AICD (automatic cardioverter/defibrillator) present 2004    AICD (automatic cardioverter/defibrillator) present 2006    AICD (automatic cardioverter/defibrillator) present 2013    [facility]    Arthritis     R knee and neck    Atrial fibrillation (HCC)     BPH (benign prostatic hyperplasia)     CAD (coronary artery disease) of artery bypass graft     CHF (congestive heart failure) ([facility] [address] )     combine    Coronary artery disease     Disease of thyroid gland     Hyperlipidemia     Hypertension     Ischemic cardiomyopathy     Left ureteral calculus 10/17/2017    Renal disorder     [facility]      Past Surgical History:   Procedure Laterality Date    CARDIAC CATHETERIZATION      CARDIAC SURGERY      Pacemaker    CORONARY ARTERY BYPASS GRAFT      OR CYSTOURETHROSCOPY,URETER CATHETER N/A 10/17/2017    Procedure: CYSTOSCOPY, left  RETROGRADE PYELOGRAM WITH LEFT URETEROSCOPY , stone extraction,LEFT STENT INSERTION;  Surgeon: Susana Ariza MD;  Location: [facility];  Service: Urology     Family History   Problem Relation Age of Onset    Tuberculosis Father         WWI    Hypertension Mother     Stroke Brother       reports that he has never smoked  He has never used smokeless tobacco  He reports that he does not drink alcohol or use drugs      I COMPLETELY REVIEWED THE PAST MEDICAL HISTORY/PAST SURGICAL HISTORY/SOCIAL HISTORY/FAMILY HISTORY/AND MEDICATIONS  AND UPDATED ALL    Objective:   BP sitting on left:  152/70 with a heart rate of 64 and regular  BP standing on left:  150/70 with a heart rate of 64 regular    Vitals:   Vitals:    07/13/20 0854   Temp: 98 2 °F (36 8 °C)       Weight (last 2 days)     Date/Time   Weight    07/13/20 0854   93 7 (206 5)            Wt Readings from Last 3 Encounters:   07/13/20 93 7 kg (206 lb 8 oz)   03/06/20 94 6 kg (208 lb 9 6 oz)   01/29/20 92 1 kg (203 lb)       Body mass index is 32 34 kg/m²  Physical Exam: General:  Obese, No acute distress  Skin:  No acute rash  Eyes:  No scleral icterus, noninjected, no discharge from eyes  ENT:  Moist mucous membranes  Neck:  Supple, no jugular venous distention, trachea is midline, no lymphadenopathy and no thyromegaly  Back   No CVAT  Chest:  Clear to auscultation and percussion, good respiratory effort  CVS:  Regular rate and rhythm without a rub, or gallops or murmurs  Abdomen:  Obese, Soft and nontender with normal bowel sounds  Extremities:  No cyanosis and no edema, no arthritic changes, normal range of motion  Neuro:  Grossly intact  Psych:  Alert, oriented x3 and appropriate      Medications:    Current Outpatient Medications:     acetaminophen (TYLENOL) 500 mg tablet, Take 500 mg by mouth every 6 (six) hours as needed for mild pain, Disp: , Rfl:     amiodarone 200 mg tablet, Take 200 mg by mouth daily  , Disp: , Rfl:     amLODIPine (NORVASC) 2 5 mg tablet, Take 2 5 mg by mouth every other day At Morning , Disp: , Rfl:     aspirin 81 MG tablet, Take 81 mg by mouth daily  , Disp: , Rfl:     isosorbide mononitrate (IMDUR) 30 mg 24 hr tablet, Take 1 tablet (30 mg total) by mouth daily, Disp: 90 tablet, Rfl: 3    levothyroxine 150 mcg tablet, Take 150 mcg by mouth daily , Disp: , Rfl:     LORazepam (ATIVAN) 1 mg tablet, Take 1 mg by mouth as needed for anxiety  , Disp: , Rfl:     metoprolol succinate (TOPROL-XL) 50 mg 24 hr tablet, Take 1 tablet (50 mg total) by mouth 2 (two) times a day, Disp: 180 tablet, Rfl: 3    mexiletine (MEXITIL) 150 mg capsule, Take 1 capsule (150 mg total) by mouth 2 (two) times a day, Disp: 180 capsule, Rfl: 3    Multiple Vitamins-Minerals (MULTIVITAMIN WITH MINERALS) tablet, Take 1 tablet by mouth daily  , Disp: , Rfl:     nitroglycerin (NITROSTAT) 0 4 mg SL tablet, Place 0 4 mg under the tongue every 5 (five) minutes as needed for chest pain  , Disp: , Rfl:     potassium citrate (Urocit-K 10) 10 mEq, Take 2 tablets (20 mEq total) by mouth 2 (two) times a day, Disp: 360 tablet, Rfl: 3    pravastatin (PRAVACHOL) 80 mg tablet, Take 80 mg by mouth daily  , Disp: , Rfl:     tamsulosin (FLOMAX) 0 4 mg, Take 1 capsule (0 4 mg total) by mouth daily with dinner, Disp: 90 capsule, Rfl: 3    torsemide (DEMADEX) 20 mg tablet, Take 2 tablets (40 mg total) by mouth daily, Disp: 180 tablet, Rfl: 3    Lab, Imaging and other studies: I have personally reviewed pertinent labs    Laboratory Results:  Results for orders placed or performed in visit on 06/29/20   Comprehensive metabolic panel   Result Value Ref Range    Sodium 139 136 - 145 mmol/L    Potassium 3 8 3 5 - 5 3 mmol/L    Chloride 105 100 - 108 mmol/L    CO2 30 21 - 32 mmol/L    ANION GAP 4 4 - 13 mmol/L    BUN 23 5 - 25 mg/dL    Creatinine 1 71 (H) 0 60 - 1 30 mg/dL    Glucose, Fasting 107 (H) 65 - 99 mg/dL    Calcium 9 2 8 3 - 10 1 mg/dL    AST 42 5 - 45 U/L    ALT 42 12 - 78 U/L    Alkaline Phosphatase 69 46 - 116 U/L    Total Protein 6 5 6 4 - 8 2 g/dL    Albumin 3 4 (L) 3 5 - 5 0 g/dL    Total Bilirubin 0 64 0 20 - 1 00 mg/dL    eGFR 36 ml/min/1 73sq m   CK   Result Value Ref Range    Total  39 - 308 U/L   CBC   Result Value Ref Range    WBC 6 52 4 31 - 10 16 Thousand/uL    RBC 4 08 3 88 - 5 62 Million/uL    Hemoglobin 13 1 12 0 - 17 0 g/dL    Hematocrit 39 9 36 5 - 49 3 %    MCV 98 82 - 98 fL    MCH 32 1 26 8 - 34 3 pg    MCHC 32 8 31 4 - 37 4 g/dL    RDW 13 9 11 6 - 15 1 %    Platelets 157 108 - 605 Thousands/uL    MPV 10 2 8 9 - 12 7 fL   Lipid Panel with Direct LDL reflex   Result Value Ref Range    Cholesterol 106 50 - 200 mg/dL Triglycerides 103 <=150 mg/dL    HDL, Direct 42 >=40 mg/dL    LDL Calculated 43 0 - 100 mg/dL   Magnesium   Result Value Ref Range    Magnesium 2 0 1 6 - 2 6 mg/dL   Phosphorus   Result Value Ref Range    Phosphorus 3 4 2 3 - 4 1 mg/dL   Protein / creatinine ratio, urine   Result Value Ref Range    Creatinine, Ur 149 0 mg/dL    Protein Urine Random 22 mg/dL    Prot/Creat Ratio, Ur 0 15 (H) 0 00 - 0 10   PTH, intact   Result Value Ref Range    PTH 94 0 (H) 18 4 - 80 1 pg/mL   Vitamin D 25 hydroxy   Result Value Ref Range    Vit D, 25-Hydroxy 34 4 30 0 - 100 0 ng/mL             Invalid input(s): ALBUMIN      Radiology review:   chest X-ray    Ultrasound      Portions of the record may have been created with voice recognition software  Occasional wrong word or "sound a like" substitutions may have occurred due to the inherent limitations of voice recognition software  Read the chart carefully and recognize, using context, where substitutions have occurred  Normal rate, regular rhythm, normal S1, S2 heart sounds heard.

## 2020-07-13 NOTE — PATIENT INSTRUCTIONS
1  No medication changes today  2 Follow-up in 4  months   Please bring in 1 week a blood pressure readings morning evening, sitting and standing is outlined above   PLEASE BRING IN YOUR BLOOD PRESSURE MACHINE FOR CORRELATION WITH THE OFFICE MACHINE AT THE NEXT VISIT   Please go for fasting lab work 1-2 weeks prior to your appointment  This will include a 24 hour urine for stone risk      3  Measures to reduce stone development:    · Please Drink 80-96 oz of water or 2 5-3 L a day throughout the day  · Avoid salt/low-salt diet   · Try to decrease animal protein intake, dairy protein and vegetable base protein are better  · Increase fruits and vegetables as much as possible  · Avoid calcium products such as Tums or other types of calcium containing antacids, you can use Pepcid for indigestion (but you do not have to restrict your dietary calcium)  · Avoid excessive vitamin D   · Avoid excessive vitamin C  · Try to avoid oxalate products (please refer to the diet sheet)  · Limit fructose and sucrose type drinks such as coke  4  General instructions:   AVOID SALT BUT NOT ADDING AN READING LABELS TO MAKE SURE THERE IS LOW-SALT IN THE FOOD THAT YOU ARE EATING     Avoid nonsteroidal anti-inflammatory drugs such as Naprosyn, ibuprofen, Aleve, Advil, Celebrex, Meloxicam (Mobic) etc   You can use Tylenol as needed if you do not have any liver condition to be concerned about     Avoid medications such as Sudafed or decongestants and antihistamines that contained the D component which is the decongestant  You can take antihistamines without the decongestant or D component   Try to avoid medications such as pantoprazole or  Protonix/Nexium or Esomeprazole)/Prilosec or omeprazole/Prevacid or lansoprazole/AcipHex or Rabeprazole  If you are able to, use Pepcid as this is safer for your kidneys       Try to exercise at least 30 minutes 3 days a week to begin with with an ultimate goal of 5 days a week for at least 30 minutes     Try to lose 5-10 lb by your next visit     Please do not drink more than 2 glasses of alcohol/wine on a daily basis as this may contribute to your high blood pressure

## 2020-07-16 DIAGNOSIS — R60.0 LOCALIZED EDEMA: ICD-10-CM

## 2020-07-16 DIAGNOSIS — N20.1 LEFT URETERAL CALCULUS: ICD-10-CM

## 2020-07-16 DIAGNOSIS — N20.0 NEPHROLITHIASIS: ICD-10-CM

## 2020-07-16 DIAGNOSIS — I12.9 HYPERTENSIVE CHRONIC KIDNEY DISEASE WITH STAGE 1 THROUGH STAGE 4 CHRONIC KIDNEY DISEASE, OR UNSPECIFIED CHRONIC KIDNEY DISEASE: ICD-10-CM

## 2020-07-16 DIAGNOSIS — E78.5 DYSLIPIDEMIA: ICD-10-CM

## 2020-07-16 DIAGNOSIS — N18.30 CHRONIC KIDNEY DISEASE, STAGE III (MODERATE) (HCC): ICD-10-CM

## 2020-07-27 DIAGNOSIS — N18.30 CHRONIC KIDNEY DISEASE, STAGE III (MODERATE) (HCC): ICD-10-CM

## 2020-07-27 DIAGNOSIS — E78.5 DYSLIPIDEMIA: ICD-10-CM

## 2020-07-27 DIAGNOSIS — N20.0 NEPHROLITHIASIS: ICD-10-CM

## 2020-07-27 DIAGNOSIS — N20.1 LEFT URETERAL CALCULUS: ICD-10-CM

## 2020-07-27 DIAGNOSIS — I12.9 HYPERTENSIVE CHRONIC KIDNEY DISEASE WITH STAGE 1 THROUGH STAGE 4 CHRONIC KIDNEY DISEASE, OR UNSPECIFIED CHRONIC KIDNEY DISEASE: ICD-10-CM

## 2020-07-27 RX ORDER — TORSEMIDE 20 MG/1
40 TABLET ORAL DAILY
Qty: 180 TABLET | Refills: 3 | Status: SHIPPED | OUTPATIENT
Start: 2020-07-27 | End: 2021-04-15 | Stop reason: SDUPTHER

## 2020-08-14 ENCOUNTER — OFFICE VISIT (OUTPATIENT)
Dept: CARDIOLOGY CLINIC | Facility: CLINIC | Age: 85
End: 2020-08-14
Payer: MEDICARE

## 2020-08-14 VITALS
DIASTOLIC BLOOD PRESSURE: 60 MMHG | HEIGHT: 67 IN | TEMPERATURE: 97.7 F | HEART RATE: 63 BPM | SYSTOLIC BLOOD PRESSURE: 124 MMHG | BODY MASS INDEX: 32.21 KG/M2 | WEIGHT: 205.2 LBS

## 2020-08-14 DIAGNOSIS — I47.2 V TACH (HCC): ICD-10-CM

## 2020-08-14 DIAGNOSIS — E78.5 DYSLIPIDEMIA: ICD-10-CM

## 2020-08-14 DIAGNOSIS — E78.2 MIXED HYPERLIPIDEMIA: ICD-10-CM

## 2020-08-14 DIAGNOSIS — I10 BENIGN ESSENTIAL HYPERTENSION: ICD-10-CM

## 2020-08-14 DIAGNOSIS — I25.810 CORONARY ARTERY DISEASE INVOLVING CORONARY BYPASS GRAFT OF NATIVE HEART WITHOUT ANGINA PECTORIS: ICD-10-CM

## 2020-08-14 DIAGNOSIS — I50.22 CHRONIC SYSTOLIC CONGESTIVE HEART FAILURE (HCC): Primary | ICD-10-CM

## 2020-08-14 DIAGNOSIS — N18.30 CHRONIC KIDNEY DISEASE, STAGE III (MODERATE) (HCC): ICD-10-CM

## 2020-08-14 DIAGNOSIS — I48.0 PAROXYSMAL ATRIAL FIBRILLATION (HCC): ICD-10-CM

## 2020-08-14 DIAGNOSIS — I25.5 ISCHEMIC CARDIOMYOPATHY: ICD-10-CM

## 2020-08-14 PROCEDURE — 93000 ELECTROCARDIOGRAM COMPLETE: CPT | Performed by: INTERNAL MEDICINE

## 2020-08-14 PROCEDURE — 99214 OFFICE O/P EST MOD 30 MIN: CPT | Performed by: INTERNAL MEDICINE

## 2020-08-14 NOTE — PROGRESS NOTES
Follow-up - Cardiology   Radha Phelps 80 y o  male MRN: [id number]        Problems    1  Chronic systolic congestive heart failure (HCC)  POCT ECG    XR chest pa & lateral   2  Coronary artery disease involving coronary bypass graft of native heart without angina pectoris     3  Mixed hyperlipidemia     4  Ischemic cardiomyopathy     5  Paroxysmal atrial fibrillation (HCC)     6  V tach ([address] )     7  Benign essential hypertension     8  Chronic kidney disease, stage III (moderate) (HCC)     9  Dyslipidemia       Impression    Christy Lau returns for a follow-up visit to the [place] office  Coronary artery disease  Prior history of CABG  Severe ischemic cardiomyopathy  No new ischemic or anginal symptoms    Chronic systolic CHF  LVEF 75%  ICD with generator change 1/20  No heart failure exacerbation  Avoiding ACE-inhibitor/ARB due to CKD  Follows with Nephrology  Stable on amlodipine and metoprolol succinate    Hypertension  Well controlled    Hyperlipidemia  Not well controlled on pravastatin, was switched to rosuvastatin with better lipids    Ventricular tachycardia  No device shocks  No recent high rate episodes per device check  Continues on mexiletine and amiodarone  In need of a chest x-ray  TSH and LFTs unremarkable    Plan    8 month follow-up  Chest x-ray for amiodarone surveillance  No med changes at this time      HPI: Radha Phelps is a 80y o  year old male  With a longstanding history of CAD, CABG, ischemic cardiomyopathy with last known ejection fraction 82%, chronic systolic/ diastolic CHF, ventricular tachycardia and paroxysmal atrial fibrillation, on long-term amiodarone and mexiletine         He underwent generator change for his Bi V ICD in January, did well  Metoprolol succinate cost went up to 37 dollars a month from 10 dollars a month  He denies any heart failure exacerbation  Compliant with torsemide  CKD stage 3 remains stable  Blood pressure remains controlled  Lipids are well controlled on rosuvastatin, after prior discussion with Dr Curtis Nair, we optimized his cholesterol control, previously not the best control on pravastatin  On mexiletine and amiodarone, no arrhythmias per device checks, but in need of a chest x-ray for surveillance, otherwise his LFTs and TFTs are normal    Review of Systems   Constitutional: Negative for appetite change, diaphoresis, fatigue and fever  Respiratory: Positive for cough and shortness of breath  Negative for chest tightness and wheezing  Cardiovascular: Negative for chest pain, palpitations and leg swelling  Gastrointestinal: Negative for abdominal pain and blood in stool  Musculoskeletal: Negative for arthralgias and joint swelling  Skin: Negative for rash  Neurological: Negative for dizziness, syncope and light-headedness  Past Medical History:   Diagnosis Date    AICD (automatic cardioverter/defibrillator) present 2004    AICD (automatic cardioverter/defibrillator) present 2006    AICD (automatic cardioverter/defibrillator) present 2013    [facility]    Arthritis     R knee and neck    Atrial fibrillation (HCC)     BPH (benign prostatic hyperplasia)     CAD (coronary artery disease) of artery bypass graft     [facility]congestive [facility] failure) ([facility] )     combine    Coronary artery disease     Disease of thyroid gland     Hyperlipidemia     Hypertension     Ischemic cardiomyopathy     Left ureteral calculus 10/17/2017    Renal disorder     V tach ([facility] )      Social History     Substance and Sexual Activity   Alcohol Use Yes    Frequency: Monthly or less    Drinks per session: 1 or 2    Binge frequency: Never     Social History     Substance and Sexual Activity   Drug Use No     Social History     Tobacco Use   Smoking Status Never Smoker   Smokeless Tobacco Never Used       Allergies:  No Known Allergies    Medications:     Current Outpatient Medications:     amiodarone 200 mg tablet, Take 200 mg by mouth daily  , Disp: , Rfl:     amLODIPine (NORVASC) 2 5 mg tablet, Take 2 5 mg by mouth every other day At Morning , Disp: , Rfl:     aspirin 81 MG tablet, Take 81 mg by mouth daily  , Disp: , Rfl:     isosorbide mononitrate (IMDUR) 30 mg 24 hr tablet, Take 1 tablet (30 mg total) by mouth daily, Disp: 90 tablet, Rfl: 3    levothyroxine 150 mcg tablet, Take 150 mcg by mouth daily , Disp: , Rfl:     LORazepam (ATIVAN) 1 mg tablet, Take 1 mg by mouth as needed for anxiety  , Disp: , Rfl:     metoprolol succinate (TOPROL-XL) 50 mg 24 hr tablet, Take 1 tablet (50 mg total) by mouth 2 (two) times a day, Disp: 180 tablet, Rfl: 3    mexiletine (MEXITIL) 150 mg capsule, Take 1 capsule (150 mg total) by mouth 2 (two) times a day, Disp: 180 capsule, Rfl: 3    Multiple Vitamins-Minerals (MULTIVITAMIN WITH MINERALS) tablet, Take 1 tablet by mouth daily  , Disp: , Rfl:     nitroglycerin (NITROSTAT) 0 4 mg SL tablet, Place 0 4 mg under the tongue every 5 (five) minutes as needed for chest pain  , Disp: , Rfl:     potassium citrate (Urocit-K 10) 10 mEq, Take 2 tablets (20 mEq total) by mouth 2 (two) times a day, Disp: 360 tablet, Rfl: 3    rosuvastatin (CRESTOR) 20 MG tablet, Take 1 tablet (20 mg total) by mouth daily, Disp: 30 tablet, Rfl: 11    tamsulosin (FLOMAX) 0 4 mg, Take 1 capsule (0 4 mg total) by mouth daily with dinner, Disp: 90 capsule, Rfl: 3    torsemide (DEMADEX) 20 mg tablet, Take 2 tablets (40 mg total) by mouth daily, Disp: 180 tablet, Rfl: 3    acetaminophen (TYLENOL) 500 mg tablet, Take 500 mg by mouth every 6 (six) hours as needed for mild pain, Disp: , Rfl:       Vitals:    08/14/20 1520   BP: 124/60   Pulse: 63   Temp: 97 7 °F (36 5 °C)     Weight (last 2 days)     Date/Time   Weight    08/14/20 1520   93 1 (205 2)            Physical Exam  Constitutional:       General: He is not in acute distress  Appearance: He is not diaphoretic  HENT:      Head: Normocephalic and atraumatic     Eyes:      General: No scleral icterus  Conjunctiva/sclera: Conjunctivae normal    Neck:      Musculoskeletal: Normal range of motion  Vascular: No JVD  Cardiovascular:      Rate and Rhythm: Normal rate and regular rhythm  Heart sounds: Normal heart sounds  No murmur  Pulmonary:      Effort: Pulmonary effort is normal  No respiratory distress  Breath sounds: Normal breath sounds  No decreased breath sounds, wheezing, rhonchi or rales  Musculoskeletal:      Right lower leg: Normal  No edema  Left lower leg: Normal  No edema  Skin:     General: Skin is warm and dry  Neurological:      Mental Status: He is alert and oriented to person, place, and time             Laboratory Studies:      NT-proBNP:   Lab Results   Component Value Date    NTBNP 3,682 (H) 2016      Coags:    Lipid Profile:   Lab Results   Component Value Date    CHOL 122 2015     Lab Results   Component Value Date    HDL 42 2020     Lab Results   Component Value Date    LDLCALC 43 2020     Lab Results   Component Value Date    TRIG 103 2020       Cardiac testing:   EKG reviewed personally:   -electronic ventricular pacemaker    Results for orders placed in visit on 09/11/15   Echo complete with contrast if indicated    Narrative [address] [facility], [address] [facility]   Phone: [phone]   TRANSTHORACIC ECHOCARDIOGRAM   2D, M-MODE, DOPPLER, AND COLOR DOPPLER   Study date:  12-Sep-2015   Patient: Ankit Rosales   MR number: [id number]   Account number: [de-identified]   : 1935   Age: 78 years   Gender: Male   Status: Inpatient   Location: Bedside   Height: 68 in   Weight: 207 5 lb   BP: 134/ 63 mmHg   Indications: Malfunc cardiac device; Cardiomyopathy   Diagnoses: 425 8 - CARDIOMYOPATH IN OTH DIS, 996 01 - 600 [facility]   Sonographer:  Betty Florentino   Primary Physician:  Brett Shipman MD   Referring Physician:  Brett Shipman MD   Group:    [facility]   Interpreting Physician:  Den Plunkett MD   SUMMARY   LEFT VENTRICLE:   The ventricle was moderately dilated  Systolic function was markedly reduced  Ejection fraction was estimated to be   30 %  There was moderate diffuse hypokinesis with distinct regional wall    motion   abnormalities  There was akinesis of the entire inferior and inferolateral   walls  Doppler parameters were consistent with a restrictive pattern,   indicative of decreased left ventricular diastolic compliance and/or increased   left atrial pressure (grade 3 diastolic dysfunction)  LEFT ATRIUM:   The atrium was mildly to moderately dilated  MITRAL VALVE:   There was mild to moderate regurgitation  TRICUSPID VALVE:   There was mild regurgitation  HISTORY: PRIOR HISTORY: HTN; Ischemic CM; VTACH; PICD   PROCEDURE: The procedure was performed at the bedside  This was a routine   TRANSTHORACIC ECHOCARDIOGRAM   Patient: Bernabe Hayden   MR number: [id number]    ------ Page 2   study  The transthoracic approach was used  The study included complete 2D   imaging, M-mode, complete spectral Doppler, and color Doppler  Echocardiographic views were limited due to poor acoustic window availability,   decreased penetration, and lung interference  This was a technically difficult   study  LEFT VENTRICLE: The ventricle was moderately dilated  Systolic function was   markedly reduced  Ejection fraction was estimated to be 30 %  There was   moderate diffuse hypokinesis with distinct regional wall motion abnormalities  There was akinesis of the entire inferior and inferolateral walls  Wall   thickness was normal  DOPPLER: Doppler parameters were consistent with a   restrictive pattern, indicative of decreased left ventricular diastolic   compliance and/or increased left atrial pressure (grade 3 diastolic   dysfunction)     RIGHT VENTRICLE: The size was normal  Systolic function was normal  A pacing   wire was present  LEFT ATRIUM: The atrium was mildly to moderately dilated  RIGHT ATRIUM: Size was normal    MITRAL VALVE: Valve structure was normal  There was mild thickening  There was   normal leaflet separation  DOPPLER: The transmitral velocity was within the   normal range  There was no evidence for stenosis  There was mild to moderate   regurgitation  AORTIC VALVE: The valve was probably trileaflet  Leaflets exhibited normal   thickness and normal cuspal separation  DOPPLER: Transaortic velocity was   within the normal range  There was no evidence for stenosis  There was no   regurgitation  TRICUSPID VALVE: The valve structure was normal  There was normal leaflet   separation  DOPPLER: The transtricuspid velocity was within the normal range  There was no evidence for stenosis  There was mild regurgitation  Estimated   peak PA pressure was 30 mmHg  PULMONIC VALVE: Not well visualized  PERICARDIUM: There was no pericardial effusion  AORTA: The root exhibited normal size  SYSTEMIC VEINS: IVC: The inferior vena cava was not well visualized     SYSTEM MEASUREMENT TABLES   2D   %FS: 14 77 %   AV Diam: 3 02 cm   TRANSTHORACIC ECHOCARDIOGRAM   Patient: Bob Mccall   MR number: [id number]    ------ Page 3   EDV(Teich): 182 1 ml   EF Biplane: 32 05 %   EF(Teich): 30 76 %   ESV(Teich): 126 07 ml   IVSd: 0 58 cm   LA Area: 23 12 cm2   LA Diam: 4 72 cm   LVEDV MOD A2C: 130 74 ml   LVEDV MOD A4C: 190 68 ml   LVEDV MOD BP: 165 48 ml   LVEF MOD A2C: 25 79 %   LVEF MOD A4C: 37 68 %   LVESV MOD A2C: 97 02 ml   LVESV MOD A4C: 118 83 ml   LVESV MOD BP: 112 45 ml   LVIDd: 6 03 cm   LVIDs: 5 14 cm   LVLd A2C: 8 07 cm   LVLd A4C: 9 28 cm   LVLs A2C: 7 38 cm   LVLs A4C: 6 38 cm   LVPWd: 0 59 cm   RA Area: 15 54 cm2   RVIDd: 3 2 cm   SV MOD A2C: 33 72 ml   SV MOD A4C: 71 85 ml   SV(Teich): 56 02 ml   CW   RAP: 10 mmHg   TR Vmax: 2 5 m/s   TR maxP 96 mmHg   MM   TAPSE: 1 98 cm   PW   E': 0 07 m/s   E/E': 12 63 MV A Hemanth: 0 54 m/s   MV Dec Billings: 5 44 m/s2   MV DecT: 168 43 ms   MV E Hemanth: 0 92 m/s   MV E/A Ratio: 1 71   MV PHT: 48 85 ms   MVA By PHT: 4 5 cm2   RVSP: 34 96 mmHg   [facility]   TRANSTHORACIC ECHOCARDIOGRAM   Patient: Indu Ontiveros   MR number: [id number]    ------ Page 4   Prepared and electronically signed by   Satish Valencia MD   Signed 12-Sep-2015 15:47:33      No results found for this or any previous visit  No results found for this or any previous visit  Results for orders placed in visit on 09/11/15   NM myocardial perfusion spect (stress and/or rest)    Narrative PHARMACOLOGIC STRESS TEST, LEXISCAN          INDICATION-  Shortness of breath, fatigue, abnormal EKG, old MI   COMPARISON- 9/30/2014   TECHNIQUE-  Pharmacologic stress testing was performed utilizing   Lexiscan  SPECT myocardial perfusion images was performed  Dosages of   TC-99-mm Myoview were 11 mCi and 33 mCi IV  Time peak imaging for rest   70 minutes and stress 70 minutes  Both rest and stress images were   performed  Images were then reconstructed in the short, vertical and   horizontal long axes projections  Baseline heart rate 61 beats per minute  Peak heart of 74 beats per minute  Baseline blood pressure 117/69 mmHg  Peak blood pressure 103/50 mmHg  Symptoms-   Belly cramps   Please see cardiology report of physiologic data  FINDINGS-   OVERALL QUALITY-   Acceptable  ARTIFACT-  None  PERFUSION IMAGES-   Stable large fixed defect in the inferior and   inferolateral wall compatible with scarring  No reversible ischemia   demonstrated  WALL MOTION-  Akinesis in the inferior and inferolateral walls   compatible with scarring  Additional global hypokinesis  EJECTION FRACTION-  43 %   IMPRESSION-   1   Stable large fixed defect in the inferior and inferolateral wall   compatible with scarring  No reversible ischemia demonstrated     2  Left ventricular ejection fraction- 43%   Transcribed on- [id number]           - CHUNG Powers MD        Reading Radiologist- CHUNG Powers MD        Electronically Signed- CHUNG Powers MD        Released Date Time- 09/14/15 1312      ------------------------------------------------------------------------------   Donny Ceballos MD    Portions of the record may have been created with voice recognition software   Occasional wrong word or "sound a like" substitutions may have occurred due to the inherent limitations of voice recognition software   Read the chart carefully and recognize, using context, where substitutions have occurred

## 2020-08-26 ENCOUNTER — REMOTE DEVICE CLINIC VISIT (OUTPATIENT)
Dept: CARDIOLOGY CLINIC | Facility: CLINIC | Age: 85
End: 2020-08-26
Payer: MEDICARE

## 2020-08-26 DIAGNOSIS — Z95.810 AICD (AUTOMATIC CARDIOVERTER/DEFIBRILLATOR) PRESENT: Primary | ICD-10-CM

## 2020-08-26 PROCEDURE — 93296 REM INTERROG EVL PM/IDS: CPT | Performed by: INTERNAL MEDICINE

## 2020-08-26 PROCEDURE — 93295 DEV INTERROG REMOTE 1/2/MLT: CPT | Performed by: INTERNAL MEDICINE

## 2020-08-26 NOTE — PROGRESS NOTES
Results for orders placed or performed in visit on 08/26/20   Cardiac EP device report    Narrative    SJM CRT-D/DDDR MODENOT MRI CONDITIONAL  MERLIN TRANSMISSION: BATTERY STATUS "OK"  AP 90% BVP 99%  ALL AVAILABLE LEAD PARAMETERS WITHIN NORMAL LIMITS  10 VHRS NOTES, NO THERAPIES GIVEN  AVAIL EGRAMS PRESENT AS SVT  PT ON AMIO, METO SUCC, & MEXILETINE  EF 30% (2018)  CORVUE IMPEDANCE MONITORING WITHIN NORMAL LIMITS  NORMAL DEVICE FUNCTION  NC       Current Outpatient Medications:     acetaminophen (TYLENOL) 500 mg tablet, Take 500 mg by mouth every 6 (six) hours as needed for mild pain, Disp: , Rfl:     amiodarone 200 mg tablet, Take 200 mg by mouth daily  , Disp: , Rfl:     amLODIPine (NORVASC) 2 5 mg tablet, Take 2 5 mg by mouth every other day At Morning , Disp: , Rfl:     aspirin 81 MG tablet, Take 81 mg by mouth daily  , Disp: , Rfl:     isosorbide mononitrate (IMDUR) 30 mg 24 hr tablet, Take 1 tablet (30 mg total) by mouth daily, Disp: 90 tablet, Rfl: 3    levothyroxine 150 mcg tablet, Take 150 mcg by mouth daily , Disp: , Rfl:     LORazepam (ATIVAN) 1 mg tablet, Take 1 mg by mouth as needed for anxiety  , Disp: , Rfl:     metoprolol succinate (TOPROL-XL) 50 mg 24 hr tablet, Take 1 tablet (50 mg total) by mouth 2 (two) times a day, Disp: 180 tablet, Rfl: 3    mexiletine (MEXITIL) 150 mg capsule, Take 1 capsule (150 mg total) by mouth 2 (two) times a day, Disp: 180 capsule, Rfl: 3    Multiple Vitamins-Minerals (MULTIVITAMIN WITH MINERALS) tablet, Take 1 tablet by mouth daily  , Disp: , Rfl:     nitroglycerin (NITROSTAT) 0 4 mg SL tablet, Place 0 4 mg under the tongue every 5 (five) minutes as needed for chest pain  , Disp: , Rfl:     potassium citrate (Urocit-K 10) 10 mEq, Take 2 tablets (20 mEq total) by mouth 2 (two) times a day, Disp: 360 tablet, Rfl: 3    rosuvastatin (CRESTOR) 20 MG tablet, Take 1 tablet (20 mg total) by mouth daily, Disp: 30 tablet, Rfl: 11    tamsulosin (FLOMAX) 0 4 mg, Take 1 capsule (0 4 mg total) by mouth daily with dinner, Disp: 90 capsule, Rfl: 3    torsemide (DEMADEX) 20 mg tablet, Take 2 tablets (40 mg total) by mouth daily, Disp: 180 tablet, Rfl: 3

## 2020-10-02 ENCOUNTER — TRANSCRIBE ORDERS (OUTPATIENT)
Dept: LAB | Facility: CLINIC | Age: 85
End: 2020-10-02

## 2020-10-02 ENCOUNTER — HOSPITAL ENCOUNTER (OUTPATIENT)
Dept: RADIOLOGY | Facility: HOSPITAL | Age: 85
Discharge: HOME | End: 2020-10-02
Attending: INTERNAL MEDICINE
Payer: MEDICARE

## 2020-10-02 ENCOUNTER — APPOINTMENT (OUTPATIENT)
Dept: LAB | Facility: CLINIC | Age: 85
End: 2020-10-02
Payer: MEDICARE

## 2020-10-02 DIAGNOSIS — I50.22 CHRONIC SYSTOLIC CONGESTIVE HEART FAILURE (HCC): ICD-10-CM

## 2020-10-02 DIAGNOSIS — E03.4 IDIOPATHIC ATROPHIC HYPOTHYROIDISM: ICD-10-CM

## 2020-10-02 DIAGNOSIS — E03.4 IDIOPATHIC ATROPHIC HYPOTHYROIDISM: Primary | ICD-10-CM

## 2020-10-02 LAB — TSH SERPL DL<=0.05 MIU/L-ACNC: 0.58 UIU/ML (ref 0.36–3.74)

## 2020-10-02 PROCEDURE — 36415 COLL VENOUS BLD VENIPUNCTURE: CPT

## 2020-10-02 PROCEDURE — 71046 X-RAY EXAM CHEST 2 VIEWS: CPT

## 2020-10-02 PROCEDURE — 84443 ASSAY THYROID STIM HORMONE: CPT

## 2020-10-07 ENCOUNTER — TELEPHONE (OUTPATIENT)
Dept: CARDIOLOGY CLINIC | Facility: CLINIC | Age: 85
End: 2020-10-07

## 2020-11-05 ENCOUNTER — LAB (OUTPATIENT)
Dept: LAB | Facility: CLINIC | Age: 85
End: 2020-11-05
Payer: MEDICARE

## 2020-11-05 ENCOUNTER — TRANSCRIBE ORDERS (OUTPATIENT)
Dept: LAB | Facility: CLINIC | Age: 85
End: 2020-11-05

## 2020-11-05 DIAGNOSIS — N20.1 LEFT URETERAL CALCULUS: ICD-10-CM

## 2020-11-05 DIAGNOSIS — N18.30 CHRONIC KIDNEY DISEASE, STAGE III (MODERATE) (HCC): ICD-10-CM

## 2020-11-05 DIAGNOSIS — E55.9 VITAMIN D DEFICIENCY: ICD-10-CM

## 2020-11-05 DIAGNOSIS — R60.0 LOCALIZED EDEMA: ICD-10-CM

## 2020-11-05 DIAGNOSIS — E78.5 DYSLIPIDEMIA: ICD-10-CM

## 2020-11-05 DIAGNOSIS — N25.81 SECONDARY HYPERPARATHYROIDISM OF RENAL ORIGIN (HCC): ICD-10-CM

## 2020-11-05 DIAGNOSIS — N20.0 NEPHROLITHIASIS: ICD-10-CM

## 2020-11-05 DIAGNOSIS — I12.9 HYPERTENSIVE CHRONIC KIDNEY DISEASE WITH STAGE 1 THROUGH STAGE 4 CHRONIC KIDNEY DISEASE, OR UNSPECIFIED CHRONIC KIDNEY DISEASE: ICD-10-CM

## 2020-11-05 LAB
ALBUMIN SERPL BCP-MCNC: 3.3 G/DL (ref 3.5–5)
ALP SERPL-CCNC: 70 U/L (ref 46–116)
ALT SERPL W P-5'-P-CCNC: 56 U/L (ref 12–78)
ANION GAP SERPL CALCULATED.3IONS-SCNC: 11 MMOL/L (ref 4–13)
AST SERPL W P-5'-P-CCNC: 60 U/L (ref 5–45)
BILIRUB SERPL-MCNC: 0.73 MG/DL (ref 0.2–1)
BUN SERPL-MCNC: 22 MG/DL (ref 5–25)
CALCIUM ALBUM COR SERPL-MCNC: 10 MG/DL (ref 8.3–10.1)
CALCIUM SERPL-MCNC: 9.4 MG/DL (ref 8.3–10.1)
CHLORIDE SERPL-SCNC: 105 MMOL/L (ref 100–108)
CK SERPL-CCNC: 111 U/L (ref 39–308)
CO2 SERPL-SCNC: 27 MMOL/L (ref 21–32)
CREAT SERPL-MCNC: 1.57 MG/DL (ref 0.6–1.3)
CREAT UR-MCNC: 152 MG/DL
ERYTHROCYTE [DISTWIDTH] IN BLOOD BY AUTOMATED COUNT: 14.4 % (ref 11.6–15.1)
GFR SERPL CREATININE-BSD FRML MDRD: 40 ML/MIN/1.73SQ M
GLUCOSE P FAST SERPL-MCNC: 114 MG/DL (ref 65–99)
HCT VFR BLD AUTO: 40.1 % (ref 36.5–49.3)
HGB BLD-MCNC: 13 G/DL (ref 12–17)
MAGNESIUM SERPL-MCNC: 2.1 MG/DL (ref 1.6–2.6)
MCH RBC QN AUTO: 31.5 PG (ref 26.8–34.3)
MCHC RBC AUTO-ENTMCNC: 32.4 G/DL (ref 31.4–37.4)
MCV RBC AUTO: 97 FL (ref 82–98)
PHOSPHATE SERPL-MCNC: 3.2 MG/DL (ref 2.3–4.1)
PLATELET # BLD AUTO: 216 THOUSANDS/UL (ref 149–390)
PMV BLD AUTO: 10.5 FL (ref 8.9–12.7)
POTASSIUM SERPL-SCNC: 4 MMOL/L (ref 3.5–5.3)
PROT SERPL-MCNC: 6.8 G/DL (ref 6.4–8.2)
PROT UR-MCNC: 25 MG/DL
PROT/CREAT UR: 0.16 MG/G{CREAT} (ref 0–0.1)
PTH-INTACT SERPL-MCNC: 88.9 PG/ML (ref 18.4–80.1)
RBC # BLD AUTO: 4.13 MILLION/UL (ref 3.88–5.62)
SODIUM SERPL-SCNC: 143 MMOL/L (ref 136–145)
WBC # BLD AUTO: 7.28 THOUSAND/UL (ref 4.31–10.16)

## 2020-11-05 PROCEDURE — 82140 ASSAY OF AMMONIA: CPT

## 2020-11-05 PROCEDURE — 84392 ASSAY OF URINE SULFATE: CPT

## 2020-11-05 PROCEDURE — 82436 ASSAY OF URINE CHLORIDE: CPT

## 2020-11-05 PROCEDURE — 82570 ASSAY OF URINE CREATININE: CPT

## 2020-11-05 PROCEDURE — 84156 ASSAY OF PROTEIN URINE: CPT

## 2020-11-05 PROCEDURE — 82340 ASSAY OF CALCIUM IN URINE: CPT

## 2020-11-05 PROCEDURE — 84300 ASSAY OF URINE SODIUM: CPT

## 2020-11-05 PROCEDURE — 82507 ASSAY OF CITRATE: CPT

## 2020-11-05 PROCEDURE — 83970 ASSAY OF PARATHORMONE: CPT

## 2020-11-05 PROCEDURE — 36415 COLL VENOUS BLD VENIPUNCTURE: CPT

## 2020-11-05 PROCEDURE — 83735 ASSAY OF MAGNESIUM: CPT

## 2020-11-05 PROCEDURE — 83945 ASSAY OF OXALATE: CPT

## 2020-11-05 PROCEDURE — 82550 ASSAY OF CK (CPK): CPT

## 2020-11-05 PROCEDURE — 83935 ASSAY OF URINE OSMOLALITY: CPT

## 2020-11-05 PROCEDURE — 84560 ASSAY OF URINE/URIC ACID: CPT

## 2020-11-05 PROCEDURE — 81003 URINALYSIS AUTO W/O SCOPE: CPT

## 2020-11-05 PROCEDURE — 84100 ASSAY OF PHOSPHORUS: CPT

## 2020-11-05 PROCEDURE — 84105 ASSAY OF URINE PHOSPHORUS: CPT

## 2020-11-05 PROCEDURE — 84133 ASSAY OF URINE POTASSIUM: CPT

## 2020-11-05 PROCEDURE — 80053 COMPREHEN METABOLIC PANEL: CPT

## 2020-11-05 PROCEDURE — 85027 COMPLETE CBC AUTOMATED: CPT

## 2020-11-05 PROCEDURE — 82131 AMINO ACIDS SINGLE QUANT: CPT

## 2020-11-12 ENCOUNTER — OFFICE VISIT (OUTPATIENT)
Dept: NEPHROLOGY | Facility: CLINIC | Age: 85
End: 2020-11-12
Payer: MEDICARE

## 2020-11-12 VITALS — HEIGHT: 67 IN | BODY MASS INDEX: 32.62 KG/M2 | TEMPERATURE: 98 F | WEIGHT: 207.8 LBS

## 2020-11-12 DIAGNOSIS — N18.32 STAGE 3B CHRONIC KIDNEY DISEASE (HCC): ICD-10-CM

## 2020-11-12 DIAGNOSIS — R60.0 LOCALIZED EDEMA: ICD-10-CM

## 2020-11-12 DIAGNOSIS — I12.9 HYPERTENSIVE CHRONIC KIDNEY DISEASE WITH STAGE 1 THROUGH STAGE 4 CHRONIC KIDNEY DISEASE, OR UNSPECIFIED CHRONIC KIDNEY DISEASE: Primary | ICD-10-CM

## 2020-11-12 DIAGNOSIS — N25.81 SECONDARY HYPERPARATHYROIDISM OF RENAL ORIGIN (HCC): ICD-10-CM

## 2020-11-12 DIAGNOSIS — E55.9 VITAMIN D DEFICIENCY: ICD-10-CM

## 2020-11-12 DIAGNOSIS — N20.0 NEPHROLITHIASIS: ICD-10-CM

## 2020-11-12 DIAGNOSIS — E78.5 DYSLIPIDEMIA: ICD-10-CM

## 2020-11-12 PROCEDURE — 99214 OFFICE O/P EST MOD 30 MIN: CPT | Performed by: INTERNAL MEDICINE

## 2020-11-12 RX ORDER — AMLODIPINE BESYLATE 2.5 MG/1
2.5 TABLET ORAL EVERY OTHER DAY
Qty: 45 TABLET | Refills: 3 | Status: SHIPPED | OUTPATIENT
Start: 2020-11-12 | End: 2021-04-15 | Stop reason: SDUPTHER

## 2020-11-12 RX ORDER — PRAVASTATIN SODIUM 80 MG/1
80 TABLET ORAL DAILY
COMMUNITY
End: 2021-08-12 | Stop reason: ALTCHOICE

## 2020-11-17 ENCOUNTER — DOCUMENTATION (OUTPATIENT)
Dept: NEPHROLOGY | Facility: CLINIC | Age: 85
End: 2020-11-17

## 2020-11-17 DIAGNOSIS — N18.32 STAGE 3B CHRONIC KIDNEY DISEASE (HCC): Primary | ICD-10-CM

## 2020-11-17 LAB
AMMONIA 24H UR-MRATE: 6 MEQ/24 HR
AMMONIA UR-SCNC: 7250 UG/DL
CA H2 PHOS DIHYD CRY URNS QL MICRO: 0.81 RATIO (ref 0–3)
CALCIUM 24H UR-MCNC: 5.2 MG/DL
CALCIUM 24H UR-MRATE: 78 MG/24 HR (ref 100–300)
CHLORIDE 24H UR-SCNC: 62 MMOL/L
CHLORIDE 24H UR-SRATE: 93 MMOL/24 HR (ref 110–250)
CITRATE 24H UR-MCNC: 206 MG/L
CITRATE 24H UR-MRATE: 309 MG/24 HR (ref 320–1240)
COM CRY STONE QL IR: 6.11 RATIO (ref 0–6)
CREAT 24H UR-MCNC: 66.6 MG/DL
CREAT 24H UR-MRATE: 999 MG/24 HR (ref 1000–2000)
CYSTINE 24H UR-MCNC: 6.29 MG/L
CYSTINE 24H UR-MRATE: 9.44 MG/24 HR (ref 10–100)
MAGNESIUM 24H UR-MRATE: 50 MG/24 HR (ref 12–293)
MAGNESIUM UR-MCNC: 3.3 MG/DL
NA URATE CRY STONE QL IR: 3 RATIO (ref 0–4)
OSMOLALITY UR: 363 MOSMOL/KG (ref 300–900)
OXALATE 24H UR-MRATE: 50 MG/24 HR (ref 7–44)
OXALATE UR-MCNC: 33 MG/L
PH 24H UR: 6.4 [PH]
PHOSPHATE 24H UR-MCNC: 35 MG/DL
PHOSPHATE 24H UR-MRATE: 525 MG/24 HR (ref 400–1300)
PLEASE NOTE (STONE RISK): ABNORMAL
POTASSIUM 24H UR-SCNC: 50.4 MMOL/24 HR (ref 25–125)
POTASSIUM UR-SCNC: 33.6 MMOL/L
PRESERVED URINE: 1500 ML/24 HR (ref 800–1800)
SODIUM 24H UR-SCNC: 82 MMOL/L
SODIUM 24H UR-SRATE: 123 MMOL/24 HR (ref 23–207)
SPECIMEN VOL 24H UR: 1500 ML/24 HR (ref 800–1800)
SULFATE 24H UR-MCNC: 15 MEQ/24 HR (ref 0–30)
SULFATE UR-MCNC: 10 MEQ/L
TRI-PHOS CRY STONE MICRO: 0.01 RATIO (ref 0–1)
URATE 24H UR-MCNC: 31.7 MG/DL
URATE 24H UR-MRATE: 476 MG/24 HR (ref 250–750)
URATE DIHYD CRY STONE QL IR: 0.58 RATIO (ref 0–1.2)

## 2020-11-17 RX ORDER — ROSUVASTATIN CALCIUM 20 MG/1
20 TABLET, COATED ORAL DAILY
COMMUNITY
End: 2021-02-19 | Stop reason: SDUPTHER

## 2020-11-25 ENCOUNTER — REMOTE DEVICE CLINIC VISIT (OUTPATIENT)
Dept: CARDIOLOGY CLINIC | Facility: CLINIC | Age: 85
End: 2020-11-25
Payer: MEDICARE

## 2020-11-25 DIAGNOSIS — Z95.810 AICD (AUTOMATIC CARDIOVERTER/DEFIBRILLATOR) PRESENT: Primary | ICD-10-CM

## 2020-11-25 PROCEDURE — 93296 REM INTERROG EVL PM/IDS: CPT | Performed by: INTERNAL MEDICINE

## 2020-11-25 PROCEDURE — 93295 DEV INTERROG REMOTE 1/2/MLT: CPT | Performed by: INTERNAL MEDICINE

## 2020-11-30 ENCOUNTER — TELEPHONE (OUTPATIENT)
Dept: NEPHROLOGY | Facility: CLINIC | Age: 85
End: 2020-11-30

## 2020-11-30 ENCOUNTER — LAB (OUTPATIENT)
Dept: LAB | Facility: CLINIC | Age: 85
End: 2020-11-30
Payer: MEDICARE

## 2020-11-30 DIAGNOSIS — N18.32 STAGE 3B CHRONIC KIDNEY DISEASE (HCC): ICD-10-CM

## 2020-11-30 LAB
ANION GAP SERPL CALCULATED.3IONS-SCNC: 7 MMOL/L (ref 4–13)
BUN SERPL-MCNC: 23 MG/DL (ref 5–25)
CALCIUM SERPL-MCNC: 9.8 MG/DL (ref 8.3–10.1)
CHLORIDE SERPL-SCNC: 106 MMOL/L (ref 100–108)
CO2 SERPL-SCNC: 29 MMOL/L (ref 21–32)
CREAT SERPL-MCNC: 1.5 MG/DL (ref 0.6–1.3)
GFR SERPL CREATININE-BSD FRML MDRD: 42 ML/MIN/1.73SQ M
GLUCOSE SERPL-MCNC: 112 MG/DL (ref 65–140)
POTASSIUM SERPL-SCNC: 4.4 MMOL/L (ref 3.5–5.3)
SODIUM SERPL-SCNC: 142 MMOL/L (ref 136–145)

## 2020-11-30 PROCEDURE — 36415 COLL VENOUS BLD VENIPUNCTURE: CPT

## 2020-11-30 PROCEDURE — 80048 BASIC METABOLIC PNL TOTAL CA: CPT

## 2021-01-20 DIAGNOSIS — Z23 ENCOUNTER FOR IMMUNIZATION: ICD-10-CM

## 2021-01-28 ENCOUNTER — IMMUNIZATIONS (OUTPATIENT)
Dept: FAMILY MEDICINE CLINIC | Facility: HOSPITAL | Age: 86
End: 2021-01-28

## 2021-01-28 DIAGNOSIS — Z23 ENCOUNTER FOR IMMUNIZATION: Primary | ICD-10-CM

## 2021-01-28 PROCEDURE — 91301 SARS-COV-2 / COVID-19 MRNA VACCINE (MODERNA) 100 MCG: CPT

## 2021-01-28 PROCEDURE — 0011A SARS-COV-2 / COVID-19 MRNA VACCINE (MODERNA) 100 MCG: CPT

## 2021-02-19 DIAGNOSIS — E78.2 MIXED HYPERLIPIDEMIA: Primary | ICD-10-CM

## 2021-02-22 RX ORDER — ROSUVASTATIN CALCIUM 20 MG/1
20 TABLET, COATED ORAL DAILY
Qty: 30 TABLET | Refills: 11 | Status: SHIPPED | OUTPATIENT
Start: 2021-02-22 | End: 2021-03-29

## 2021-02-24 ENCOUNTER — IMMUNIZATIONS (OUTPATIENT)
Dept: FAMILY MEDICINE CLINIC | Facility: HOSPITAL | Age: 86
End: 2021-02-24

## 2021-02-24 DIAGNOSIS — Z23 ENCOUNTER FOR IMMUNIZATION: Primary | ICD-10-CM

## 2021-02-24 PROCEDURE — 91301 SARS-COV-2 / COVID-19 MRNA VACCINE (MODERNA) 100 MCG: CPT

## 2021-02-24 PROCEDURE — 0012A SARS-COV-2 / COVID-19 MRNA VACCINE (MODERNA) 100 MCG: CPT

## 2021-03-01 ENCOUNTER — IN-CLINIC DEVICE VISIT (OUTPATIENT)
Dept: CARDIOLOGY CLINIC | Facility: CLINIC | Age: 86
End: 2021-03-01
Payer: MEDICARE

## 2021-03-01 DIAGNOSIS — Z95.810 BIVENTRICULAR IMPLANTABLE CARDIOVERTER-DEFIBRILLATOR IN SITU: Primary | ICD-10-CM

## 2021-03-01 PROCEDURE — 93284 PRGRMG EVAL IMPLANTABLE DFB: CPT | Performed by: INTERNAL MEDICINE

## 2021-03-01 NOTE — PROGRESS NOTES
Results for orders placed or performed in visit on 03/01/21   Cardiac EP device report    Narrative    SJM CRT-D/DDDR MODENOT MRI CONDITIONAL  DEVICE INTERROGATED IN THE [facility] OFFICE: BATTERY VOLTAGE ADEQUATE (5 6-6 YRS)  AP 90% BVP 98%  ALL LEAD PARAMETERS WITHIN NORMAL LIMITS  ALL OTHER TESTING WITHIN NORMAL LIMITS  2 VT-NS EPISODES W/BOTH EGRMS FOR SVT, 12 SECS @ AVG  MS, 6 SECS @ AVG  MS (MORPHOLOGY SIMILAR TO INTRINSIC / 1:1)  EF 30% (4/2018 ECHO)  PT TAKES ASA 81, AMIODORONE, MEXILITINE, METO SUCC  CORVUE IMPEDANCE MONITORING WITHIN NORMAL LIMITS  NO PROGRAMMING CHANGES MADE TO DEVICE PARAMETERS  NORMAL DEVICE FUNCTION     EBS

## 2021-03-03 ENCOUNTER — LAB (OUTPATIENT)
Dept: LAB | Facility: CLINIC | Age: 86
End: 2021-03-03
Payer: MEDICARE

## 2021-03-03 ENCOUNTER — TRANSCRIBE ORDERS (OUTPATIENT)
Dept: LAB | Facility: CLINIC | Age: 86
End: 2021-03-03

## 2021-03-03 DIAGNOSIS — E03.4 IDIOPATHIC ATROPHIC HYPOTHYROIDISM: ICD-10-CM

## 2021-03-03 DIAGNOSIS — R60.0 LOCALIZED EDEMA: ICD-10-CM

## 2021-03-03 DIAGNOSIS — I12.9 HYPERTENSIVE CHRONIC KIDNEY DISEASE WITH STAGE 1 THROUGH STAGE 4 CHRONIC KIDNEY DISEASE, OR UNSPECIFIED CHRONIC KIDNEY DISEASE: ICD-10-CM

## 2021-03-03 DIAGNOSIS — N18.32 CHRONIC KIDNEY DISEASE (CKD) STAGE G3B/A1, MODERATELY DECREASED GLOMERULAR FILTRATION RATE (GFR) BETWEEN 30-44 ML/MIN/1.73 SQUARE METER AND ALBUMINURIA CREATININE RATIO LESS THAN 30 MG/G (HCC): ICD-10-CM

## 2021-03-03 DIAGNOSIS — E78.5 DYSLIPIDEMIA: ICD-10-CM

## 2021-03-03 DIAGNOSIS — N18.32 STAGE 3B CHRONIC KIDNEY DISEASE (HCC): ICD-10-CM

## 2021-03-03 DIAGNOSIS — N20.0 NEPHROLITHIASIS: ICD-10-CM

## 2021-03-03 DIAGNOSIS — E55.9 VITAMIN D DEFICIENCY: ICD-10-CM

## 2021-03-03 DIAGNOSIS — N18.32 CHRONIC KIDNEY DISEASE (CKD) STAGE G3B/A1, MODERATELY DECREASED GLOMERULAR FILTRATION RATE (GFR) BETWEEN 30-44 ML/MIN/1.73 SQUARE METER AND ALBUMINURIA CREATININE RATIO LESS THAN 30 MG/G (HCC): Primary | ICD-10-CM

## 2021-03-03 DIAGNOSIS — N25.81 SECONDARY HYPERPARATHYROIDISM OF RENAL ORIGIN (HCC): ICD-10-CM

## 2021-03-03 LAB
ALBUMIN SERPL BCP-MCNC: 3.4 G/DL (ref 3.5–5)
ALP SERPL-CCNC: 71 U/L (ref 46–116)
ALT SERPL W P-5'-P-CCNC: 43 U/L (ref 12–78)
ANION GAP SERPL CALCULATED.3IONS-SCNC: 6 MMOL/L (ref 4–13)
AST SERPL W P-5'-P-CCNC: 45 U/L (ref 5–45)
BACTERIA UR QL AUTO: ABNORMAL /HPF
BASOPHILS # BLD AUTO: 0.05 THOUSANDS/ΜL (ref 0–0.1)
BASOPHILS NFR BLD AUTO: 1 % (ref 0–1)
BILIRUB SERPL-MCNC: 0.75 MG/DL (ref 0.2–1)
BILIRUB UR QL STRIP: NEGATIVE
BUN SERPL-MCNC: 24 MG/DL (ref 5–25)
CALCIUM ALBUM COR SERPL-MCNC: 9.4 MG/DL (ref 8.3–10.1)
CALCIUM SERPL-MCNC: 8.9 MG/DL (ref 8.3–10.1)
CHLORIDE SERPL-SCNC: 107 MMOL/L (ref 100–108)
CHOLEST SERPL-MCNC: 111 MG/DL (ref 50–200)
CK MB SERPL-MCNC: 1.7 % (ref 0–2.5)
CK MB SERPL-MCNC: 3.1 NG/ML (ref 0–5)
CK SERPL-CCNC: 184 U/L (ref 39–308)
CLARITY UR: CLEAR
CO2 SERPL-SCNC: 31 MMOL/L (ref 21–32)
COLOR UR: YELLOW
CREAT SERPL-MCNC: 1.62 MG/DL (ref 0.6–1.3)
CREAT UR-MCNC: 183 MG/DL
EOSINOPHIL # BLD AUTO: 0.27 THOUSAND/ΜL (ref 0–0.61)
EOSINOPHIL NFR BLD AUTO: 4 % (ref 0–6)
ERYTHROCYTE [DISTWIDTH] IN BLOOD BY AUTOMATED COUNT: 14.9 % (ref 11.6–15.1)
GFR SERPL CREATININE-BSD FRML MDRD: 38 ML/MIN/1.73SQ M
GLUCOSE P FAST SERPL-MCNC: 114 MG/DL (ref 65–99)
GLUCOSE UR STRIP-MCNC: NEGATIVE MG/DL
HCT VFR BLD AUTO: 40.1 % (ref 36.5–49.3)
HDLC SERPL-MCNC: 45 MG/DL
HGB BLD-MCNC: 12.8 G/DL (ref 12–17)
HGB UR QL STRIP.AUTO: ABNORMAL
IMM GRANULOCYTES # BLD AUTO: 0.02 THOUSAND/UL (ref 0–0.2)
IMM GRANULOCYTES NFR BLD AUTO: 0 % (ref 0–2)
KETONES UR STRIP-MCNC: NEGATIVE MG/DL
LDLC SERPL CALC-MCNC: 46 MG/DL (ref 0–100)
LEUKOCYTE ESTERASE UR QL STRIP: NEGATIVE
LYMPHOCYTES # BLD AUTO: 1.27 THOUSANDS/ΜL (ref 0.6–4.47)
LYMPHOCYTES NFR BLD AUTO: 20 % (ref 14–44)
MAGNESIUM SERPL-MCNC: 2.1 MG/DL (ref 1.6–2.6)
MCH RBC QN AUTO: 30.8 PG (ref 26.8–34.3)
MCHC RBC AUTO-ENTMCNC: 31.9 G/DL (ref 31.4–37.4)
MCV RBC AUTO: 97 FL (ref 82–98)
MONOCYTES # BLD AUTO: 0.93 THOUSAND/ΜL (ref 0.17–1.22)
MONOCYTES NFR BLD AUTO: 15 % (ref 4–12)
NEUTROPHILS # BLD AUTO: 3.83 THOUSANDS/ΜL (ref 1.85–7.62)
NEUTS SEG NFR BLD AUTO: 60 % (ref 43–75)
NITRITE UR QL STRIP: NEGATIVE
NON-SQ EPI CELLS URNS QL MICRO: ABNORMAL /HPF
NONHDLC SERPL-MCNC: 66 MG/DL
NRBC BLD AUTO-RTO: 0 /100 WBCS
PH UR STRIP.AUTO: 6.5 [PH]
PHOSPHATE SERPL-MCNC: 3.2 MG/DL (ref 2.3–4.1)
PLATELET # BLD AUTO: 224 THOUSANDS/UL (ref 149–390)
PMV BLD AUTO: 10.2 FL (ref 8.9–12.7)
POTASSIUM SERPL-SCNC: 4.2 MMOL/L (ref 3.5–5.3)
PROT SERPL-MCNC: 6.2 G/DL (ref 6.4–8.2)
PROT UR STRIP-MCNC: NEGATIVE MG/DL
PROT UR-MCNC: 27 MG/DL
PROT/CREAT UR: 0.15 MG/G{CREAT} (ref 0–0.1)
RBC # BLD AUTO: 4.15 MILLION/UL (ref 3.88–5.62)
RBC #/AREA URNS AUTO: ABNORMAL /HPF
SODIUM SERPL-SCNC: 144 MMOL/L (ref 136–145)
SP GR UR STRIP.AUTO: 1.02 (ref 1–1.03)
TRIGL SERPL-MCNC: 102 MG/DL
TSH SERPL DL<=0.05 MIU/L-ACNC: 2.73 UIU/ML (ref 0.36–3.74)
UROBILINOGEN UR QL STRIP.AUTO: 0.2 E.U./DL
WBC # BLD AUTO: 6.37 THOUSAND/UL (ref 4.31–10.16)
WBC #/AREA URNS AUTO: ABNORMAL /HPF

## 2021-03-03 PROCEDURE — 84443 ASSAY THYROID STIM HORMONE: CPT

## 2021-03-03 PROCEDURE — 82550 ASSAY OF CK (CPK): CPT

## 2021-03-03 PROCEDURE — 85025 COMPLETE CBC W/AUTO DIFF WBC: CPT

## 2021-03-03 PROCEDURE — 84100 ASSAY OF PHOSPHORUS: CPT

## 2021-03-03 PROCEDURE — 80053 COMPREHEN METABOLIC PANEL: CPT

## 2021-03-03 PROCEDURE — 83735 ASSAY OF MAGNESIUM: CPT

## 2021-03-03 PROCEDURE — 82553 CREATINE MB FRACTION: CPT

## 2021-03-03 PROCEDURE — 80061 LIPID PANEL: CPT

## 2021-03-03 PROCEDURE — 36415 COLL VENOUS BLD VENIPUNCTURE: CPT

## 2021-03-03 PROCEDURE — 82570 ASSAY OF URINE CREATININE: CPT

## 2021-03-03 PROCEDURE — 84156 ASSAY OF PROTEIN URINE: CPT

## 2021-03-03 PROCEDURE — 81001 URINALYSIS AUTO W/SCOPE: CPT | Performed by: INTERNAL MEDICINE

## 2021-03-29 DIAGNOSIS — E78.2 MIXED HYPERLIPIDEMIA: ICD-10-CM

## 2021-03-29 RX ORDER — ROSUVASTATIN CALCIUM 20 MG/1
TABLET, COATED ORAL
Qty: 30 TABLET | Refills: 0 | Status: SHIPPED | OUTPATIENT
Start: 2021-03-29 | End: 2021-04-15 | Stop reason: SDUPTHER

## 2021-04-15 ENCOUNTER — OFFICE VISIT (OUTPATIENT)
Dept: CARDIOLOGY CLINIC | Facility: CLINIC | Age: 86
End: 2021-04-15
Payer: MEDICARE

## 2021-04-15 VITALS
HEART RATE: 65 BPM | BODY MASS INDEX: 32.65 KG/M2 | SYSTOLIC BLOOD PRESSURE: 138 MMHG | DIASTOLIC BLOOD PRESSURE: 68 MMHG | HEIGHT: 67 IN | WEIGHT: 208 LBS | OXYGEN SATURATION: 96 %

## 2021-04-15 DIAGNOSIS — N20.1 LEFT URETERAL CALCULUS: ICD-10-CM

## 2021-04-15 DIAGNOSIS — E55.9 VITAMIN D DEFICIENCY: ICD-10-CM

## 2021-04-15 DIAGNOSIS — N18.30 CHRONIC KIDNEY DISEASE, STAGE III (MODERATE) (HCC): ICD-10-CM

## 2021-04-15 DIAGNOSIS — I12.9 HYPERTENSIVE CHRONIC KIDNEY DISEASE WITH STAGE 1 THROUGH STAGE 4 CHRONIC KIDNEY DISEASE, OR UNSPECIFIED CHRONIC KIDNEY DISEASE: ICD-10-CM

## 2021-04-15 DIAGNOSIS — I50.22 CHRONIC SYSTOLIC CONGESTIVE HEART FAILURE (HCC): ICD-10-CM

## 2021-04-15 DIAGNOSIS — N18.32 STAGE 3B CHRONIC KIDNEY DISEASE (HCC): ICD-10-CM

## 2021-04-15 DIAGNOSIS — R60.0 LOCALIZED EDEMA: ICD-10-CM

## 2021-04-15 DIAGNOSIS — N25.81 SECONDARY HYPERPARATHYROIDISM OF RENAL ORIGIN (HCC): ICD-10-CM

## 2021-04-15 DIAGNOSIS — I25.810 CORONARY ARTERY DISEASE INVOLVING CORONARY BYPASS GRAFT OF NATIVE HEART WITHOUT ANGINA PECTORIS: ICD-10-CM

## 2021-04-15 DIAGNOSIS — I48.0 PAROXYSMAL ATRIAL FIBRILLATION (HCC): Primary | ICD-10-CM

## 2021-04-15 DIAGNOSIS — I25.5 ISCHEMIC CARDIOMYOPATHY: ICD-10-CM

## 2021-04-15 DIAGNOSIS — E78.2 MIXED HYPERLIPIDEMIA: ICD-10-CM

## 2021-04-15 DIAGNOSIS — I47.2 V TACH (HCC): ICD-10-CM

## 2021-04-15 DIAGNOSIS — E78.5 DYSLIPIDEMIA: ICD-10-CM

## 2021-04-15 DIAGNOSIS — N20.0 NEPHROLITHIASIS: ICD-10-CM

## 2021-04-15 DIAGNOSIS — I10 BENIGN ESSENTIAL HYPERTENSION: ICD-10-CM

## 2021-04-15 PROCEDURE — 99215 OFFICE O/P EST HI 40 MIN: CPT | Performed by: INTERNAL MEDICINE

## 2021-04-15 PROCEDURE — 93000 ELECTROCARDIOGRAM COMPLETE: CPT | Performed by: INTERNAL MEDICINE

## 2021-04-15 RX ORDER — METOPROLOL SUCCINATE 50 MG/1
50 TABLET, EXTENDED RELEASE ORAL 2 TIMES DAILY
Qty: 180 TABLET | Refills: 3 | Status: SHIPPED | OUTPATIENT
Start: 2021-04-15 | End: 2021-10-22 | Stop reason: SDUPTHER

## 2021-04-15 RX ORDER — MEXILETINE HYDROCHLORIDE 150 MG/1
150 CAPSULE ORAL 2 TIMES DAILY
Qty: 180 CAPSULE | Refills: 3 | Status: SHIPPED | OUTPATIENT
Start: 2021-04-15 | End: 2021-11-11 | Stop reason: HOSPADM

## 2021-04-15 RX ORDER — AMIODARONE HYDROCHLORIDE 200 MG/1
200 TABLET ORAL DAILY
Qty: 90 TABLET | Refills: 3 | Status: SHIPPED | OUTPATIENT
Start: 2021-04-15 | End: 2021-10-25 | Stop reason: SDUPTHER

## 2021-04-15 RX ORDER — POTASSIUM CITRATE 10 MEQ/1
10 TABLET, EXTENDED RELEASE ORAL 2 TIMES DAILY
Qty: 180 TABLET | Refills: 3 | Status: SHIPPED | OUTPATIENT
Start: 2021-04-15 | End: 2022-05-18 | Stop reason: SDUPTHER

## 2021-04-15 RX ORDER — AMLODIPINE BESYLATE 2.5 MG/1
2.5 TABLET ORAL EVERY OTHER DAY
Qty: 45 TABLET | Refills: 3 | Status: ON HOLD | OUTPATIENT
Start: 2021-04-15 | End: 2021-11-06

## 2021-04-15 RX ORDER — TORSEMIDE 20 MG/1
TABLET ORAL
Qty: 190 TABLET | Refills: 3 | Status: SHIPPED | OUTPATIENT
Start: 2021-04-15 | End: 2021-06-22

## 2021-04-15 RX ORDER — ROSUVASTATIN CALCIUM 20 MG/1
20 TABLET, COATED ORAL DAILY
Qty: 90 TABLET | Refills: 3 | Status: SHIPPED | OUTPATIENT
Start: 2021-04-15 | End: 2021-05-05

## 2021-04-15 RX ORDER — ISOSORBIDE MONONITRATE 30 MG/1
30 TABLET, EXTENDED RELEASE ORAL DAILY
Qty: 90 TABLET | Refills: 3 | Status: SHIPPED | OUTPATIENT
Start: 2021-04-15

## 2021-04-15 NOTE — PROGRESS NOTES
Follow-up - Cardiology   Vergia Limb 80 y o  male MRN: [id number]        Problems    1  Paroxysmal atrial fibrillation (HCC)  POCT ECG   2  Hypertensive chronic kidney disease with stage 1 through stage 4 chronic kidney disease, or unspecified chronic kidney disease     3  Left ureteral calculus     4  Chronic kidney disease, stage III (moderate)     5  Nephrolithiasis     6  Dyslipidemia       Impression    Concepcion Johnston returns for a follow-up visit to the [facility], today complaining of weight gain, cough, edema    Coronary artery disease  Remote CABG  Severe ischemic cardiomyopathy  No new anginal symptoms, and EKG today shares unchanged ventricular pacing    Chronic systolic CHF  LVEF 47%, due to ischemic cardiomyopathy  CRT-D with generator change 1/20 (not MRI compatible)  Now with acute on chronic systolic CHF with about 4-5 lb weight gain, bilateral edema, cough  He is not taking ACE-inhibitor/ARB due to prior hyperkalemia and CKD stage 3  He follows with Nephrology  Afterload reduction with amlodipine is well tolerated  He is on beta-blocker therapy    Hypertension  Blood pressure is slightly elevated today in the setting of volume overload    Hyperlipidemia  Excellent cholesterol levels with rosuvastatin    Ventricular tachycardia  On suppressive amiodarone and mexiletine  No high rate episodes per device of any significant degree, continues to have occasional nonsustained ventricular tachycardia  Chest x-ray up today 10/20  Chemistry profile for LFTs and TSH up-to-date    Plan    Discussed again being diligent about the diet, low-sodium, less than 150 mg per serving, and trying to keep water down to about 50 oz a day at the most  We also discussed the need to weigh himself daily, if he does not, then when he gains weight, he needs to start weighing himself and increasing the torsemide to twice a day until he drops 4-5 lb until his ankle edema resolved    At this time he is probably about 4-5 lb overweight  Edema worse, he has a cough  He is finding the a m  Torsemide dose a lot more effective than when he takes in the p m  Especially beats out of the house in the morning and avoiding the morning dose  If taking the torsemide twice a day does not improve things, we may need to raise the morning dose  Usual follow-up, and recommend BMP in a couple weeks on the higher dose of torsemide    HPI: Radha Hernandez is a 80y o  year old male  With a longstanding history of CAD, CABG, ischemic cardiomyopathy with last known ejection fraction 72%, chronic systolic/ diastolic CHF, ventricular tachycardia and paroxysmal atrial fibrillation, on long-term amiodarone and mexiletine  He complains of increasing edema, dyspnea, cough  His weight 208 lb is up a few lb since his last office visit  He is compliant with torsemide, but finds it much more effective when he takes it 1st thing in the morning after being recumbent all night, finds it much less effective with decreased urination when he takes it later in the day, often needs to do that when he goes up from morning appointments because of urinary frequency when he takes the torsemide  He has not tolerated spironolactone in the past due to low blood pressure and hyperkalemia  He has also not been on ACE-inhibitor/ARB due to hyperkalemia in the past and CKD  Blood pressure slightly elevated today despite taking amlodipine and metoprolol, and likely driven by his volume overload  His cholesterol is well controlled  He denies any recent major dietary changes, he only eats really 1 meal a day, that is in the evening, he snacks the rest of the day, and he does acknowledge that he feels cold after eating his supper  Sleep is poor, very fractionated    Review of Systems   Constitutional: Negative  Negative for appetite change, diaphoresis, fatigue and fever  HENT: Negative  Eyes: Negative      Respiratory: Positive for cough, chest tightness and shortness of breath  Negative for wheezing  Cardiovascular: Positive for leg swelling  Negative for chest pain and palpitations  Gastrointestinal: Negative  Negative for abdominal pain and blood in stool  Endocrine: Negative  Genitourinary: Negative  Musculoskeletal: Negative  Negative for arthralgias and joint swelling  Skin: Negative  Negative for rash  Neurological: Negative  Negative for dizziness, syncope and light-headedness  Hematological: Negative  Psychiatric/Behavioral: Negative  Past Medical History:   Diagnosis Date    AICD (automatic cardioverter/defibrillator) present 2004    AICD (automatic cardioverter/defibrillator) present 2006    AICD (automatic cardioverter/defibrillator) present 2013    [facility]    Arthritis     R knee and neck    Atrial fibrillation (HCC)     BPH (benign prostatic hyperplasia)     CAD (coronary artery disease) of artery bypass graft     CHF (congestive heart failure) ([facility] [address] )     combine    Coronary artery disease     Disease of thyroid gland     Hyperlipidemia     Hypertension     Ischemic cardiomyopathy     Left ureteral calculus 10/17/2017    Renal disorder     V tach (HCC)      Social History     Substance and Sexual Activity   Alcohol Use Yes    Frequency: Monthly or less    Drinks per session: 1 or 2    Binge frequency: Never     Social History     Substance and Sexual Activity   Drug Use No     Social History     Tobacco Use   Smoking Status Never Smoker   Smokeless Tobacco Never Used       Allergies:  No Known Allergies    Medications:     Current Outpatient Medications:     acetaminophen (TYLENOL) 500 mg tablet, Take 500 mg by mouth every 6 (six) hours as needed for mild pain, Disp: , Rfl:     amiodarone 200 mg tablet, Take 200 mg by mouth daily  , Disp: , Rfl:     amLODIPine (NORVASC) 2 5 mg tablet, Take 1 tablet (2 5 mg total) by mouth every other day At Morning , Disp: 45 tablet, Rfl: 3    aspirin 81 MG tablet, Take 81 mg by mouth daily  , Disp: , Rfl:     isosorbide mononitrate (IMDUR) 30 mg 24 hr tablet, Take 1 tablet (30 mg total) by mouth daily, Disp: 90 tablet, Rfl: 3    levothyroxine 150 mcg tablet, Take 150 mcg by mouth daily , Disp: , Rfl:     LORazepam (ATIVAN) 1 mg tablet, Take 1 mg by mouth as needed for anxiety  , Disp: , Rfl:     metoprolol succinate (TOPROL-XL) 50 mg 24 hr tablet, Take 1 tablet (50 mg total) by mouth 2 (two) times a day, Disp: 180 tablet, Rfl: 3    mexiletine (MEXITIL) 150 mg capsule, Take 1 capsule (150 mg total) by mouth 2 (two) times a day, Disp: 180 capsule, Rfl: 3    Multiple Vitamins-Minerals (MULTIVITAMIN WITH MINERALS) tablet, Take 1 tablet by mouth daily  , Disp: , Rfl:     nitroglycerin (NITROSTAT) 0 4 mg SL tablet, Place 0 4 mg under the tongue every 5 (five) minutes as needed for chest pain  , Disp: , Rfl:     potassium citrate (Urocit-K 10) 10 mEq, Take 2 tablets (20 mEq total) by mouth 2 (two) times a day (Patient taking differently: Take 10 mEq by mouth 2 (two) times a day ), Disp: 360 tablet, Rfl: 3    rosuvastatin (CRESTOR) 20 MG tablet, Take 1 tablet by mouth once daily, Disp: 30 tablet, Rfl: 0    tamsulosin (FLOMAX) 0 4 mg, Take 1 capsule (0 4 mg total) by mouth daily with dinner, Disp: 90 capsule, Rfl: 3    torsemide (DEMADEX) 20 mg tablet, Take 2 tablets (40 mg total) by mouth daily, Disp: 180 tablet, Rfl: 3    pravastatin (PRAVACHOL) 80 mg tablet, Take 80 mg by mouth daily, Disp: , Rfl:       Vitals:    04/15/21 0925   BP: 138/68   Pulse: 65   SpO2: 96%     Weight (last 2 days)     Date/Time   Weight    04/15/21 0925   94 3 (208)            Physical Exam  Constitutional:       General: He is not in acute distress  Appearance: Normal appearance  He is well-developed  He is not diaphoretic  HENT:      Head: Normocephalic and atraumatic  Eyes:      General: No scleral icterus       Conjunctiva/sclera: Conjunctivae normal       Pupils: Pupils are equal, round, and reactive to light  Neck:      Musculoskeletal: Normal range of motion and neck supple  Thyroid: No thyromegaly  Vascular: Hepatojugular reflux and JVD present  Cardiovascular:      Rate and Rhythm: Normal rate and regular rhythm  Heart sounds: Murmur present  No friction rub  No gallop  Pulmonary:      Effort: Pulmonary effort is normal  No respiratory distress  Breath sounds: Normal breath sounds  No wheezing or rales  Abdominal:      General: Bowel sounds are normal  There is no distension  Palpations: Abdomen is soft  Tenderness: There is no abdominal tenderness  Musculoskeletal: Normal range of motion  General: No deformity  Right lower leg: Edema present  Left lower leg: Edema present  Skin:     General: Skin is warm and dry  Findings: No erythema or rash  Neurological:      General: No focal deficit present  Mental Status: He is alert and oriented to person, place, and time  Cranial Nerves: No cranial nerve deficit  Motor: No weakness             Laboratory Studies:    Laboratory studies personally reviewed  NT-proBNP:   Lab Results   Component Value Date    NTBNP 3,682 (H) 06/26/2016      Coags:    Lipid Profile:   Lab Results   Component Value Date    CHOL 122 09/09/2015     Lab Results   Component Value Date    HDL 45 03/03/2021     Lab Results   Component Value Date    LDLCALC 46 03/03/2021     Lab Results   Component Value Date    TRIG 102 03/03/2021       Cardiac testing:   EKG reviewed personally:   8/20-electronic ventricular pacemaker  4/21-ventricular paced rhythm    Echocardiogram  2018-EF 30%, LV dilated, akinesis basal to mid inferior wall, and inferolateral wall, grade 3 diastolic dysfunction, RV dilated, left atrium dilated, mild MR, moderate TR, severe pulmonary hypertension    Device check  3/1/2021-nonsustained VT, normal Dione Swain MD    Portions of the record may have been created with voice recognition software   Occasional wrong word or "sound a like" substitutions may have occurred due to the inherent limitations of voice recognition software   Read the chart carefully and recognize, using context, where substitutions have occurred

## 2021-04-26 DIAGNOSIS — E78.2 MIXED HYPERLIPIDEMIA: ICD-10-CM

## 2021-05-05 ENCOUNTER — APPOINTMENT (OUTPATIENT)
Dept: LAB | Facility: CLINIC | Age: 86
End: 2021-05-05
Payer: MEDICARE

## 2021-05-05 ENCOUNTER — TRANSCRIBE ORDERS (OUTPATIENT)
Dept: LAB | Facility: CLINIC | Age: 86
End: 2021-05-05

## 2021-05-05 DIAGNOSIS — I12.9 HYPERTENSIVE CHRONIC KIDNEY DISEASE WITH STAGE 1 THROUGH STAGE 4 CHRONIC KIDNEY DISEASE, OR UNSPECIFIED CHRONIC KIDNEY DISEASE: ICD-10-CM

## 2021-05-05 LAB
ANION GAP SERPL CALCULATED.3IONS-SCNC: 4 MMOL/L (ref 4–13)
BUN SERPL-MCNC: 24 MG/DL (ref 5–25)
CALCIUM SERPL-MCNC: 9.5 MG/DL (ref 8.3–10.1)
CHLORIDE SERPL-SCNC: 109 MMOL/L (ref 100–108)
CO2 SERPL-SCNC: 27 MMOL/L (ref 21–32)
CREAT SERPL-MCNC: 1.54 MG/DL (ref 0.6–1.3)
GFR SERPL CREATININE-BSD FRML MDRD: 41 ML/MIN/1.73SQ M
GLUCOSE P FAST SERPL-MCNC: 104 MG/DL (ref 65–99)
POTASSIUM SERPL-SCNC: 4.2 MMOL/L (ref 3.5–5.3)
SODIUM SERPL-SCNC: 140 MMOL/L (ref 136–145)

## 2021-05-05 PROCEDURE — 36415 COLL VENOUS BLD VENIPUNCTURE: CPT

## 2021-05-05 PROCEDURE — 80048 BASIC METABOLIC PNL TOTAL CA: CPT

## 2021-05-05 RX ORDER — ROSUVASTATIN CALCIUM 20 MG/1
TABLET, COATED ORAL
Qty: 30 TABLET | Refills: 0 | Status: SHIPPED | OUTPATIENT
Start: 2021-05-05 | End: 2021-12-04 | Stop reason: HOSPADM

## 2021-05-25 ENCOUNTER — TELEPHONE (OUTPATIENT)
Dept: CARDIOLOGY CLINIC | Facility: CLINIC | Age: 86
End: 2021-05-25

## 2021-05-25 DIAGNOSIS — I50.22 CHRONIC SYSTOLIC CONGESTIVE HEART FAILURE (HCC): Primary | ICD-10-CM

## 2021-05-25 RX ORDER — METOLAZONE 2.5 MG/1
TABLET ORAL
Qty: 10 TABLET | Refills: 3 | Status: SHIPPED | OUTPATIENT
Start: 2021-05-25

## 2021-05-25 NOTE — TELEPHONE ENCOUNTER
Patient's wife called stating the torsemide is not working  The patient's weight is unchanged, and edema is worse  Please advise, or call wife  [address] spoke with patient's wife, she understood Metolazone instructions  Script will be called in to [phone]

## 2021-05-25 NOTE — PROGRESS NOTES
Torsemide 40 mg p o  B i d  Ineffective  At my last office visit a month ago he was about 5 lb overweight with edema and cough  Recommend metolazone 2 5 mg, he should take 1 dose tomorrow morning before the a m  Torsemide, and if effective, only take it once to twice a week as needed and always before the a m   Torsemide dose

## 2021-05-25 NOTE — TELEPHONE ENCOUNTER
Please see my note with instructions in his chart    Can you please call him to relay my recommendations

## 2021-06-10 ENCOUNTER — REMOTE DEVICE CLINIC VISIT (OUTPATIENT)
Dept: CARDIOLOGY CLINIC | Facility: CLINIC | Age: 86
End: 2021-06-10
Payer: MEDICARE

## 2021-06-10 DIAGNOSIS — Z95.810 AICD (AUTOMATIC CARDIOVERTER/DEFIBRILLATOR) PRESENT: Primary | ICD-10-CM

## 2021-06-10 PROCEDURE — 93296 REM INTERROG EVL PM/IDS: CPT | Performed by: INTERNAL MEDICINE

## 2021-06-10 PROCEDURE — 93295 DEV INTERROG REMOTE 1/2/MLT: CPT | Performed by: INTERNAL MEDICINE

## 2021-06-11 NOTE — PROGRESS NOTES
Results for orders placed or performed in visit on 06/10/21   Cardiac EP device report    Narrative    SJM CRT-D/DDDR MODENOT MRI CONDITIONAL  MERLIN TRANSMISSION: BATTERY ADEQUATE (5 3 YRS)  AP 86%; BVP 98%  ALL LEAD PARAMETERS WITHIN NORMAL LIMITS  NO SIGNIFICANT AHR EPISODES  MS 0%  2 NSVT EPISODES (UP  BPM & 15 BEATS)  PT  TAKES AMIODARONE, ASA 81 & METOPROLOL SUCC  CORVUE IMPEDANCE MONITORING WITHIN NORMAL LIMITS  NORMAL DEVICE FUNCTION   PL

## 2021-06-17 ENCOUNTER — HOSPITAL ENCOUNTER (OUTPATIENT)
Dept: NON INVASIVE DIAGNOSTICS | Facility: CLINIC | Age: 86
Discharge: HOME | End: 2021-06-17
Payer: MEDICARE

## 2021-06-17 DIAGNOSIS — I25.5 ISCHEMIC CARDIOMYOPATHY: ICD-10-CM

## 2021-06-17 PROCEDURE — 93356 MYOCRD STRAIN IMG SPCKL TRCK: CPT

## 2021-06-17 PROCEDURE — 93306 TTE W/DOPPLER COMPLETE: CPT | Performed by: INTERNAL MEDICINE

## 2021-06-17 PROCEDURE — 93306 TTE W/DOPPLER COMPLETE: CPT

## 2021-06-21 ENCOUNTER — TELEPHONE (OUTPATIENT)
Dept: CARDIOLOGY CLINIC | Facility: CLINIC | Age: 86
End: 2021-06-21

## 2021-06-21 NOTE — TELEPHONE ENCOUNTER
Let us see what his visit with Nephrology shows, but he definitely needs to have a BMP before we start  hammering away with heavier dose diuretic

## 2021-06-21 NOTE — TELEPHONE ENCOUNTER
Wife called, states pt's wt is 203 lbs today  His legs and feet are very edematous  Can't get shoes on and they are bluish in color  He is sob at times  Wife has given Metolazone only once a week because she is worried about the kidneys  Did not have BMP yet, she was not aware it needed to be done  He dose have appt tomorrow with nephrology        Taking torsemide 40 mg qd              Metolazone 2 5 mg prn ( only once a week)      Please advise

## 2021-06-22 ENCOUNTER — OFFICE VISIT (OUTPATIENT)
Dept: NEPHROLOGY | Facility: CLINIC | Age: 86
End: 2021-06-22
Payer: MEDICARE

## 2021-06-22 VITALS
WEIGHT: 208 LBS | BODY MASS INDEX: 32.65 KG/M2 | HEIGHT: 67 IN | SYSTOLIC BLOOD PRESSURE: 132 MMHG | DIASTOLIC BLOOD PRESSURE: 74 MMHG

## 2021-06-22 DIAGNOSIS — N18.30 CHRONIC KIDNEY DISEASE, STAGE III (MODERATE) (HCC): ICD-10-CM

## 2021-06-22 DIAGNOSIS — E78.5 DYSLIPIDEMIA: ICD-10-CM

## 2021-06-22 DIAGNOSIS — N20.1 LEFT URETERAL CALCULUS: ICD-10-CM

## 2021-06-22 DIAGNOSIS — N20.0 NEPHROLITHIASIS: ICD-10-CM

## 2021-06-22 DIAGNOSIS — I12.9 HYPERTENSIVE CHRONIC KIDNEY DISEASE WITH STAGE 1 THROUGH STAGE 4 CHRONIC KIDNEY DISEASE, OR UNSPECIFIED CHRONIC KIDNEY DISEASE: ICD-10-CM

## 2021-06-22 DIAGNOSIS — R60.1 GENERALIZED EDEMA: Primary | ICD-10-CM

## 2021-06-22 PROCEDURE — 99214 OFFICE O/P EST MOD 30 MIN: CPT | Performed by: NURSE PRACTITIONER

## 2021-06-22 RX ORDER — TORSEMIDE 20 MG/1
40 TABLET ORAL DAILY
Qty: 190 TABLET | Refills: 3 | Status: SHIPPED | OUTPATIENT
Start: 2021-06-22 | End: 2021-08-12

## 2021-06-22 NOTE — PROGRESS NOTES
NEPHROLOGY OFFICE VISIT   Sha Limb 80 y o  male MRN: [id number]  6/22/2021    Reason for Visit: Follow-up    INTERVAL HISTORY and SUBJECTIVE:  I had the pleasure of seeing Concepcion Johnston  today in the renal clinic for the continued management of chronic kidney disease  During visit, patient endorses bilateral lower extremity edema, discoloration of his lower extremities at times  Weight today per outpatient scale: 203 lbs  Patient denies acute shortness of breath at present, however endorses intermittent dyspnea on exertion  He is eating and drinking well  He denies nausea, vomiting, diarrhea  The last blood work was done on 5/5/21, which we have reviewed together  Patient's primary nephrologist, Dr Gemma Martinez  Patient will repeat lab studies within one week to ensure renal stability  Further, patient will complete blood and urine tests prior to next follow-up  ASSESSMENT AND PLAN:  1  Chronic kidney disease, stage III:  - etiology suspect due to hypertensive nephrosclerosis, arteriolar nephrosclerosis, cardiorenal syndrome, age-related nephron loss  - upon review medical records, baseline creatinine 1 5-1 8 mg/dL  - most recent creatinine 1 54 mg/dL  - prior UA completed in March 2021 revealed small blood, no protein, 10-20 RBC, 1-2 WBC, occasional bacteria  - proteinuria, urine protein creatinine ratio 0 15    - continue to avoid NSAID use, nephrotoxic agents  2  Nephrolithiasis:   - 24 hour stone risk profile; urine citrate low at 309- on urocit- k 10 mEq tablets twice daily, oxalate 50 mg- placed on low oxalate diet + increased fluid intake to 64 oz or 2 L per day  - cautious with drinking excessive amounts of fluid given heart failure, renal disease    - seen by urology in the past as well, recommended repeat follow up  3  Hypertension:  - currently on: torsemide 40 mg daily, Toprol XL 50 mg BID, metolazone 2 5 mg once weekly, Imdur 30 mg daily     - recommendations: increase torsemide 40 mg BID with weight gain of 3 plus pounds, if we need to take BID dosing daily, we will then plan to re-introduce metolazone into regimen    - advised on low sodium diet  4  Anemia:  - most recent hemoglobin 12 8 grams/deciliter, at goal      5  Mineral bone disease:  - PTH 88 9 in 2020    - phosphorus 3 2 from 3/2021   - will check PTH, phosphorus, vitamin d level prior to next office follow-up  6  Chronic systolic CHF:  - most recent echo completed on 6/17/21 revealed EF 20%, moderate diffuse hypokinesis, akinesis of the entire inferior basal- mid inferior lateral walls  IVC dilation  - no on an ACE-inhibitor/ ARB due to prior hyperkalemia and underlying kidney disease   - follows with Dr Malorie Amador with cardiology  - per patient and wife, "dry weight" around 200 lbs  Most recent outpatient weight: 203 lbs  - please see above for diuretic regimens  7  CAD:  - with history of CABG, with ischemic cardiomyopathy as above  8  Bilateral lower extremity edema:  - due to above, please see above for diuretic adjustments  - will also check bilateral lower extremity duplex  PATIENT INSTRUCTIONS:    Patient Instructions   Please take torsemide 40 mg daily  If you have a weight gain > 3 lbs, please take additional torsemide 40 mg in the evening  Continue to weigh yourself daily  We will check your blood work in one week to make sure the kidneys are stable  If you have any questions, please call our office  Thank you!      Orders Placed This Encounter   Procedures    Protein / creatinine ratio, urine     Standing Status:   Future     Standing Expiration Date:   6/22/2022    PTH, intact     Standing Status:   Future     Standing Expiration Date:   6/22/2022    Renal function panel     Standing Status:   Future     Standing Expiration Date:   6/22/2022    Urinalysis with microscopic     Standing Status:   Future     Standing Expiration Date:   12/22/2021    Vitamin D 25 hydroxy Standing Status:   Future     Standing Expiration Date:   6/22/2022    Renal function panel     Standing Status:   Future     Standing Expiration Date:   6/22/2022       OBJECTIVE:  Current Weight: Weight - Scale: 94 3 kg (208 lb)  Vitals:    06/22/21 1449   BP: 132/74   BP Location: Left arm   Patient Position: Sitting   Cuff Size: Adult   Weight: 94 3 kg (208 lb)   Height: 5' 7" (1 702 m)    Body mass index is 32 58 kg/m²  REVIEW OF SYSTEMS:    Review of Systems   Constitutional: Negative for activity change and appetite change  Respiratory: Positive for shortness of breath  Cardiovascular: Positive for leg swelling  Negative for chest pain  Gastrointestinal: Positive for abdominal distention  Negative for diarrhea, nausea and vomiting  Neurological: Negative for dizziness, light-headedness and headaches  Psychiatric/Behavioral: Negative for confusion  PHYSICAL EXAM:      Physical Exam  Constitutional:       Appearance: Normal appearance  Cardiovascular:      Rate and Rhythm: Normal rate and regular rhythm  Pulses: Normal pulses  Heart sounds: Normal heart sounds  Pulmonary:      Effort: Pulmonary effort is normal       Breath sounds: Normal breath sounds  Abdominal:      General: Bowel sounds are normal  There is distension  Musculoskeletal:         General: Swelling present  Right lower leg: Edema present  Left lower leg: Edema present  Skin:     General: Skin is warm and dry  Neurological:      General: No focal deficit present  Mental Status: He is alert and oriented to person, place, and time  Psychiatric:         Mood and Affect: Mood normal          Behavior: Behavior normal          Thought Content:  Thought content normal          Judgment: Judgment normal          Medications:    Current Outpatient Medications:     acetaminophen (TYLENOL) 500 mg tablet, Take 500 mg by mouth every 6 (six) hours as needed for mild pain, Disp: , Rfl:    amiodarone 200 mg tablet, Take 1 tablet (200 mg total) by mouth daily, Disp: 90 tablet, Rfl: 3    amLODIPine (NORVASC) 2 5 mg tablet, Take 1 tablet (2 5 mg total) by mouth every other day At Morning , Disp: 45 tablet, Rfl: 3    aspirin 81 MG tablet, Take 81 mg by mouth daily  , Disp: , Rfl:     isosorbide mononitrate (IMDUR) 30 mg 24 hr tablet, Take 1 tablet (30 mg total) by mouth daily, Disp: 90 tablet, Rfl: 3    levothyroxine 150 mcg tablet, Take 150 mcg by mouth daily , Disp: , Rfl:     LORazepam (ATIVAN) 1 mg tablet, Take 1 mg by mouth as needed for anxiety  , Disp: , Rfl:     metolazone (ZAROXOLYN) 2 5 mg tablet, As needed no more than twice weekly for leg swelling, Disp: 10 tablet, Rfl: 3    metoprolol succinate (TOPROL-XL) 50 mg 24 hr tablet, Take 1 tablet (50 mg total) by mouth 2 (two) times a day, Disp: 180 tablet, Rfl: 3    mexiletine (MEXITIL) 150 mg capsule, Take 1 capsule (150 mg total) by mouth 2 (two) times a day, Disp: 180 capsule, Rfl: 3    Multiple Vitamins-Minerals (MULTIVITAMIN WITH MINERALS) tablet, Take 1 tablet by mouth daily  , Disp: , Rfl:     nitroglycerin (NITROSTAT) 0 4 mg SL tablet, Place 0 4 mg under the tongue every 5 (five) minutes as needed for chest pain  , Disp: , Rfl:     potassium citrate (Urocit-K 10) 10 mEq, Take 1 tablet (10 mEq total) by mouth 2 (two) times a day, Disp: 180 tablet, Rfl: 3    pravastatin (PRAVACHOL) 80 mg tablet, Take 80 mg by mouth daily, Disp: , Rfl:     rosuvastatin (CRESTOR) 20 MG tablet, Take 1 tablet by mouth once daily, Disp: 30 tablet, Rfl: 0    tamsulosin (FLOMAX) 0 4 mg, Take 1 capsule (0 4 mg total) by mouth daily with dinner, Disp: 90 capsule, Rfl: 3    torsemide (DEMADEX) 20 mg tablet, Take 2 tablets (40 mg total) by mouth daily Take a total of 40 mg in the am  If you have weight gain, take 40 mg in the am and pm , Disp: 190 tablet, Rfl: 3    Laboratory Results:        Invalid input(s): ALBUMIN    Results for orders placed or performed in visit on 31/04/95   Basic metabolic panel   Result Value Ref Range    Sodium 140 136 - 145 mmol/L    Potassium 4 2 3 5 - 5 3 mmol/L    Chloride 109 (H) 100 - 108 mmol/L    CO2 27 21 - 32 mmol/L    ANION GAP 4 4 - 13 mmol/L    BUN 24 5 - 25 mg/dL    Creatinine 1 54 (H) 0 60 - 1 30 mg/dL    Glucose, Fasting 104 (H) 65 - 99 mg/dL    Calcium 9 5 8 3 - 10 1 mg/dL    eGFR 41 ml/min/1 73sq m

## 2021-06-22 NOTE — PATIENT INSTRUCTIONS
Please take torsemide 40 mg daily  If you have a weight gain > 3 lbs, please take additional torsemide 40 mg in the evening  Continue to weigh yourself daily  We will check your blood work in one week to make sure the kidneys are stable  If you have any questions, please call our office  Thank you!

## 2021-06-23 ENCOUNTER — TELEPHONE (OUTPATIENT)
Dept: NEPHROLOGY | Facility: CLINIC | Age: 86
End: 2021-06-23

## 2021-06-23 NOTE — TELEPHONE ENCOUNTER
Nephrology visit did give instructions regarding his diuretics  Will have BMP in 1 week from his visit

## 2021-06-30 ENCOUNTER — TELEPHONE (OUTPATIENT)
Dept: NEPHROLOGY | Facility: CLINIC | Age: 86
End: 2021-06-30

## 2021-06-30 NOTE — TELEPHONE ENCOUNTER
I called the patient/wife regarding ongoing lower extremity edema and weight gain  Torsemide 40 mg bid since may and then back to torsemide 40 daily  But his edema and weight is up again  Very sob but not acute   No chest pain   No urine symptoms  No GI symptoms    Recommend  1  Continue torsemide 40 b i d  indefinitely    2  Metolazone 2 5 mg as needed for leg swelling or 2-3 lb weight gain that is resistant to the torsemide    3  Salt and fluid restriction: Fluid 1500 mL    4  Venous duplex tomorrow    5  The patient gets any worse in terms of breathing or swelling or weight gain despite the above go right to the hospital    6  BMP 1 week after making the above changes    7  Follow-up appointment with 1 of the advanced practitioner's in about 2 weeks     Reviewed with the patient's wife who read the instructions back to me understands the instructions

## 2021-06-30 NOTE — TELEPHONE ENCOUNTER
Patient's wife called the office with concerns on patient's leg swelling  Since office visit with Corey Mena, patient's leg swelling has not improved  Confirmed patient had initiated decreased torsemide 40 mg in the AM  After a couple days on this decreased dose patient's weight had gone from 200lbs to 206lb  Patient then followed recommendations of taking additional torsemide dose in the evening  Patient's weight had gone back down to 200lbs and is his current weight as of this AM  Patient is no longer on metolazone or any other diuretics at this time  Patient does complain of SOB  Patient's wife is very concerned with patient's lover extremity swelling  Bps around 130/70  Please advise

## 2021-07-01 ENCOUNTER — HOSPITAL ENCOUNTER (OUTPATIENT)
Dept: VASCULAR ULTRASOUND | Facility: HOSPITAL | Age: 86
Discharge: HOME | End: 2021-07-01
Payer: MEDICARE

## 2021-07-01 DIAGNOSIS — R60.1 GENERALIZED EDEMA: ICD-10-CM

## 2021-07-01 PROCEDURE — 93970 EXTREMITY STUDY: CPT | Performed by: SURGERY

## 2021-07-01 PROCEDURE — 93970 EXTREMITY STUDY: CPT

## 2021-07-06 ENCOUNTER — TELEPHONE (OUTPATIENT)
Dept: NEPHROLOGY | Facility: CLINIC | Age: 86
End: 2021-07-06

## 2021-07-06 NOTE — TELEPHONE ENCOUNTER
----- Message from Justine Kan PA-C sent at 7/6/2021  3:08 PM EDT -----  Please let patient know that his LE duplex did not show any blood clots

## 2021-07-13 ENCOUNTER — TELEPHONE (OUTPATIENT)
Dept: NEPHROLOGY | Facility: CLINIC | Age: 86
End: 2021-07-13

## 2021-07-13 ENCOUNTER — APPOINTMENT (OUTPATIENT)
Dept: RADIOLOGY | Facility: CLINIC | Age: 86
End: 2021-07-13
Payer: MEDICARE

## 2021-07-13 ENCOUNTER — APPOINTMENT (OUTPATIENT)
Dept: LAB | Facility: CLINIC | Age: 86
End: 2021-07-13
Payer: MEDICARE

## 2021-07-13 DIAGNOSIS — I10 ESSENTIAL HYPERTENSION: ICD-10-CM

## 2021-07-13 DIAGNOSIS — I50.22 CHRONIC SYSTOLIC CONGESTIVE HEART FAILURE (HCC): ICD-10-CM

## 2021-07-13 DIAGNOSIS — E78.2 MIXED HYPERLIPIDEMIA: ICD-10-CM

## 2021-07-13 DIAGNOSIS — N18.2 CHRONIC KIDNEY DISEASE, STAGE II (MILD): ICD-10-CM

## 2021-07-13 DIAGNOSIS — E03.4 HYPOTHYROIDISM DUE TO ACQUIRED ATROPHY OF THYROID: ICD-10-CM

## 2021-07-13 DIAGNOSIS — N18.30 CHRONIC KIDNEY DISEASE, STAGE III (MODERATE) (HCC): ICD-10-CM

## 2021-07-13 DIAGNOSIS — I50.32 CHRONIC DIASTOLIC HEART FAILURE (HCC): ICD-10-CM

## 2021-07-13 LAB
ALBUMIN SERPL BCP-MCNC: 3.2 G/DL (ref 3.5–5)
ALP SERPL-CCNC: 97 U/L (ref 46–116)
ALT SERPL W P-5'-P-CCNC: 60 U/L (ref 12–78)
ANION GAP SERPL CALCULATED.3IONS-SCNC: 5 MMOL/L (ref 4–13)
AST SERPL W P-5'-P-CCNC: 101 U/L (ref 5–45)
BASOPHILS # BLD AUTO: 0.06 THOUSANDS/ΜL (ref 0–0.1)
BASOPHILS NFR BLD AUTO: 1 % (ref 0–1)
BILIRUB SERPL-MCNC: 0.66 MG/DL (ref 0.2–1)
BUN SERPL-MCNC: 26 MG/DL (ref 5–25)
CALCIUM ALBUM COR SERPL-MCNC: 10 MG/DL (ref 8.3–10.1)
CALCIUM SERPL-MCNC: 9.4 MG/DL (ref 8.3–10.1)
CHLORIDE SERPL-SCNC: 104 MMOL/L (ref 100–108)
CHOLEST SERPL-MCNC: 116 MG/DL (ref 50–200)
CO2 SERPL-SCNC: 31 MMOL/L (ref 21–32)
CREAT SERPL-MCNC: 1.74 MG/DL (ref 0.6–1.3)
EOSINOPHIL # BLD AUTO: 0.38 THOUSAND/ΜL (ref 0–0.61)
EOSINOPHIL NFR BLD AUTO: 6 % (ref 0–6)
ERYTHROCYTE [DISTWIDTH] IN BLOOD BY AUTOMATED COUNT: 15.6 % (ref 11.6–15.1)
GFR SERPL CREATININE-BSD FRML MDRD: 35 ML/MIN/1.73SQ M
GLUCOSE P FAST SERPL-MCNC: 103 MG/DL (ref 65–99)
HCT VFR BLD AUTO: 39.8 % (ref 36.5–49.3)
HDLC SERPL-MCNC: 36 MG/DL
HGB BLD-MCNC: 12.8 G/DL (ref 12–17)
IMM GRANULOCYTES # BLD AUTO: 0.02 THOUSAND/UL (ref 0–0.2)
IMM GRANULOCYTES NFR BLD AUTO: 0 % (ref 0–2)
LDLC SERPL CALC-MCNC: 56 MG/DL (ref 0–100)
LYMPHOCYTES # BLD AUTO: 1.44 THOUSANDS/ΜL (ref 0.6–4.47)
LYMPHOCYTES NFR BLD AUTO: 21 % (ref 14–44)
MCH RBC QN AUTO: 31.3 PG (ref 26.8–34.3)
MCHC RBC AUTO-ENTMCNC: 32.2 G/DL (ref 31.4–37.4)
MCV RBC AUTO: 97 FL (ref 82–98)
MONOCYTES # BLD AUTO: 1.04 THOUSAND/ΜL (ref 0.17–1.22)
MONOCYTES NFR BLD AUTO: 15 % (ref 4–12)
NEUTROPHILS # BLD AUTO: 3.86 THOUSANDS/ΜL (ref 1.85–7.62)
NEUTS SEG NFR BLD AUTO: 57 % (ref 43–75)
NONHDLC SERPL-MCNC: 80 MG/DL
NRBC BLD AUTO-RTO: 0 /100 WBCS
PHOSPHATE SERPL-MCNC: 3 MG/DL (ref 2.3–4.1)
PLATELET # BLD AUTO: 274 THOUSANDS/UL (ref 149–390)
PMV BLD AUTO: 10.6 FL (ref 8.9–12.7)
POTASSIUM SERPL-SCNC: 3.9 MMOL/L (ref 3.5–5.3)
PROT SERPL-MCNC: 6.8 G/DL (ref 6.4–8.2)
RBC # BLD AUTO: 4.09 MILLION/UL (ref 3.88–5.62)
SODIUM SERPL-SCNC: 140 MMOL/L (ref 136–145)
TRIGL SERPL-MCNC: 121 MG/DL
TSH SERPL DL<=0.05 MIU/L-ACNC: 3.91 UIU/ML (ref 0.36–3.74)
WBC # BLD AUTO: 6.8 THOUSAND/UL (ref 4.31–10.16)

## 2021-07-13 PROCEDURE — 85025 COMPLETE CBC W/AUTO DIFF WBC: CPT

## 2021-07-13 PROCEDURE — 80053 COMPREHEN METABOLIC PANEL: CPT

## 2021-07-13 PROCEDURE — 80061 LIPID PANEL: CPT

## 2021-07-13 PROCEDURE — 36415 COLL VENOUS BLD VENIPUNCTURE: CPT

## 2021-07-13 PROCEDURE — 84443 ASSAY THYROID STIM HORMONE: CPT

## 2021-07-13 PROCEDURE — 84100 ASSAY OF PHOSPHORUS: CPT

## 2021-07-13 PROCEDURE — 71046 X-RAY EXAM CHEST 2 VIEWS: CPT

## 2021-07-13 NOTE — TELEPHONE ENCOUNTER
Left voicemail asking patient to please call the office to go over recent lab results as well as to verify some medications

## 2021-07-13 NOTE — TELEPHONE ENCOUNTER
----- Message from Isabell Artis Casia  sent at 7/13/2021  1:40 PM EDT -----  Ted Morales,    Can we please advise patient that his creatinine is slightly elevated from his prior 2 months ago, however he is within his baseline with a creatinine of 1 74  Further, he did receive a call that his duplex was negative when I was out on vacation so he is aware of these results  Can we ensure that he is taking the torsemide 40 mg BID? Also that he is taking metolazone 2 5 mg as needed for leg swelling or 2-3 lb weigh gain and that he is following a 1 5 L fluid restriction? Thank you so much, Kelly Gibson

## 2021-07-22 NOTE — TELEPHONE ENCOUNTER
Able to reach patient  Patient aware recent labs show creatinine elevated but within baseline  Patient confirmed he is currently taking torsemide 40 mg b i d and and metolozone 2 5 mg as needed for leg swelling and 2-3 pd weight gain  Patient also confirmed he is maintaining his fluid restriction at 1 5L a day  Patient has no questions or concerns at this time

## 2021-07-23 ENCOUNTER — OFFICE VISIT (OUTPATIENT)
Dept: UROLOGY | Facility: CLINIC | Age: 86
End: 2021-07-23
Payer: MEDICARE

## 2021-07-23 VITALS
HEART RATE: 60 BPM | DIASTOLIC BLOOD PRESSURE: 76 MMHG | SYSTOLIC BLOOD PRESSURE: 118 MMHG | WEIGHT: 206.4 LBS | HEIGHT: 67 IN | BODY MASS INDEX: 32.39 KG/M2

## 2021-07-23 DIAGNOSIS — R32 URINARY INCONTINENCE, UNSPECIFIED TYPE: ICD-10-CM

## 2021-07-23 DIAGNOSIS — R31.29 MICROSCOPIC HEMATURIA: ICD-10-CM

## 2021-07-23 DIAGNOSIS — R35.0 URINARY FREQUENCY: Primary | ICD-10-CM

## 2021-07-23 LAB
POST-VOID RESIDUAL VOLUME, ML POC: 81 ML
SL AMB  POCT GLUCOSE, UA: NORMAL
SL AMB LEUKOCYTE ESTERASE,UA: NORMAL
SL AMB POCT BILIRUBIN,UA: NORMAL
SL AMB POCT BLOOD,UA: NORMAL
SL AMB POCT CLARITY,UA: CLEAR
SL AMB POCT COLOR,UA: NORMAL
SL AMB POCT KETONES,UA: NORMAL
SL AMB POCT NITRITE,UA: NORMAL
SL AMB POCT PH,UA: 6.5
SL AMB POCT SPECIFIC GRAVITY,UA: 1.01
SL AMB POCT URINE PROTEIN: NORMAL
SL AMB POCT UROBILINOGEN: 0.2

## 2021-07-23 PROCEDURE — 87086 URINE CULTURE/COLONY COUNT: CPT | Performed by: PHYSICIAN ASSISTANT

## 2021-07-23 PROCEDURE — 81002 URINALYSIS NONAUTO W/O SCOPE: CPT | Performed by: PHYSICIAN ASSISTANT

## 2021-07-23 PROCEDURE — 99214 OFFICE O/P EST MOD 30 MIN: CPT | Performed by: PHYSICIAN ASSISTANT

## 2021-07-23 PROCEDURE — 51798 US URINE CAPACITY MEASURE: CPT | Performed by: PHYSICIAN ASSISTANT

## 2021-07-23 RX ORDER — MIRTAZAPINE 7.5 MG/1
15 TABLET, FILM COATED ORAL EVERY EVENING
COMMUNITY
Start: 2021-07-01 | End: 2021-10-12 | Stop reason: ALTCHOICE

## 2021-07-23 NOTE — PROGRESS NOTES
1  Urinary frequency  POCT urine dip    POCT Measure PVR   2  Urinary incontinence, unspecified type  POCT urine dip    POCT Measure PVR   3  Microscopic hematuria  CT abdomen pelvis wo contrast       Assessment and plan:       1  Microscopic hematuria  2  Nephrolithiasis  3  LUTS  - patient's last urinalysis with microscopy revealed innumerable RBC in the absence of urinary infection  -  Ultrasound in April of 2021 negative for any upper tract abnormalities other than nephrolithiasis  - renal function not amenable to contrasted imaging  Will obtain a CT of the abdomen pelvis without contrast for further evaluation of his stone burden  -   We reviewed a myriad of etiologies  That contribute to his urinary symptoms at this time  Fortunately he is demonstrating adequate bladder emptying and urine is negative for evidence of infection  Recommend a cystoscopy to further complete his hematuria evaluation and further evaluation of his urinary symptoms  Tamera Bynum PA-C      Chief Complaint     LUTS    History of Present Illness     Brittany Byrd is a 80 y o  male with a history of nephrolithiasis  Presenting today for follow up  patient has a longstanding history of nephrolithiasis requiring  Previous ureteroscopic intervention  With Dr Zaina Murillo  Patient is managed on potassium citrate  patient reports that he has been having increasing lower urinary tract symptoms despite tamsulosin monotherapy  He is on a diuretic at this time  He feels like he voids  Frequently and urgently  Will have some urinary incontinence associated with this  Does notice significant spraying of his urinary stream which requires him to either sit or void into a urinal       On careful questioning patient does not drink much water however primarily only drinks soda throughout the day  Denies any constipation      Patient had an ultrasound of his kidney and bladder 04/14/2021 which was negative for any upper tract obstruction  Multiple bilateral intrarenal calculi were noted  He was noted to have a partially distended bladder with diverticulum and slightly thickened appearance of the bladder wall however no definitive mass was appreciated  A simple left renal cyst was reported  Patient had a read it urinalysis with microscopy  ( 07/13/2021) which revealed innumerable  RBC per high-power field in the absence of urinary infection  Urine dip leukocyte and nitrite negative, positive blood  PVR 81mL  AUA SYMPTOM SCORE      Most Recent Value   AUA SYMPTOM SCORE   How often have you had a sensation of not emptying your bladder completely after you finished urinating? 4   How often have you had to urinate again less than two hours after you finished urinating? 4   How often have you found you stopped and started again several times when you urinate? 2   How often have you found it difficult to postpone urination? 5   How often have you had a weak urinary stream?  4   How often have you had to push or strain to begin urination? 5   How many times did you most typically get up to urinate from the time you went to bed at night until the time you got up in the morning? 5   Quality of Life: If you were to spend the rest of your life with your urinary condition just the way it is now, how would you feel about that?  4   AUA SYMPTOM SCORE  29            Laboratory     Lab Results   Component Value Date    CREATININE 1 74 (H) 07/13/2021       Lab Results   Component Value Date    PSA 0 9 03/08/2016       Review of Systems     Review of Systems   Constitutional: Negative for activity change, appetite change, chills, diaphoresis, fatigue, fever and unexpected weight change  Respiratory: Negative for chest tightness and shortness of breath  Cardiovascular: Negative for chest pain, palpitations and leg swelling     Gastrointestinal: Negative for abdominal distention, abdominal pain, constipation, diarrhea, nausea and vomiting  Genitourinary: Negative for decreased urine volume, difficulty urinating, dysuria, enuresis, flank pain, frequency, genital sores, hematuria and urgency  Musculoskeletal: Negative for back pain, gait problem and myalgias  Skin: Negative for color change, pallor, rash and wound  Psychiatric/Behavioral: Negative for behavioral problems  The patient is not nervous/anxious  Allergies     No Known Allergies    Physical Exam     Physical Exam  Constitutional:       General: He is not in acute distress  Appearance: Normal appearance  He is normal weight  He is not ill-appearing, toxic-appearing or diaphoretic  HENT:      Head: Normocephalic and atraumatic  Eyes:      General:         Right eye: No discharge  Left eye: No discharge  Conjunctiva/sclera: Conjunctivae normal    Pulmonary:      Effort: Pulmonary effort is normal  No respiratory distress  Musculoskeletal:         General: No swelling or tenderness  Normal range of motion  Skin:     Comments: Evidence of bilateral lower extremity venous stasis   Neurological:      General: No focal deficit present  Mental Status: He is alert and oriented to person, place, and time  Psychiatric:         Mood and Affect: Mood normal          Behavior: Behavior normal          Thought Content: Thought content normal          Judgment: Judgment normal        Vital Signs     There were no vitals filed for this visit  Current Medications       Current Outpatient Medications:     acetaminophen (TYLENOL) 500 mg tablet, Take 500 mg by mouth every 6 (six) hours as needed for mild pain, Disp: , Rfl:     amiodarone 200 mg tablet, Take 1 tablet (200 mg total) by mouth daily, Disp: 90 tablet, Rfl: 3    amLODIPine (NORVASC) 2 5 mg tablet, Take 1 tablet (2 5 mg total) by mouth every other day At Morning , Disp: 45 tablet, Rfl: 3    aspirin 81 MG tablet, Take 81 mg by mouth daily  , Disp: , Rfl:     isosorbide mononitrate (IMDUR) 30 mg 24 hr tablet, Take 1 tablet (30 mg total) by mouth daily, Disp: 90 tablet, Rfl: 3    levothyroxine 150 mcg tablet, Take 150 mcg by mouth daily , Disp: , Rfl:     LORazepam (ATIVAN) 1 mg tablet, Take 1 mg by mouth as needed for anxiety  , Disp: , Rfl:     metolazone (ZAROXOLYN) 2 5 mg tablet, As needed no more than twice weekly for leg swelling, Disp: 10 tablet, Rfl: 3    metoprolol succinate (TOPROL-XL) 50 mg 24 hr tablet, Take 1 tablet (50 mg total) by mouth 2 (two) times a day, Disp: 180 tablet, Rfl: 3    mexiletine (MEXITIL) 150 mg capsule, Take 1 capsule (150 mg total) by mouth 2 (two) times a day, Disp: 180 capsule, Rfl: 3    Multiple Vitamins-Minerals (MULTIVITAMIN WITH MINERALS) tablet, Take 1 tablet by mouth daily  , Disp: , Rfl:     nitroglycerin (NITROSTAT) 0 4 mg SL tablet, Place 0 4 mg under the tongue every 5 (five) minutes as needed for chest pain  , Disp: , Rfl:     potassium citrate (Urocit-K 10) 10 mEq, Take 1 tablet (10 mEq total) by mouth 2 (two) times a day, Disp: 180 tablet, Rfl: 3    pravastatin (PRAVACHOL) 80 mg tablet, Take 80 mg by mouth daily, Disp: , Rfl:     rosuvastatin (CRESTOR) 20 MG tablet, Take 1 tablet by mouth once daily, Disp: 30 tablet, Rfl: 0    tamsulosin (FLOMAX) 0 4 mg, Take 1 capsule (0 4 mg total) by mouth daily with dinner, Disp: 90 capsule, Rfl: 3    torsemide (DEMADEX) 20 mg tablet, Take 2 tablets (40 mg total) by mouth daily Take a total of 40 mg in the am  If you have weight gain, take 40 mg in the am and pm , Disp: 190 tablet, Rfl: 3      Active Problems     Patient Active Problem List   Diagnosis    CHF (congestive heart failure) (HCC)    Hyperlipidemia    Benign essential hypertension    Paroxysmal atrial fibrillation (HCC)    BPH (benign prostatic hyperplasia)    CAD (coronary artery disease) of artery bypass graft    Ischemic cardiomyopathy    Hypertensive chronic kidney disease with stage 1 through stage 4 chronic kidney disease, or unspecified chronic kidney disease    V tach ([facility] [address] )    Strep pharyngitis    Pain with swallowing    Dehydration    Chronic kidney disease, stage III (moderate) (HCC)    Dyslipidemia    Nephrolithiasis    Hypokalemia    Localized edema    Secondary hyperparathyroidism of renal origin ([facility] [address] )    Disease of thyroid gland    Obesity (BMI 30 0-34  9)    Vitamin D deficiency         Past Medical History     Past Medical History:   Diagnosis Date    AICD (automatic cardioverter/defibrillator) present 2004    AICD (automatic cardioverter/defibrillator) present 2006    AICD (automatic cardioverter/defibrillator) present 2013    [facility]    Arthritis     R knee and neck    Atrial fibrillation (HCC)     BPH (benign prostatic hyperplasia)     CAD (coronary artery disease) of artery bypass graft     CHF (congestive heart failure) (HCC)     combine    Coronary artery disease     Disease of thyroid gland     Hyperlipidemia     Hypertension     Ischemic cardiomyopathy     Left ureteral calculus 10/17/2017    Renal disorder     V tach [facility])          Surgical History     Past Surgical History:   Procedure Laterality Date    CARDIAC CATHETERIZATION      CARDIAC SURGERY      Pacemaker    CORONARY ARTERY BYPASS GRAFT      MA CYSTOURETHROSCOPY,URETER CATHETER N/A 10/17/2017    Procedure: CYSTOSCOPY, left  RETROGRADE PYELOGRAM WITH LEFT URETEROSCOPY , stone extraction,LEFT STENT INSERTION;  Surgeon: Bert Thompson MD;  Location: [facility];  Service: Urology         Family History     Family History   Problem Relation Age of Onset    Tuberculosis Father         WWI    Hypertension Mother     Stroke Brother          Social History     Social History       Radiology   CT ABDOMEN AND PELVIS WITHOUT IV CONTRAST     INDICATION:  Lower abdominal pain and distention      COMPARISON: 11/25/2013     TECHNIQUE:  CT examination of the abdomen and pelvis was performed without intravenous contrast   Reformatted images were created in axial, sagittal, and coronal planes        Radiation dose length product (DLP) for this visit:  684 mGy-cm   This examination, like all CT scans performed in the [facility], was performed utilizing techniques to minimize radiation dose exposure, including the use of iterative   reconstruction and automated exposure control       Enteric contrast was administered       FINDINGS:     ABDOMEN     LOWER CHEST:  Small left basilar pleural effusion noted  Lung bases otherwise clear  Visualized cardiac structures appear intact      LIVER/BILIARY TREE:  Unremarkable      GALLBLADDER:  There are gallstone(s) within the gallbladder, without pericholecystic inflammatory changes      SPLEEN:  Unremarkable      PANCREAS:  Unremarkable      ADRENAL GLANDS:  Unremarkable      KIDNEYS/URETERS:  Distal obstructing left ureteral calculi measuring in the range of 3 6-mm with moderate left hydroureteronephrosis or additional nonobstructive left-sided renal calculi identified measuring up to 18 mm  Smaller nonobstructive   right-sided renal calculi also evident  Bilateral renal cysts noted      STOMACH AND BOWEL:  Diverticular disease without diverticulitis  Small sliding-type hiatal hernia noted      APPENDIX:  No findings to suggest appendicitis      ABDOMINOPELVIC CAVITY:  No ascites or free intraperitoneal air  No lymphadenopathy      VESSELS:  Unremarkable for patient's age      PELVIS     REPRODUCTIVE ORGANS:  Unremarkable for patient's age      Findings are consistent with the preliminary report from [facility] which was provided shortly after completion of the exam                      ABDOMINAL WALL/INGUINAL REGIONS:  Unremarkable      OSSEOUS STRUCTURES:  No acute fracture or destructive osseous lesion      IMPRESSION:  1   Bilateral nephrolithiasis including multiple left-sided ureteral calculi measuring 3 to 6 mm and resulting in obstruction with moderate left hydroureteronephrosis evident  2  Colonic diverticular disease without diverticulitis  3  Cholelithiasis without cholecystitis  4  Small left pleural effusion

## 2021-07-24 LAB — BACTERIA UR CULT: NORMAL

## 2021-08-10 ENCOUNTER — TELEPHONE (OUTPATIENT)
Dept: NEPHROLOGY | Facility: CLINIC | Age: 86
End: 2021-08-10

## 2021-08-10 NOTE — TELEPHONE ENCOUNTER
I would increase torsemide to 40 b i d  for few days then decrease to 40 mg the morning and 20 mg in the evening if this has helped  Continue metolazone 2 5 mg weekly 1 hour prior to t  This is most likely related to his cardiac function  I will reach out to Dr Kelly Barros to try to assist and potentially have his heart failure clinic see the patient  I would repeat a CMP and urine protein creatinine ratio next week in the morning but nonfasting    The rest of the workup was unremarkable  -venous duplex was negative for DVT  -thyroid profile essentially unremarkable  -renal function has been stable without significant proteinuria  Echo is mention ejection fraction 20% which most likely explains    Thank you    Please encourage her to follow up as needed  Also try to get the patient in with 1 of the advanced practitioner's in the next couple of weeks

## 2021-08-10 NOTE — TELEPHONE ENCOUNTER
Patients wife called the office advising that patient is upset and depressed as his edema is getting in the way of his day to day activities  He is unable to wear his shoes and unable to stand for long given edema is causing his legs to become heavy  Per wife his weight fluctuates from 2-3 lbs weight gain      He is on torsemide 20 mg BID   Metolazone 2 5 mg 1 weekly

## 2021-08-11 ENCOUNTER — TELEPHONE (OUTPATIENT)
Dept: CARDIOLOGY CLINIC | Facility: CLINIC | Age: 86
End: 2021-08-11

## 2021-08-11 NOTE — TELEPHONE ENCOUNTER
S/w Mrs Brittney Park, o/v w/ Stuart Ponce tomorrow  Assessment for IV Lasix then  Spouse agreeable to plan

## 2021-08-12 ENCOUNTER — APPOINTMENT (OUTPATIENT)
Dept: LAB | Facility: CLINIC | Age: 86
End: 2021-08-12
Payer: MEDICARE

## 2021-08-12 ENCOUNTER — TELEPHONE (OUTPATIENT)
Dept: NEPHROLOGY | Facility: CLINIC | Age: 86
End: 2021-08-12

## 2021-08-12 ENCOUNTER — OFFICE VISIT (OUTPATIENT)
Dept: CARDIOLOGY CLINIC | Facility: CLINIC | Age: 86
End: 2021-08-12
Payer: MEDICARE

## 2021-08-12 VITALS
WEIGHT: 207.1 LBS | HEIGHT: 67 IN | SYSTOLIC BLOOD PRESSURE: 126 MMHG | DIASTOLIC BLOOD PRESSURE: 62 MMHG | HEART RATE: 60 BPM | OXYGEN SATURATION: 96 % | BODY MASS INDEX: 32.51 KG/M2

## 2021-08-12 DIAGNOSIS — I50.23 ACUTE ON CHRONIC HFREF (HEART FAILURE WITH REDUCED EJECTION FRACTION) (HCC): Primary | ICD-10-CM

## 2021-08-12 DIAGNOSIS — N20.1 LEFT URETERAL CALCULUS: ICD-10-CM

## 2021-08-12 DIAGNOSIS — Z86.79 HISTORY OF SUSTAINED VENTRICULAR TACHYCARDIA: ICD-10-CM

## 2021-08-12 DIAGNOSIS — I50.23 ACUTE ON CHRONIC HFREF (HEART FAILURE WITH REDUCED EJECTION FRACTION) (HCC): ICD-10-CM

## 2021-08-12 DIAGNOSIS — I10 HYPERTENSION, UNSPECIFIED TYPE: ICD-10-CM

## 2021-08-12 DIAGNOSIS — I12.9 HYPERTENSIVE CHRONIC KIDNEY DISEASE WITH STAGE 1 THROUGH STAGE 4 CHRONIC KIDNEY DISEASE, OR UNSPECIFIED CHRONIC KIDNEY DISEASE: ICD-10-CM

## 2021-08-12 DIAGNOSIS — I25.10 CORONARY ARTERY DISEASE INVOLVING NATIVE CORONARY ARTERY OF NATIVE HEART WITHOUT ANGINA PECTORIS: ICD-10-CM

## 2021-08-12 DIAGNOSIS — N20.0 NEPHROLITHIASIS: ICD-10-CM

## 2021-08-12 DIAGNOSIS — N18.30 CHRONIC KIDNEY DISEASE, STAGE III (MODERATE) (HCC): ICD-10-CM

## 2021-08-12 DIAGNOSIS — N18.31 STAGE 3A CHRONIC KIDNEY DISEASE (HCC): ICD-10-CM

## 2021-08-12 DIAGNOSIS — I25.5 ISCHEMIC CARDIOMYOPATHY: ICD-10-CM

## 2021-08-12 DIAGNOSIS — E78.5 DYSLIPIDEMIA: ICD-10-CM

## 2021-08-12 LAB
ANION GAP SERPL CALCULATED.3IONS-SCNC: 9 MMOL/L (ref 4–13)
BUN SERPL-MCNC: 26 MG/DL (ref 5–25)
CALCIUM SERPL-MCNC: 9 MG/DL (ref 8.3–10.1)
CHLORIDE SERPL-SCNC: 103 MMOL/L (ref 100–108)
CO2 SERPL-SCNC: 31 MMOL/L (ref 21–32)
CREAT SERPL-MCNC: 1.96 MG/DL (ref 0.6–1.3)
GFR SERPL CREATININE-BSD FRML MDRD: 30 ML/MIN/1.73SQ M
GLUCOSE SERPL-MCNC: 109 MG/DL (ref 65–140)
POTASSIUM SERPL-SCNC: 3.8 MMOL/L (ref 3.5–5.3)
SODIUM SERPL-SCNC: 143 MMOL/L (ref 136–145)

## 2021-08-12 PROCEDURE — 99214 OFFICE O/P EST MOD 30 MIN: CPT | Performed by: PHYSICIAN ASSISTANT

## 2021-08-12 PROCEDURE — 80048 BASIC METABOLIC PNL TOTAL CA: CPT

## 2021-08-12 PROCEDURE — 36415 COLL VENOUS BLD VENIPUNCTURE: CPT

## 2021-08-12 RX ORDER — TORSEMIDE 20 MG/1
60 TABLET ORAL DAILY
Qty: 190 TABLET | Refills: 3
Start: 2021-08-12 | End: 2021-08-12

## 2021-08-12 RX ORDER — FUROSEMIDE 10 MG/ML
80 INJECTION INTRAMUSCULAR; INTRAVENOUS ONCE
Status: CANCELLED | OUTPATIENT
Start: 2021-08-12 | End: 2021-08-12

## 2021-08-12 RX ORDER — TORSEMIDE 20 MG/1
60 TABLET ORAL 2 TIMES DAILY
Qty: 190 TABLET | Refills: 3
Start: 2021-08-12 | End: 2021-08-12 | Stop reason: SDUPTHER

## 2021-08-12 NOTE — PATIENT INSTRUCTIONS
Increase torsemide to 60 mg (3 tablets) twice a day  Complete blood work today or tomorrow; non fasting  Please weigh yourself every day and keep a detailed log of weights  Contact the Heart Failure program at [phone] if you gain 3 lbs overnight or 5 lbs in 5-7 days  Limit daily sodium/salt intake to 3712-1044 mg daily to prevent fluid retention  Avoid canned foods, [place] food, and processed meat (hot dogs, lunch meat, and sausage etc )  Limit fluid intake to 2000 mL or 2L (about 60-65 ounces) daily  Bring complete list of medications and log of daily weights to your follow-up appointment

## 2021-08-12 NOTE — TELEPHONE ENCOUNTER
I spoke to the patients wife she is aware to have him on a low salt* (note fixed 3:29pm) diet in hopes of getting diuretics to properly work  I explained to her that this is very crucial and continue with out change in medications  Please preview labs from this morning

## 2021-08-12 NOTE — TELEPHONE ENCOUNTER
----- Message from Imer Coffey MD sent at 8/12/2021 12:37 PM EDT -----    Author: Imer Coffey MD Service: -- Author Type: Physician   Filed: 8/12/2021 12:37 PM Encounter Date: 8/12/2021 Status: Signed   : Imer Coffey MD (Physician)        Please call his wife and reinforce a low salt diet!!!!          ----- Message -----  From: Emily Brink PA-C  Sent: 8/12/2021  11:58 AM EDT  To: Imer Coffey MD

## 2021-08-12 NOTE — PROGRESS NOTES
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 [facility] ([facility] ) 03/18/2019    Localized edema 09/20/2018    Dyslipidemia 05/16/2018    Nephrolithiasis 05/16/2018    Hypokalemia 05/16/2018    Chronic kidney disease, stage III (moderate) ([facility] ) 04/26/2018    Strep pharyngitis 06/26/2016    Pain with swallowing 06/26/2016    Dehydration 06/26/2016    CHF (congestive heart failure) (HCC)     Hyperlipidemia     Benign essential hypertension     Paroxysmal atrial fibrillation (HCC)     BPH (benign prostatic hyperplasia)     CAD (coronary artery disease) of artery bypass graft     Ischemic cardiomyopathy     Hypertensive chronic kidney disease with stage 1 through stage 4 chronic kidney disease, or unspecified chronic kidney disease     V tach ([place] [id number] )        Today's Plan:   Discussed risk of harm with receiving IV Lasix with patient and his wife given that current renal function and serum potassium are unknown  Additionally, patient is not in acute distress and has been diuresing well with recent med changes and weekly metolazone  After discussion, patient in agreement to defer IV Lasix at this time   Check BMP today or tomorrow   Increase torsemide from 40 to 60 mg BID through the weekend   Patient often eating high sodium foods/snacks  Provided education on importance of low sodium diet and gave "Living With Heart Failure" booklet during visit   RTC next week to reassess volume status (s/p med changes as above) and diuretic dosing   Long-term amiodarone use routine monitoring (TSH, CMP, PFTs, etc) per primary cardiologist/PCP     Removed duplicate statin from medication list      Plan:  Chronic HFrEF; LVEF 20%; LVIDd 7 57 cm; NYHA II; ACC/AHA Stage B   Etiology: ischemic  TTE 04/24/2018: LVEF 30%  LVIDd 7 46 cm  Dilated RV; normal RVSF  Mild MR  Moderate TR  PASP 70 mmHg  TTE 06/17/2021: LVEF 20%  LVIDd 7 57 cm  Moderate diffuse hypokinesis  Mildly dilated RV with normal RVSF  Mild KEYLA  Moderate MR and TR  Dilated IVC  Reviewed importance of low sodium diet and fluid restriction  Weight of 209 lbs on 07/30  Today, weighs 207 lbs  Most recent BMP from 07/13/2021: sodium 140; potassium 3 9; BUN 26; creatinine 1 74; eGFR 35  Neurohormonal Blockade:  --Beta Blocker: metoprolol succinate 50 mg BID   --ARNi / ACEi / ARB: No    --Aldosterone Antagonist: No    --SGLT2 Inhibitor: No    --Diuretic: torsemide 60 mg BID with metolazone 2 5 mg weekly and potassium citrate 10 mEq BID  Sudden Cardiac Death Risk Reduction:  --[employer] BiV ICD; upgraded on 01/29/2020  --Interrogation from 06/10/2021: AP 86%  BVP 98%  Lead parameters WNL  2 NSVT episodes  Corvue WNL  Normal device function  Electrical Resynchronization:  --Candidacy for BiV device:  ms  --BiV response: No; LVEF 30% in 04/2018 to 20% in 06/2021  Coronary artery disease   Without active chest pain  S/p CABG x4 in 2004  Continue on aspirin, rosuvastatin, Imdur 30 mg daily, and BB as above  PRN SL nitro prescribed  Chronic kidney disease, stage IIIa   Baseline creatinine of 1 5-1 7  Most recent BMP from 07/13/2021: sodium 140; potassium 3 9; BUN 26; creatinine 1 74; eGFR 35  Hypertension   BP of 126/62 mmHg in office today  Continues on amlodipine 2 5 mg QOD and medications as above  History of ventricular tachycardia   Rhythm control: amiodarone 200 mg daily and mexiletine 150 mg BID  Anemia of chronic disease  History of nephrolithiasis    HPI:   Judy Johnson is an 49-year-old man with a PMH as above who presents to the office for acute visit  Follows with Dr Eugenio Nichols as outpatient  Torsemide increased to 40 mg BID indefinitely and metolazone added from PRN use by nephrology in late June  Unclear why, but patient reported being on torsemide 20 mg BID on 08/10  Contacted nephrology on 08/10 and torsemide increased again to 40 mg BID for a few days and advised to start metolazone 2 5 mg weekly  Patient scheduled for acute visit on 08/11/2021 at request of patient's cardiologist for in-office IV Lasix  08/12/2021: Patient presents with his wife for an acute visit  Feeling well overall; primary complaint today of persistent pedal and LE swelling and occasional LE cramping  Denies SOB at rest, bendopnea, orthopnea, and PND  When ambulating is right knee pain limits him before any MARTINES develops  Took metolazone yesterday (1 hour before torsemide and with extra K+); lost ~3 lbs overnight  Reports significant urinary response to torsemide with occasional incontinence  Often sleeps during the day "out of boredom and lack of hobbies" per wife and thus has poor quality nocturnal sleep  Often has PBJ, lightly salted eggs, or [place] sausage for breakfasts  Routinely snacks on regular sodium sharp cheeses  Drinks teas and sodas throughout the day; he estimates that he drinks at least ~64 oz fluid daily  Provided education about importance of low sodium diet and fluid restriction and also gave patient "Living With Heart Failure" booklet  Discussed risk of IV Lasix with patient and his wife given that his current renal function and serum potassium are unknown  Additionally, patient in no acute distress and has been diuresing well with meds changes made in the last few days  After discussion, patient in agreement to defer IV Lasix at this time        Past Medical History:   Diagnosis Date    AICD (automatic cardioverter/defibrillator) present 2004    AICD (automatic cardioverter/defibrillator) present 2006    AICD (automatic cardioverter/defibrillator) present 2013    [facility] Arthritis     R knee and neck    Atrial fibrillation (HCC)     BPH (benign prostatic hyperplasia)     CAD (coronary artery disease) of artery bypass graft     CHF (congestive heart failure) (HCC)     combine    Coronary artery disease     Disease of thyroid gland     Hyperlipidemia     Hypertension     Ischemic cardiomyopathy     Left ureteral calculus 10/17/2017    Renal disorder     [facility]        Review of Systems   Constitutional: Negative for activity change, appetite change, fatigue, fever and unexpected weight change  HENT: Negative for congestion, postnasal drip, rhinorrhea, sneezing, sore throat and trouble swallowing  Eyes: Negative  Respiratory: Negative for cough, chest tightness and shortness of breath  Cardiovascular: Positive for leg swelling  Negative for chest pain and palpitations  Gastrointestinal: Negative for abdominal distention, abdominal pain, diarrhea, nausea and vomiting  Endocrine: Negative  Genitourinary: Positive for frequency (with diuretic)  Negative for decreased urine volume, difficulty urinating, dysuria and urgency  Musculoskeletal: Positive for arthralgias  Skin: Negative  Allergic/Immunologic: Negative  Neurological: Negative for dizziness, tremors, syncope, weakness, light-headedness and headaches  Hematological: Negative  Psychiatric/Behavioral: Negative for agitation, confusion and sleep disturbance  The patient is not nervous/anxious  14-point ROS completed and negative except as stated above and/or in the HPI      No Known Allergies    Current Outpatient Medications:     acetaminophen (TYLENOL) 500 mg tablet, Take 500 mg by mouth every 6 (six) hours as needed for mild pain, Disp: , Rfl:     amiodarone 200 mg tablet, Take 1 tablet (200 mg total) by mouth daily, Disp: 90 tablet, Rfl: 3    amLODIPine (NORVASC) 2 5 mg tablet, Take 1 tablet (2 5 mg total) by mouth every other day At Morning , Disp: 45 tablet, Rfl: 3   aspirin 81 MG tablet, Take 81 mg by mouth daily  , Disp: , Rfl:     isosorbide mononitrate (IMDUR) 30 mg 24 hr tablet, Take 1 tablet (30 mg total) by mouth daily, Disp: 90 tablet, Rfl: 3    levothyroxine 150 mcg tablet, Take 150 mcg by mouth daily , Disp: , Rfl:     LORazepam (ATIVAN) 1 mg tablet, Take 1 mg by mouth as needed for anxiety  , Disp: , Rfl:     metolazone (ZAROXOLYN) 2 5 mg tablet, As needed no more than twice weekly for leg swelling, Disp: 10 tablet, Rfl: 3    metoprolol succinate (TOPROL-XL) 50 mg 24 hr tablet, Take 1 tablet (50 mg total) by mouth 2 (two) times a day, Disp: 180 tablet, Rfl: 3    mexiletine (MEXITIL) 150 mg capsule, Take 1 capsule (150 mg total) by mouth 2 (two) times a day, Disp: 180 capsule, Rfl: 3    mirtazapine (REMERON) 7 5 MG tablet, Take 15 mg by mouth every evening , Disp: , Rfl:     Multiple Vitamins-Minerals (MULTIVITAMIN WITH MINERALS) tablet, Take 1 tablet by mouth daily  , Disp: , Rfl:     nitroglycerin (NITROSTAT) 0 4 mg SL tablet, Place 0 4 mg under the tongue every 5 (five) minutes as needed for chest pain  , Disp: , Rfl:     potassium citrate (Urocit-K 10) 10 mEq, Take 1 tablet (10 mEq total) by mouth 2 (two) times a day, Disp: 180 tablet, Rfl: 3    rosuvastatin (CRESTOR) 20 MG tablet, Take 1 tablet by mouth once daily, Disp: 30 tablet, Rfl: 0    tamsulosin (FLOMAX) 0 4 mg, Take 1 capsule (0 4 mg total) by mouth daily with dinner, Disp: 90 capsule, Rfl: 3    torsemide (DEMADEX) 20 mg tablet, Take 3 tablets (60 mg total) by mouth 2 (two) times a day, Disp: 190 tablet, Rfl: 3    Social History     Socioeconomic History    Marital status: /Civil Union     Spouse name: Not on file    Number of children: Not on file    Years of education: Not on file    Highest education level: Not on file   Occupational History    Occupation: Retired   Tobacco Use    Smoking status: Never Smoker    Smokeless tobacco: Never Used   Vaping Use    Vaping Use: Never used   Substance and Sexual Activity    Alcohol use: Yes    Drug use: No    Sexual activity: Not on file   Other Topics Concern    Not on file   Social History Narrative    Not on file     Social Determinants of Health     Financial Resource Strain:     Difficulty of Paying Living Expenses:    Food Insecurity:     Worried About Running Out of Food in the Last Year:     [address] in the Last Year:    Transportation Needs:     Lack of Transportation (Medical):  Lack of Transportation (Non-Medical):    Physical Activity:     Days of Exercise per Week:     Minutes of Exercise per Session:    Stress:     Feeling of Stress :    Social Connections:     Frequency of Communication with Friends and Family:     Frequency of Social Gatherings with Friends and Family:     Attends Judaism Services:     Active Member of Clubs or Organizations:     Attends Club or Organization Meetings:     Marital Status:    Intimate Partner Violence:     Fear of Current or Ex-Partner:     Emotionally Abused:     Physically Abused:     Sexually Abused:      Family History   Problem Relation Age of Onset    Tuberculosis Father         WWI    Hypertension Mother     Stroke Brother        Vitals:   Blood pressure 126/62, pulse 60, height 5' 7" (1 702 m), weight 93 9 kg (207 lb 1 6 oz), SpO2 96 %  Body mass index is 32 44 kg/m²  Wt Readings from Last 3 Encounters:   08/12/21 93 9 kg (207 lb 1 6 oz)   07/23/21 93 6 kg (206 lb 6 4 oz)   06/22/21 94 3 kg (208 lb)     Vitals:    08/12/21 1032   BP: 126/62   BP Location: Right arm   Patient Position: Sitting   Cuff Size: Adult   Pulse: 60   SpO2: 96%   Weight: 93 9 kg (207 lb 1 6 oz)   Height: 5' 7" (1 702 m)       Physical Exam  Vitals reviewed  Constitutional:       General: He is awake  He is not in acute distress  Appearance: Normal appearance  He is well-developed and overweight  Comments: Face mask present   HENT:      Head: Normocephalic  Nose: Nose normal       Mouth/Throat:      Mouth: Mucous membranes are moist    Eyes:      General: No scleral icterus  Conjunctiva/sclera: Conjunctivae normal    Neck:      Vascular: JVD (mild; v-wave noted) present  Trachea: No tracheal deviation  Cardiovascular:      Rate and Rhythm: Normal rate and regular rhythm  No extrasystoles are present  Pulses: Normal pulses  Heart sounds: Murmur heard  Comments: No sacral edema appreciated  Pulmonary:      Effort: Pulmonary effort is normal  No tachypnea, bradypnea or respiratory distress  Breath sounds: Normal air entry  No decreased air movement  No decreased breath sounds, wheezing or rales  Abdominal:      General: Bowel sounds are normal  There is no distension  Palpations: Abdomen is soft  Tenderness: There is no abdominal tenderness  Musculoskeletal:      Cervical back: Neck supple  Right lower le+ Edema (hitting mid calf) present  Left lower le+ Edema (hitting mid calf) present  Skin:     General: Skin is warm and dry  Coloration: Skin is not jaundiced or pale  Neurological:      General: No focal deficit present  Mental Status: He is alert and oriented to person, place, and time  Psychiatric:         Attention and Perception: Attention normal          Mood and Affect: Mood and affect normal          Speech: Speech normal          Behavior: Behavior normal  Behavior is cooperative  Thought Content:  Thought content normal      Labs & Results:  Lab Results   Component Value Date    WBC 6 80 2021    HGB 12 8 2021    HCT 39 8 2021    MCV 97 2021     2021     Lab Results   Component Value Date    SODIUM 140 2021    K 3 9 2021     2021    CO2 31 2021    BUN 26 (H) 2021    CREATININE 1 74 (H) 2021    GLUC 112 2020    CALCIUM 9 4 2021     Lab Results   Component Value Date    INR 1 10 01/29/2020    INR 1 35 (H) 06/26/2016    INR 1 05 09/11/2015    PROTIME 13 8 01/29/2020    PROTIME 16 3 (H) 06/26/2016    PROTIME 13 5 09/11/2015     Lab Results   Component Value Date    NTBNP 3,682 (H) 06/26/2016      EKG personally reviewed by AGUSTINA Celis PA-C

## 2021-08-13 ENCOUNTER — TELEPHONE (OUTPATIENT)
Dept: CARDIOLOGY CLINIC | Facility: CLINIC | Age: 86
End: 2021-08-13

## 2021-08-13 NOTE — TELEPHONE ENCOUNTER
Since he took metolazone 2 days ago, would hold medication at least until he's seen by Claudia Apple next week  Due to the risk of further decline in his renal function and his not being in acute distress (as of his office visit yesterday), it is not in patient's best interest to agressively treat his LE swelling with high dose diuretics  He may have a component of chronic LE swelling going on as well  We had a detailed conversation yesterday about how patient needs to cut back on salt intake (no more than 2 g sodium daily) and monitor fluid intake which will help with preventing LE swelling  Please provide HF dietary education as appropriate  Also discussed that elevating his legs and/or wearing compression stockings during the day may help reduce his pedal edema

## 2021-08-13 NOTE — PROGRESS NOTES
Cardiology  Heart Failure   Follow Up Office Visit Note -     Judith Friday   80 y o    male   MRN: [id number]  [address]  [address] [facility] [address] [facility]  [phone]    PCP: Arcelia Duque  Cardiologist : Dr Pradeep Chong                Summary of Recommendations  Low-sodium diet, Heart failure education as below  Continue the current plan in terms of his diuretics as well as other medications no changes made today  Nonfasting BMP today  CMP 1 week- reassess LFTS  Were elevated in July   Follow-up with me 2 weeks  Follow up will be scheduled in the future with Dr Pradeep Chong         Impression/ Plan  Chronic HFrEF EF 20%; LVIDd 7 57 cm; NYHA II; ACC/AHA Stage B  ICM, EF 20%   6/2021  --Dry weight: 200 lb  At home, without clothing his weight was 199 lb consistently since Friday according to his wife    Wt Readings from Last 3 Encounters:   08/16/21 93 1 kg (205 lb 4 oz)   08/12/21 93 9 kg (207 lb 1 6 oz)   07/23/21 93 6 kg (206 lb 6 4 oz)     Wt Readings from Last 3 Encounters:   08/12/21 93 9 kg (207 lb 1 6 oz)   07/23/21 93 6 kg (206 lb 6 4 oz)   06/22/21 94 3 kg (208 lb)     --Beta-blocker:  metoprolol succinate 50 mg BID   --Diuretic:   torsemide 40 mg am, 20 mg Pm; with metolazone 2 5 mg weekly p r n   and potassium citrate 10 mEq BID routinely  --ACE/ARB/ARNI:  None given a hx of hyperkalemia and CKD  --MRA  --SLGT2   --ICD: S/P BiV iCD  --2 g sodium diet, 1500 cc fluid restriction  Daily weights, call weight gain 2-3 lb in 1 day or 5 lb in 5 days  He has a plan for weight gain: Metolazone 2 5 mg p r n  S/P BI V ICD, upgraded 1/2020  Interrogation from 06/10/2021: AP 86%  BVP 98%  Lead parameters WNL  2 NSVT episodes  Corvue WNL  Normal device function  PAF, on amiodarone  Severe pulmonary hypertension  CAD,S/P CABG x 4 2004    on aspirin, rosuvastatin,beta blocker, nitrate  History of ventricular tachycardia on antiarrhythmic with amiodarone 200 mg daily and mexiletine 150 mg BID  TSH 05026:3 9  LFTS 7/13/21:  ALT 60   Hypertension, essential  /64  On amlodipine 2 5 mg QOD, beta blocker, nitrate, diuretics  Hyperlipidemia  On atorvastatin 40 mg/d   7/13/21: LDL 56, non HDL 80, at goal  CKD stage 3 a  Follows with nephrology  baseline Cr 1 5-1 7  Anemia of chronic disese  Cardiac testing  · TTE 04/24/2018: LVEF 30%  LVIDd 7 46 cm  moderate diffuse hypokinesis with regional variations  There was akinesis of the basal-mid inferolateral wall(s)  There was severe hypokinesis of the basal-mid inferior wall(s)  Dilated RV; normal RVSF  Mild MR  Moderate TR  PASP 70 mmHg   TTE 06/17/2021: LVEF 20%  LVIDd 7 57 cm  Moderate diffuse hypokinesis  Akinesis of the entire inferior and basal-mid inferolateral wall(s)  RV mildly  Dialted  Est peak syst pressure 70 mm Hg  Mild KEYLA  Moderate MR and Moderate TR  HPI:   Kathrine Asencio is an 81 yo male with a longstanding history of CAD, CABG, ischemic cardiomyopathy with last an Ef of 20% ( 7/3391) chronic systolic/ diastolic CHF, ventricular tachycardia and paroxysmal atrial fibrillation, on long-term amiodarone and mexiletine  4/152021  Office visit with his cardiologist  5 lb over dry wt   Complained of weight gain, cough, and edema  Advised to increase torsemide to 40 mg b i d , from daily until his weight dropped 4-5 lb    5/25/21 increased dose of torsemide was ineffective at reducing his weight  Metolazone 2 5 mg added twice a week    6/22/21 nephrology PA visit  currently on: torsemide 40 mg daily, Toprol XL 50 mg BID, metolazone 2 5 mg once weekly, Imdur 30 mg daily  -  increase torsemide 40 mg BID for  weight gain of 3 plus pounds  No longer on metolazone    6/30/21 per his nephrologist:  Dr Mainor Reid noted he had been on torsemide 40 b i d  since May /most recently was back down to daily  The patient's weight and edema were up    Very short of breath  Torsemide increased to 40 mg b i d  indefinitely  Added metolazone 2 5 mg as needed for 2-3 lb weight gain or leg edema  1500 mL fluid restriction  Ordered venous duplex-was negative for DVT  BMP in a week    7/13/21   Creatinine 1 74: sodium 140; potassium 3 9; BUN 26     8/10/21  Patient reported being on torsemide 20 mg b i d  for unclear reasons  He contacted his nephrologist     Marti Stabs increased again to 40 mg b i d  for few days then decrease to 40 in the morning and 20 in the p m  if the 40 b i d  helped  metolazone 2 5 mg added weekly    8/12/21 Seen by Carolyn Gordillo, cardiology at the request of Nephrology and the patient's cardiologist    Complained of persistent lower extremity edema  Complained of right knee pain  Lost 3 lb overnight after taking metolazone August 11th  High sodium diet  Drinking 64 oz of fluid a day/- 2L  Educated regarding sodium diet restrictions  IV Lasix deferred  Increased torsemide from 40 to 60 mg BID through the weekend  Metolazone- p r n        8/12/21 Cr 1 96  BUN 26  K 3 8    8/16/21  Close follow up for heart failure  Sx:  No chest pain  He does not complain of any persistent dyspnea  He is sedentary  He uses a cane  He has lower extremity edema chronic, which is improving  He has a right lower extremity open area which is improving  Wt Readings from Last 3 Encounters:   08/16/21 93 1 kg (205 lb 4 oz)   08/12/21 93 9 kg (207 lb 1 6 oz)   07/23/21 93 6 kg (206 lb 6 4 oz)     Currently on: Torsemide 40 mg in the morning, torsemide 20 mg in the p m  Potassium 10 mEq-2 tabs daily  Metolazone 2 5 mg weekly p r n with and extra K 20 meq      Plan  1  Educated how to read food labels to facilitate adherence to a low-salt diet  Re-emphasized 1500 cc fluid restriction Written material provided  His wife tells me he has cut down on his sodium significantly but still occasionally does use salt shaker    I recommend avoiding this salt shaker completely and substituting it with Mrs DASH, pepper, onion powder, garlic powder, herbs, etc   2  BMP today  CMP 2 weeks  3  follow up with me 2 weeks      I have spent 25 minutes with Patient and family today in which greater than 50% of this time was spent in counseling/coordination of care regarding Intructions for management, Patient and family education, Importance of tx compliance and Risk factor reductions  Assessment  Diagnoses and all orders for this visit:    Chronic systolic congestive heart failure ([facility] )    Coronary artery disease involving coronary bypass graft of native heart without angina pectoris    Ischemic cardiomyopathy    Paroxysmal atrial fibrillation (HCC)    V tach (HCC)    Benign essential hypertension    Stage 3b chronic kidney disease (HCC)    Mixed hyperlipidemia    Obesity (BMI 30 0-34  9)        Past Medical History:   Diagnosis Date    AICD (automatic cardioverter/defibrillator) present 2004    AICD (automatic cardioverter/defibrillator) present 2006    AICD (automatic cardioverter/defibrillator) present 2013    [facility]    Arthritis     R knee and neck    Atrial fibrillation (HCC)     BPH (benign prostatic hyperplasia)     CAD (coronary artery disease) of artery bypass graft     CHF (congestive heart failure) ([facility] )     combine    Coronary artery disease     Disease of thyroid gland     Hyperlipidemia     Hypertension     Ischemic cardiomyopathy     Left ureteral calculus 10/17/2017    Renal disorder     V tach ([facility] )        Review of Systems   Constitutional: Negative for chills  Cardiovascular: Positive for leg swelling  Negative for chest pain, claudication, cyanosis, dyspnea on exertion, irregular heartbeat, near-syncope, orthopnea, palpitations, paroxysmal nocturnal dyspnea and syncope  Respiratory: Negative for cough and shortness of breath  Gastrointestinal: Negative for heartburn and nausea     Neurological: Negative for dizziness, focal weakness, headaches, light-headedness and weakness  All other systems reviewed and are negative  No Known Allergies    Current Outpatient Medications:     acetaminophen (TYLENOL) 500 mg tablet, Take 500 mg by mouth every 6 (six) hours as needed for mild pain, Disp: , Rfl:     amiodarone 200 mg tablet, Take 1 tablet (200 mg total) by mouth daily, Disp: 90 tablet, Rfl: 3    amLODIPine (NORVASC) 2 5 mg tablet, Take 1 tablet (2 5 mg total) by mouth every other day At Morning , Disp: 45 tablet, Rfl: 3    aspirin 81 MG tablet, Take 81 mg by mouth daily  , Disp: , Rfl:     isosorbide mononitrate (IMDUR) 30 mg 24 hr tablet, Take 1 tablet (30 mg total) by mouth daily, Disp: 90 tablet, Rfl: 3    levothyroxine 150 mcg tablet, Take 150 mcg by mouth daily , Disp: , Rfl:     LORazepam (ATIVAN) 1 mg tablet, Take 1 mg by mouth as needed for anxiety  , Disp: , Rfl:     metolazone (ZAROXOLYN) 2 5 mg tablet, As needed no more than twice weekly for leg swelling, Disp: 10 tablet, Rfl: 3    metoprolol succinate (TOPROL-XL) 50 mg 24 hr tablet, Take 1 tablet (50 mg total) by mouth 2 (two) times a day, Disp: 180 tablet, Rfl: 3    mexiletine (MEXITIL) 150 mg capsule, Take 1 capsule (150 mg total) by mouth 2 (two) times a day, Disp: 180 capsule, Rfl: 3    mirtazapine (REMERON) 7 5 MG tablet, Take 15 mg by mouth every evening , Disp: , Rfl:     Multiple Vitamins-Minerals (MULTIVITAMIN WITH MINERALS) tablet, Take 1 tablet by mouth daily  , Disp: , Rfl:     nitroglycerin (NITROSTAT) 0 4 mg SL tablet, Place 0 4 mg under the tongue every 5 (five) minutes as needed for chest pain  , Disp: , Rfl:     potassium citrate (Urocit-K 10) 10 mEq, Take 1 tablet (10 mEq total) by mouth 2 (two) times a day, Disp: 180 tablet, Rfl: 3    rosuvastatin (CRESTOR) 20 MG tablet, Take 1 tablet by mouth once daily, Disp: 30 tablet, Rfl: 0    tamsulosin (FLOMAX) 0 4 mg, Take 1 capsule (0 4 mg total) by mouth daily with dinner, Disp: 90 capsule, Rfl: 3    torsemide (DEMADEX) 20 mg tablet, Take 40 mg (2 tablets) every morning by mouth and 20 mg (1 tablet) by mouth every afternoon  , Disp: 190 tablet, Rfl: 3    Social History     Socioeconomic History    Marital status: /Civil Union     Spouse name: Not on file    Number of children: Not on file    Years of education: Not on file    Highest education level: Not on file   Occupational History    Occupation: Retired   Tobacco Use    Smoking status: Never Smoker    Smokeless tobacco: Never Used   Vaping Use    Vaping Use: Never used   Substance and Sexual Activity    Alcohol use: Yes    Drug use: No    Sexual activity: Not on file   Other Topics Concern    Not on file   Social History Narrative    Not on file     Social Determinants of Health     Financial Resource Strain:     Difficulty of Paying Living Expenses:    Food Insecurity:     Worried About Running Out of Food in the Last Year:     [address] in the Last Year:    Transportation Needs:     Lack of Transportation (Medical):  Lack of Transportation (Non-Medical):    Physical Activity:     Days of Exercise per Week:     Minutes of Exercise per Session:    Stress:     Feeling of Stress :    Social Connections:     Frequency of Communication with Friends and Family:     Frequency of Social Gatherings with Friends and Family:     Attends Orthodoxy Services:     Active Member of Clubs or Organizations:     Attends Club or Organization Meetings:     Marital Status:    Intimate Partner Violence:     Fear of Current or Ex-Partner:     Emotionally Abused:     Physically Abused:     Sexually Abused:        Family History   Problem Relation Age of Onset    Tuberculosis Father         WWI    Hypertension Mother     Stroke Brother        Physical Exam  Vitals and nursing note reviewed  Constitutional:       General: He is not in acute distress  Comments: Uses a cane for gait assistance   HENT:      Head: Normocephalic and atraumatic     Eyes: Conjunctiva/sclera: Conjunctivae normal    Cardiovascular:      Rate and Rhythm: Normal rate and regular rhythm  Pulses: Intact distal pulses  Heart sounds: S1 normal  Murmur heard  Systolic murmur is present  Soft systolic murmur over the precordium      Pulmonary:      Effort: Pulmonary effort is normal       Breath sounds: Normal breath sounds  Abdominal:      General: Bowel sounds are normal       Palpations: Abdomen is soft  Musculoskeletal:         General: Normal range of motion  Cervical back: Normal range of motion and neck supple  Right lower leg: Edema present  Left lower leg: Edema present  Skin:     General: Skin is warm and dry  Comments: Small open wound right lower extremity, lateral calf   Neurological:      Mental Status: He is alert  Comments: Oriented x2         Vitals: There were no vitals taken for this visit  Wt Readings from Last 3 Encounters:   21 93 9 kg (207 lb 1 6 oz)   21 93 6 kg (206 lb 6 4 oz)   21 94 3 kg (208 lb)         Labs & Results:  Lab Results   Component Value Date    WBC 6 80 2021    HGB 12 8 2021    HCT 39 8 2021    MCV 97 2021     2021     No results found for: BNP  No components found for: CHEM    Results for orders placed during the hospital encounter of 21    Echo complete with contrast if indicated    Narrative  [place] [address], [address] [facility]  [phone]    Transthoracic Echocardiogram  2D, M-mode, Doppler, and Color Doppler    Study date:  2021    Patient: Jessica Jones  MR number: [id number]  Account number: [de-identified]  : 1935  Age: 80 years  Gender: Male  Status: Outpatient  [facility]: [facility]  Height: 67 in  Weight: 207 5 lb  BP: 138/ 68 mmHg    Indications: Atrial fibrillation, Ischemic cardiomyopathy      Diagnoses: I42 9 - Cardiomyopathy, unspecified, I48 0 - Atrial fibrillation    Sonographer:  ELI Knight  Primary Physician:  Francisco Leon MD  Referring Physician:  Clif Fisher MD  Group:  [facility]  Interpreting Physician:  Julito Kumar MD    SUMMARY    LEFT VENTRICLE:  The ventricle was markedly dilated  Systolic function was markedly reduced by visual assessment  Ejection fraction was estimated to be 20 %  There was moderate diffuse hypokinesis  There was akinesis of the entire inferior and basal-mid inferolateral wall(s)  Doppler parameters were consistent with restrictive physiology, indicative of decreased left ventricular diastolic compliance and/or increased left atrial pressure  RIGHT VENTRICLE:  The ventricle was mildly dilated  Systolic pressure was markedly increased  Estimated peak pressure was 70 mmHg  LEFT ATRIUM:  The atrium was mildly dilated  RIGHT ATRIUM:  The atrium was mildly dilated  MITRAL VALVE:  There was moderate regurgitation  TRICUSPID VALVE:  There was moderate regurgitation  PULMONIC VALVE:  There was mild regurgitation  IVC, HEPATIC VEINS:  The inferior vena cava was dilated  The respirophasic change in diameter was less than 50%  HISTORY: PRIOR HISTORY: PAF, HLD, HTN, CAD with CABG, ICM, AICD, CKD3  PROCEDURE: The study was performed in the [facility]  This was a routine study  The transthoracic approach was used  The study included complete 2D imaging, M-mode, complete spectral Doppler, and color Doppler  The  heart rate was 63 bpm, at the start of the study  Images were obtained from the parasternal, apical, subcostal, and suprasternal notch acoustic windows  Image quality was adequate  LEFT VENTRICLE: The ventricle was markedly dilated  Systolic function was markedly reduced by visual assessment  Ejection fraction was estimated to be 20 %  There was moderate diffuse hypokinesis   There was akinesis of the entire inferior  and basal-mid inferolateral wall(s)  Wall thickness was normal  DOPPLER: There was a reduced contribution of atrial contraction to ventricular filling, due to increased ventricular diastolic pressure or atrial contractile dysfunction  Doppler parameters were consistent with restrictive physiology, indicative of decreased left ventricular diastolic compliance and/or increased left atrial pressure  RIGHT VENTRICLE: The ventricle was mildly dilated  Systolic function was normal  A pacing wire was present  DOPPLER: Systolic pressure was markedly increased  Estimated peak pressure was 70 mmHg  LEFT ATRIUM: The atrium was mildly dilated  RIGHT ATRIUM: The atrium was mildly dilated  MITRAL VALVE: The annulus was dilated  Valve structure was normal  There was normal leaflet separation  DOPPLER: The transmitral velocity was within the normal range  There was no evidence for stenosis  There was moderate regurgitation  AORTIC VALVE: The valve was trileaflet  Leaflets exhibited normal thickness, mild calcification, and normal cuspal separation  DOPPLER: Transaortic velocity was minimally increased  There was no evidence for stenosis  There was no  regurgitation  TRICUSPID VALVE: The valve structure was normal  There was normal leaflet separation  DOPPLER: The transtricuspid velocity was within the normal range  There was no evidence for stenosis  There was moderate regurgitation  PULMONIC VALVE: Leaflets exhibited normal thickness, no calcification, and normal cuspal separation  DOPPLER: The transpulmonic velocity was within the normal range  There was mild regurgitation  PERICARDIUM: There was no pericardial effusion  AORTA: The root exhibited normal size  SYSTEMIC VEINS: IVC: The inferior vena cava was dilated  The respirophasic change in diameter was less than 50%      SYSTEM MEASUREMENT TABLES    2D  %FS: 7 71 %  Ao Diam: 3 39 cm  EDV(Teich): 304 24 ml  EF Biplane: 20 25 %  EF(Teich): 16 5 %  ESV(Teich): 254 04 ml  IVSd: 0 63 cm  LA Diam: 5 5 cm  LAAs A2C: 26 46 cm2  LAAs A4C: 27 04 cm2  LAESV A-L A2C: 95 68 ml  LAESV A-L A4C: 102 41 ml  LAESV Index (A-L): 48 64 ml/m2  LAESV MOD A2C: 90 47 ml  LAESV MOD A4C: 95 06 ml  LAESV(A-L): 100 21 ml  LAESV(MOD BP): 93 73 ml  LAESVInd MOD BP: 45 5 ml/m2  LALs A2C: 6 21 cm  LALs A4C: 6 06 cm  LVEDV MOD A2C: 294 47 ml  LVEDV MOD A4C: 206 38 ml  LVEDV MOD BP: 249 76 ml  LVEDVInd MOD BP: 121 24 ml/m2  LVEF MOD A2C: 14 15 %  LVEF MOD A4C: 25 84 %  LVESV MOD A2C: 252 8 ml  LVESV MOD A4C: 153 06 ml  LVESV MOD BP: 199 2 ml  LVESVInd MOD BP: 96 7 ml/m2  LVIDd: 7 57 cm  LVIDs: 6 98 cm  LVLd A2C: 9 35 cm  LVLd A4C: 9 09 cm  LVLs A2C: 8 73 cm  LVLs A4C: 8 47 cm  LVPWd: 0 59 cm  Yari A4C: 19 13 cm2  RAAs A4C: 21 01 cm2  RAEDV A-L: 62 99 ml  RAEDV MOD: 62 18 ml  RAESV A-L: 68 28 ml  RAESV MOD: 67 99 ml  RALd: 4 93 cm  RALs: 5 49 cm  RVIDd: 4 55 cm  SV MOD A2C: 41 68 ml  SV MOD A4C: 53 32 ml  SV(Teich): 50 2 ml    CF  MR Als  Hemanth: 0 42 m/s  MR Flow: 275 7 ml/s  MR Rad: 1 02 cm    CW  AV Env  Ti: 300 67 ms  AV MaxP 39 mmHg  AV VTI: 24 81 cm  AV Vmax: 1 26 m/s  AV Vmean: 0 83 m/s  AV meanPG: 3 16 mmHg  MR VTI: 195 19 cm  MR Vmax: 5 39 m/s  PRend P 09 mmHg  PRend Vmax: 1 81 m/s  TR MaxP 62 mmHg  TR Vmax: 3 89 m/s    MM  TAPSE: 1 4 cm    PW  LVOT Env  Ti: 334 92 ms  LVOT VTI: 13 06 cm  LVOT Vmax: 0 61 m/s  LVOT Vmean: 0 39 m/s  LVOT maxP 51 mmHg  LVOT meanP 73 mmHg  MR ERO: 0 51 cm2  MR RV: 99 75 ml  PV Acc [place]: 9 18 m/s2  PV AccT: 38 06 ms    [facility]    Prepared and electronically signed by    Rafy Suarez MD  Signed 2021 12:22:42    No results found for this or any previous visit  This note was completed in part utilizing [employer] fluency direct voice recognition software     Grammatical errors, random word insertion, spelling mistakes, and incomplete sentences may be an occasional consequence of the system secondary to software limitations, ambient noise and hardware issues  At the time of dictation, efforts were made to edit, clarify and /or correct errors  Please read the chart carefully and recognize, using context, where substitutions have occurred    If you have any questions or concerns about the context, text or information contained within the body of this dictation, please contact myself, the provider, for further clarification

## 2021-08-13 NOTE — TELEPHONE ENCOUNTER
Wife called, they got the message about decreasing the torsemide  Wants to know about the metolazone   He takes 1-2 times a week  Also they are disgusted, because nothing is being done about the edema blle  And doing less diuretic will make them worse  She understands the decrease due to the renal function  Is there any thing else you can recommend?       Please advise

## 2021-08-16 ENCOUNTER — OFFICE VISIT (OUTPATIENT)
Dept: CARDIOLOGY CLINIC | Facility: CLINIC | Age: 86
End: 2021-08-16
Payer: MEDICARE

## 2021-08-16 ENCOUNTER — APPOINTMENT (OUTPATIENT)
Dept: LAB | Facility: CLINIC | Age: 86
End: 2021-08-16
Payer: MEDICARE

## 2021-08-16 VITALS
WEIGHT: 205.25 LBS | HEART RATE: 62 BPM | BODY MASS INDEX: 32.21 KG/M2 | OXYGEN SATURATION: 94 % | HEIGHT: 67 IN | DIASTOLIC BLOOD PRESSURE: 64 MMHG | RESPIRATION RATE: 20 BRPM | SYSTOLIC BLOOD PRESSURE: 132 MMHG

## 2021-08-16 DIAGNOSIS — I50.22 CHRONIC HFREF (HEART FAILURE WITH REDUCED EJECTION FRACTION) (HCC): ICD-10-CM

## 2021-08-16 DIAGNOSIS — I25.5 ISCHEMIC CARDIOMYOPATHY: ICD-10-CM

## 2021-08-16 DIAGNOSIS — I48.0 PAROXYSMAL ATRIAL FIBRILLATION (HCC): ICD-10-CM

## 2021-08-16 DIAGNOSIS — E78.2 MIXED HYPERLIPIDEMIA: ICD-10-CM

## 2021-08-16 DIAGNOSIS — I47.2 V TACH (HCC): ICD-10-CM

## 2021-08-16 DIAGNOSIS — E66.9 OBESITY (BMI 30.0-34.9): ICD-10-CM

## 2021-08-16 DIAGNOSIS — N18.32 STAGE 3B CHRONIC KIDNEY DISEASE (HCC): ICD-10-CM

## 2021-08-16 DIAGNOSIS — I10 BENIGN ESSENTIAL HYPERTENSION: ICD-10-CM

## 2021-08-16 DIAGNOSIS — I50.22 CHRONIC SYSTOLIC CONGESTIVE HEART FAILURE (HCC): Primary | ICD-10-CM

## 2021-08-16 DIAGNOSIS — I25.810 CORONARY ARTERY DISEASE INVOLVING CORONARY BYPASS GRAFT OF NATIVE HEART WITHOUT ANGINA PECTORIS: ICD-10-CM

## 2021-08-16 LAB
ANION GAP SERPL CALCULATED.3IONS-SCNC: 9 MMOL/L (ref 4–13)
BUN SERPL-MCNC: 30 MG/DL (ref 5–25)
CALCIUM SERPL-MCNC: 9.5 MG/DL (ref 8.3–10.1)
CHLORIDE SERPL-SCNC: 101 MMOL/L (ref 100–108)
CO2 SERPL-SCNC: 33 MMOL/L (ref 21–32)
CREAT SERPL-MCNC: 1.92 MG/DL (ref 0.6–1.3)
GFR SERPL CREATININE-BSD FRML MDRD: 31 ML/MIN/1.73SQ M
GLUCOSE SERPL-MCNC: 114 MG/DL (ref 65–140)
POTASSIUM SERPL-SCNC: 3.7 MMOL/L (ref 3.5–5.3)
SODIUM SERPL-SCNC: 143 MMOL/L (ref 136–145)

## 2021-08-16 PROCEDURE — 80048 BASIC METABOLIC PNL TOTAL CA: CPT

## 2021-08-16 PROCEDURE — 99214 OFFICE O/P EST MOD 30 MIN: CPT | Performed by: NURSE PRACTITIONER

## 2021-08-16 PROCEDURE — 36415 COLL VENOUS BLD VENIPUNCTURE: CPT

## 2021-08-16 NOTE — PATIENT INSTRUCTIONS
DASH Eating Plan   WHAT YOU NEED TO KNOW:   The DASH (Dietary Approaches to Stop Hypertension) Eating Plan is designed to help prevent or lower high blood pressure  It can also help to lower LDL (bad) cholesterol and decrease your risk of heart disease  The plan is low in sodium, sugar, unhealthy fats, and total fat  It is high in potassium, calcium, magnesium, and fiber  These nutrients are added when you eat more fruits, vegetables, and whole grains  DISCHARGE INSTRUCTIONS:   Your sodium limit each day: Your dietitian will tell you how much sodium is safe for you to have each day  People with high blood pressure should have no more than 1,500 to 2,300 mg of sodium in a day  A teaspoon (tsp) of salt has 2,300 mg of sodium  This may seem like a difficult goal, but small changes to the foods you eat can make a big difference  Your healthcare provider or dietitian can help you create a meal plan that follows your sodium limit  How to limit sodium:   · Read food labels  Food labels can help you choose foods that are low in sodium  The amount of sodium is listed in milligrams (mg)  The % Daily Value (DV) column tells you how much of your daily needs are met by 1 serving of the food for each nutrient listed  Choose foods that have less than 5% of the DV of sodium  These foods are considered low in sodium  Foods that have 20% or more of the DV of sodium are considered high in sodium  Avoid foods that have more than 300 mg of sodium in each serving  Choose foods that say low-sodium, reduced-sodium, or no salt added on the food label  · Avoid salt  Do not salt food at the table, and add very little salt to foods during cooking  Use herbs and spices, such as onions, garlic, and salt-free seasonings to add flavor to foods  Try lemon or lime juice or vinegar to give foods a tart flavor  Use hot peppers or a small amount of hot pepper sauce to add a spicy flavor to foods  · Ask about salt substitutes    Ask your healthcare provider if you may use salt substitutes  Some salt substitutes have ingredients that can be harmful if you have certain health conditions  · Choose foods carefully at restaurants  Meals from restaurants, especially fast food restaurants, are often high in sodium  Some restaurants have nutrition information that tells you the amount of sodium in their foods  Ask to have your food prepared with less, or no salt  What you need to know about fats:   · Include healthy fats  Examples are unsaturated fats and omega-3 fatty acids  Unsaturated fats are found in soybean, canola, olive, or sunflower oil, and liquid and soft tub margarines  Omega-3 fatty acids are found in fatty fish, such as salmon, tuna, mackerel, and sardines  It is also found in flaxseed oil and ground flaxseed  · Avoid unhealthy fats  Do not eat unhealthy fats, such as saturated fats and trans fats  Saturated fats are found in foods that contain fat from animals  Examples are fatty meats, whole milk, butter, cream, and other dairy foods  It is also found in shortening, stick margarine, palm oil, and coconut oil  Trans fats are found in fried foods, crackers, chips, and baked goods made with margarine or shortening  Foods to include: With the DASH eating plan, you need to eat a certain number of servings from each food group  This will help you get enough of certain nutrients and limit others  The amount of servings you should eat depends on how many calories you need  Your dietitian can tell you how many calories you need  The number of servings listed next to the food groups below are for people who need about 2,000 calories each day  · Grains:  6 to 8 servings (3 of these servings should be whole-grain foods)    ? 1 slice of whole-grain bread    ? 1 ounce of dry cereal    ? ½ cup of cooked cereal, pasta, or brown rice    · Vegetables and fruits:  4 to 5 servings of fruits and 4 to 5 servings of vegetables    ?  1 medium fruit    ? ½ cup of frozen, canned (no added salt), or chopped fresh vegetables    ? ½ cup of fresh, frozen, dried, or canned fruit (canned in light syrup or fruit juice)    ? ½ cup of vegetable or fruit juice    · Dairy:  2 to 3 servings    ? 1 cup of nonfat (skim) or 1% milk    ? 1½ ounces of fat-free or low-fat cheese    ? 6 ounces of nonfat or low-fat yogurt    · Lean meat, poultry, and fish:  6 ounces or less    ? Poultry (chicken, turkey) with no skin    ? Fish (especially fatty fish, such as salmon, fresh tuna, or mackerel)    ? Lean beef and pork (loin, round, extra lean hamburger)    ? Egg whites and egg substitutes    · Nuts, seeds, and legumes:  4 to 5 servings each week    ? ½ cup of cooked beans and peas    ? 1½ ounces of unsalted nuts    ? 2 tablespoons of peanut butter or seeds    · Sweets and added sugars:  5 or less each week    ? 1 tablespoon of sugar, jelly, or jam    ? ½ cup of sorbet or gelatin    ? 1 cup of lemonade    · Fats:  2 to 3 servings each week    ? 1 teaspoon of soft margarine or vegetable oil    ? 1 tablespoon of mayonnaise    ? 2 tablespoons of salad dressing    Foods to avoid:   · Grains:      ? Baked goods, such as doughnuts, pastries, cookies, and biscuits (high in fat and sugar)    ? Mixes for cornbread and biscuits, packaged foods, such as bread stuffing, rice and pasta mixes, macaroni and cheese, and instant cereals (high in sodium)    · Fruits and vegetables:      ? Regular, canned vegetables (high in sodium)    ? Sauerkraut, pickled vegetables, and other foods prepared in brine (high in sodium)    ? Fried vegetables or vegetables in butter or high-fat sauces    ? Fruit in cream or butter sauce (high in fat)    · Dairy:      ? Whole milk, 2% milk, and cream (high in fat)    ?  Regular cheese and processed cheese (high in fat and sodium)    · Meats and protein foods:      ? Smoked or cured meat, such as corned beef, meyers, ham, hot dogs, and sausage (high in fat and sodium)    ? Canned beans and canned meats or spreads, such as potted meats, sardines, anchovies, and imitation seafood (high in sodium)    ? Deli or lunch meats, such as bologna, ham, turkey, and roast beef (high in sodium)    ? High-fat meat (T-bone steak, regular hamburger, and ribs)    ? Whole eggs and egg yolks (high in fat)    · Other:      ? Seasonings made with salt, such as garlic salt, celery salt, onion salt, seasoned salt, meat tenderizers, and monosodium glutamate (MSG)    ? Miso soup and canned or dried soup mixes (high in sodium)    ? Regular soy sauce, barbecue sauce, teriyaki sauce, steak sauce, Worcestershire sauce, and most flavored vinegars (high in sodium)    ? Regular condiments, such as mustard, ketchup, and salad dressings (high in sodium)    ? Gravy and sauces, such as Bob or cheese sauces (high in sodium and fat)    ? Drinks high in sugar, such as soda or fruit drinks    ? Snack foods, such as salted chips, popcorn, pretzels, pork rinds, salted crackers, and salted nuts    ? Frozen foods, such as dinners, entrees, vegetables with sauces, and breaded meats (high in sodium)    Other guidelines to follow:   · Maintain a healthy weight  Your risk for heart disease is higher if you are overweight  Your healthcare provider may suggest that you lose weight if you are overweight  You can lose weight by eating fewer calories and foods that have added sugars and fat  The DASH meal plan can help you do this  Decrease calories by eating smaller portions at each meal and fewer snacks  Ask your healthcare provider for more information about how to lose weight  · Exercise regularly  Regular exercise can help you reach or maintain a healthy weight  Regular exercise can also help decrease your blood pressure and improve your cholesterol levels  Get 30 minutes or more of moderate exercise each day of the week  To lose weight, get at least 60 minutes of exercise   Talk to your healthcare provider about the best exercise program for you  · Limit alcohol  Women should limit alcohol to 1 drink a day  Men should limit alcohol to 2 drinks a day  A drink of alcohol is 12 ounces of beer, 5 ounces of wine, or 1½ ounces of liquor  © Copyright [address] 2021 Information is for End User's use only and may not be sold, redistributed or otherwise used for commercial purposes  All illustrations and images included in CareNotes® are the copyrighted property of [employer]  or [address] [place], [place] [id number]   The above information is an  only  It is not intended as medical advice for individual conditions or treatments  Talk to your doctor, nurse or pharmacist before following any medical regimen to see if it is safe and effective for you

## 2021-08-16 NOTE — LETTER
August 16, 2021     Joice Goldmann  [address] [place] [address]    Patient: Gauri Chapman   YOB: 1935   Date of Visit: 8/16/2021       Dear Dr Pedro Plasencia:    Thank you for referring Gauri Chapman to me for evaluation  Below are my notes for this consultation  If you have questions, please do not hesitate to call me  I look forward to following your patient along with you  Sincerely,        HELENE Pena        CC: MD Gissel Ferrell MD Correne Lynn, [facility]  8/16/2021  8:08 AM  Sign when Signing Visit  Cardiology  Heart Failure   Follow Up Office Visit Note -     Gauri Chapman   80 y o    male   MRN: [id number]  [address]  [address]  [place] [address] [facility] [place] [place] [address]  [phone]  [phone]    PCP: Joice Goldmann  Cardiologist : Dr Cece Caballero                Summary of Recommendations  Low-sodium diet, Heart failure education as below  Continue the current plan in terms of his diuretics as well as other medications no changes made today  Nonfasting BMP today  CMP 1 week- reassess LFTS  Were elevated in July   Follow-up with me 2 weeks  Follow up will be scheduled in the future with Dr Cece Caballero         Impression/ Plan  Chronic HFrEF EF 20%; LVIDd 7 57 cm; NYHA II; ACC/AHA Stage B  ICM, EF 20%   6/2021  --Dry weight: 200 lb    At home, without clothing his weight was 199 lb consistently since Friday according to his wife    Wt Readings from Last 3 Encounters:   08/16/21 93 1 kg (205 lb 4 oz)   08/12/21 93 9 kg (207 lb 1 6 oz)   07/23/21 93 6 kg (206 lb 6 4 oz)     Wt Readings from Last 3 Encounters:   08/12/21 93 9 kg (207 lb 1 6 oz)   07/23/21 93 6 kg (206 lb 6 4 oz)   06/22/21 94 3 kg (208 lb)     --Beta-blocker:  metoprolol succinate 50 mg BID   --Diuretic:   torsemide 40 mg am, 20 mg Pm; with metolazone 2 5 mg weekly p r n   and potassium citrate 10 mEq BID routinely  --ACE/ARB/ARNI:  None given a hx of hyperkalemia and CKD  --MRA  --SLGT2   --ICD: S/P BiV iCD  --2 g sodium diet, 1500 cc fluid restriction  Daily weights, call weight gain 2-3 lb in 1 day or 5 lb in 5 days  He has a plan for weight gain: Metolazone 2 5 mg p r n  S/P BI V ICD, upgraded 1/2020  Interrogation from 06/10/2021: AP 86%  BVP 98%  Lead parameters WNL  2 NSVT episodes  Corvue WNL  Normal device function  PAF, on amiodarone  Severe pulmonary hypertension  CAD,S/P CABG x 4 2004  on aspirin, rosuvastatin,beta blocker, nitrate  History of ventricular tachycardia on antiarrhythmic with amiodarone 200 mg daily and mexiletine 150 mg BID  TSH 98637:3 9  LFTS 7/13/21:  ALT 60   Hypertension, essential  /64  On amlodipine 2 5 mg QOD, beta blocker, nitrate, diuretics  Hyperlipidemia  On atorvastatin 40 mg/d   7/13/21: LDL 56, non HDL 80, at goal  CKD stage 3 a  Follows with nephrology  baseline Cr 1 5-1 7  Anemia of chronic disese  Cardiac testing  · TTE 04/24/2018: LVEF 30%  LVIDd 7 46 cm  moderate diffuse hypokinesis with regional variations  There was akinesis of the basal-mid inferolateral wall(s)  There was severe hypokinesis of the basal-mid inferior wall(s)  Dilated RV; normal RVSF  Mild MR  Moderate TR  PASP 70 mmHg   TTE 06/17/2021: LVEF 20%  LVIDd 7 57 cm  Moderate diffuse hypokinesis  Akinesis of the entire inferior and basal-mid inferolateral wall(s)  RV mildly  Dialted  Est peak syst pressure 70 mm Hg  Mild KEYLA  Moderate MR and Moderate TR  HPI:   Alexis Ernst is an 79 yo male with a longstanding history of CAD, CABG, ischemic cardiomyopathy with last an Ef of 20% ( 0/2178) chronic systolic/ diastolic CHF, ventricular tachycardia and paroxysmal atrial fibrillation, on long-term amiodarone and mexiletine  4/152021  Office visit with his cardiologist  5 lb over dry wt     Complained of weight gain, cough, and edema  Advised to increase torsemide to 40 mg b i d , from daily until his weight dropped 4-5 lb    5/25/21 increased dose of torsemide was ineffective at reducing his weight  Metolazone 2 5 mg added twice a week    6/22/21 nephrology PA visit  currently on: torsemide 40 mg daily, Toprol XL 50 mg BID, metolazone 2 5 mg once weekly, Imdur 30 mg daily  -  increase torsemide 40 mg BID for  weight gain of 3 plus pounds  No longer on metolazone    6/30/21 per his nephrologist:  Dr Jessica Sanchez noted he had been on torsemide 40 b i d  since May /most recently was back down to daily  The patient's weight and edema were up  Very short of breath  Torsemide increased to 40 mg b i d  indefinitely  Added metolazone 2 5 mg as needed for 2-3 lb weight gain or leg edema  1500 mL fluid restriction  Ordered venous duplex-was negative for DVT  BMP in a week    7/13/21   Creatinine 1 74: sodium 140; potassium 3 9; BUN 26     8/10/21  Patient reported being on torsemide 20 mg b i d  for unclear reasons  He contacted his nephrologist     Dash Caruso increased again to 40 mg b i d  for few days then decrease to 40 in the morning and 20 in the p m  if the 40 b i d  helped  metolazone 2 5 mg added weekly    8/12/21 Seen by Adolfo Yang cardiology at the request of Nephrology and the patient's cardiologist    Complained of persistent lower extremity edema  Complained of right knee pain  Lost 3 lb overnight after taking metolazone August 11th  High sodium diet  Drinking 64 oz of fluid a day/- 2L  Educated regarding sodium diet restrictions  IV Lasix deferred  Increased torsemide from 40 to 60 mg BID through the weekend  Metolazone- p r n        8/12/21 Cr 1 96  BUN 26  K 3 8    8/16/21  Close follow up for heart failure  Sx:  No chest pain  He does not complain of any persistent dyspnea  He is sedentary  He uses a cane  He has lower extremity edema chronic, which is improving  He has a right lower extremity open area which is improving    Wt Readings from Last 3 Encounters:   08/16/21 93 1 kg (205 lb 4 oz) 08/12/21 93 9 kg (207 lb 1 6 oz)   07/23/21 93 6 kg (206 lb 6 4 oz)     Currently on: Torsemide 40 mg in the morning, torsemide 20 mg in the p m  Potassium 10 mEq-2 tabs daily  Metolazone 2 5 mg weekly p r n with and extra K 20 meq      Plan  1  Educated how to read food labels to facilitate adherence to a low-salt diet  Re-emphasized 1500 cc fluid restriction Written material provided  His wife tells me he has cut down on his sodium significantly but still occasionally does use salt shaker  I recommend avoiding this salt shaker completely and substituting it with Mrs DASH, pepper, onion powder, garlic powder, herbs, etc   2  BMP today  CMP 2 weeks  3  follow up with me 2 weeks      I have spent 25 minutes with Patient and family today in which greater than 50% of this time was spent in counseling/coordination of care regarding Intructions for management, Patient and family education, Importance of tx compliance and Risk factor reductions  Assessment  Diagnoses and all orders for this visit:    Chronic systolic congestive heart failure ([address] )    Coronary artery disease involving coronary bypass graft of native heart without angina pectoris    Ischemic cardiomyopathy    Paroxysmal atrial fibrillation (HCC)    V tach (HCC)    Benign essential hypertension    Stage 3b chronic kidney disease (HCC)    Mixed hyperlipidemia    Obesity (BMI 30 0-34  9)        Past Medical History:   Diagnosis Date    AICD (automatic cardioverter/defibrillator) present 2004    AICD (automatic cardioverter/defibrillator) present 2006    AICD (automatic cardioverter/defibrillator) present 2013    [facility]    Arthritis     R knee and neck    Atrial fibrillation (HCC)     BPH (benign prostatic hyperplasia)     CAD (coronary artery disease) of artery bypass graft     CHF (congestive heart failure) (HCC)     combine    Coronary artery disease     Disease of thyroid gland     Hyperlipidemia     Hypertension     Ischemic cardiomyopathy     Left ureteral calculus 10/17/2017    Renal disorder     V tach ([facility] [address] )        Review of Systems   Constitutional: Negative for chills  Cardiovascular: Positive for leg swelling  Negative for chest pain, claudication, cyanosis, dyspnea on exertion, irregular heartbeat, near-syncope, orthopnea, palpitations, paroxysmal nocturnal dyspnea and syncope  Respiratory: Negative for cough and shortness of breath  Gastrointestinal: Negative for heartburn and nausea  Neurological: Negative for dizziness, focal weakness, headaches, light-headedness and weakness  All other systems reviewed and are negative  No Known Allergies    Current Outpatient Medications:     acetaminophen (TYLENOL) 500 mg tablet, Take 500 mg by mouth every 6 (six) hours as needed for mild pain, Disp: , Rfl:     amiodarone 200 mg tablet, Take 1 tablet (200 mg total) by mouth daily, Disp: 90 tablet, Rfl: 3    amLODIPine (NORVASC) 2 5 mg tablet, Take 1 tablet (2 5 mg total) by mouth every other day At Morning , Disp: 45 tablet, Rfl: 3    aspirin 81 MG tablet, Take 81 mg by mouth daily  , Disp: , Rfl:     isosorbide mononitrate (IMDUR) 30 mg 24 hr tablet, Take 1 tablet (30 mg total) by mouth daily, Disp: 90 tablet, Rfl: 3    levothyroxine 150 mcg tablet, Take 150 mcg by mouth daily , Disp: , Rfl:     LORazepam (ATIVAN) 1 mg tablet, Take 1 mg by mouth as needed for anxiety  , Disp: , Rfl:     metolazone (ZAROXOLYN) 2 5 mg tablet, As needed no more than twice weekly for leg swelling, Disp: 10 tablet, Rfl: 3    metoprolol succinate (TOPROL-XL) 50 mg 24 hr tablet, Take 1 tablet (50 mg total) by mouth 2 (two) times a day, Disp: 180 tablet, Rfl: 3    mexiletine (MEXITIL) 150 mg capsule, Take 1 capsule (150 mg total) by mouth 2 (two) times a day, Disp: 180 capsule, Rfl: 3    mirtazapine (REMERON) 7 5 MG tablet, Take 15 mg by mouth every evening , Disp: , Rfl:     Multiple Vitamins-Minerals (MULTIVITAMIN WITH MINERALS) tablet, Take 1 tablet by mouth daily  , Disp: , Rfl:     nitroglycerin (NITROSTAT) 0 4 mg SL tablet, Place 0 4 mg under the tongue every 5 (five) minutes as needed for chest pain  , Disp: , Rfl:     potassium citrate (Urocit-K 10) 10 mEq, Take 1 tablet (10 mEq total) by mouth 2 (two) times a day, Disp: 180 tablet, Rfl: 3    rosuvastatin (CRESTOR) 20 MG tablet, Take 1 tablet by mouth once daily, Disp: 30 tablet, Rfl: 0    tamsulosin (FLOMAX) 0 4 mg, Take 1 capsule (0 4 mg total) by mouth daily with dinner, Disp: 90 capsule, Rfl: 3    torsemide (DEMADEX) 20 mg tablet, Take 40 mg (2 tablets) every morning by mouth and 20 mg (1 tablet) by mouth every afternoon  , Disp: 190 tablet, Rfl: 3    Social History     Socioeconomic History    Marital status: /Civil Union     Spouse name: Not on file    Number of children: Not on file    Years of education: Not on file    Highest education level: Not on file   Occupational History    Occupation: Retired   Tobacco Use    Smoking status: Never Smoker    Smokeless tobacco: Never Used   Vaping Use    Vaping Use: Never used   Substance and Sexual Activity    Alcohol use: Yes    Drug use: No    Sexual activity: Not on file   Other Topics Concern    Not on file   Social History Narrative    Not on file     Social Determinants of Health     Financial Resource Strain:     Difficulty of Paying Living Expenses:    Food Insecurity:     Worried About Running Out of Food in the Last Year:     [address] in the Last Year:    Transportation Needs:     Lack of Transportation (Medical):      Lack of Transportation (Non-Medical):    Physical Activity:     Days of Exercise per Week:     Minutes of Exercise per Session:    Stress:     Feeling of Stress :    Social Connections:     Frequency of Communication with Friends and Family:     Frequency of Social Gatherings with Friends and Family:     Attends [employer] Services:     Active Member of Clubs or Organizations:     Attends Club or Organization Meetings:     Marital Status:    Intimate Partner Violence:     Fear of Current or Ex-Partner:     Emotionally Abused:     Physically Abused:     Sexually Abused:        Family History   Problem Relation Age of Onset    Tuberculosis Father         WWI    Hypertension Mother     Stroke Brother        Physical Exam  Vitals and nursing note reviewed  Constitutional:       General: He is not in acute distress  Comments: Uses a cane for gait assistance   HENT:      Head: Normocephalic and atraumatic  Eyes:      Conjunctiva/sclera: Conjunctivae normal    Cardiovascular:      Rate and Rhythm: Normal rate and regular rhythm  Pulses: Intact distal pulses  Heart sounds: S1 normal  Murmur heard  Systolic murmur is present  Soft systolic murmur over the precordium      Pulmonary:      Effort: Pulmonary effort is normal       Breath sounds: Normal breath sounds  Abdominal:      General: Bowel sounds are normal       Palpations: Abdomen is soft  Musculoskeletal:         General: Normal range of motion  Cervical back: Normal range of motion and neck supple  Right lower leg: Edema present  Left lower leg: Edema present  Skin:     General: Skin is warm and dry  Comments: Small open wound right lower extremity, lateral calf   Neurological:      Mental Status: He is alert  Comments: Oriented x2         Vitals: There were no vitals taken for this visit     Wt Readings from Last 3 Encounters:   08/12/21 93 9 kg (207 lb 1 6 oz)   07/23/21 93 6 kg (206 lb 6 4 oz)   06/22/21 94 3 kg (208 lb)         Labs & Results:  Lab Results   Component Value Date    WBC 6 80 07/13/2021    HGB 12 8 07/13/2021    HCT 39 8 07/13/2021    MCV 97 07/13/2021     07/13/2021     No results found for: BNP  No components found for: CHEM    Results for orders placed during the hospital encounter of 06/17/21    Echo complete with contrast if indicated    Narrative  [facility], [facility]  [phone]    Transthoracic Echocardiogram  2D, M-mode, Doppler, and Color Doppler    Study date:  2021    Patient: Jessica Jones  MR number: [id number]  Account number: [de-identified]  : 1935  Age: 80 years  Gender: Male  Status: Outpatient  Location: [address] [place] [address]  Height: 67 in  Weight: 207 5 lb  BP: 138/ 68 mmHg    Indications: Atrial fibrillation, Ischemic cardiomyopathy  Diagnoses: I42 9 - Cardiomyopathy, unspecified, I48 0 - Atrial fibrillation    Sonographer:  ELI Jefferson  Primary Physician:  Keiko Ring MD  Referring Physician:  Tracey Newby MD  Group:  [facility]  Interpreting Physician:  Mikey Douglas MD    SUMMARY    LEFT VENTRICLE:  The ventricle was markedly dilated  Systolic function was markedly reduced by visual assessment  Ejection fraction was estimated to be 20 %  There was moderate diffuse hypokinesis  There was akinesis of the entire inferior and basal-mid inferolateral wall(s)  Doppler parameters were consistent with restrictive physiology, indicative of decreased left ventricular diastolic compliance and/or increased left atrial pressure  RIGHT VENTRICLE:  The ventricle was mildly dilated  Systolic pressure was markedly increased  Estimated peak pressure was 70 mmHg  LEFT ATRIUM:  The atrium was mildly dilated  RIGHT ATRIUM:  The atrium was mildly dilated  MITRAL VALVE:  There was moderate regurgitation  TRICUSPID VALVE:  There was moderate regurgitation  PULMONIC VALVE:  There was mild regurgitation  IVC, HEPATIC VEINS:  The inferior vena cava was dilated  The respirophasic change in diameter was less than 50%  HISTORY: PRIOR HISTORY: PAF, HLD, HTN, CAD with CABG, ICM, AICD, CKD3  PROCEDURE: The study was performed in the [address] [place] [address]  This was a routine study   The transthoracic approach was used  The study included complete 2D imaging, M-mode, complete spectral Doppler, and color Doppler  The  heart rate was 63 bpm, at the start of the study  Images were obtained from the parasternal, apical, subcostal, and suprasternal notch acoustic windows  Image quality was adequate  LEFT VENTRICLE: The ventricle was markedly dilated  Systolic function was markedly reduced by visual assessment  Ejection fraction was estimated to be 20 %  There was moderate diffuse hypokinesis  There was akinesis of the entire inferior  and basal-mid inferolateral wall(s)  Wall thickness was normal  DOPPLER: There was a reduced contribution of atrial contraction to ventricular filling, due to increased ventricular diastolic pressure or atrial contractile dysfunction  Doppler parameters were consistent with restrictive physiology, indicative of decreased left ventricular diastolic compliance and/or increased left atrial pressure  RIGHT VENTRICLE: The ventricle was mildly dilated  Systolic function was normal  A pacing wire was present  DOPPLER: Systolic pressure was markedly increased  Estimated peak pressure was 70 mmHg  LEFT ATRIUM: The atrium was mildly dilated  RIGHT ATRIUM: The atrium was mildly dilated  MITRAL VALVE: The annulus was dilated  Valve structure was normal  There was normal leaflet separation  DOPPLER: The transmitral velocity was within the normal range  There was no evidence for stenosis  There was moderate regurgitation  AORTIC VALVE: The valve was trileaflet  Leaflets exhibited normal thickness, mild calcification, and normal cuspal separation  DOPPLER: Transaortic velocity was minimally increased  There was no evidence for stenosis  There was no  regurgitation  TRICUSPID VALVE: The valve structure was normal  There was normal leaflet separation  DOPPLER: The transtricuspid velocity was within the normal range  There was no evidence for stenosis   There was moderate regurgitation  PULMONIC VALVE: Leaflets exhibited normal thickness, no calcification, and normal cuspal separation  DOPPLER: The transpulmonic velocity was within the normal range  There was mild regurgitation  PERICARDIUM: There was no pericardial effusion  AORTA: The root exhibited normal size  SYSTEMIC VEINS: IVC: The inferior vena cava was dilated  The respirophasic change in diameter was less than 50%  SYSTEM MEASUREMENT TABLES    2D  %FS: 7 71 %  Ao Diam: 3 39 cm  EDV(Teich): 304 24 ml  EF Biplane: 20 25 %  EF(Teich): 16 5 %  ESV(Teich): 254 04 ml  IVSd: 0 63 cm  LA Diam: 5 5 cm  LAAs A2C: 26 46 cm2  LAAs A4C: 27 04 cm2  LAESV A-L A2C: 95 68 ml  LAESV A-L A4C: 102 41 ml  LAESV Index (A-L): 48 64 ml/m2  LAESV MOD A2C: 90 47 ml  LAESV MOD A4C: 95 06 ml  LAESV(A-L): 100 21 ml  LAESV(MOD BP): 93 73 ml  LAESVInd MOD BP: 45 5 ml/m2  LALs A2C: 6 21 cm  LALs A4C: 6 06 cm  LVEDV MOD A2C: 294 47 ml  LVEDV MOD A4C: 206 38 ml  LVEDV MOD BP: 249 76 ml  LVEDVInd MOD BP: 121 24 ml/m2  LVEF MOD A2C: 14 15 %  LVEF MOD A4C: 25 84 %  LVESV MOD A2C: 252 8 ml  LVESV MOD A4C: 153 06 ml  LVESV MOD BP: 199 2 ml  LVESVInd MOD BP: 96 7 ml/m2  LVIDd: 7 57 cm  LVIDs: 6 98 cm  LVLd A2C: 9 35 cm  LVLd A4C: 9 09 cm  LVLs A2C: 8 73 cm  LVLs A4C: 8 47 cm  LVPWd: 0 59 cm  Yari A4C: 19 13 cm2  RAAs A4C: 21 01 cm2  RAEDV A-L: 62 99 ml  RAEDV MOD: 62 18 ml  RAESV A-L: 68 28 ml  RAESV MOD: 67 99 ml  RALd: 4 93 cm  RALs: 5 49 cm  RVIDd: 4 55 cm  SV MOD A2C: 41 68 ml  SV MOD A4C: 53 32 ml  SV(Teich): 50 2 ml    CF  MR Als  Hemanth: 0 42 m/s  MR Flow: 275 7 ml/s  MR Rad: 1 02 cm    CW  AV Env  Ti: 300 67 ms  AV MaxP 39 mmHg  AV VTI: 24 81 cm  AV Vmax: 1 26 m/s  AV Vmean: 0 83 m/s  AV meanPG: 3 16 mmHg  MR VTI: 195 19 cm  MR Vmax: 5 39 m/s  PRend P 09 mmHg  PRend Vmax: 1 81 m/s  TR MaxP 62 mmHg  TR Vmax: 3 89 m/s    MM  TAPSE: 1 4 cm    PW  LVOT Env  Ti: 334 92 ms  LVOT VTI: 13 06 cm  LVOT Vmax: 0 61 m/s  LVOT Vmean: 0 39 m/s  LVOT maxP 51 mmHg  LVOT meanP 73 mmHg  MR ERO: 0 51 cm2  MR RV: 99 75 ml  PV Acc Lamb: 9 18 m/s2  PV AccT: 38 06 ms    [facility] Accredited Echocardiography Laboratory    Prepared and electronically signed by    Kamran Woo MD  Signed 2021 12:22:42    No results found for this or any previous visit  This note was completed in part utilizing [employer] fluency direct voice recognition software  Grammatical errors, random word insertion, spelling mistakes, and incomplete sentences may be an occasional consequence of the system secondary to software limitations, ambient noise and hardware issues  At the time of dictation, efforts were made to edit, clarify and /or correct errors  Please read the chart carefully and recognize, using context, where substitutions have occurred    If you have any questions or concerns about the context, text or information contained within the body of this dictation, please contact myself, the provider, for further clarification

## 2021-08-19 ENCOUNTER — HOSPITAL ENCOUNTER (OUTPATIENT)
Dept: CT IMAGING | Facility: HOSPITAL | Age: 86
Discharge: HOME | End: 2021-08-19
Payer: MEDICARE

## 2021-08-19 DIAGNOSIS — R31.29 MICROSCOPIC HEMATURIA: ICD-10-CM

## 2021-08-19 PROCEDURE — 74176 CT ABD & PELVIS W/O CONTRAST: CPT

## 2021-08-19 PROCEDURE — G1004 CDSM NDSC: HCPCS

## 2021-08-27 ENCOUNTER — OFFICE VISIT (OUTPATIENT)
Dept: OBGYN CLINIC | Facility: CLINIC | Age: 86
End: 2021-08-27
Payer: MEDICARE

## 2021-08-27 ENCOUNTER — APPOINTMENT (OUTPATIENT)
Dept: RADIOLOGY | Facility: CLINIC | Age: 86
End: 2021-08-27
Payer: MEDICARE

## 2021-08-27 VITALS
SYSTOLIC BLOOD PRESSURE: 130 MMHG | HEART RATE: 62 BPM | BODY MASS INDEX: 32.8 KG/M2 | HEIGHT: 67 IN | DIASTOLIC BLOOD PRESSURE: 66 MMHG | WEIGHT: 209 LBS

## 2021-08-27 DIAGNOSIS — M17.0 BILATERAL PRIMARY OSTEOARTHRITIS OF KNEE: Primary | ICD-10-CM

## 2021-08-27 DIAGNOSIS — M25.561 PAIN IN BOTH KNEES, UNSPECIFIED CHRONICITY: ICD-10-CM

## 2021-08-27 DIAGNOSIS — M25.562 PAIN IN BOTH KNEES, UNSPECIFIED CHRONICITY: ICD-10-CM

## 2021-08-27 PROCEDURE — 73562 X-RAY EXAM OF KNEE 3: CPT

## 2021-08-27 PROCEDURE — 99204 OFFICE O/P NEW MOD 45 MIN: CPT | Performed by: ORTHOPAEDIC SURGERY

## 2021-08-27 PROCEDURE — 20610 DRAIN/INJ JOINT/BURSA W/O US: CPT | Performed by: ORTHOPAEDIC SURGERY

## 2021-08-27 RX ORDER — HYALURONATE SODIUM 10 MG/ML
20 SYRINGE (ML) INTRAARTICULAR
Status: COMPLETED | OUTPATIENT
Start: 2021-08-27 | End: 2021-08-27

## 2021-08-27 RX ADMIN — Medication 20 MG: at 17:42

## 2021-08-27 NOTE — PATIENT INSTRUCTIONS
Knee Exercises   AMBULATORY CARE:   What you need to know about knee exercises:  Knee exercises help strengthen the muscles around your knee  Strong muscles can help reduce pain and decrease your risk of future injury  Knee exercises also help you heal after an injury or surgery  · Start slow  These are beginning exercises  Ask your healthcare provider if you need to see a physical therapist for more advanced exercises  As you get stronger, you may be able to do more sets of each exercise or add weights  · Stop if you feel pain  It is normal to feel some discomfort at first  Regular exercise will help decrease your discomfort over time  · Do the exercises on both legs  Do this so both knees remain strong  · Warm up before you do knee exercises  Walk or ride a stationary bike for 5 or 10 minutes to warm your muscles  How to perform knee stretches safely:  Always stretch before you do strengthening exercises  Do these stretching exercises again after you do the strengthening exercises  Do these stretches 4 or 5 days a week, or as directed  · Standing calf stretch: Face a wall and place both palms flat on the wall, or hold the back of a chair for balance  Keep a slight bend in your knees  Take a big step backward with one leg  Keep your other leg directly under you  Keep both heels flat and press your hips forward  Hold the stretch for 30 seconds, and then relax for 30 seconds  Switch legs  Repeat 2 or 3 times on each leg  · Standing quadriceps stretch:  Stand and place one hand against a wall or hold the back of a chair for balance  With your weight on one leg, bend your other leg and grab your ankle  Bring your heel toward your buttocks  Hold the stretch for 30 to 60 seconds  Switch legs  Repeat 2 or 3 times on each leg  · Sitting hamstring stretch:  Sit with both legs straight in front of you  Do not point or flex your toes   Place your palms on the floor and slide your hands forward until you feel the stretch  Do not round your back  Hold the stretch for 30 seconds  Repeat 2 or 3 times  How to perform knee strengthening exercises safely:  Do these exercises 4 or 5 days a week, or as directed  · Standing half squats:  Stand with your feet shoulder-width apart  Lean your back against a wall or hold the back of a chair for balance, if needed  Slowly sit down about 10 inches, as if you are going to sit in a chair  Your body weight should be mostly over your heels  Hold the squat for 5 seconds, then rise to a standing position  Do 3 sets of 10 squats to strengthen your buttocks and thighs  · Standing hamstring curls: Face a wall and place both palms flat on the wall, or hold the back of a chair for balance  With your weight on one leg, lift your other foot as close to your buttocks as you can  Hold for 5 seconds and then lower your leg  Do 2 sets of 10 curls on each leg  This exercise strengthens the muscles in the back of your thigh  · Standing calf raises:  Face a wall and place both palms flat on the wall, or hold the back of a chair for balance  Stand up straight, and do not lean  Place all your weight on one leg by lifting the other foot off the floor  Raise the heel of the foot that is on the floor as high as you can and then lower it  Do 2 sets of 10 calf raises on each leg to strengthen your calf muscles  · Straight leg lifts:  Lie on your stomach with straight legs  Fold your arms in front of you and rest your head in your arms  Tighten your leg muscles and raise one leg as high as you can  Hold for 5 seconds, then lower your leg  Do 2 sets of 10 lifts on each leg to strengthen your buttocks  · Sitting leg lifts:  Sit in a chair  Slowly straighten and raise one leg  Squeeze your thigh muscles and hold for 5 seconds  Relax and return your foot to the floor  Do 2 sets of 10 lifts on each leg   This helps strengthen the muscles in the front of your thigh  Contact your healthcare provider if:   · You have new pain or your pain becomes worse  · You have questions or concerns about your condition or care  © Copyright [employer] 2021 Information is for End User's use only and may not be sold, redistributed or otherwise used for commercial purposes  All illustrations and images included in CareNotes® are the copyrighted property of A D A M [employer] Inc  or Alesia Lemons   The above information is an  only  It is not intended as medical advice for individual conditions or treatments  Talk to your doctor, nurse or pharmacist before following any medical regimen to see if it is safe and effective for you  Patellofemoral Pain Syndrome Exercises   WHAT YOU NEED TO KNOW:   You will need to ice and elevate your knee the first few days after the pain starts  Your healthcare provider may send you to physical therapy  A physical therapist will teach you exercises to strengthen the muscles in your legs, including around your knee  Strong leg muscles can help prevent more injuries  He or she will give you exercises to do at home  DISCHARGE INSTRUCTIONS:   What you need to know about PFPS:   · Only do exercises as instructed  Your physical therapist will tell you which exercises to do and how many times to do them  · Stop if you feel pain  You may feel some discomfort when you start  This should get better as you continue the exercises  You should not feel pain  Stop and call your physical therapist or healthcare provider right away  Exercises to do at home: Your physical therapist or healthcare provider may give you any of the following exercises to do at home:  · Wall lean:  Stand with your side against the wall  Bend and raise the leg closest to the wall  Press your knee into the wall  Hold the position as long as directed  Repeat on the other side  · Quad set exercise:  Lie on a flat, firm surface   Bend your left leg until your foot is flat on the floor  Keep your right leg straight  Tighten the top of your right thigh and hold for 10 to 20 seconds, and then relax  Repeat this exercise 5 to 10 times  Then repeat on your other leg  · Short arc quad (SAQ) exercise:  Lie on a flat, firm surface  Place a rolled up towel or foam roller under your injured knee  Bend your knee  Tighten your quad muscle and lift your foot as you straighten your leg  Hold for 5 seconds and then return to the starting position  Keep your knee touching the towel or roller at all times  · Straight leg lift:  Lie on a flat, firm surface  Bend your left leg until your foot is flat on the floor  Raise your right leg several inches off the floor and hold for 5 to 10 seconds  Lower your leg slowly  Repeat this exercise 5 to 10 times  Then repeat on your other leg  · Clam exercise:  Lie on your side so your injured side is on top  Bend your knees  Keep your heels together during this exercise  Slowly raise your top knee toward the ceiling  Then lower your leg so your knees are together  · Single leg stance: Your goal is to put weight on your injured leg  First stand with your weight evenly on both feet  You may hold on to a chair or wall for balance  Do not lean to the side  Then lift your foot so all your weight is on your injured leg  Ask your healthcare provider how long to hold this position  · Single leg squat:  Stand on one leg  Raise the other leg straight out with toes pointed up  Bend the standing leg into a squat and slowly come back to a standing position  · [place] Codding bridge:  Lie on your back with legs straight and ankles on the ball  Keep your legs straight  Slowly raise your hips off the floor by making your buttock muscles tight  Hold for 5 seconds  Repeat as directed  · Ball bridge with knee flexion:  Lie on your back  Bend your knees and put your feet on the ball   Lift your hips off the floor by making your buttocks muscles tight  Make sure to keep your feet on the ball and knees bent  · Standing hip abduction with band:  Stand with legs hip width apart with a band around your ankles  Lift your leg out to the side and stretch the band  Bring your leg back down  Repeat on the other side  © Copyright [employer] 2021 Information is for End User's use only and may not be sold, redistributed or otherwise used for commercial purposes  All illustrations and images included in CareNotes® are the copyrighted property of [employer]  or [facility] Kerry Lemons   The above information is an  only  It is not intended as medical advice for individual conditions or treatments  Talk to your doctor, nurse or pharmacist before following any medical regimen to see if it is safe and effective for you

## 2021-08-27 NOTE — PROGRESS NOTES
Assessment/Plan:  1  Bilateral primary osteoarthritis of knee  Large joint arthrocentesis: R knee    Large joint arthrocentesis: L knee   2  Pain in both knees, unspecified chronicity  XR knee 3 vw left non injury    XR knee 3 vw right non injury     Scribe Attestation    I,:  Riley Yu am acting as a scribe while in the presence of the attending physician :       I,:  Gordon Salinas MD personally performed the services described in this documentation    as scribed in my presence :         Xiomara Loza is a pleasant 80year old who presents to the office today for an initial evaluation of his bilateral knees  Upon review of the x-rays, a thorough history and my examination, Xiomara Loza is presenting with signs and symptoms consistent with bilateral severe medial and patellofemoral compartmental osteoarthritis  Non-operative treatments were discussed with the patient in the forms of corticosteroid injections, physical therapy or gel lubricant injections  The patient stated that he would like to do a physician directed home exercise program it is that attending formal physical therapy  I was agreeable to this treatment option  At today's visit he was provided with a physician directed home exercise program that he was instructed to perform at least 3 times per day  We discussed the risks and benefits of Euflexxa injection today in the office and he did elect to proceed  The bilateral knee   Euflexxa injections were administered without issue and well tolerated by the patient  This was done under sterile technique  Post injection instructions were provided  He understands can be repeated no sooner than three months  I discussed with the patient that the Euflexxa injections or a series of 3 shots over the next 2 weeks  I will follow-up with him in 1 week for a repeat Euflexxa injection  He understood and had no further questions      Large joint arthrocentesis: R knee  Universal Protocol:  Consent given by: patient  Time out: Immediately prior to procedure a "time out" was called to verify the correct patient, procedure, equipment, support staff and site/side marked as required  Site marked: the operative site was marked  Supporting Documentation  Indications: pain   Procedure Details  Location: knee - R knee  Preparation: Patient was prepped and draped in the usual sterile fashion  Needle size: 22 G  Ultrasound guidance: no  Approach: anterolateral  Medications administered: 20 mg Sodium Hyaluronate 20 MG/2ML    Patient tolerance: patient tolerated the procedure well with no immediate complications  Dressing:  Sterile dressing applied    Large joint arthrocentesis: L knee  Universal Protocol:  Consent given by: patient  Time out: Immediately prior to procedure a "time out" was called to verify the correct patient, procedure, equipment, support staff and site/side marked as required  Site marked: the operative site was marked  Supporting Documentation  Indications: pain   Procedure Details  Location: knee - L knee  Preparation: Patient was prepped and draped in the usual sterile fashion  Needle size: 22 G  Ultrasound guidance: no  Approach: anterolateral  Medications administered: 20 mg Sodium Hyaluronate 20 MG/2ML    Patient tolerance: patient tolerated the procedure well with no immediate complications  Dressing:  Sterile dressing applied      Subjective:   Patient ID: Maxi Junior is a 80 y o  male who presents to the office today for an initial evaluation of his bilateral knees  He states that his bilateral knee pain started about 1 year ago and has progressively got worse  He states that his right knee is worse than his left knee  He states that he will experience intermittent dull/achy daily pain  He states that he will experience stiffness into his knees especially when getting up from a seated position  He states that he will experience episodes of instability    He states that he has no pain while walking  He states that he does not ambulate up and down stairs due to having a chair lift at home  He states that he uses a cane for ambulation and stability  He states that he has not used any over-the-counter anti-inflammatories to help alleviate his pain  Review of Systems   Constitutional: Negative for chills, fever and unexpected weight change  HENT: Negative for hearing loss, nosebleeds and sore throat  Eyes: Negative for pain, redness and visual disturbance  Respiratory: Negative for cough, shortness of breath and wheezing  Cardiovascular: Negative for chest pain, palpitations and leg swelling  Gastrointestinal: Negative for abdominal pain, nausea and vomiting  Endocrine: Negative for polyphagia and polyuria  Genitourinary: Negative for dysuria and hematuria  Musculoskeletal:        As noted in HPI   Skin: Negative for rash and wound  Neurological: Negative for dizziness, numbness and headaches  Psychiatric/Behavioral: Negative for decreased concentration  The patient is not nervous/anxious            Past Medical History:   Diagnosis Date    AICD (automatic cardioverter/defibrillator) present 2004    AICD (automatic cardioverter/defibrillator) present 2006    AICD (automatic cardioverter/defibrillator) present 2013    [facility]    Arthritis     R knee and neck    Atrial fibrillation (HCC)     BPH (benign prostatic hyperplasia)     CAD (coronary artery disease) of artery bypass graft     [facility]est[facility]     combine    Coronary artery disease     Disease of thyroid gland     Hyperlipidemia     Hypertension     Ischemic cardiomyopathy     Left ureteral calculus 10/17/2017    Renal disorder     [facility]        Past Surgical History:   Procedure Laterality Date    CARDIAC CATHETERIZATION      CARDIAC SURGERY      Pacemaker    CORONARY ARTERY BYPASS GRAFT      MN CYSTOURETHROSCOPY,URETER CATHETER N/A 10/17/2017    Procedure: CYSTOSCOPY, left RETROGRADE PYELOGRAM WITH LEFT URETEROSCOPY , stone extraction,LEFT STENT INSERTION;  Surgeon: Susana Amezcua MD;  Location: [facility];  Service: Urology       Family History   Problem Relation Age of Onset    Tuberculosis Father         WWI    Hypertension Mother     Stroke Brother        Social History     Occupational History    Occupation: Retired   Tobacco Use    Smoking status: Never Smoker    Smokeless tobacco: Never Used   Vaping Use    Vaping Use: Never used   Substance and Sexual Activity    Alcohol use: Yes    Drug use: No    Sexual activity: Not on file         Current Outpatient Medications:     amiodarone 200 mg tablet, Take 1 tablet (200 mg total) by mouth daily, Disp: 90 tablet, Rfl: 3    amLODIPine (NORVASC) 2 5 mg tablet, Take 1 tablet (2 5 mg total) by mouth every other day At Morning , Disp: 45 tablet, Rfl: 3    aspirin 81 MG tablet, Take 81 mg by mouth daily  , Disp: , Rfl:     isosorbide mononitrate (IMDUR) 30 mg 24 hr tablet, Take 1 tablet (30 mg total) by mouth daily, Disp: 90 tablet, Rfl: 3    levothyroxine 150 mcg tablet, Take 150 mcg by mouth daily , Disp: , Rfl:     LORazepam (ATIVAN) 1 mg tablet, Take 1 mg by mouth as needed for anxiety  , Disp: , Rfl:     metolazone (ZAROXOLYN) 2 5 mg tablet, As needed no more than twice weekly for leg swelling, Disp: 10 tablet, Rfl: 3    metoprolol succinate (TOPROL-XL) 50 mg 24 hr tablet, Take 1 tablet (50 mg total) by mouth 2 (two) times a day, Disp: 180 tablet, Rfl: 3    mexiletine (MEXITIL) 150 mg capsule, Take 1 capsule (150 mg total) by mouth 2 (two) times a day, Disp: 180 capsule, Rfl: 3    mirtazapine (REMERON) 7 5 MG tablet, Take 15 mg by mouth every evening , Disp: , Rfl:     Multiple Vitamins-Minerals (MULTIVITAMIN WITH MINERALS) tablet, Take 1 tablet by mouth daily  , Disp: , Rfl:     nitroglycerin (NITROSTAT) 0 4 mg SL tablet, Place 0 4 mg under the tongue every 5 (five) minutes as needed for chest pain  , Disp: , Rfl:     potassium citrate (Urocit-K 10) 10 mEq, Take 1 tablet (10 mEq total) by mouth 2 (two) times a day, Disp: 180 tablet, Rfl: 3    rosuvastatin (CRESTOR) 20 MG tablet, Take 1 tablet by mouth once daily, Disp: 30 tablet, Rfl: 0    tamsulosin (FLOMAX) 0 4 mg, Take 1 capsule (0 4 mg total) by mouth daily with dinner, Disp: 90 capsule, Rfl: 3    torsemide (DEMADEX) 20 mg tablet, Take 40 mg (2 tablets) every morning by mouth and 20 mg (1 tablet) by mouth every afternoon  , Disp: 190 tablet, Rfl: 3    acetaminophen (TYLENOL) 500 mg tablet, Take 500 mg by mouth every 6 (six) hours as needed for mild pain (Patient not taking: Reported on 8/16/2021), Disp: , Rfl:     No Known Allergies    Objective:  Vitals:    08/27/21 1304   BP: 130/66   Pulse: 62       Right Knee Exam     Tenderness   The patient is experiencing tenderness in the medial joint line  Range of Motion   Extension: -5   Flexion: 110     Tests   Varus: negative Valgus: positive  Drawer:  Anterior - negative    Posterior - negative    Other   Erythema: absent  Scars: absent  Sensation: normal  Pulse: present  Swelling: none  Effusion: no effusion present    Comments:  Peripatellar crepitus appreciated on exam      Left Knee Exam     Tenderness   The patient is experiencing no tenderness  Range of Motion   Extension: 0   Flexion: 110     Tests   Varus: negative Valgus: positive  Drawer:  Anterior - negative     Posterior - negative    Other   Erythema: absent  Scars: absent  Sensation: normal  Pulse: present  Swelling: none  Effusion: no effusion present    Comments:  Peripatellar crepitus appreciated on exam            Physical Exam  Vitals reviewed  Constitutional:       Appearance: He is well-developed  HENT:      Head: Normocephalic and atraumatic  Eyes:      Conjunctiva/sclera: Conjunctivae normal       Pupils: Pupils are equal, round, and reactive to light  Cardiovascular:      Rate and Rhythm: Normal rate     Pulmonary:      Effort: Pulmonary effort is normal  No respiratory distress  Musculoskeletal:      Cervical back: Normal range of motion and neck supple  Right knee: No effusion  Left knee: No effusion  Comments: As noted in HPI   Skin:     General: Skin is warm and dry  Neurological:      Mental Status: He is alert and oriented to person, place, and time  Psychiatric:         Behavior: Behavior normal            I have personally reviewed pertinent films in PACS and my interpretation is as follows:     X-ray's performed in the office today of his right knee demonstrates severe end-stage medial and patellofemoral compartmental osteoarthritis  X-ray's demonstrates joint space narrowing, sclerosis, osteophytes and subchondral cysts  There is no acute fracture, dislocation, lytic or blastic lesion  X-ray's performed in the office today of his left knee demonstrates severe medial and patellofemoral compartmental osteoarthritis  X-ray's demonstrates joint space narrowing, sclerosis, osteophytes and subchondral cysts  There is no acute fracture, dislocation, lytic or blastic lesion

## 2021-08-29 NOTE — PROGRESS NOTES
Cardiology  Heart Failure   Follow Up Office Visit Note -     Audrey Schroeder   80 y o    male   MRN: [id number]  [address]  [address] [place] [address]  [phone]    PCP: Sharifa Valencia  Cardiologist : Dr Daniele Watson                Summary of Recommendations  Low-sodium diet, Heart failure education as below  Continue the current plan in terms of his diuretics as well as other medications no changes made today  Follow up will be scheduled in the future with Dr Daniele Watson 10/5, CMP prior to         Impression/ Plan  Chronic HFrEF EF 20%; LVIDd 7 57 cm; NYHA II; ACC/AHA Stage B  ICM, EF 20%   6/2021  --Dry weight: 200 lb  At home, without clothing his weight was 199 lb-201  With clothes 206-208 lb    Wt Readings from Last 3 Encounters:   08/30/21 94 6 kg (208 lb 9 6 oz)   08/27/21 94 8 kg (209 lb)   08/16/21 93 1 kg (205 lb 4 oz)       Wt Readings from Last 3 Encounters:   08/30/21 94 6 kg (208 lb 9 6 oz)   08/27/21 94 8 kg (209 lb)   08/16/21 93 1 kg (205 lb 4 oz)     --Beta-blocker:  metoprolol succinate 50 mg BID   --Diuretic:   torsemide 40 mg am, 20 mg Pm; with metolazone 2 5 mg weekly p r n   and potassium citrate 10 mEq BID routinely  --ACE/ARB/ARNI:  None given a hx of hyperkalemia and CKD  --MRA  --SLGT2   --ICD: S/P BiV iCD  --2 g sodium diet, 1500 cc fluid restriction  Daily weights, call weight gain 2-3 lb in 1 day or 5 lb in 5 days  He has a plan for weight gain: Metolazone 2 5 mg p r n  he has not used this in a few weeks  S/P BI V ICD, upgraded 1/2020  Interrogation from 06/10/2021: AP 86%  BVP 98%  Lead parameters WNL  2 NSVT episodes  Corvue WNL  Normal device function  paroxysmal atrial fibrillation on amiodarone  Vpaced rhythm  Has not been on [address]  Has chronically been on aspirin, per his cardiologist  Severe pulmonary hypertension   CAD,S/P CABG x 4 2004    on aspirin, rosuvastatin,beta blocker, nitrate  History of ventricular tachycardia on antiarrhythmic with amiodarone 200 mg daily and mexiletine 150 mg BID  TSH 54249:3 9  LFTS 7/13/21:  ALT 60   Most recently AST 77  - he may be due for PFTs  Will leave to his cardiologist  Hypertension, essential  /64  On amlodipine 2 5 mg QOD, beta blocker, nitrate, diuretics  Hyperlipidemia  On atorvastatin 40 mg/d   7/13/21: LDL 56, non HDL 80, at goal  CKD stage 3 a  Follows with nephrology  baseline Cr 1 5-1 7,  More recently he's been around 1 9- OFF metolazone  Anemia of chronic disese  Cardiac testing  · TTE 04/24/2018: LVEF 30%  LVIDd 7 46 cm  moderate diffuse hypokinesis with regional variations  There was akinesis of the basal-mid inferolateral wall(s)  There was severe hypokinesis of the basal-mid inferior wall(s)  Dilated RV; normal RVSF  Mild MR  Moderate TR  PASP 70 mmHg   TTE 06/17/2021: LVEF 20%  LVIDd 7 57 cm  Moderate diffuse hypokinesis  Akinesis of the entire inferior and basal-mid inferolateral wall(s)  RV mildly  Dialted  Est peak syst pressure 70 mm Hg  Mild KEYLA  Moderate MR and Moderate TR  HPI:   Ben Ontiveros is an 79 yo male with a longstanding history of CAD, CABG, ischemic cardiomyopathy with last an Ef of 20% ( 5/1875) chronic systolic/ diastolic CHF, ventricular tachycardia and paroxysmal atrial fibrillation, on long-term amiodarone and mexiletine  4/152021  Office visit with his cardiologist  5 lb over dry wt   Complained of weight gain, cough, and edema  Advised to increase torsemide to 40 mg b i d , from daily until his weight dropped 4-5 lb    5/25/21 increased dose of torsemide was ineffective at reducing his weight  Metolazone 2 5 mg added twice a week    6/22/21 nephrology PA visit  currently on: torsemide 40 mg daily, Toprol XL 50 mg BID, metolazone 2 5 mg once weekly, Imdur 30 mg daily  -  increase torsemide 40 mg BID for  weight gain of 3 plus pounds    No longer on metolazone    6/30/21 per his nephrologist:  Dr Daphney Kiran noted he had been on torsemide 40 b i d  since May /most recently was back down to daily  The patient's weight and edema were up  Very short of breath  Torsemide increased to 40 mg b i d  indefinitely  Added metolazone 2 5 mg as needed for 2-3 lb weight gain or leg edema  1500 mL fluid restriction  Ordered venous duplex-was negative for DVT  BMP in a week    7/13/21   Creatinine 1 74: sodium 140; potassium 3 9; BUN 26     8/10/21  Patient reported being on torsemide 20 mg b i d  for unclear reasons  He contacted his nephrologist     Venita Red increased again to 40 mg b i d  for few days then decrease to 40 in the morning and 20 in the p m  if the 40 b i d  helped  metolazone 2 5 mg added weekly    8/12/21 Seen by Alee Carmona, cardiology at the request of Nephrology and the patient's cardiologist    Complained of persistent lower extremity edema  Complained of right knee pain  Lost 3 lb overnight after taking metolazone August 11th  High sodium diet  Drinking 64 oz of fluid a day/- 2L  Educated regarding sodium diet restrictions  IV Lasix deferred  Increased torsemide from 40 to 60 mg BID through the weekend  Metolazone- p r n        8/12/21 Cr 1 96  BUN 26  K 3 8    8/16/21  Close follow up for heart failure  Sx:  No chest pain  He does not complain of any persistent dyspnea  He is sedentary  He uses a cane  He has lower extremity edema chronic, which is improving  He has a right lower extremity open area which is improving  Wt Readings from Last 3 Encounters:   08/30/21 94 6 kg (208 lb 9 6 oz)   08/27/21 94 8 kg (209 lb)   08/16/21 93 1 kg (205 lb 4 oz)     Currently on: Torsemide 40 mg in the morning, torsemide 20 mg in the p m  Potassium 10 mEq-2 tabs daily  Metolazone 2 5 mg weekly p r n with and extra K 20 meq    Plan  1  Educated how to read food labels to facilitate adherence to a low-salt diet  Re-emphasized 1500 cc fluid restriction Written material provided  His wife tells me he has cut down on his sodium significantly but still occasionally does use salt shaker  I recommend avoiding this salt shaker completely and substituting it with Mrs DASH, pepper, onion powder, garlic powder, herbs, etc   2  BMP today  CMP 2 weeks  3  follow up with me 2 weeks      8/30/21  Close heart failure follow-up  He is with his wife  He denies chest pain, or shortness of breath more than his usual   He tells me he is "the same"  He is hopeful the injections he is getting in his knees will help his arthritis  His 1st was last week, and he does note he has more mobility  His 2nd will be this Friday, he is going to [address]  He does not want surgery  He tells me     His blood pressure is satisfactory, his weight is stable    His wife tells me he does not use a salt shaker the table  He is trying hard to avoid dietary sodium although he finds this difficult  We reviewed his most recent labs, that was drawn this morning  He has not needed metolazone in several weeks  We reviewed his medications  He is compliant as listed  Will get a repeat CMP prior to his next visit with his cardiologist, for stability  His ASTwas slightly elevated      I have spent 25 minutes with Patient and family today in which greater than 50% of this time was spent in counseling/coordination of care regarding Intructions for management, Patient and family education, Importance of tx compliance and Risk factor reductions  Assessment  Diagnoses and all orders for this visit:    Chronic HFrEF (heart failure with reduced ejection fraction) ([facility])  -     Comprehensive metabolic panel;  Future    Ischemic cardiomyopathy    Coronary artery disease involving coronary bypass graft of native heart without angina pectoris    Benign essential hypertension    ICD (implantable cardioverter-defibrillator) in place    V tach ([facility] )    Dyslipidemia    Stage 3b chronic kidney disease [facility])        Past Medical History:   Diagnosis Date    AICD (automatic cardioverter/defibrillator) present 2004    AICD (automatic cardioverter/defibrillator) present 2006    AICD (automatic cardioverter/defibrillator) present 2013    [facility]    Arthritis     R knee and neck    Atrial fibrillation (HCC)     BPH (benign prostatic hyperplasia)     CAD (coronary artery disease) of artery bypass graft     CHF (congestive heart failure) ([facility] )     combine    Coronary artery disease     Disease of thyroid gland     Hyperlipidemia     Hypertension     Ischemic cardiomyopathy     Left ureteral calculus 10/17/2017    Renal disorder     V tach ([facility] )        Review of Systems   Constitutional: Negative for chills  Cardiovascular: Negative for chest pain, claudication, cyanosis, dyspnea on exertion, irregular heartbeat, leg swelling, near-syncope, orthopnea, palpitations, paroxysmal nocturnal dyspnea and syncope  Respiratory: Negative for cough and shortness of breath  Musculoskeletal: Positive for joint pain  Knee discomfort, improving   Gastrointestinal: Negative for heartburn and nausea  Neurological: Negative for dizziness, focal weakness, headaches, light-headedness and weakness  All other systems reviewed and are negative  No Known Allergies    Current Outpatient Medications:     acetaminophen (TYLENOL) 500 mg tablet, Take 500 mg by mouth every 6 (six) hours as needed for mild pain , Disp: , Rfl:     amiodarone 200 mg tablet, Take 1 tablet (200 mg total) by mouth daily, Disp: 90 tablet, Rfl: 3    amLODIPine (NORVASC) 2 5 mg tablet, Take 1 tablet (2 5 mg total) by mouth every other day At Morning , Disp: 45 tablet, Rfl: 3    aspirin 81 MG tablet, Take 81 mg by mouth daily  , Disp: , Rfl:     isosorbide mononitrate (IMDUR) 30 mg 24 hr tablet, Take 1 tablet (30 mg total) by mouth daily, Disp: 90 tablet, Rfl: 3    levothyroxine 150 mcg tablet, Take 150 mcg by mouth daily , Disp: , Rfl:     LORazepam (ATIVAN) 1 mg tablet, Take 1 mg by mouth as needed for anxiety  , Disp: , Rfl:     metolazone (ZAROXOLYN) 2 5 mg tablet, As needed no more than twice weekly for leg swelling, Disp: 10 tablet, Rfl: 3    metoprolol succinate (TOPROL-XL) 50 mg 24 hr tablet, Take 1 tablet (50 mg total) by mouth 2 (two) times a day, Disp: 180 tablet, Rfl: 3    mexiletine (MEXITIL) 150 mg capsule, Take 1 capsule (150 mg total) by mouth 2 (two) times a day, Disp: 180 capsule, Rfl: 3    mirtazapine (REMERON) 7 5 MG tablet, Take 15 mg by mouth every evening , Disp: , Rfl:     Multiple Vitamins-Minerals (MULTIVITAMIN WITH MINERALS) tablet, Take 1 tablet by mouth daily  , Disp: , Rfl:     nitroglycerin (NITROSTAT) 0 4 mg SL tablet, Place 0 4 mg under the tongue every 5 (five) minutes as needed for chest pain  , Disp: , Rfl:     potassium citrate (Urocit-K 10) 10 mEq, Take 1 tablet (10 mEq total) by mouth 2 (two) times a day, Disp: 180 tablet, Rfl: 3    rosuvastatin (CRESTOR) 20 MG tablet, Take 1 tablet by mouth once daily, Disp: 30 tablet, Rfl: 0    tamsulosin (FLOMAX) 0 4 mg, Take 1 capsule (0 4 mg total) by mouth daily with dinner, Disp: 90 capsule, Rfl: 3    torsemide (DEMADEX) 20 mg tablet, Take 40 mg (2 tablets) every morning by mouth and 20 mg (1 tablet) by mouth every afternoon  , Disp: 190 tablet, Rfl: 3    Social History     Socioeconomic History    Marital status: /Civil Union     Spouse name: Not on file    Number of children: Not on file    Years of education: Not on file    Highest education level: Not on file   Occupational History    Occupation: Retired   Tobacco Use    Smoking status: Never Smoker    Smokeless tobacco: Never Used   Vaping Use    Vaping Use: Never used   Substance and Sexual Activity    Alcohol use:  Yes    Drug use: No    Sexual activity: Not on file   Other Topics Concern    Not on file   Social History Narrative    Not on file     Social Determinants of Health Financial Resource Strain:     Difficulty of Paying Living Expenses:    Food Insecurity:     Worried About Running Out of Food in the Last Year:     [address] in the Last Year:    Transportation Needs:     Lack of Transportation (Medical):  Lack of Transportation (Non-Medical):    Physical Activity:     Days of Exercise per Week:     Minutes of Exercise per Session:    Stress:     Feeling of Stress :    Social Connections:     Frequency of Communication with Friends and Family:     Frequency of Social Gatherings with Friends and Family:     Attends Christian Services:     Active Member of Clubs or Organizations:     Attends Club or Organization Meetings:     Marital Status:    Intimate Partner Violence:     Fear of Current or Ex-Partner:     Emotionally Abused:     Physically Abused:     Sexually Abused:        Family History   Problem Relation Age of Onset    Tuberculosis Father         WWI    Hypertension Mother     Stroke Brother        Physical Exam  Vitals and nursing note reviewed  Constitutional:       General: He is not in acute distress  Comments: Uses a cane for gait assistance   HENT:      Head: Normocephalic and atraumatic  Eyes:      Conjunctiva/sclera: Conjunctivae normal    Cardiovascular:      Rate and Rhythm: Normal rate and regular rhythm  Pulses: Intact distal pulses  Heart sounds: S1 normal  Murmur heard  Systolic murmur is present  Soft systolic murmur over the precordium      Pulmonary:      Effort: Pulmonary effort is normal       Breath sounds: No decreased air movement  Decreased breath sounds present  Abdominal:      General: Bowel sounds are normal       Palpations: Abdomen is soft  Musculoskeletal:         General: Normal range of motion  Cervical back: Normal range of motion and neck supple  Right lower leg: Edema present  Left lower leg: Edema present        Comments: Trace to +1 rosy lower extremity edema   Skin: General: Skin is warm and dry  Comments: Small open wound right lower extremity, lateral calf   Neurological:      Mental Status: He is alert  Comments: Oriented x2         Vitals: Blood pressure 126/62, pulse 60, height 5' 7" (1 702 m), weight 94 6 kg (208 lb 9 6 oz), SpO2 95 %  Wt Readings from Last 3 Encounters:   21 94 6 kg (208 lb 9 6 oz)   21 94 8 kg (209 lb)   21 93 1 kg (205 lb 4 oz)         Labs & Results:  Lab Results   Component Value Date    WBC 6 80 2021    HGB 12 8 2021    HCT 39 8 2021    MCV 97 2021     2021     No results found for: BNP  No components found for: CHEM    Results for orders placed during the hospital encounter of 21    Echo complete with contrast if indicated    Narrative  [place] [address] [address] [place] [address]  [phone]    Transthoracic Echocardiogram  2D, M-mode, Doppler, and Color Doppler    Study date:  2021    Patient: Nathalia Marroquin  MR number: [id number]  Account number: [de-identified]  : 1935  Age: 80 years  Gender: Male  Status: Outpatient  Location: [facility] [place]  Height: 67 in  Weight: 207 5 lb  BP: 138/ 68 mmHg    Indications: Atrial fibrillation, Ischemic cardiomyopathy  Diagnoses: I42 9 - Cardiomyopathy, unspecified, I48 0 - Atrial fibrillation    Sonographer:  ELI Cardozo  Primary Physician:  Keerthi De La Rosa MD  Referring Physician:  Rachel Shukla MD  Group:  [facility]  Interpreting Physician:  Lyndsey Burrell MD    SUMMARY    LEFT VENTRICLE:  The ventricle was markedly dilated  Systolic function was markedly reduced by visual assessment  Ejection fraction was estimated to be 20 %  There was moderate diffuse hypokinesis  There was akinesis of the entire inferior and basal-mid inferolateral wall(s)    Doppler parameters were consistent with restrictive physiology, indicative of decreased left ventricular diastolic compliance and/or increased left atrial pressure  RIGHT VENTRICLE:  The ventricle was mildly dilated  Systolic pressure was markedly increased  Estimated peak pressure was 70 mmHg  LEFT ATRIUM:  The atrium was mildly dilated  RIGHT ATRIUM:  The atrium was mildly dilated  MITRAL VALVE:  There was moderate regurgitation  TRICUSPID VALVE:  There was moderate regurgitation  PULMONIC VALVE:  There was mild regurgitation  IVC, HEPATIC VEINS:  The inferior vena cava was dilated  The respirophasic change in diameter was less than 50%  HISTORY: PRIOR HISTORY: PAF, HLD, HTN, CAD with CABG, ICM, AICD, CKD3  PROCEDURE: The study was performed in the [address] [place] [address] and Vascular Center  This was a routine study  The transthoracic approach was used  The study included complete 2D imaging, M-mode, complete spectral Doppler, and color Doppler  The  heart rate was 63 bpm, at the start of the study  Images were obtained from the parasternal, apical, subcostal, and suprasternal notch acoustic windows  Image quality was adequate  LEFT VENTRICLE: The ventricle was markedly dilated  Systolic function was markedly reduced by visual assessment  Ejection fraction was estimated to be 20 %  There was moderate diffuse hypokinesis  There was akinesis of the entire inferior  and basal-mid inferolateral wall(s)  Wall thickness was normal  DOPPLER: There was a reduced contribution of atrial contraction to ventricular filling, due to increased ventricular diastolic pressure or atrial contractile dysfunction  Doppler parameters were consistent with restrictive physiology, indicative of decreased left ventricular diastolic compliance and/or increased left atrial pressure  RIGHT VENTRICLE: The ventricle was mildly dilated  Systolic function was normal  A pacing wire was present  DOPPLER: Systolic pressure was markedly increased  Estimated peak pressure was 70 mmHg      LEFT ATRIUM: The atrium was mildly dilated  RIGHT ATRIUM: The atrium was mildly dilated  MITRAL VALVE: The annulus was dilated  Valve structure was normal  There was normal leaflet separation  DOPPLER: The transmitral velocity was within the normal range  There was no evidence for stenosis  There was moderate regurgitation  AORTIC VALVE: The valve was trileaflet  Leaflets exhibited normal thickness, mild calcification, and normal cuspal separation  DOPPLER: Transaortic velocity was minimally increased  There was no evidence for stenosis  There was no  regurgitation  TRICUSPID VALVE: The valve structure was normal  There was normal leaflet separation  DOPPLER: The transtricuspid velocity was within the normal range  There was no evidence for stenosis  There was moderate regurgitation  PULMONIC VALVE: Leaflets exhibited normal thickness, no calcification, and normal cuspal separation  DOPPLER: The transpulmonic velocity was within the normal range  There was mild regurgitation  PERICARDIUM: There was no pericardial effusion  AORTA: The root exhibited normal size  SYSTEMIC VEINS: IVC: The inferior vena cava was dilated  The respirophasic change in diameter was less than 50%      SYSTEM MEASUREMENT TABLES    2D  %FS: 7 71 %  Ao Diam: 3 39 cm  EDV(Teich): 304 24 ml  EF Biplane: 20 25 %  EF(Teich): 16 5 %  ESV(Teich): 254 04 ml  IVSd: 0 63 cm  LA Diam: 5 5 cm  LAAs A2C: 26 46 cm2  LAAs A4C: 27 04 cm2  LAESV A-L A2C: 95 68 ml  LAESV A-L A4C: 102 41 ml  LAESV Index (A-L): 48 64 ml/m2  LAESV MOD A2C: 90 47 ml  LAESV MOD A4C: 95 06 ml  LAESV(A-L): 100 21 ml  LAESV(MOD BP): 93 73 ml  LAESVInd MOD BP: 45 5 ml/m2  LALs A2C: 6 21 cm  LALs A4C: 6 06 cm  LVEDV MOD A2C: 294 47 ml  LVEDV MOD A4C: 206 38 ml  LVEDV MOD BP: 249 76 ml  LVEDVInd MOD BP: 121 24 ml/m2  LVEF MOD A2C: 14 15 %  LVEF MOD A4C: 25 84 %  LVESV MOD A2C: 252 8 ml  LVESV MOD A4C: 153 06 ml  LVESV MOD BP: 199 2 ml  LVESVInd MOD BP: 96 7 ml/m2  LVIDd: 7 57 cm  LVIDs: 6 98 cm  LVLd A2C: 9 35 cm  LVLd A4C: 9 09 cm  LVLs A2C: 8 73 cm  LVLs A4C: 8 47 cm  LVPWd: 0 59 cm  Yari A4C: 19 13 cm2  RAAs A4C: 21 01 cm2  RAEDV A-L: 62 99 ml  RAEDV MOD: 62 18 ml  RAESV A-L: 68 28 ml  RAESV MOD: 67 99 ml  RALd: 4 93 cm  RALs: 5 49 cm  RVIDd: 4 55 cm  SV MOD A2C: 41 68 ml  SV MOD A4C: 53 32 ml  SV(Teich): 50 2 ml    CF  MR Als  Hemanth: 0 42 m/s  MR Flow: 275 7 ml/s  MR Rad: 1 02 cm    CW  AV Env  Ti: 300 67 ms  AV MaxP 39 mmHg  AV VTI: 24 81 cm  AV Vmax: 1 26 m/s  AV Vmean: 0 83 m/s  AV meanPG: 3 16 mmHg  MR VTI: 195 19 cm  MR Vmax: 5 39 m/s  PRend P 09 mmHg  PRend Vmax: 1 81 m/s  TR MaxP 62 mmHg  TR Vmax: 3 89 m/s    MM  TAPSE: 1 4 cm    PW  LVOT Env  Ti: 334 92 ms  LVOT VTI: 13 06 cm  LVOT Vmax: 0 61 m/s  LVOT Vmean: 0 39 m/s  LVOT maxP 51 mmHg  LVOT meanP 73 mmHg  MR ERO: 0 51 cm2  MR RV: 99 75 ml  PV Acc Barron: 9 18 m/s2  PV AccT: 38 06 ms    [employer] Accredited [facility]    Prepared and electronically signed by    Lyndsey Burrell MD  Signed 2021 12:22:42    No results found for this or any previous visit  This note was completed in part utilizing m-modal fluency direct voice recognition software  Grammatical errors, random word insertion, spelling mistakes, and incomplete sentences may be an occasional consequence of the system secondary to software limitations, ambient noise and hardware issues  At the time of dictation, efforts were made to edit, clarify and /or correct errors  Please read the chart carefully and recognize, using context, where substitutions have occurred    If you have any questions or concerns about the context, text or information contained within the body of this dictation, please contact myself, the provider, for further clarification

## 2021-08-30 ENCOUNTER — OFFICE VISIT (OUTPATIENT)
Dept: CARDIOLOGY CLINIC | Facility: CLINIC | Age: 86
End: 2021-08-30
Payer: MEDICARE

## 2021-08-30 ENCOUNTER — APPOINTMENT (OUTPATIENT)
Dept: LAB | Facility: CLINIC | Age: 86
End: 2021-08-30
Payer: MEDICARE

## 2021-08-30 VITALS
HEART RATE: 60 BPM | DIASTOLIC BLOOD PRESSURE: 62 MMHG | HEIGHT: 67 IN | SYSTOLIC BLOOD PRESSURE: 126 MMHG | OXYGEN SATURATION: 95 % | BODY MASS INDEX: 32.74 KG/M2 | WEIGHT: 208.6 LBS

## 2021-08-30 DIAGNOSIS — I47.2 V TACH (HCC): ICD-10-CM

## 2021-08-30 DIAGNOSIS — I25.5 ISCHEMIC CARDIOMYOPATHY: ICD-10-CM

## 2021-08-30 DIAGNOSIS — E78.5 DYSLIPIDEMIA: ICD-10-CM

## 2021-08-30 DIAGNOSIS — I10 BENIGN ESSENTIAL HYPERTENSION: ICD-10-CM

## 2021-08-30 DIAGNOSIS — Z95.810 ICD (IMPLANTABLE CARDIOVERTER-DEFIBRILLATOR) IN PLACE: ICD-10-CM

## 2021-08-30 DIAGNOSIS — N18.30 CHRONIC KIDNEY DISEASE, STAGE III (MODERATE) (HCC): ICD-10-CM

## 2021-08-30 DIAGNOSIS — N18.32 STAGE 3B CHRONIC KIDNEY DISEASE (HCC): ICD-10-CM

## 2021-08-30 DIAGNOSIS — I25.810 CORONARY ARTERY DISEASE INVOLVING CORONARY BYPASS GRAFT OF NATIVE HEART WITHOUT ANGINA PECTORIS: ICD-10-CM

## 2021-08-30 DIAGNOSIS — I50.22 CHRONIC HFREF (HEART FAILURE WITH REDUCED EJECTION FRACTION) (HCC): ICD-10-CM

## 2021-08-30 DIAGNOSIS — I50.22 CHRONIC HFREF (HEART FAILURE WITH REDUCED EJECTION FRACTION) (HCC): Primary | ICD-10-CM

## 2021-08-30 LAB
25(OH)D3 SERPL-MCNC: 42.5 NG/ML (ref 30–100)
ALBUMIN SERPL BCP-MCNC: 3.3 G/DL (ref 3.5–5)
ALP SERPL-CCNC: 103 U/L (ref 46–116)
ALT SERPL W P-5'-P-CCNC: 50 U/L (ref 12–78)
ANION GAP SERPL CALCULATED.3IONS-SCNC: 10 MMOL/L (ref 4–13)
AST SERPL W P-5'-P-CCNC: 77 U/L (ref 5–45)
BACTERIA UR QL AUTO: ABNORMAL /HPF
BILIRUB SERPL-MCNC: 0.41 MG/DL (ref 0.2–1)
BILIRUB UR QL STRIP: NEGATIVE
BUN SERPL-MCNC: 28 MG/DL (ref 5–25)
CALCIUM ALBUM COR SERPL-MCNC: 9.9 MG/DL (ref 8.3–10.1)
CALCIUM SERPL-MCNC: 9.3 MG/DL (ref 8.3–10.1)
CHLORIDE SERPL-SCNC: 107 MMOL/L (ref 100–108)
CLARITY UR: CLEAR
CO2 SERPL-SCNC: 30 MMOL/L (ref 21–32)
COLOR UR: YELLOW
CREAT SERPL-MCNC: 1.95 MG/DL (ref 0.6–1.3)
CREAT UR-MCNC: 132 MG/DL
GFR SERPL CREATININE-BSD FRML MDRD: 30 ML/MIN/1.73SQ M
GLUCOSE SERPL-MCNC: 121 MG/DL (ref 65–140)
GLUCOSE UR STRIP-MCNC: NEGATIVE MG/DL
HGB UR QL STRIP.AUTO: ABNORMAL
KETONES UR STRIP-MCNC: NEGATIVE MG/DL
LEUKOCYTE ESTERASE UR QL STRIP: NEGATIVE
NITRITE UR QL STRIP: NEGATIVE
NON-SQ EPI CELLS URNS QL MICRO: ABNORMAL /HPF
PH UR STRIP.AUTO: 6 [PH]
POTASSIUM SERPL-SCNC: 4.2 MMOL/L (ref 3.5–5.3)
PROT SERPL-MCNC: 6.9 G/DL (ref 6.4–8.2)
PROT UR STRIP-MCNC: NEGATIVE MG/DL
PROT UR-MCNC: 29 MG/DL
PROT/CREAT UR: 0.22 MG/G{CREAT} (ref 0–0.1)
PTH-INTACT SERPL-MCNC: 134.2 PG/ML (ref 18.4–80.1)
RBC #/AREA URNS AUTO: ABNORMAL /HPF
SODIUM SERPL-SCNC: 147 MMOL/L (ref 136–145)
SP GR UR STRIP.AUTO: 1.02 (ref 1–1.03)
UROBILINOGEN UR QL STRIP.AUTO: 0.2 E.U./DL
WBC #/AREA URNS AUTO: ABNORMAL /HPF

## 2021-08-30 PROCEDURE — 82570 ASSAY OF URINE CREATININE: CPT

## 2021-08-30 PROCEDURE — 36415 COLL VENOUS BLD VENIPUNCTURE: CPT

## 2021-08-30 PROCEDURE — 82306 VITAMIN D 25 HYDROXY: CPT

## 2021-08-30 PROCEDURE — 81001 URINALYSIS AUTO W/SCOPE: CPT

## 2021-08-30 PROCEDURE — 83970 ASSAY OF PARATHORMONE: CPT

## 2021-08-30 PROCEDURE — 84156 ASSAY OF PROTEIN URINE: CPT

## 2021-08-30 PROCEDURE — 80053 COMPREHEN METABOLIC PANEL: CPT

## 2021-08-30 PROCEDURE — 99214 OFFICE O/P EST MOD 30 MIN: CPT | Performed by: NURSE PRACTITIONER

## 2021-08-30 NOTE — LETTER
August 30, 2021     Eric Emmanuel  [address] [place] [address]    Patient: Hardy Gowers   YOB: 1935   Date of Visit: 8/30/2021       Dear Dr Lucy Mckeon:    Thank you for referring Hardy Gowers to me for evaluation  Below are my notes for this consultation  If you have questions, please do not hesitate to call me  I look forward to following your patient along with you  Sincerely,        HELENE Garcia        CC: MD Dorothy River CRNP  8/30/2021  9:38 AM  Sign when Signing Visit  Cardiology  Heart Failure   Follow Up Office Visit Note -     Hardy Gowers   80 y o    male   MRN: [id number]  [address]  [address] [place] [address]  [phone]    PCP: Eric Emmanuel  Cardiologist : Dr Brenda Dai                Summary of Recommendations  Low-sodium diet, Heart failure education as below  Continue the current plan in terms of his diuretics as well as other medications no changes made today  Follow up will be scheduled in the future with Dr Brenda Dai 10/5, CMP prior to         Impression/ Plan  Chronic HFrEF EF 20%; LVIDd 7 57 cm; NYHA II; ACC/AHA Stage B  ICM, EF 20%   6/2021  --Dry weight: 200 lb  At home, without clothing his weight was 199 lb-201  With clothes 206-208 lb    Wt Readings from Last 3 Encounters:   08/30/21 94 6 kg (208 lb 9 6 oz)   08/27/21 94 8 kg (209 lb)   08/16/21 93 1 kg (205 lb 4 oz)       Wt Readings from Last 3 Encounters:   08/27/21 94 8 kg (209 lb)   08/16/21 93 1 kg (205 lb 4 oz)   08/12/21 93 9 kg (207 lb 1 6 oz)     --Beta-blocker:  metoprolol succinate 50 mg BID   --Diuretic:   torsemide 40 mg am, 20 mg Pm; with metolazone 2 5 mg weekly p r n   and potassium citrate 10 mEq BID routinely  --ACE/ARB/ARNI:  None given a hx of hyperkalemia and CKD  --MRA  --SLGT2   --ICD: S/P BiV iCD  --2 g sodium diet, 1500 cc fluid restriction   Daily weights, call weight gain 2-3 lb in 1 day or 5 lb in 5 days  He has a plan for weight gain: Metolazone 2 5 mg p r n  he has not used this in a few weeks  S/P BI V ICD, upgraded 1/2020  Interrogation from 06/10/2021: AP 86%  BVP 98%  Lead parameters WNL  2 NSVT episodes  Corvue WNL  Normal device function  paroxysmal atrial fibrillation on amiodarone  Vpaced rhythm  Has not been on [address]  Has chronically been on aspirin, per his cardiologist  Severe pulmonary hypertension   CAD,S/P CABG x 4 2004  on aspirin, rosuvastatin,beta blocker, nitrate  History of ventricular tachycardia on antiarrhythmic with amiodarone 200 mg daily and mexiletine 150 mg BID  TSH 07600:3 9  LFTS 7/13/21:  ALT 60   Most recently AST 77  - he may be due for PFTs  Will leave to his cardiologist  Hypertension, essential  /64  On amlodipine 2 5 mg QOD, beta blocker, nitrate, diuretics  Hyperlipidemia  On atorvastatin 40 mg/d   7/13/21: LDL 56, non HDL 80, at goal  CKD stage 3 a  Follows with nephrology  baseline Cr 1 5-1 7,  More recently he's been around 1 9- OFF metolazone  Anemia of chronic disese  Cardiac testing  · TTE 04/24/2018: LVEF 30%  LVIDd 7 46 cm  moderate diffuse hypokinesis with regional variations  There was akinesis of the basal-mid inferolateral wall(s)  There was severe hypokinesis of the basal-mid inferior wall(s)  Dilated RV; normal RVSF  Mild MR  Moderate TR  PASP 70 mmHg   TTE 06/17/2021: LVEF 20%  LVIDd 7 57 cm  Moderate diffuse hypokinesis  Akinesis of the entire inferior and basal-mid inferolateral wall(s)  RV mildly  Dialted  Est peak syst pressure 70 mm Hg  Mild KEYLA  Moderate MR and Moderate TR  HPI:   Gabby Ryan is an 81 yo male with a longstanding history of CAD, CABG, ischemic cardiomyopathy with last an Ef of 20% ( 8/1412) chronic systolic/ diastolic CHF, ventricular tachycardia and paroxysmal atrial fibrillation, on long-term amiodarone and mexiletine  4/152021    Office visit with his cardiologist  5 lb over dry wt   Complained of weight gain, cough, and edema  Advised to increase torsemide to 40 mg b i d , from daily until his weight dropped 4-5 lb    5/25/21 increased dose of torsemide was ineffective at reducing his weight  Metolazone 2 5 mg added twice a week    6/22/21 nephrology PA visit  currently on: torsemide 40 mg daily, Toprol XL 50 mg BID, metolazone 2 5 mg once weekly, Imdur 30 mg daily  -  increase torsemide 40 mg BID for  weight gain of 3 plus pounds  No longer on metolazone    6/30/21 per his nephrologist:  Dr Ambreen Ingram noted he had been on torsemide 40 b i d  since May /most recently was back down to daily  The patient's weight and edema were up  Very short of breath  Torsemide increased to 40 mg b i d  indefinitely  Added metolazone 2 5 mg as needed for 2-3 lb weight gain or leg edema  1500 mL fluid restriction  Ordered venous duplex-was negative for DVT  BMP in a week    7/13/21   Creatinine 1 74: sodium 140; potassium 3 9; BUN 26     8/10/21  Patient reported being on torsemide 20 mg b i d  for unclear reasons  He contacted his nephrologist     Maryam Hardy increased again to 40 mg b i d  for few days then decrease to 40 in the morning and 20 in the p m  if the 40 b i d  helped  metolazone 2 5 mg added weekly    8/12/21 Seen by Nirali Sesay, cardiology at the request of Nephrology and the patient's cardiologist    Complained of persistent lower extremity edema  Complained of right knee pain  Lost 3 lb overnight after taking metolazone August 11th  High sodium diet  Drinking 64 oz of fluid a day/- 2L  Educated regarding sodium diet restrictions  IV Lasix deferred  Increased torsemide from 40 to 60 mg BID through the weekend  Metolazone- p r n        8/12/21 Cr 1 96  BUN 26  K 3 8    8/16/21  Close follow up for heart failure  Sx:  No chest pain  He does not complain of any persistent dyspnea  He is sedentary  He uses a cane    He has lower extremity edema chronic, which is improving  He has a right lower extremity open area which is improving  Wt Readings from Last 3 Encounters:   08/27/21 94 8 kg (209 lb)   08/16/21 93 1 kg (205 lb 4 oz)   08/12/21 93 9 kg (207 lb 1 6 oz)     Currently on: Torsemide 40 mg in the morning, torsemide 20 mg in the p m  Potassium 10 mEq-2 tabs daily  Metolazone 2 5 mg weekly p r n with and extra K 20 meq    Plan  1  Educated how to read food labels to facilitate adherence to a low-salt diet  Re-emphasized 1500 cc fluid restriction Written material provided  His wife tells me he has cut down on his sodium significantly but still occasionally does use salt shaker  I recommend avoiding this salt shaker completely and substituting it with Mrs DASH, pepper, onion powder, garlic powder, herbs, etc   2  BMP today  CMP 2 weeks  3  follow up with me 2 weeks      8/30/21  Close heart failure follow-up  He is with his wife  He denies chest pain, or shortness of breath more than his usual   He tells me he is "the same"  He is hopeful the injections he is getting in his knees will help his arthritis  His 1st was last week, and he does note he has more mobility  His 2nd will be this Friday, he is going to [address]  He does not want surgery  He tells me     His blood pressure is satisfactory, his weight is stable    His wife tells me he does not use a salt shaker the table  He is trying hard to avoid dietary sodium although he finds this difficult  We reviewed his most recent labs, that was drawn this morning  He has not needed metolazone in several weeks  We reviewed his medications  He is compliant as listed  Will get a repeat CMP prior to his next visit with his cardiologist, for stability    His ASTwas slightly elevated      I have spent 25 minutes with Patient and family today in which greater than 50% of this time was spent in counseling/coordination of care regarding Intructions for management, Patient and family education, Importance of tx compliance and Risk factor reductions  Assessment  Diagnoses and all orders for this visit:    Ch[facility] with [facility]    Ischemic cardiomyopathy    Coronary artery disease involving coronary bypass graft of native heart without angina pectoris    Benign essential hypertension    ICD (implantable cardioverter-defibrillator) in place    V tach ([place] [address] )    Dyslipidemia    Stage 3b chronic kidney disease ([place] [address] )        Past Medical History:   Diagnosis Date    AICD (automatic cardioverter/defibrillator) present 2004    AICD (automatic cardioverter/defibrillator) present 2006    AICD (automatic cardioverter/defibrillator) present 2013    [facility]    Arthritis     R knee and neck    Atrial fibrillation (HCC)     BPH (benign prostatic hyperplasia)     CAD (coronary artery disease) of artery bypass graft     CHF (congestive heart failure) (HCC)     combine    Coronary artery disease     Disease of thyroid gland     Hyperlipidemia     Hypertension     Ischemic cardiomyopathy     Left ureteral calculus 10/17/2017    Renal disorder     V tach ([place] [address] )        Review of Systems   Constitutional: Negative for chills  Cardiovascular: Negative for chest pain, claudication, cyanosis, dyspnea on exertion, irregular heartbeat, leg swelling, near-syncope, orthopnea, palpitations, paroxysmal nocturnal dyspnea and syncope  Respiratory: Negative for cough and shortness of breath  Musculoskeletal: Positive for joint pain  Knee discomfort, improving   Gastrointestinal: Negative for heartburn and nausea  Neurological: Negative for dizziness, focal weakness, headaches, light-headedness and weakness  All other systems reviewed and are negative  No Known Allergies        Current Outpatient Medications:     acetaminophen (TYLENOL) 500 mg tablet, Take 500 mg by mouth every 6 (six) hours as needed for mild pain (Patient not taking: Reported on 8/16/2021), Disp: , Rfl:     amiodarone 200 mg tablet, Take 1 tablet (200 mg total) by mouth daily, Disp: 90 tablet, Rfl: 3    amLODIPine (NORVASC) 2 5 mg tablet, Take 1 tablet (2 5 mg total) by mouth every other day At Morning , Disp: 45 tablet, Rfl: 3    aspirin 81 MG tablet, Take 81 mg by mouth daily  , Disp: , Rfl:     isosorbide mononitrate (IMDUR) 30 mg 24 hr tablet, Take 1 tablet (30 mg total) by mouth daily, Disp: 90 tablet, Rfl: 3    levothyroxine 150 mcg tablet, Take 150 mcg by mouth daily , Disp: , Rfl:     LORazepam (ATIVAN) 1 mg tablet, Take 1 mg by mouth as needed for anxiety  , Disp: , Rfl:     metolazone (ZAROXOLYN) 2 5 mg tablet, As needed no more than twice weekly for leg swelling, Disp: 10 tablet, Rfl: 3    metoprolol succinate (TOPROL-XL) 50 mg 24 hr tablet, Take 1 tablet (50 mg total) by mouth 2 (two) times a day, Disp: 180 tablet, Rfl: 3    mexiletine (MEXITIL) 150 mg capsule, Take 1 capsule (150 mg total) by mouth 2 (two) times a day, Disp: 180 capsule, Rfl: 3    mirtazapine (REMERON) 7 5 MG tablet, Take 15 mg by mouth every evening , Disp: , Rfl:     Multiple Vitamins-Minerals (MULTIVITAMIN WITH MINERALS) tablet, Take 1 tablet by mouth daily  , Disp: , Rfl:     nitroglycerin (NITROSTAT) 0 4 mg SL tablet, Place 0 4 mg under the tongue every 5 (five) minutes as needed for chest pain  , Disp: , Rfl:     potassium citrate (Urocit-K 10) 10 mEq, Take 1 tablet (10 mEq total) by mouth 2 (two) times a day, Disp: 180 tablet, Rfl: 3    rosuvastatin (CRESTOR) 20 MG tablet, Take 1 tablet by mouth once daily, Disp: 30 tablet, Rfl: 0    tamsulosin (FLOMAX) 0 4 mg, Take 1 capsule (0 4 mg total) by mouth daily with dinner, Disp: 90 capsule, Rfl: 3    torsemide (DEMADEX) 20 mg tablet, Take 40 mg (2 tablets) every morning by mouth and 20 mg (1 tablet) by mouth every afternoon  , Disp: 190 tablet, Rfl: 3    Social History     Socioeconomic History    Marital status: /Civil Union Spouse name: Not on file    Number of children: Not on file    Years of education: Not on file    Highest education level: Not on file   Occupational History    Occupation: Retired   Tobacco Use    Smoking status: Never Smoker    Smokeless tobacco: Never Used   Vaping Use    Vaping Use: Never used   Substance and Sexual Activity    Alcohol use: Yes    Drug use: No    Sexual activity: Not on file   Other Topics Concern    Not on file   Social History Narrative    Not on file     Social Determinants of Health     Financial Resource Strain:     Difficulty of Paying Living Expenses:    Food Insecurity:     Worried About Running Out of Food in the Last Year:     [address] in the Last Year:    Transportation Needs:     Lack of Transportation (Medical):  Lack of Transportation (Non-Medical):    Physical Activity:     Days of Exercise per Week:     Minutes of Exercise per Session:    Stress:     Feeling of Stress :    Social Connections:     Frequency of Communication with Friends and Family:     Frequency of Social Gatherings with Friends and Family:     Attends Yazdanism Services:     Active Member of Clubs or Organizations:     Attends Club or Organization Meetings:     Marital Status:    Intimate Partner Violence:     Fear of Current or Ex-Partner:     Emotionally Abused:     Physically Abused:     Sexually Abused:        Family History   Problem Relation Age of Onset    Tuberculosis Father         WWI    Hypertension Mother     Stroke Brother        Physical Exam  Vitals and nursing note reviewed  Constitutional:       General: He is not in acute distress  Comments: Uses a cane for gait assistance   HENT:      Head: Normocephalic and atraumatic  Eyes:      Conjunctiva/sclera: Conjunctivae normal    Cardiovascular:      Rate and Rhythm: Normal rate and regular rhythm  Pulses: Intact distal pulses  Heart sounds: S1 normal  Murmur heard     Systolic murmur is present  Soft systolic murmur over the precordium      Pulmonary:      Effort: Pulmonary effort is normal       Breath sounds: Normal breath sounds  Abdominal:      General: Bowel sounds are normal       Palpations: Abdomen is soft  Musculoskeletal:         General: Normal range of motion  Cervical back: Normal range of motion and neck supple  Right lower leg: Edema present  Left lower leg: Edema present  Skin:     General: Skin is warm and dry  Comments: Small open wound right lower extremity, lateral calf   Neurological:      Mental Status: He is alert  Comments: Oriented x2         Vitals: There were no vitals taken for this visit  Wt Readings from Last 3 Encounters:   21 94 8 kg (209 lb)   21 93 1 kg (205 lb 4 oz)   21 93 9 kg (207 lb 1 6 oz)         Labs & Results:  Lab Results   Component Value Date    WBC 6 80 2021    HGB 12 8 2021    HCT 39 8 2021    MCV 97 2021     2021     No results found for: BNP  No components found for: CHEM    Results for orders placed during the hospital encounter of 21    Echo complete with contrast if indicated    Narrative  [place] [address], [address] [facility]  [phone]    Transthoracic Echocardiogram  2D, M-mode, Doppler, and Color Doppler    Study date:  2021    Patient: Matt Adler  MR number: [id number]  Account number: [de-identified]  : 1935  Age: 80 years  Gender: Male  Status: Outpatient  Location: [address] [place] [address] and Vascular Center  Height: 67 in  Weight: 207 5 lb  BP: 138/ 68 mmHg    Indications: Atrial fibrillation, Ischemic cardiomyopathy      Diagnoses: I42 9 - Cardiomyopathy, unspecified, I48 0 - Atrial fibrillation    Sonographer:  ELI Casas  Primary Physician:  Kathy Sumner MD  Referring Physician:  Jeovanny Hernandez MD  Group:  [facility]  Interpreting Physician:  Leisa Aguirre MD    SUMMARY    LEFT VENTRICLE:  The ventricle was markedly dilated  Systolic function was markedly reduced by visual assessment  Ejection fraction was estimated to be 20 %  There was moderate diffuse hypokinesis  There was akinesis of the entire inferior and basal-mid inferolateral wall(s)  Doppler parameters were consistent with restrictive physiology, indicative of decreased left ventricular diastolic compliance and/or increased left atrial pressure  RIGHT VENTRICLE:  The ventricle was mildly dilated  Systolic pressure was markedly increased  Estimated peak pressure was 70 mmHg  LEFT ATRIUM:  The atrium was mildly dilated  RIGHT ATRIUM:  The atrium was mildly dilated  MITRAL VALVE:  There was moderate regurgitation  TRICUSPID VALVE:  There was moderate regurgitation  PULMONIC VALVE:  There was mild regurgitation  IVC, HEPATIC VEINS:  The inferior vena cava was dilated  The respirophasic change in diameter was less than 50%  HISTORY: PRIOR HISTORY: PAF, HLD, HTN, CAD with CABG, ICM, AICD, CKD3  PROCEDURE: The study was performed in the [address] [place] [address] and Vascular Center  This was a routine study  The transthoracic approach was used  The study included complete 2D imaging, M-mode, complete spectral Doppler, and color Doppler  The  heart rate was 63 bpm, at the start of the study  Images were obtained from the parasternal, apical, subcostal, and suprasternal notch acoustic windows  Image quality was adequate  LEFT VENTRICLE: The ventricle was markedly dilated  Systolic function was markedly reduced by visual assessment  Ejection fraction was estimated to be 20 %  There was moderate diffuse hypokinesis  There was akinesis of the entire inferior  and basal-mid inferolateral wall(s)   Wall thickness was normal  DOPPLER: There was a reduced contribution of atrial contraction to ventricular filling, due to increased ventricular diastolic pressure or atrial contractile dysfunction  Doppler parameters were consistent with restrictive physiology, indicative of decreased left ventricular diastolic compliance and/or increased left atrial pressure  RIGHT VENTRICLE: The ventricle was mildly dilated  Systolic function was normal  A pacing wire was present  DOPPLER: Systolic pressure was markedly increased  Estimated peak pressure was 70 mmHg  LEFT ATRIUM: The atrium was mildly dilated  RIGHT ATRIUM: The atrium was mildly dilated  MITRAL VALVE: The annulus was dilated  Valve structure was normal  There was normal leaflet separation  DOPPLER: The transmitral velocity was within the normal range  There was no evidence for stenosis  There was moderate regurgitation  AORTIC VALVE: The valve was trileaflet  Leaflets exhibited normal thickness, mild calcification, and normal cuspal separation  DOPPLER: Transaortic velocity was minimally increased  There was no evidence for stenosis  There was no  regurgitation  TRICUSPID VALVE: The valve structure was normal  There was normal leaflet separation  DOPPLER: The transtricuspid velocity was within the normal range  There was no evidence for stenosis  There was moderate regurgitation  PULMONIC VALVE: Leaflets exhibited normal thickness, no calcification, and normal cuspal separation  DOPPLER: The transpulmonic velocity was within the normal range  There was mild regurgitation  PERICARDIUM: There was no pericardial effusion  AORTA: The root exhibited normal size  SYSTEMIC VEINS: IVC: The inferior vena cava was dilated  The respirophasic change in diameter was less than 50%      SYSTEM MEASUREMENT TABLES    2D  %FS: 7 71 %  Ao Diam: 3 39 cm  EDV(Teich): 304 24 ml  EF Biplane: 20 25 %  EF(Teich): 16 5 %  ESV(Teich): 254 04 ml  IVSd: 0 63 cm  LA Diam: 5 5 cm  LAAs A2C: 26 46 cm2  LAAs A4C: 27 04 cm2  LAESV A-L A2C: 95 68 ml  LAESV A-L A4C: 102 41 ml  LAESV Index (A-L): 48 64 ml/m2  LAESV MOD A2C: 90 47 ml  LAESV MOD A4C: 95 06 ml  LAESV(A-L): 100 21 ml  LAESV(MOD BP): 93 73 ml  LAESVInd MOD BP: 45 5 ml/m2  LALs A2C: 6 21 cm  LALs A4C: 6 06 cm  LVEDV MOD A2C: 294 47 ml  LVEDV MOD A4C: 206 38 ml  LVEDV MOD BP: 249 76 ml  LVEDVInd MOD BP: 121 24 ml/m2  LVEF MOD A2C: 14 15 %  LVEF MOD A4C: 25 84 %  LVESV MOD A2C: 252 8 ml  LVESV MOD A4C: 153 06 ml  LVESV MOD BP: 199 2 ml  LVESVInd MOD BP: 96 7 ml/m2  LVIDd: 7 57 cm  LVIDs: 6 98 cm  LVLd A2C: 9 35 cm  LVLd A4C: 9 09 cm  LVLs A2C: 8 73 cm  LVLs A4C: 8 47 cm  LVPWd: 0 59 cm  Yari A4C: 19 13 cm2  RAAs A4C: 21 01 cm2  RAEDV A-L: 62 99 ml  RAEDV MOD: 62 18 ml  RAESV A-L: 68 28 ml  RAESV MOD: 67 99 ml  RALd: 4 93 cm  RALs: 5 49 cm  RVIDd: 4 55 cm  SV MOD A2C: 41 68 ml  SV MOD A4C: 53 32 ml  SV(Teich): 50 2 ml    CF  MR Als  Hemanth: 0 42 m/s  MR Flow: 275 7 ml/s  MR Rad: 1 02 cm    CW  AV Env  Ti: 300 67 ms  AV MaxP 39 mmHg  AV VTI: 24 81 cm  AV Vmax: 1 26 m/s  AV Vmean: 0 83 m/s  AV meanPG: 3 16 mmHg  MR VTI: 195 19 cm  MR Vmax: 5 39 m/s  PRend P 09 mmHg  PRend Vmax: 1 81 m/s  TR MaxP 62 mmHg  TR Vmax: 3 89 m/s    MM  TAPSE: 1 4 cm    PW  LVOT Env  Ti: 334 92 ms  LVOT VTI: 13 06 cm  LVOT Vmax: 0 61 m/s  LVOT Vmean: 0 39 m/s  LVOT maxP 51 mmHg  LVOT meanP 73 mmHg  MR ERO: 0 51 cm2  MR RV: 99 75 ml  PV Acc [place]: 9 18 m/s2  PV AccT: 38 06 ms    [employer] Accredited [facility]    Prepared and electronically signed by    Nithya Garcia MD  Signed 2021 12:22:42    No results found for this or any previous visit  This note was completed in part utilizing m-modal fluency direct voice recognition software  Grammatical errors, random word insertion, spelling mistakes, and incomplete sentences may be an occasional consequence of the system secondary to software limitations, ambient noise and hardware issues  At the time of dictation, efforts were made to edit, clarify and /or correct errors    Please read the chart carefully and recognize, using context, where substitutions have occurred    If you have any questions or concerns about the context, text or information contained within the body of this dictation, please contact myself, the provider, for further clarification

## 2021-09-03 ENCOUNTER — PROCEDURE VISIT (OUTPATIENT)
Dept: OBGYN CLINIC | Facility: CLINIC | Age: 86
End: 2021-09-03
Payer: MEDICARE

## 2021-09-03 DIAGNOSIS — M25.561 CHRONIC PAIN OF BOTH KNEES: ICD-10-CM

## 2021-09-03 DIAGNOSIS — G89.29 CHRONIC PAIN OF BOTH KNEES: ICD-10-CM

## 2021-09-03 DIAGNOSIS — M25.562 CHRONIC PAIN OF BOTH KNEES: ICD-10-CM

## 2021-09-03 DIAGNOSIS — M17.0 BILATERAL PRIMARY OSTEOARTHRITIS OF KNEE: Primary | ICD-10-CM

## 2021-09-03 PROCEDURE — 99213 OFFICE O/P EST LOW 20 MIN: CPT | Performed by: ORTHOPAEDIC SURGERY

## 2021-09-03 PROCEDURE — 20610 DRAIN/INJ JOINT/BURSA W/O US: CPT | Performed by: PHYSICIAN ASSISTANT

## 2021-09-03 RX ORDER — HYALURONATE SODIUM 10 MG/ML
20 SYRINGE (ML) INTRAARTICULAR
Status: COMPLETED | OUTPATIENT
Start: 2021-09-03 | End: 2021-09-03

## 2021-09-03 RX ADMIN — Medication 20 MG: at 11:46

## 2021-09-03 NOTE — PROGRESS NOTES
Assessment/Plan:  1  Bilateral primary osteoarthritis of knee  Large joint arthrocentesis    Ambulatory referral to Physical Therapy   2  Chronic pain of both knees  Large joint arthrocentesis    Ambulatory referral to Physical Therapy      Brina Sanderson is a pleasant 59-year-old gentleman presenting today for the 2nd of 3 Euflexxa injections for his bilateral knees  He consented to and underwent the injections as detailed below, which she tolerated well without difficulty or complication  Post injection instructions were provided  We will plan to see him back next week to complete the series  I did also provide him with a referral to outpatient physical therapy as requested  All questions addressed    Large joint arthrocentesis: bilateral knee  Universal Protocol:  Consent: Verbal consent obtained  Risks and benefits: risks, benefits and alternatives were discussed  Consent given by: patient  Time out: Immediately prior to procedure a "time out" was called to verify the correct patient, procedure, equipment, support staff and site/side marked as required  Timeout called at: 9/3/2021 11:37 AM   Site marked: the operative site was marked  Patient identity confirmed: verbally with patient    Supporting Documentation  Indications: pain   Procedure Details  Location: knee - bilateral knee  Preparation: Patient was prepped and draped in the usual sterile fashion  Needle size: 22 G  Ultrasound guidance: no  Approach: anterolateral    Medications (Right): 20 mg Sodium Hyaluronate 20 MG/2MLMedications (Left): 20 mg Sodium Hyaluronate 20 MG/2ML   Patient tolerance: patient tolerated the procedure well with no immediate complications  Dressing:  Sterile dressing applied        Subjective:  Bilateral knee Euflexxa #2    Patient ID: Aris De Luna is a 80 y o  male       Patient is a pleasant 59-year-old presenting today for the 2nd of 3 Euflexxa injections for his bilateral knee osteoarthritis    Review of Systems Constitutional: Negative  HENT: Negative  Eyes: Negative  Respiratory: Negative  Cardiovascular: Negative  Gastrointestinal: Negative  Endocrine: Negative  Genitourinary: Negative  Musculoskeletal: Positive for arthralgias, joint swelling and myalgias  Skin: Negative  Allergic/Immunologic: Negative  Neurological: Negative  Hematological: Negative  Psychiatric/Behavioral: Negative  Past Medical History:   Diagnosis Date    AICD (automatic cardioverter/defibrillator) present 2004    AICD (automatic cardioverter/defibrillator) present 2006    AICD (automatic cardioverter/defibrillator) present 2013    [facility]    Arthritis     R knee and neck    Atrial fibrillation (HCC)     BPH (benign prostatic hyperplasia)     CAD (coronary artery disease) of artery bypass graft     CHF (congestive heart failure) (HCC)     combine    Coronary artery disease     Disease of thyroid gland     Hyperlipidemia     Hypertension     Ischemic cardiomyopathy     Left ureteral calculus 10/17/2017    Renal disorder     V tach ([address] )        Past Surgical History:   Procedure Laterality Date    CARDIAC CATHETERIZATION      CARDIAC SURGERY      Pacemaker    CORONARY ARTERY BYPASS GRAFT      MD CYSTOURETHROSCOPY,URETER CATHETER N/A 10/17/2017    Procedure: CYSTOSCOPY, left  RETROGRADE PYELOGRAM WITH LEFT URETEROSCOPY , stone extraction,LEFT STENT INSERTION;  Surgeon: Randall Cho MD;  Location: [facility];  Service: Urology       Family History   Problem Relation Age of Onset    Tuberculosis Father         WW[facility]    Hypertension Mother     Stroke Brother        Social History     Occupational History    Occupation: Retired   Tobacco Use    Smoking status: Never Smoker    Smokeless tobacco: Never Used   Vaping Use    Vaping Use: Never used   Substance and Sexual Activity    Alcohol use:  Yes    Drug use: No    Sexual activity: Not on file         Current Outpatient Medications:   acetaminophen (TYLENOL) 500 mg tablet, Take 500 mg by mouth every 6 (six) hours as needed for mild pain , Disp: , Rfl:     amiodarone 200 mg tablet, Take 1 tablet (200 mg total) by mouth daily, Disp: 90 tablet, Rfl: 3    amLODIPine (NORVASC) 2 5 mg tablet, Take 1 tablet (2 5 mg total) by mouth every other day At Morning , Disp: 45 tablet, Rfl: 3    aspirin 81 MG tablet, Take 81 mg by mouth daily  , Disp: , Rfl:     isosorbide mononitrate (IMDUR) 30 mg 24 hr tablet, Take 1 tablet (30 mg total) by mouth daily, Disp: 90 tablet, Rfl: 3    levothyroxine 150 mcg tablet, Take 150 mcg by mouth daily , Disp: , Rfl:     LORazepam (ATIVAN) 1 mg tablet, Take 1 mg by mouth as needed for anxiety  , Disp: , Rfl:     metolazone (ZAROXOLYN) 2 5 mg tablet, As needed no more than twice weekly for leg swelling, Disp: 10 tablet, Rfl: 3    metoprolol succinate (TOPROL-XL) 50 mg 24 hr tablet, Take 1 tablet (50 mg total) by mouth 2 (two) times a day, Disp: 180 tablet, Rfl: 3    mexiletine (MEXITIL) 150 mg capsule, Take 1 capsule (150 mg total) by mouth 2 (two) times a day, Disp: 180 capsule, Rfl: 3    mirtazapine (REMERON) 7 5 MG tablet, Take 15 mg by mouth every evening , Disp: , Rfl:     Multiple Vitamins-Minerals (MULTIVITAMIN WITH MINERALS) tablet, Take 1 tablet by mouth daily  , Disp: , Rfl:     nitroglycerin (NITROSTAT) 0 4 mg SL tablet, Place 0 4 mg under the tongue every 5 (five) minutes as needed for chest pain  , Disp: , Rfl:     potassium citrate (Urocit-K 10) 10 mEq, Take 1 tablet (10 mEq total) by mouth 2 (two) times a day, Disp: 180 tablet, Rfl: 3    rosuvastatin (CRESTOR) 20 MG tablet, Take 1 tablet by mouth once daily, Disp: 30 tablet, Rfl: 0    tamsulosin (FLOMAX) 0 4 mg, Take 1 capsule (0 4 mg total) by mouth daily with dinner, Disp: 90 capsule, Rfl: 3    torsemide (DEMADEX) 20 mg tablet, Take 40 mg (2 tablets) every morning by mouth and 20 mg (1 tablet) by mouth every afternoon  , Disp: 190 tablet, Rfl: 3    No Known Allergies    Objective: There were no vitals filed for this visit  There is no height or weight on file to calculate BMI  Ortho Exam    Physical Exam  Vitals and nursing note reviewed  Constitutional:       Appearance: He is well-developed  Comments: There is no height or weight on file to calculate BMI  HENT:      Head: Normocephalic and atraumatic  Right Ear: External ear normal       Left Ear: External ear normal    Cardiovascular:      Rate and Rhythm: Normal rate  Pulmonary:      Effort: Pulmonary effort is normal    Abdominal:      Palpations: Abdomen is soft  Musculoskeletal:      Cervical back: Normal range of motion  Comments: See ortho exam   Skin:     General: Skin is warm and dry  Neurological:      Mental Status: He is alert and oriented to person, place, and time  Psychiatric:         Behavior: Behavior normal          Thought Content:  Thought content normal          Judgment: Judgment normal

## 2021-09-09 ENCOUNTER — REMOTE DEVICE CLINIC VISIT (OUTPATIENT)
Dept: CARDIOLOGY CLINIC | Facility: CLINIC | Age: 86
End: 2021-09-09
Payer: MEDICARE

## 2021-09-09 DIAGNOSIS — Z95.810 ICD (IMPLANTABLE CARDIOVERTER-DEFIBRILLATOR) IN PLACE: Primary | ICD-10-CM

## 2021-09-09 PROCEDURE — 93295 DEV INTERROG REMOTE 1/2/MLT: CPT | Performed by: INTERNAL MEDICINE

## 2021-09-09 PROCEDURE — 93296 REM INTERROG EVL PM/IDS: CPT | Performed by: INTERNAL MEDICINE

## 2021-09-09 NOTE — PROGRESS NOTES
Results for orders placed or performed in visit on 09/09/21   Cardiac EP device report    Narrative    SJM CRT-D/DDDR MODENOT MRI CONDITIONAL  MERLIN TRANSMISSION: BATTERY VOLTAGE ADEQUATE (5 2-5 4 YRS)  AP 76% BVP 99%  ALL AVAILABLE LEAD PARAMETERS WITHIN NORMAL LIMITS  4 VT-NS EPISODES, NO THERAPY DELIVERED W/ AVAIL EGRM FOR NSVT, 32 BEATS @  BPM  97 AMS EPISODES (<1% BURDEN) W/EGRM FOR FF O S ON ATRIAL CHANNEL (NO AF)  EF 20% (6/2021 ECHO)  PT ON ASA 81, AMIODORONE, MEXILETINE, METOPROLOL SUCC  CORVUE IMPEDANCE MONITORING WITHIN NORMAL LIMITS  NORMAL DEVICE FUNCTION     EBS

## 2021-09-10 ENCOUNTER — PROCEDURE VISIT (OUTPATIENT)
Dept: OBGYN CLINIC | Facility: CLINIC | Age: 86
End: 2021-09-10
Payer: MEDICARE

## 2021-09-10 DIAGNOSIS — M17.0 BILATERAL PRIMARY OSTEOARTHRITIS OF KNEE: Primary | ICD-10-CM

## 2021-09-10 PROCEDURE — 20610 DRAIN/INJ JOINT/BURSA W/O US: CPT | Performed by: ORTHOPAEDIC SURGERY

## 2021-09-10 RX ORDER — HYALURONATE SODIUM 10 MG/ML
20 SYRINGE (ML) INTRAARTICULAR
Status: COMPLETED | OUTPATIENT
Start: 2021-09-10 | End: 2021-09-10

## 2021-09-10 RX ADMIN — Medication 20 MG: at 10:09

## 2021-09-10 NOTE — PROGRESS NOTES
Assessment/Plan:  1  Bilateral primary osteoarthritis of knee         Follow-up prn  Subjective:   Brittany Byrd is a 80 y o  male who presents today for euflexxa #3 bilateral knees  Review of Systems      Past Medical History:   Diagnosis Date    AICD (automatic cardioverter/defibrillator) present 2004    AICD (automatic cardioverter/defibrillator) present 2006    AICD (automatic cardioverter/defibrillator) present 2013    [facility]    Arthritis     R knee and neck    Atrial fibrillation (HCC)     BPH (benign prostatic hyperplasia)     CAD (coronary artery disease) of artery bypass graft     CHF (congestive heart failure) (HCC)     combine    Coronary artery disease     Disease of thyroid gland     Hyperlipidemia     Hypertension     Ischemic cardiomyopathy     Left ureteral calculus 10/17/2017    Renal disorder     V tach ([address] )        Past Surgical History:   Procedure Laterality Date    CARDIAC CATHETERIZATION      CARDIAC SURGERY      Pacemaker    CORONARY ARTERY BYPASS GRAFT      HI CYSTOURETHROSCOPY,URETER CATHETER N/A 10/17/2017    Procedure: CYSTOSCOPY, left  RETROGRADE PYELOGRAM WITH LEFT URETEROSCOPY , stone extraction,LEFT STENT INSERTION;  Surgeon: Amado Baker MD;  Location: [facility];  Service: Urology       Family History   Problem Relation Age of Onset    Tuberculosis Father         WWI    Hypertension Mother     Stroke Brother        Social History     Occupational History    Occupation: Retired   Tobacco Use    Smoking status: Never Smoker    Smokeless tobacco: Never Used   Vaping Use    Vaping Use: Never used   Substance and Sexual Activity    Alcohol use:  Yes    Drug use: No    Sexual activity: Not on file         Current Outpatient Medications:     acetaminophen (TYLENOL) 500 mg tablet, Take 500 mg by mouth every 6 (six) hours as needed for mild pain , Disp: , Rfl:     amiodarone 200 mg tablet, Take 1 tablet (200 mg total) by mouth daily, Disp: 90 tablet, Rfl: 3    amLODIPine (NORVASC) 2 5 mg tablet, Take 1 tablet (2 5 mg total) by mouth every other day At Morning , Disp: 45 tablet, Rfl: 3    aspirin 81 MG tablet, Take 81 mg by mouth daily  , Disp: , Rfl:     isosorbide mononitrate (IMDUR) 30 mg 24 hr tablet, Take 1 tablet (30 mg total) by mouth daily, Disp: 90 tablet, Rfl: 3    levothyroxine 150 mcg tablet, Take 150 mcg by mouth daily , Disp: , Rfl:     LORazepam (ATIVAN) 1 mg tablet, Take 1 mg by mouth as needed for anxiety  , Disp: , Rfl:     metolazone (ZAROXOLYN) 2 5 mg tablet, As needed no more than twice weekly for leg swelling, Disp: 10 tablet, Rfl: 3    metoprolol succinate (TOPROL-XL) 50 mg 24 hr tablet, Take 1 tablet (50 mg total) by mouth 2 (two) times a day, Disp: 180 tablet, Rfl: 3    mexiletine (MEXITIL) 150 mg capsule, Take 1 capsule (150 mg total) by mouth 2 (two) times a day, Disp: 180 capsule, Rfl: 3    mirtazapine (REMERON) 7 5 MG tablet, Take 15 mg by mouth every evening , Disp: , Rfl:     Multiple Vitamins-Minerals (MULTIVITAMIN WITH MINERALS) tablet, Take 1 tablet by mouth daily  , Disp: , Rfl:     nitroglycerin (NITROSTAT) 0 4 mg SL tablet, Place 0 4 mg under the tongue every 5 (five) minutes as needed for chest pain  , Disp: , Rfl:     potassium citrate (Urocit-K 10) 10 mEq, Take 1 tablet (10 mEq total) by mouth 2 (two) times a day, Disp: 180 tablet, Rfl: 3    rosuvastatin (CRESTOR) 20 MG tablet, Take 1 tablet by mouth once daily, Disp: 30 tablet, Rfl: 0    tamsulosin (FLOMAX) 0 4 mg, Take 1 capsule (0 4 mg total) by mouth daily with dinner, Disp: 90 capsule, Rfl: 3    torsemide (DEMADEX) 20 mg tablet, Take 40 mg (2 tablets) every morning by mouth and 20 mg (1 tablet) by mouth every afternoon  , Disp: 190 tablet, Rfl: 3    No Known Allergies    Objective: There were no vitals filed for this visit      Ortho Exam    Physical Exam    Large joint arthrocentesis: L knee  Universal Protocol:  Risks and benefits: risks, benefits and alternatives were discussed  Consent given by: patient  Timeout called at: 9/10/2021 10:07 AM   Site marked: the operative site was marked  Supporting Documentation  Indications: pain   Procedure Details  Location: knee - L knee  Preparation: Patient was prepped and draped in the usual sterile fashion (Alcohol prep)  Needle size: 22 G  Ultrasound guidance: no  Approach: anterolateral  Medications administered: 20 mg Sodium Hyaluronate 20 MG/2ML    Patient tolerance: patient tolerated the procedure well with no immediate complications  Dressing:  Sterile dressing applied    Large joint arthrocentesis: R knee  Universal Protocol:  Risks and benefits: risks, benefits and alternatives were discussed  Consent given by: patient  Timeout called at: 9/10/2021 10:07 AM   Site marked: the operative site was marked  Supporting Documentation  Indications: pain   Procedure Details  Location: knee - R knee  Preparation: Patient was prepped and draped in the usual sterile fashion (Alcohol prep)  Needle size: 22 G  Ultrasound guidance: no  Approach: anterolateral  Medications administered: 20 mg Sodium Hyaluronate 20 MG/2ML    Patient tolerance: patient tolerated the procedure well with no immediate complications  Dressing:  Sterile dressing applied

## 2021-09-13 ENCOUNTER — EVALUATION (OUTPATIENT)
Dept: PHYSICAL THERAPY | Facility: CLINIC | Age: 86
End: 2021-09-13
Payer: MEDICARE

## 2021-09-13 DIAGNOSIS — M25.562 CHRONIC PAIN OF BOTH KNEES: ICD-10-CM

## 2021-09-13 DIAGNOSIS — G89.29 CHRONIC PAIN OF BOTH KNEES: ICD-10-CM

## 2021-09-13 DIAGNOSIS — M25.561 CHRONIC PAIN OF BOTH KNEES: ICD-10-CM

## 2021-09-13 DIAGNOSIS — M17.0 BILATERAL PRIMARY OSTEOARTHRITIS OF KNEE: ICD-10-CM

## 2021-09-13 PROCEDURE — 97112 NEUROMUSCULAR REEDUCATION: CPT | Performed by: PHYSICAL THERAPIST

## 2021-09-13 PROCEDURE — 97530 THERAPEUTIC ACTIVITIES: CPT | Performed by: PHYSICAL THERAPIST

## 2021-09-13 PROCEDURE — 97162 PT EVAL MOD COMPLEX 30 MIN: CPT | Performed by: PHYSICAL THERAPIST

## 2021-09-13 NOTE — PROGRESS NOTES
PT Evaluation     Today's date: 2021  Patient name: Jacque Chavez  : 1935  MRN: [id number]  Referring provider: Florina Aldridge  Dx:   Encounter Diagnosis     ICD-10-CM    1  Bilateral primary osteoarthritis of knee  M17 0 Ambulatory referral to Physical Therapy   2  Chronic pain of both knees  M25 561 Ambulatory referral to Physical Therapy    M25 562     G89 29                   Assessment  Assessment details: Jacque Chavez is a 80 y o  male presents with signs and symptoms consistent with:   Bilateral primary osteoarthritis of knee  Chronic pain of both knees    Chey Boo has the above listed impairments and will benefit from skilled PT to address deficits to return to prior level of function  Etiologic factors include Heart issues, knee arthritis, gets winded quickly, difficulty in walking  Prognosis is good given HEP compliance and attendance to physical therapy 2x a week  Positive prognostic indicators include motivation to improve  Negative prognostic indicators include endurance, fall risk  Please contact me if you have any questions or recommendations  Thank you for the referral and the opportunity to share in Jacque Chavez care  Patient verbalized understanding of POC, HEP, and return demonstrated HEP  Impairments: abnormal coordination, abnormal gait, abnormal muscle firing, abnormal muscle tone, abnormal or restricted ROM, abnormal movement, activity intolerance, impaired balance, impaired physical strength, lacks appropriate home exercise program, pain with function and weight-bearing intolerance  Barriers to therapy: [id number] Societal implications    Understanding of Dx/Px/POC: good   Prognosis: good    Goals  Impairment Goals 4-6 weeks  - Decrease pain to <3/10  - Improve knee AROM to equal to the unaffected lower extremity  - Increase knee strength to 5/5 throughout  - Increase hip strength to 4/5 throughout    Functional Goals 6-8 weeks  - Return to Prior Level of Function  - Increase Functional Status Measure (FOTO) to: 61  - Patient will be independent with HEP  - Patient will be able to squat safely  - Patient will be able to perform sit to stand safely  - Patient will be able to walk safely           Plan  Patient would benefit from: skilled PT  Referral necessary: No  Planned modality interventions: cryotherapy, electrical stimulation/Russian stimulation, H-Wave, TENS, thermotherapy: hydrocollator packs and unattended electrical stimulation  Planned therapy interventions: abdominal trunk stabilization, activity modification, ADL retraining, balance/weight bearing training, breathing training, body mechanics training, coordination, flexibility, functional ROM exercises, graded exercise, home exercise program, work reintegration, therapeutic training, therapeutic exercise, therapeutic activities, stretching, strengthening, self care, postural training, patient education, neuromuscular re-education, muscle pump exercises, motor coordination training, Klein taping, manual therapy, joint mobilization, IADL retraining, balance and gait training  Frequency: 2x week  Duration in visits: 16  Duration in weeks: 8  Treatment plan discussed with: patient, PTA and referring physician        Subjective Evaluation    Pain  Current pain ratin  At best pain ratin  At worst pain ratin  Location: R>L knee pain    Patient Goals  Patient goal: Patient wants to get stronger with walking  WORK:  Patient is retired from outside construction  HOME LIFE: Patient reports he lives his wife  He helps out with house cleaning  Cleaning the swimming pool  They go to [place] in Dec, and come back in January  HOBBIES/EXERCISE: Patient reports that he used to be a ""  He reports that he hasn't been doing too much lately since he has had his heart troubles  He stays around the house most of the time    He does the pool upkeep  He reports that he is at the stage of his life where he is getting people in to do everything for him  PAIN LOCATION/DESCRIPTORS:  He reports that he has some pain in his knees, but the shots really helped  He reports that he is having a lot of clicking  He reports that he doesn't really have pain, but his knee would click and then give out sometimes  He uses his cane to assist   His chief complaint is just difficulty with walking and balance issues  AGGRAVATING FACTORS:  Walking, he doesn't do stairs any longer- uses a lift chair, sit to stand from chair, tries to avoid bending to get things from the floor  Difficulty sleeping- gets up every hour but not because of the knees  Wanting to get stronger  EASES: not really having pain, wearing a brace to help with the knee security  LATENCY: n/a  DAY PATTERN: increased difficulty in the AM     IMAGING:  X-ray: arthritis  PLOF:  Knees have been troublesome for a while, but not painful  HISTORY OF CURRENT INJURY:  Finished gel injections Friday  Can go back and get another round in 6 months  He has a big fear of getting a knee replacement, because of the therapy after  He reports that he is not scared of the surgery  He reports that he has had these issues in his knees for a few years  He has been using the cane for at least 4 years  Objective     Tenderness   Left Knee   Tenderness in the lateral joint line  No tenderness in the medial joint line  Right Knee   No tenderness in the lateral joint line and medial joint line       Active Range of Motion   Left Knee   Flexion: 112 degrees with pain  Extension: Left knee active extension: lacking 4 deg  with pain    Right Knee   Flexion: 110 degrees with pain  Extension: Right knee active extension: Lacking 4 deg  with pain    Mobility   Patellar Mobility:   Left Knee   Hypomobile: left medial, left lateral, left superior and left inferior    Right Knee   Hypomobile: medial, lateral, superior and inferior     Strength/Myotome Testing     Left Knee   Flexion: 4-  Extension: 4    Right Knee   Flexion: 4-  Extension: 4    Additional Strength Details  QS: VL>VMO bilaterally, poor  SLR: Right: able to lift with a lag, left: unable to lift secondary to groin pain  Hip MMT: not tested secondary to pain  General Comments:      Knee Comments  TU 67 sec with SPC  STS TEST: 10 full reps                Diagnosis: Knee OA, Difficulty in walking   Precautions: Fall risk, gets winded quickly, Heart issues   Goals: Improve LE strength, improve balance, improve endurance   Manual Therapy 21       Knee joint mobs        Patellar stretching                                Exercise Diary         Therapeutic Exercise        Seated knee ext stretch        Heel slides                                                Neuromuscular Re-education        QS [id number]       Hamstring ball rolls supine        bridges        clams        SLR, unassisted R, assisted L        Squats        Standing hip flexor stretch at mirror, can not do at wall                Therapeutic Activities        Pt education Objective findings, all questions/concerns addressed  Scheduling, HEP                   Modalities

## 2021-09-14 ENCOUNTER — OFFICE VISIT (OUTPATIENT)
Dept: PHYSICAL THERAPY | Facility: CLINIC | Age: 86
End: 2021-09-14
Payer: MEDICARE

## 2021-09-14 DIAGNOSIS — M17.0 BILATERAL PRIMARY OSTEOARTHRITIS OF KNEE: Primary | ICD-10-CM

## 2021-09-14 DIAGNOSIS — G89.29 CHRONIC PAIN OF BOTH KNEES: ICD-10-CM

## 2021-09-14 DIAGNOSIS — M25.562 CHRONIC PAIN OF BOTH KNEES: ICD-10-CM

## 2021-09-14 DIAGNOSIS — M25.561 CHRONIC PAIN OF BOTH KNEES: ICD-10-CM

## 2021-09-14 PROCEDURE — 97110 THERAPEUTIC EXERCISES: CPT

## 2021-09-14 PROCEDURE — 97112 NEUROMUSCULAR REEDUCATION: CPT

## 2021-09-14 NOTE — PROGRESS NOTES
Daily Note     Today's date: 2021  Patient name: Jony Zafar  : 1935  MRN: [id number]  Referring provider: Roxy Ga PA-C  Dx:   Encounter Diagnosis     ICD-10-CM    1  Bilateral primary osteoarthritis of knee  M17 0    2  Chronic pain of both knees  M25 561     M25 562     G89 29                   Subjective: Pt states that he is pretty sore today, all over  He reports that he didn't get to do his exercises as much as he would have liked to  Objective: See treatment diary below      Assessment: Tolerated treatment well  Pt able to perform outlined program below with no c/o increased knee pain  Pt challenged with strengthening and fatigue is noted  Rest periods needed due to fatigue and as pt gets winded easily and needs to catch his breath  Reviewed HEP with pt reporting understanding  Pt will benefit from continued therapy  Plan: Continue per plan of care     Diagnosis: Knee OA, Difficulty in walking   Precautions: Fall risk, gets winded quickly, Heart issues   Goals: Improve LE strength, improve balance, improve endurance   Manual Therapy 21      Knee joint mobs        Patellar stretching                                Exercise Diary         Therapeutic Exercise        Seated knee ext stretch  10x10 sec      Heel slides  10x ea                                              Neuromuscular Re-education        QS [id number] 10x10 sec      Hamstring ball rolls supine        bridges        clams  BKFO: RTB 10x      SLR, unassisted R, assisted L  NV      Squats  10x      NBOS on airex  3x30 sec      Standing hip flexor stretch at mirror, can not do at wall  30x ea      HR  15x      Therapeutic Activities        Pt education Objective findings, all questions/concerns addressed  Scheduling, HEP     HEP reviewed              Modalities

## 2021-09-20 ENCOUNTER — OFFICE VISIT (OUTPATIENT)
Dept: PHYSICAL THERAPY | Facility: CLINIC | Age: 86
End: 2021-09-20
Payer: MEDICARE

## 2021-09-20 DIAGNOSIS — M25.561 CHRONIC PAIN OF BOTH KNEES: ICD-10-CM

## 2021-09-20 DIAGNOSIS — G89.29 CHRONIC PAIN OF BOTH KNEES: ICD-10-CM

## 2021-09-20 DIAGNOSIS — M25.562 CHRONIC PAIN OF BOTH KNEES: ICD-10-CM

## 2021-09-20 DIAGNOSIS — M17.0 BILATERAL PRIMARY OSTEOARTHRITIS OF KNEE: Primary | ICD-10-CM

## 2021-09-20 PROCEDURE — 97112 NEUROMUSCULAR REEDUCATION: CPT

## 2021-09-20 PROCEDURE — 97110 THERAPEUTIC EXERCISES: CPT

## 2021-09-20 NOTE — PROGRESS NOTES
Daily Note     Today's date: 2021  Patient name: Karlos Madsen  : 1935  MRN: [id number]  Referring provider: Avtar Junior PA-C  Dx:   Encounter Diagnosis     ICD-10-CM    1  Bilateral primary osteoarthritis of knee  M17 0    2  Chronic pain of both knees  M25 561     M25 562     G89 29                   Subjective: Pt states that his knees were sore over the weekend      Objective: See treatment diary below      Assessment: Tolerated treatment well  Progressed pt's ex's as able with rest breaks as needed due to fatigue  Pt challenged with balance activities with min-mod sway noted and light HR assistance needed  Reviewed HEP with pt reporting understanding  Will continue to progress nv as tolerated  Plan: Continue per plan of care     Diagnosis: Knee OA, Difficulty in walking   Precautions: Fall risk, gets winded quickly, Heart issues   Goals: Improve LE strength, improve balance, improve endurance   Manual Therapy 21     Knee joint mobs        Patellar stretching   NV                             Exercise Diary         Therapeutic Exercise        Seated knee ext stretch  10x10 sec 10x10 sec     Heel slides  10x ea                                              Neuromuscular Re-education        QS [id number] 10x10 sec 10x10 sec     Hamstring ball rolls supine   10x     bridges   NV     clams  BKFO: RTB 10x BKFO: RTB  15x     SLR, unassisted R, assisted L  NV      Squats  10x 10x     Side stepping   2x     Tandem balance   2x30 sec ea     NBOS on airex  3x30 sec 3x30 sec     Standing hip flexor stretch at mirror, can not do at wall  30x ea      HR  15x 20x     Therapeutic Activities        Pt education Objective findings, all questions/concerns addressed  Scheduling, HEP     HEP reviewed HEP reviewed with printout given             Modalities

## 2021-09-21 ENCOUNTER — OFFICE VISIT (OUTPATIENT)
Dept: PHYSICAL THERAPY | Facility: CLINIC | Age: 86
End: 2021-09-21
Payer: MEDICARE

## 2021-09-21 DIAGNOSIS — M25.562 CHRONIC PAIN OF BOTH KNEES: ICD-10-CM

## 2021-09-21 DIAGNOSIS — G89.29 CHRONIC PAIN OF BOTH KNEES: ICD-10-CM

## 2021-09-21 DIAGNOSIS — M25.561 CHRONIC PAIN OF BOTH KNEES: ICD-10-CM

## 2021-09-21 DIAGNOSIS — M17.0 BILATERAL PRIMARY OSTEOARTHRITIS OF KNEE: Primary | ICD-10-CM

## 2021-09-21 PROCEDURE — 97110 THERAPEUTIC EXERCISES: CPT

## 2021-09-21 PROCEDURE — 97112 NEUROMUSCULAR REEDUCATION: CPT

## 2021-09-21 NOTE — PROGRESS NOTES
Daily Note     Today's date: 2021  Patient name: Brenna Yates  : 1935  MRN: [id number]  Referring provider: Shai Matute PA-C  Dx:   Encounter Diagnosis     ICD-10-CM    1  Bilateral primary osteoarthritis of knee  M17 0    2  Chronic pain of both knees  M25 561     M25 562     G89 29                   Subjective: Pt reports that he did his ex's last night at 10:00  He states that he is feeling ok this morning  Objective: See treatment diary below      Assessment: Tolerated treatment well  Pt challenged with progressions, especially control during quad ladders  Pt had difficulty getting into position for standing hip flexor stretch so will modify nv as able  Pt gets SOB quickly with ex's so breaks taken as needed  He is able to recover quickly and perform another exercise  Will continue to progress nv as tolerated        Plan: Continue per plan of care     Diagnosis: Knee OA, Difficulty in walking   Precautions: Fall risk, gets winded quickly, Heart issues   Goals: Improve LE strength, improve balance, improve endurance   Manual Therapy 21    Knee joint mobs        Patellar stretching   NV TJH, PTA                            Exercise Diary         Therapeutic Exercise        Seated knee ext stretch  10x10 sec 10x10 sec     Heel slides  10x ea      Hamstring stretch seated in chair    3x30 sec ea                                    Neuromuscular Re-education        QS [id number] 10x10 sec 10x10 sec     QS ladders    10x    Hamstring ball rolls supine   10x 10x    bridges   NV     clams  BKFO: RTB 10x BKFO: RTB  15x     SLR, unassisted R, assisted L  NV      Squats  10x 10x     Side stepping   2x 2x    Tandem balance   2x30 sec ea     Tandem ambulation        NBOS on airex  3x30 sec 3x30 sec     Wobble board    1 min ea    Standing hip flexor stretch at mirror, can not do at wall  30x ea  Modified     Standing hamstring curl    10x ea    HR  15x 20x 20x Therapeutic Activities        Pt education Objective findings, all questions/concerns addressed  Scheduling, HEP     HEP reviewed HEP reviewed with printout given HEP discussed answering pts questions            Modalities

## 2021-09-22 NOTE — PROGRESS NOTES
Assessment/Plan:    Nephrolithiasis  Patient with long history of stones now with nonobstructing stones in the kidneys  They are of decent size but will not pursue intervention to become an issue as is his wishes  Microscopic hematuria  The patient has had a negative hematuria workup  Recommend annual urinalysis with PCP and consideration for repeat workup in 3-5 years if micro hematuria persists  If the patient has gross hematuria then they should see us immediately  OAB (overactive bladder)  Patient with OAB symptoms in addition to some obstructive symptoms  He is emptying his bladder well  I think he may benefit from an anti muscarinic  I would prefer to use beta 3 agonist rather than anticholinergic given all the risks of dementia and other problems, especially in the elderly, with anticholinergics  We will write for Mirabegron low dose and increase if he is tolerating well          Subjective:      Patient ID: Maxi Junior is a 80 y o  male  HPI  Maxi Junior is a 80 y o  male with a history of nephrolithiasis now with microhematuria      Patient has a longstanding history of nephrolithiasis requiring  ureteroscopic and PCNL intervention  With Dr Dash Vaughan  Patient is managed on potassium citrate  Recent CT shows non obstructing renal stones measuring to 1 5cm      Patient reports that he has been having increasing lower urinary tract symptoms despite tamsulosin monotherapy  He is on a diuretic at this time  He feels like he voids frequently and urgently and has associated urinary incontinence  Also complains of spraying of his urinary stream which requires him to either sit or void into a urinal        On careful questioning patient does not drink much water however primarily only drinks soda throughout the day  Denies any constipation      PVR 0cc this visit  81mL last visit  AUA 29/4    Patient had microhematuria seen on UA 7/13/21 with numerous rbc/hpf   He had an ultrasound of his kidney and bladder 04/14/2021 which was negative for any upper tract obstruction  Multiple bilateral intrarenal calculi were noted  He was noted to have a partially distended bladder with diverticulum and slightly thickened appearance of the bladder wall however no definitive mass was appreciated  A simple left renal cyst was reported  Ct non con August 2021 was unremarkable other than renal stones        Past Surgical History:   Procedure Laterality Date    CARDIAC CATHETERIZATION      CARDIAC SURGERY      Pacemaker    CORONARY ARTERY BYPASS GRAFT      VA CYSTOURETHROSCOPY,URETER CATHETER N/A 10/17/2017    Procedure: CYSTOSCOPY, left  RETROGRADE PYELOGRAM WITH LEFT URETEROSCOPY , stone extraction,LEFT STENT INSERTION;  Surgeon: Mayda Adorno MD;  Location: [facility];  Service: Urology        Past Medical History:   Diagnosis Date    AICD (automatic cardioverter/defibrillator) present 2004    AICD (automatic cardioverter/defibrillator) present 2006    AICD (automatic cardioverter/defibrillator) present 2013    [facility]    Arthritis     R knee and neck    Atrial fibrillation ([facility] [address] [facility]     BPH (benign prostatic hyperplasia)     CAD (coronary artery disease) of artery bypass graft     CHF (congestive heart failure) ([address] )     combine    Coronary artery disease     Disease of thyroid gland     Hyperlipidemia     Hypertension     Ischemic cardiomyopathy     Left ureteral calculus 10/17/2017    Renal disorder     V tach ([facility] [address] )              Review of Systems   Constitutional: Negative for chills and fever  HENT: Negative for ear pain and sore throat  Eyes: Negative for pain and visual disturbance  Respiratory: Negative for cough and shortness of breath  Cardiovascular: Negative for chest pain and palpitations  Gastrointestinal: Negative for abdominal pain and vomiting  Genitourinary: Positive for frequency and urgency  Negative for dysuria and hematuria  Musculoskeletal: Negative for arthralgias and back pain  Skin: Negative for color change and rash  Neurological: Negative for seizures and syncope  All other systems reviewed and are negative  Objective:      /86   Pulse 67   Ht 5' 7" (1 702 m)   Wt 97 1 kg (214 lb)   BMI 33 52 kg/m²     Lab Results   Component Value Date    PSA 0 9 03/08/2016          Physical Exam  Vitals reviewed  Constitutional:       Appearance: Normal appearance  He is obese  HENT:      Head: Normocephalic and atraumatic  Eyes:      Pupils: Pupils are equal, round, and reactive to light  Abdominal:      General: Abdomen is flat  Neurological:      Mental Status: He is alert  Cystoscopy     Date/Time 9/23/2021 8:16 AM     Performed by  Jasmin Pandya MD     Authorized by Jasmin Pandya MD      Universal Protocol:  Consent: Written consent obtained  Risks and benefits: risks, benefits and alternatives were discussed  Consent given by: patient  Time out: Immediately prior to procedure a "time out" was called to verify the correct patient, procedure, equipment, support staff and site/side marked as required  Patient understanding: patient states understanding of the procedure being performed  Patient consent: the patient's understanding of the procedure matches consent given  Procedure consent: procedure consent matches procedure scheduled  Patient identity confirmed: verbally with patient        Procedure Details:  Procedure type: cystoscopy    Patient tolerance: Patient tolerated the procedure well with no immediate complications    Additional Procedure Details: A time-out was performed identifying the correct patient site and procedure  A MA chaperone was in the room  A flexible cystoscope was introduced into the urethra  The pendulous urethra was normal   The prostatic urethra showed mild bilateral lobar hypertrophy without a median lobe    The bladder did not have any lesions concerning for malignancy  There were mild trabeculations and no diverticula  The ureteral orifices were in orthotopic position        CT AUG 2021  ABDOMEN     LOWER CHEST:  Minimal left pleural effusion  No infiltrates  Cardiomegaly with multiple pacer leads      LIVER/BILIARY TREE:  Unremarkable      GALLBLADDER:  There are gallstone(s) within the gallbladder, without pericholecystic inflammatory changes      SPLEEN:  Unremarkable      PANCREAS:  Unremarkable      ADRENAL GLANDS:  Unremarkable      KIDNEYS/URETERS:  Multiple bilateral renal calculi, largest on the right measuring 8 mm, and on the left measuring 16 mm  No ureteral calculi  No hydronephrosis  One or more simple renal cyst(s) is noted        STOMACH AND BOWEL:  There is colonic diverticulosis without evidence of acute diverticulitis      APPENDIX:  No findings to suggest appendicitis      ABDOMINOPELVIC CAVITY:  No ascites  No pneumoperitoneum  No lymphadenopathy      VESSELS:  Atherosclerotic changes are present  No evidence of aneurysm      PELVIS     REPRODUCTIVE ORGANS:  Unremarkable for patient's age      URINARY BLADDER:  Unremarkable      ABDOMINAL WALL/INGUINAL REGIONS:  There is a small fat-containing umbilical hernia, unchanged from previous examination      OSSEOUS STRUCTURES:  No acute fracture or destructive osseous lesion  Spinal degenerative changes are noted      IMPRESSION:     Cysts and nonobstructing calculi bilaterally in the kidneys      Colonic diverticulosis without diverticulitis      Cholelithiasis      Orders  Orders Placed This Encounter   Procedures    Cystoscopy     This order was created via procedure documentation    POCT Measure PVR

## 2021-09-23 ENCOUNTER — TELEPHONE (OUTPATIENT)
Dept: UROLOGY | Facility: AMBULATORY SURGERY CENTER | Age: 86
End: 2021-09-23

## 2021-09-23 ENCOUNTER — PROCEDURE VISIT (OUTPATIENT)
Dept: UROLOGY | Facility: CLINIC | Age: 86
End: 2021-09-23
Payer: MEDICARE

## 2021-09-23 VITALS
DIASTOLIC BLOOD PRESSURE: 86 MMHG | SYSTOLIC BLOOD PRESSURE: 124 MMHG | HEART RATE: 67 BPM | WEIGHT: 214 LBS | HEIGHT: 67 IN | BODY MASS INDEX: 33.59 KG/M2

## 2021-09-23 DIAGNOSIS — N40.1 BENIGN PROSTATIC HYPERPLASIA WITH URINARY FREQUENCY: ICD-10-CM

## 2021-09-23 DIAGNOSIS — N32.81 OAB (OVERACTIVE BLADDER): ICD-10-CM

## 2021-09-23 DIAGNOSIS — R31.29 MICROSCOPIC HEMATURIA: ICD-10-CM

## 2021-09-23 DIAGNOSIS — N20.0 NEPHROLITHIASIS: Primary | ICD-10-CM

## 2021-09-23 DIAGNOSIS — R35.0 BENIGN PROSTATIC HYPERPLASIA WITH URINARY FREQUENCY: ICD-10-CM

## 2021-09-23 LAB — POST-VOID RESIDUAL VOLUME, ML POC: 0 ML

## 2021-09-23 PROCEDURE — 52000 CYSTOURETHROSCOPY: CPT | Performed by: UROLOGY

## 2021-09-23 PROCEDURE — 51798 US URINE CAPACITY MEASURE: CPT | Performed by: UROLOGY

## 2021-09-23 RX ORDER — MIRTAZAPINE 15 MG/1
30 TABLET, FILM COATED ORAL EVERY EVENING
Status: ON HOLD | COMMUNITY
Start: 2021-09-16 | End: 2021-12-03 | Stop reason: SDUPTHER

## 2021-09-23 NOTE — ASSESSMENT & PLAN NOTE
Patient with OAB symptoms in addition to some obstructive symptoms  He is emptying his bladder well  I think he may benefit from an anti muscarinic  I would prefer to use beta 3 agonist rather than anticholinergic given all the risks of dementia and other problems, especially in the elderly, with anticholinergics    We will write for Mirabegron low dose and increase if he is tolerating well

## 2021-09-23 NOTE — PATIENT INSTRUCTIONS
Cystoscopy   AMBULATORY CARE:   A cystoscopy  is a procedure to look inside your urethra and bladder using a cystoscope  A cystoscope is a small tube with a light and magnifying camera on the end  The procedure is used to diagnose and treat conditions of the bladder, urethra, or prostate  Your healthcare provider may also do a ureteroscopy during a cystoscopy  A ureteroscopy is a procedure to look inside your ureters and kidneys  Prepare for your cystoscopy: Your healthcare provider will talk to you about how to prepare for your procedure  He or she will tell you what medicines to take and not to take on the day of your procedure  You may need to stop taking medicines such as anticoagulants, aspirin, and ibuprofen several days before your procedure  Your provider may tell you stop eating after midnight the night before your procedure  You may be asked to drink a large amount of liquids before your procedure  Arrange for a ride home after your procedure  You will not be allowed to drive yourself home  During your cystoscopy:   · You may be given general anesthesia to keep you asleep and pain free during your procedure  Your healthcare provider may instead use local anesthesia  It is put into your urethra and bladder  You will not feel pain, but you may be able to feel some pressure during your procedure  With local anesthesia, you may feel burning or need to urinate when the cystoscope is put in and removed  · If you are female, you will be placed on your back and your feet may be placed in stirrups  If you are male, you will be placed on your back or in a sitting position  The cystoscope will be placed through your urethra and into your bladder  The urologist will look at the walls of your urethra as the scope goes through to your bladder  Your bladder will be filled with liquid so your urologist can see the inside of your bladder more clearly   Tools may be inserted through the cystoscope to treat any problems in your urethra or bladder  Your provider may also take a sample of tissue and send it to a lab for tests  After your cystoscopy:  After you are fully awake, you will go home  You may see small amounts of blood in your urine  This is normal  It is also normal to have an increased need to urinate  You may also have burning or mild discomfort in your bladder or kidney area when you urinate  If you had general anesthesia, it may take at least 24 hours before you feel like your usual self  Risks of a cystoscopy: You may bleed more than expected or develop an infection  Your urethra, bladder, or ureters may get damaged during your procedure  You may have abdominal pain  Swelling caused by the cystoscopy may cause a blockage or slow urine flow  Seek care immediately if:   · Your urine turns from pink to red, or you have clots in your urine  · You cannot urinate and your bladder feels full  · You have severe pain  Contact your healthcare provider or urologist if:   · Your pain or burning during urination becomes worse or lasts longer than 1 day  · Your urine stays pink for longer than 1 day  · You have a fever and chills  · You urinate less than usual, or still feel like you have to urinate after you use the bathroom  · You have questions or concerns about your condition or care  Medicines: You may  be given any of the following:  · Antibiotics  help treat or prevent a bacterial infection  · Acetaminophen  decreases pain and fever  It is available without a doctor's order  Ask how much to take and how often to take it  Follow directions  Read the labels of all other medicines you are using to see if they also contain acetaminophen, or ask your doctor or pharmacist  Acetaminophen can cause liver damage if not taken correctly  Do not use more than 4 grams (4,000 milligrams) total of acetaminophen in one day  · Take your medicine as directed    Contact your healthcare provider if you think your medicine is not helping or if you have side effects  Tell him or her if you are allergic to any medicine  Keep a list of the medicines, vitamins, and herbs you take  Include the amounts, and when and why you take them  Bring the list or the pill bottles to follow-up visits  Carry your medicine list with you in case of an emergency  Self-care:   · Drink liquids as directed  Your healthcare provider may recommend that you drink 6 to 8 eight-ounce cups of water every day for 2 days after your procedure  · Apply a warm, damp washcloth over your urethral opening  This may help to relieve discomfort  · Ask when you can return to regular daily activities  Your healthcare provider may recommend that you rest after your procedure  · Do not have sex  until your healthcare provider tells you it is okay  Sex may increase your risk for a urinary tract infection  Follow up with your healthcare provider as directed:  Write down your questions so you remember to ask them during your visits  © Copyright [employer] 2021 Information is for End User's use only and may not be sold, redistributed or otherwise used for commercial purposes  All illustrations and images included in CareNotes® are the copyrighted property of A D [employer] M [employer] Inc  or [employer]ia [employer]   The above information is an  only  It is not intended as medical advice for individual conditions or treatments  Talk to your doctor, nurse or pharmacist before following any medical regimen to see if it is safe and effective for you

## 2021-09-23 NOTE — ASSESSMENT & PLAN NOTE
Patient with long history of stones now with nonobstructing stones in the kidneys  They are of decent size but will not pursue intervention to become an issue as is his wishes

## 2021-09-24 ENCOUNTER — TELEPHONE (OUTPATIENT)
Dept: OTHER | Facility: OTHER | Age: 86
End: 2021-09-24

## 2021-09-24 NOTE — TELEPHONE ENCOUNTER
Pt's wife called office requesting a call back from office, regarding medication needs an authorization in order for insurance to approve order

## 2021-09-27 NOTE — TELEPHONE ENCOUNTER
Clipmarks link below was not linking correctly  New authorization was started after gathering his current pharmacy billing information: BIN#: [id number] - PCN#:  [id number] - GRP#: [id number] - ID#: [id number]  Prior Authorization for Myrbetriq 25mg was requested and initiated via CoverMyMeds  com - Key: [id number] - Rx#: [id number]  Response questions answered and submitted for consideration  Letter of Medical Necessity also accompanied the request   Final determination is expected within 24-72 hours  I spoke with Mrs Lynne Peñaloza and made her aware of the authorization processed and his request status  She verbalized understanding

## 2021-09-28 ENCOUNTER — OFFICE VISIT (OUTPATIENT)
Dept: PHYSICAL THERAPY | Facility: CLINIC | Age: 86
End: 2021-09-28
Payer: MEDICARE

## 2021-09-28 ENCOUNTER — APPOINTMENT (OUTPATIENT)
Dept: LAB | Facility: CLINIC | Age: 86
End: 2021-09-28
Payer: MEDICARE

## 2021-09-28 DIAGNOSIS — N18.32 CHRONIC KIDNEY DISEASE (CKD) STAGE G3B/A3, MODERATELY DECREASED GLOMERULAR FILTRATION RATE (GFR) BETWEEN 30-44 ML/MIN/1.73 SQUARE METER AND ALBUMINURIA CREATININE RATIO GREATER THAN 300 MG/G (HCC): ICD-10-CM

## 2021-09-28 DIAGNOSIS — M25.562 CHRONIC PAIN OF BOTH KNEES: ICD-10-CM

## 2021-09-28 DIAGNOSIS — E78.2 MIXED HYPERLIPIDEMIA: ICD-10-CM

## 2021-09-28 DIAGNOSIS — E03.4 HYPOTHYROIDISM DUE TO ACQUIRED ATROPHY OF THYROID: ICD-10-CM

## 2021-09-28 DIAGNOSIS — G89.29 CHRONIC PAIN OF BOTH KNEES: ICD-10-CM

## 2021-09-28 DIAGNOSIS — M17.0 BILATERAL PRIMARY OSTEOARTHRITIS OF KNEE: Primary | ICD-10-CM

## 2021-09-28 DIAGNOSIS — I50.22 CHRONIC HFREF (HEART FAILURE WITH REDUCED EJECTION FRACTION) (HCC): ICD-10-CM

## 2021-09-28 DIAGNOSIS — M25.561 CHRONIC PAIN OF BOTH KNEES: ICD-10-CM

## 2021-09-28 LAB
ALBUMIN SERPL BCP-MCNC: 3.3 G/DL (ref 3.5–5)
ALP SERPL-CCNC: 99 U/L (ref 46–116)
ALT SERPL W P-5'-P-CCNC: 51 U/L (ref 12–78)
ANION GAP SERPL CALCULATED.3IONS-SCNC: 4 MMOL/L (ref 4–13)
AST SERPL W P-5'-P-CCNC: 90 U/L (ref 5–45)
BASOPHILS # BLD AUTO: 0.06 THOUSANDS/ΜL (ref 0–0.1)
BASOPHILS NFR BLD AUTO: 1 % (ref 0–1)
BILIRUB SERPL-MCNC: 0.6 MG/DL (ref 0.2–1)
BUN SERPL-MCNC: 27 MG/DL (ref 5–25)
CALCIUM ALBUM COR SERPL-MCNC: 9.7 MG/DL (ref 8.3–10.1)
CALCIUM SERPL-MCNC: 9.1 MG/DL (ref 8.3–10.1)
CHLORIDE SERPL-SCNC: 104 MMOL/L (ref 100–108)
CHOLEST SERPL-MCNC: 113 MG/DL (ref 50–200)
CO2 SERPL-SCNC: 32 MMOL/L (ref 21–32)
CREAT SERPL-MCNC: 1.77 MG/DL (ref 0.6–1.3)
EOSINOPHIL # BLD AUTO: 0.4 THOUSAND/ΜL (ref 0–0.61)
EOSINOPHIL NFR BLD AUTO: 6 % (ref 0–6)
ERYTHROCYTE [DISTWIDTH] IN BLOOD BY AUTOMATED COUNT: 15.9 % (ref 11.6–15.1)
GFR SERPL CREATININE-BSD FRML MDRD: 34 ML/MIN/1.73SQ M
GLUCOSE P FAST SERPL-MCNC: 111 MG/DL (ref 65–99)
HCT VFR BLD AUTO: 41.1 % (ref 36.5–49.3)
HDLC SERPL-MCNC: 42 MG/DL
HGB BLD-MCNC: 13 G/DL (ref 12–17)
IMM GRANULOCYTES # BLD AUTO: 0.02 THOUSAND/UL (ref 0–0.2)
IMM GRANULOCYTES NFR BLD AUTO: 0 % (ref 0–2)
LDLC SERPL CALC-MCNC: 46 MG/DL (ref 0–100)
LYMPHOCYTES # BLD AUTO: 1.39 THOUSANDS/ΜL (ref 0.6–4.47)
LYMPHOCYTES NFR BLD AUTO: 20 % (ref 14–44)
MCH RBC QN AUTO: 31.9 PG (ref 26.8–34.3)
MCHC RBC AUTO-ENTMCNC: 31.6 G/DL (ref 31.4–37.4)
MCV RBC AUTO: 101 FL (ref 82–98)
MONOCYTES # BLD AUTO: 1.06 THOUSAND/ΜL (ref 0.17–1.22)
MONOCYTES NFR BLD AUTO: 15 % (ref 4–12)
NEUTROPHILS # BLD AUTO: 4.12 THOUSANDS/ΜL (ref 1.85–7.62)
NEUTS SEG NFR BLD AUTO: 58 % (ref 43–75)
NONHDLC SERPL-MCNC: 71 MG/DL
NRBC BLD AUTO-RTO: 0 /100 WBCS
PLATELET # BLD AUTO: 257 THOUSANDS/UL (ref 149–390)
PMV BLD AUTO: 10.6 FL (ref 8.9–12.7)
POTASSIUM SERPL-SCNC: 3.9 MMOL/L (ref 3.5–5.3)
PROT SERPL-MCNC: 7.2 G/DL (ref 6.4–8.2)
RBC # BLD AUTO: 4.07 MILLION/UL (ref 3.88–5.62)
SODIUM SERPL-SCNC: 140 MMOL/L (ref 136–145)
TRIGL SERPL-MCNC: 127 MG/DL
TSH SERPL DL<=0.05 MIU/L-ACNC: 12.3 UIU/ML (ref 0.36–3.74)
WBC # BLD AUTO: 7.05 THOUSAND/UL (ref 4.31–10.16)

## 2021-09-28 PROCEDURE — 80053 COMPREHEN METABOLIC PANEL: CPT

## 2021-09-28 PROCEDURE — 84443 ASSAY THYROID STIM HORMONE: CPT

## 2021-09-28 PROCEDURE — 36415 COLL VENOUS BLD VENIPUNCTURE: CPT

## 2021-09-28 PROCEDURE — 97112 NEUROMUSCULAR REEDUCATION: CPT

## 2021-09-28 PROCEDURE — 85025 COMPLETE CBC W/AUTO DIFF WBC: CPT

## 2021-09-28 PROCEDURE — 97530 THERAPEUTIC ACTIVITIES: CPT

## 2021-09-28 PROCEDURE — 80061 LIPID PANEL: CPT

## 2021-09-28 NOTE — PROGRESS NOTES
Daily Note     Today's date: 2021  Patient name: Maxi Junior  : 1935  MRN: [id number]  Referring provider: Taz Hennessy PA-C  Dx:   Encounter Diagnosis     ICD-10-CM    1  Bilateral primary osteoarthritis of knee  M17 0    2  Chronic pain of both knees  M25 561     M25 562     G89 29                   Subjective: Pt states that he has been doing his exercises at home  He reports that some of them are painful so he's not sure what he is doing wrong  Pt states that his R knee was really bothering him the past couple of days  Objective: See treatment diary below      Assessment: Tolerated treatment well  No c/o increased knee pain  Educated pt on HEP and encouraged him to perform ex's that were printed  Reviewed ex's that pt was having pain with and they were not on printed sheet so discussed holding off on those until he was able to perform with correct form  Cues needed for mini squat with pt having decreased sx's when performed correctly  Progressed pt with strengthening as able  Pt will benefit from continued therapy        Plan: Continue per plan of care     Diagnosis: Knee OA, Difficulty in walking   Precautions: Fall risk, gets winded quickly, Heart issues   Goals: Improve LE strength, improve balance, improve endurance   Manual Therapy  21   Knee joint mobs        Patellar stretching   NV TJH, PTA TJH, PTA                           Exercise Diary         Therapeutic Exercise        Seated knee ext stretch  10x10 sec 10x10 sec     Heel slides  10x ea      Hamstring stretch seated in chair    3x30 sec ea 3x30 sec ea                                   Neuromuscular Re-education        QS  10x10 sec 10x10 sec  10x10 sec   QS ladders    10x    Hamstring ball rolls supine   10x 10x 15x   bridges   NV  10x   clams  BKFO: RTB 10x BKFO: RTB  15x  GTB: 10x ea   SLR, unassisted R, assisted L  NV   Mini march/hip flexion:     Squats  10x 10x  10x cues for form   Side stepping   2x 2x    Tandem balance   2x30 sec ea     Tandem ambulation        NBOS on airex  3x30 sec 3x30 sec     Wobble board    1 min ea 1 min ea   Standing hip flexor stretch at mirror, can not do at wall  30x ea  Modified     Standing hamstring curl    10x ea 10x ea   HR  15x 20x 20x    Therapeutic Activities        Pt education  HEP reviewed HEP reviewed with printout given HEP discussed answering pts questions HEP reviewed with printout given; answered all questions           Modalities

## 2021-09-29 NOTE — TELEPHONE ENCOUNTER
Mignon Lucero called in to speak with Aysha  She received a call from [facility] and the Myrbetriq 25mg is going t cost $315 for 2 mo  Mignon Lucero would like to know if this is the med you liliam talked about for $20 and if not, can you help

## 2021-09-29 NOTE — TELEPHONE ENCOUNTER
I reached out to Mrs Constance Cullen  We discussed her concerns about being able to afford this medication  I suggested that she at least give the medication a try for 30 days  This should at least provide some sort of cost savings  If the drug works well for him, I then suggested pursuing a conversation with her 's pharmacy plan and discuss a mail order option and also a Tiering Exception  She verbalized understanding

## 2021-09-30 ENCOUNTER — OFFICE VISIT (OUTPATIENT)
Dept: PHYSICAL THERAPY | Facility: CLINIC | Age: 86
End: 2021-09-30
Payer: MEDICARE

## 2021-09-30 DIAGNOSIS — M25.561 CHRONIC PAIN OF BOTH KNEES: ICD-10-CM

## 2021-09-30 DIAGNOSIS — M25.562 CHRONIC PAIN OF BOTH KNEES: ICD-10-CM

## 2021-09-30 DIAGNOSIS — M17.0 BILATERAL PRIMARY OSTEOARTHRITIS OF KNEE: Primary | ICD-10-CM

## 2021-09-30 DIAGNOSIS — G89.29 CHRONIC PAIN OF BOTH KNEES: ICD-10-CM

## 2021-09-30 PROCEDURE — 97530 THERAPEUTIC ACTIVITIES: CPT

## 2021-09-30 PROCEDURE — 97112 NEUROMUSCULAR REEDUCATION: CPT

## 2021-09-30 NOTE — PROGRESS NOTES
Daily Note     Today's date: 2021  Patient name: Jayant Casiano  : 1935  MRN: [id number]  Referring provider: Mily Nation PA-C  Dx:   Encounter Diagnosis     ICD-10-CM    1  Bilateral primary osteoarthritis of knee  M17 0    2  Chronic pain of both knees  M25 561     M25 562     G89 29                   Subjective: Pt states that he was really sore after his last visit, he thinks he just is out of shape and not used to it  Pt reports that he tries to do some of the exercises not on his sheet and stops if it is causing pain  Objective: See treatment diary below      Assessment: Tolerated treatment well  Continued with outlined program focusing on form and technique and taking breaks as needed due to pt being SOB  Fatigue noted throughout but pt is motivated to continued  Discussed only performing ex's on HEP as pt reports pain when he tries different ones  Will continue to monitor and progress as able          Plan: Continue per plan of care     Diagnosis: Knee OA, Difficulty in walking   Precautions: Fall risk, gets winded quickly, Heart issues   Goals: Improve LE strength, improve balance, improve endurance   Manual Therapy 21   Knee joint mobs        Patellar stretching   NV TJH, PTA TJH, PTA   STM to L quad/ITB TJH, PTA                       Exercise Diary         Therapeutic Exercise        Seated knee ext stretch   10x10 sec     Heel slides Unable due to UE       Hamstring stretch seated in chair    3x30 sec ea 3x30 sec ea                                   Neuromuscular Re-education        QS   10x10 sec  10x10 sec   QS ladders 10x difficulty with control   10x    Hamstring ball rolls supine 15x  10x 10x 15x   bridges 15x  NV  10x   clams   BKFO: RTB  15x  GTB: 10x ea   SLR, unassisted R, assisted L     Mini march/hip flexion:     Squats NV  10x  10x cues for form   Side stepping   2x 2x    Tandem balance 2x30 sec ea  2x30 sec ea     Tandem ambulation        NBOS on airex   3x30 sec     Wobble board NV   1 min ea 1 min ea   Standing hip flexor stretch at mirror, can not do at wall    Modified     Standing hamstring curl 15x ea   10x ea 10x ea   HR   20x 20x    Therapeutic Activities        Pt education Discussed performing ex's on the sheets and to hold off on others  HEP reviewed with printout given HEP discussed answering pts questions HEP reviewed with printout given; answered all questions           Modalities

## 2021-10-04 ENCOUNTER — OFFICE VISIT (OUTPATIENT)
Dept: PHYSICAL THERAPY | Facility: CLINIC | Age: 86
End: 2021-10-04
Payer: MEDICARE

## 2021-10-04 DIAGNOSIS — M25.562 CHRONIC PAIN OF BOTH KNEES: ICD-10-CM

## 2021-10-04 DIAGNOSIS — M17.0 BILATERAL PRIMARY OSTEOARTHRITIS OF KNEE: Primary | ICD-10-CM

## 2021-10-04 DIAGNOSIS — M25.561 CHRONIC PAIN OF BOTH KNEES: ICD-10-CM

## 2021-10-04 DIAGNOSIS — G89.29 CHRONIC PAIN OF BOTH KNEES: ICD-10-CM

## 2021-10-04 PROCEDURE — 97110 THERAPEUTIC EXERCISES: CPT

## 2021-10-04 PROCEDURE — 97530 THERAPEUTIC ACTIVITIES: CPT

## 2021-10-05 ENCOUNTER — OFFICE VISIT (OUTPATIENT)
Dept: CARDIOLOGY CLINIC | Facility: CLINIC | Age: 86
End: 2021-10-05
Payer: MEDICARE

## 2021-10-05 VITALS
HEIGHT: 67 IN | WEIGHT: 215.8 LBS | BODY MASS INDEX: 33.87 KG/M2 | SYSTOLIC BLOOD PRESSURE: 140 MMHG | OXYGEN SATURATION: 98 % | DIASTOLIC BLOOD PRESSURE: 68 MMHG | HEART RATE: 61 BPM

## 2021-10-05 DIAGNOSIS — I25.5 ISCHEMIC CARDIOMYOPATHY: ICD-10-CM

## 2021-10-05 DIAGNOSIS — I25.810 CORONARY ARTERY DISEASE INVOLVING CORONARY BYPASS GRAFT OF NATIVE HEART WITHOUT ANGINA PECTORIS: ICD-10-CM

## 2021-10-05 DIAGNOSIS — E78.2 MIXED HYPERLIPIDEMIA: ICD-10-CM

## 2021-10-05 DIAGNOSIS — I47.2 V TACH (HCC): ICD-10-CM

## 2021-10-05 DIAGNOSIS — I10 BENIGN ESSENTIAL HYPERTENSION: ICD-10-CM

## 2021-10-05 DIAGNOSIS — I50.22 CHRONIC SYSTOLIC CONGESTIVE HEART FAILURE (HCC): ICD-10-CM

## 2021-10-05 DIAGNOSIS — N18.32 STAGE 3B CHRONIC KIDNEY DISEASE (HCC): ICD-10-CM

## 2021-10-05 DIAGNOSIS — I50.22 CHRONIC HFREF (HEART FAILURE WITH REDUCED EJECTION FRACTION) (HCC): ICD-10-CM

## 2021-10-05 DIAGNOSIS — I48.0 PAROXYSMAL ATRIAL FIBRILLATION (HCC): Primary | ICD-10-CM

## 2021-10-05 PROCEDURE — 93000 ELECTROCARDIOGRAM COMPLETE: CPT | Performed by: INTERNAL MEDICINE

## 2021-10-05 PROCEDURE — 99215 OFFICE O/P EST HI 40 MIN: CPT | Performed by: INTERNAL MEDICINE

## 2021-10-05 RX ORDER — TORSEMIDE 20 MG/1
TABLET ORAL
Qty: 190 TABLET | Refills: 3
Start: 2021-10-05 | End: 2021-10-12 | Stop reason: SDUPTHER

## 2021-10-07 ENCOUNTER — OFFICE VISIT (OUTPATIENT)
Dept: PHYSICAL THERAPY | Facility: CLINIC | Age: 86
End: 2021-10-07
Payer: MEDICARE

## 2021-10-07 DIAGNOSIS — M17.0 BILATERAL PRIMARY OSTEOARTHRITIS OF KNEE: Primary | ICD-10-CM

## 2021-10-07 DIAGNOSIS — G89.29 CHRONIC PAIN OF BOTH KNEES: ICD-10-CM

## 2021-10-07 DIAGNOSIS — M25.562 CHRONIC PAIN OF BOTH KNEES: ICD-10-CM

## 2021-10-07 DIAGNOSIS — M25.561 CHRONIC PAIN OF BOTH KNEES: ICD-10-CM

## 2021-10-07 PROCEDURE — 97140 MANUAL THERAPY 1/> REGIONS: CPT

## 2021-10-07 PROCEDURE — 97112 NEUROMUSCULAR REEDUCATION: CPT

## 2021-10-11 ENCOUNTER — APPOINTMENT (OUTPATIENT)
Dept: LAB | Facility: CLINIC | Age: 86
End: 2021-10-11
Payer: MEDICARE

## 2021-10-11 ENCOUNTER — EVALUATION (OUTPATIENT)
Dept: PHYSICAL THERAPY | Facility: CLINIC | Age: 86
End: 2021-10-11
Payer: MEDICARE

## 2021-10-11 DIAGNOSIS — G89.29 CHRONIC PAIN OF BOTH KNEES: ICD-10-CM

## 2021-10-11 DIAGNOSIS — I50.22 CHRONIC SYSTOLIC CONGESTIVE HEART FAILURE (HCC): ICD-10-CM

## 2021-10-11 DIAGNOSIS — M25.561 CHRONIC PAIN OF BOTH KNEES: ICD-10-CM

## 2021-10-11 DIAGNOSIS — M17.0 BILATERAL PRIMARY OSTEOARTHRITIS OF KNEE: Primary | ICD-10-CM

## 2021-10-11 DIAGNOSIS — M25.562 CHRONIC PAIN OF BOTH KNEES: ICD-10-CM

## 2021-10-11 LAB
ANION GAP SERPL CALCULATED.3IONS-SCNC: 4 MMOL/L (ref 4–13)
BUN SERPL-MCNC: 35 MG/DL (ref 5–25)
CALCIUM SERPL-MCNC: 9.4 MG/DL (ref 8.3–10.1)
CHLORIDE SERPL-SCNC: 105 MMOL/L (ref 100–108)
CO2 SERPL-SCNC: 32 MMOL/L (ref 21–32)
CREAT SERPL-MCNC: 2 MG/DL (ref 0.6–1.3)
GFR SERPL CREATININE-BSD FRML MDRD: 30 ML/MIN/1.73SQ M
GLUCOSE P FAST SERPL-MCNC: 114 MG/DL (ref 65–99)
POTASSIUM SERPL-SCNC: 4 MMOL/L (ref 3.5–5.3)
SODIUM SERPL-SCNC: 141 MMOL/L (ref 136–145)

## 2021-10-11 PROCEDURE — 97140 MANUAL THERAPY 1/> REGIONS: CPT | Performed by: PHYSICAL THERAPIST

## 2021-10-11 PROCEDURE — 97110 THERAPEUTIC EXERCISES: CPT | Performed by: PHYSICAL THERAPIST

## 2021-10-11 PROCEDURE — 80048 BASIC METABOLIC PNL TOTAL CA: CPT

## 2021-10-11 PROCEDURE — 97530 THERAPEUTIC ACTIVITIES: CPT | Performed by: PHYSICAL THERAPIST

## 2021-10-11 PROCEDURE — 36415 COLL VENOUS BLD VENIPUNCTURE: CPT

## 2021-10-12 ENCOUNTER — OFFICE VISIT (OUTPATIENT)
Dept: CARDIOLOGY CLINIC | Facility: CLINIC | Age: 86
End: 2021-10-12
Payer: MEDICARE

## 2021-10-12 ENCOUNTER — APPOINTMENT (OUTPATIENT)
Dept: PHYSICAL THERAPY | Facility: CLINIC | Age: 86
End: 2021-10-12
Payer: MEDICARE

## 2021-10-12 VITALS
HEART RATE: 60 BPM | BODY MASS INDEX: 33.09 KG/M2 | OXYGEN SATURATION: 95 % | SYSTOLIC BLOOD PRESSURE: 132 MMHG | DIASTOLIC BLOOD PRESSURE: 66 MMHG | HEIGHT: 67 IN | WEIGHT: 210.8 LBS

## 2021-10-12 DIAGNOSIS — I50.22 CHRONIC SYSTOLIC CONGESTIVE HEART FAILURE (HCC): ICD-10-CM

## 2021-10-12 DIAGNOSIS — I10 BENIGN ESSENTIAL HYPERTENSION: ICD-10-CM

## 2021-10-12 DIAGNOSIS — I25.810 CORONARY ARTERY DISEASE INVOLVING CORONARY BYPASS GRAFT OF NATIVE HEART WITHOUT ANGINA PECTORIS: ICD-10-CM

## 2021-10-12 DIAGNOSIS — I50.22 CHRONIC HFREF (HEART FAILURE WITH REDUCED EJECTION FRACTION) (HCC): ICD-10-CM

## 2021-10-12 DIAGNOSIS — I47.2 V TACH (HCC): ICD-10-CM

## 2021-10-12 DIAGNOSIS — I48.0 PAROXYSMAL ATRIAL FIBRILLATION (HCC): Primary | ICD-10-CM

## 2021-10-12 DIAGNOSIS — I25.5 ISCHEMIC CARDIOMYOPATHY: ICD-10-CM

## 2021-10-12 PROCEDURE — 93000 ELECTROCARDIOGRAM COMPLETE: CPT | Performed by: INTERNAL MEDICINE

## 2021-10-12 PROCEDURE — 99214 OFFICE O/P EST MOD 30 MIN: CPT | Performed by: INTERNAL MEDICINE

## 2021-10-12 RX ORDER — TORSEMIDE 20 MG/1
TABLET ORAL
Qty: 190 TABLET | Refills: 3
Start: 2021-10-12 | End: 2021-10-18 | Stop reason: SDUPTHER

## 2021-10-14 ENCOUNTER — OFFICE VISIT (OUTPATIENT)
Dept: PHYSICAL THERAPY | Facility: CLINIC | Age: 86
End: 2021-10-14
Payer: MEDICARE

## 2021-10-14 DIAGNOSIS — M25.562 CHRONIC PAIN OF BOTH KNEES: ICD-10-CM

## 2021-10-14 DIAGNOSIS — M25.561 CHRONIC PAIN OF BOTH KNEES: ICD-10-CM

## 2021-10-14 DIAGNOSIS — G89.29 CHRONIC PAIN OF BOTH KNEES: ICD-10-CM

## 2021-10-14 DIAGNOSIS — M17.0 BILATERAL PRIMARY OSTEOARTHRITIS OF KNEE: Primary | ICD-10-CM

## 2021-10-14 PROCEDURE — 97112 NEUROMUSCULAR REEDUCATION: CPT

## 2021-10-14 PROCEDURE — 97110 THERAPEUTIC EXERCISES: CPT

## 2021-10-18 ENCOUNTER — OFFICE VISIT (OUTPATIENT)
Dept: PHYSICAL THERAPY | Facility: CLINIC | Age: 86
End: 2021-10-18
Payer: MEDICARE

## 2021-10-18 DIAGNOSIS — M25.561 CHRONIC PAIN OF BOTH KNEES: ICD-10-CM

## 2021-10-18 DIAGNOSIS — G89.29 CHRONIC PAIN OF BOTH KNEES: ICD-10-CM

## 2021-10-18 DIAGNOSIS — M25.562 CHRONIC PAIN OF BOTH KNEES: ICD-10-CM

## 2021-10-18 DIAGNOSIS — M17.0 BILATERAL PRIMARY OSTEOARTHRITIS OF KNEE: Primary | ICD-10-CM

## 2021-10-18 DIAGNOSIS — I50.22 CHRONIC HFREF (HEART FAILURE WITH REDUCED EJECTION FRACTION) (HCC): ICD-10-CM

## 2021-10-18 PROCEDURE — 97112 NEUROMUSCULAR REEDUCATION: CPT

## 2021-10-18 PROCEDURE — 97110 THERAPEUTIC EXERCISES: CPT

## 2021-10-18 RX ORDER — TORSEMIDE 20 MG/1
TABLET ORAL
Qty: 190 TABLET | Refills: 3 | Status: SHIPPED | OUTPATIENT
Start: 2021-10-18 | End: 2021-11-22 | Stop reason: SDUPTHER

## 2021-10-19 ENCOUNTER — OFFICE VISIT (OUTPATIENT)
Dept: PHYSICAL THERAPY | Facility: CLINIC | Age: 86
End: 2021-10-19
Payer: MEDICARE

## 2021-10-19 DIAGNOSIS — G89.29 CHRONIC PAIN OF BOTH KNEES: ICD-10-CM

## 2021-10-19 DIAGNOSIS — M25.562 CHRONIC PAIN OF BOTH KNEES: ICD-10-CM

## 2021-10-19 DIAGNOSIS — M17.0 BILATERAL PRIMARY OSTEOARTHRITIS OF KNEE: Primary | ICD-10-CM

## 2021-10-19 DIAGNOSIS — M25.561 CHRONIC PAIN OF BOTH KNEES: ICD-10-CM

## 2021-10-19 PROCEDURE — 97112 NEUROMUSCULAR REEDUCATION: CPT

## 2021-10-19 PROCEDURE — 97110 THERAPEUTIC EXERCISES: CPT

## 2021-10-21 ENCOUNTER — OFFICE VISIT (OUTPATIENT)
Dept: NEPHROLOGY | Facility: CLINIC | Age: 86
End: 2021-10-21
Payer: MEDICARE

## 2021-10-21 ENCOUNTER — TELEPHONE (OUTPATIENT)
Dept: CARDIOLOGY CLINIC | Facility: CLINIC | Age: 86
End: 2021-10-21

## 2021-10-21 ENCOUNTER — APPOINTMENT (OUTPATIENT)
Dept: LAB | Facility: CLINIC | Age: 86
End: 2021-10-21
Payer: MEDICARE

## 2021-10-21 VITALS — WEIGHT: 207 LBS | BODY MASS INDEX: 32.49 KG/M2 | HEIGHT: 67 IN

## 2021-10-21 DIAGNOSIS — N20.0 NEPHROLITHIASIS: ICD-10-CM

## 2021-10-21 DIAGNOSIS — N25.81 SECONDARY HYPERPARATHYROIDISM OF RENAL ORIGIN (HCC): ICD-10-CM

## 2021-10-21 DIAGNOSIS — I12.9 HYPERTENSIVE CHRONIC KIDNEY DISEASE WITH STAGE 1 THROUGH STAGE 4 CHRONIC KIDNEY DISEASE, OR UNSPECIFIED CHRONIC KIDNEY DISEASE: Primary | ICD-10-CM

## 2021-10-21 DIAGNOSIS — I50.22 CHRONIC SYSTOLIC CONGESTIVE HEART FAILURE (HCC): Primary | ICD-10-CM

## 2021-10-21 DIAGNOSIS — E78.5 DYSLIPIDEMIA: ICD-10-CM

## 2021-10-21 DIAGNOSIS — I50.22 CHRONIC SYSTOLIC CONGESTIVE HEART FAILURE (HCC): ICD-10-CM

## 2021-10-21 DIAGNOSIS — R31.29 MICROSCOPIC HEMATURIA: ICD-10-CM

## 2021-10-21 DIAGNOSIS — E55.9 VITAMIN D DEFICIENCY: ICD-10-CM

## 2021-10-21 DIAGNOSIS — R60.0 LOCALIZED EDEMA: ICD-10-CM

## 2021-10-21 DIAGNOSIS — I47.2 V TACH (HCC): Primary | ICD-10-CM

## 2021-10-21 DIAGNOSIS — N18.32 STAGE 3B CHRONIC KIDNEY DISEASE (HCC): ICD-10-CM

## 2021-10-21 DIAGNOSIS — I48.0 PAROXYSMAL ATRIAL FIBRILLATION (HCC): ICD-10-CM

## 2021-10-21 LAB
ANION GAP SERPL CALCULATED.3IONS-SCNC: 4 MMOL/L (ref 4–13)
BUN SERPL-MCNC: 27 MG/DL (ref 5–25)
CALCIUM SERPL-MCNC: 9.2 MG/DL (ref 8.3–10.1)
CHLORIDE SERPL-SCNC: 103 MMOL/L (ref 100–108)
CO2 SERPL-SCNC: 31 MMOL/L (ref 21–32)
CREAT SERPL-MCNC: 2.02 MG/DL (ref 0.6–1.3)
GFR SERPL CREATININE-BSD FRML MDRD: 29 ML/MIN/1.73SQ M
GLUCOSE P FAST SERPL-MCNC: 109 MG/DL (ref 65–99)
MAGNESIUM SERPL-MCNC: 2.3 MG/DL (ref 1.6–2.6)
POTASSIUM SERPL-SCNC: 4.5 MMOL/L (ref 3.5–5.3)
SODIUM SERPL-SCNC: 138 MMOL/L (ref 136–145)

## 2021-10-21 PROCEDURE — 36415 COLL VENOUS BLD VENIPUNCTURE: CPT

## 2021-10-21 PROCEDURE — 99214 OFFICE O/P EST MOD 30 MIN: CPT | Performed by: INTERNAL MEDICINE

## 2021-10-21 PROCEDURE — 83735 ASSAY OF MAGNESIUM: CPT

## 2021-10-21 PROCEDURE — 80048 BASIC METABOLIC PNL TOTAL CA: CPT

## 2021-10-22 ENCOUNTER — TELEPHONE (OUTPATIENT)
Dept: CARDIOLOGY CLINIC | Facility: CLINIC | Age: 86
End: 2021-10-22

## 2021-10-22 DIAGNOSIS — I50.22 CHRONIC SYSTOLIC CONGESTIVE HEART FAILURE (HCC): ICD-10-CM

## 2021-10-22 RX ORDER — METOPROLOL SUCCINATE 50 MG/1
50 TABLET, EXTENDED RELEASE ORAL SEE ADMIN INSTRUCTIONS
Qty: 270 TABLET | Refills: 3 | Status: SHIPPED | OUTPATIENT
Start: 2021-10-22 | End: 2021-11-11 | Stop reason: HOSPADM

## 2021-10-25 ENCOUNTER — OFFICE VISIT (OUTPATIENT)
Dept: PHYSICAL THERAPY | Facility: CLINIC | Age: 86
End: 2021-10-25
Payer: MEDICARE

## 2021-10-25 DIAGNOSIS — M17.0 BILATERAL PRIMARY OSTEOARTHRITIS OF KNEE: Primary | ICD-10-CM

## 2021-10-25 DIAGNOSIS — M25.561 CHRONIC PAIN OF BOTH KNEES: ICD-10-CM

## 2021-10-25 DIAGNOSIS — I48.0 PAROXYSMAL ATRIAL FIBRILLATION (HCC): ICD-10-CM

## 2021-10-25 DIAGNOSIS — M25.562 CHRONIC PAIN OF BOTH KNEES: ICD-10-CM

## 2021-10-25 DIAGNOSIS — G89.29 CHRONIC PAIN OF BOTH KNEES: ICD-10-CM

## 2021-10-25 PROCEDURE — 97112 NEUROMUSCULAR REEDUCATION: CPT

## 2021-10-25 PROCEDURE — 97530 THERAPEUTIC ACTIVITIES: CPT

## 2021-10-25 PROCEDURE — 97110 THERAPEUTIC EXERCISES: CPT

## 2021-10-25 RX ORDER — AMIODARONE HYDROCHLORIDE 200 MG/1
200 TABLET ORAL 2 TIMES DAILY
Qty: 180 TABLET | Refills: 3 | Status: SHIPPED | OUTPATIENT
Start: 2021-10-25 | End: 2021-11-11 | Stop reason: HOSPADM

## 2021-10-28 ENCOUNTER — OFFICE VISIT (OUTPATIENT)
Dept: PHYSICAL THERAPY | Facility: CLINIC | Age: 86
End: 2021-10-28
Payer: MEDICARE

## 2021-10-28 DIAGNOSIS — M25.562 CHRONIC PAIN OF BOTH KNEES: ICD-10-CM

## 2021-10-28 DIAGNOSIS — M25.561 CHRONIC PAIN OF BOTH KNEES: ICD-10-CM

## 2021-10-28 DIAGNOSIS — G89.29 CHRONIC PAIN OF BOTH KNEES: ICD-10-CM

## 2021-10-28 DIAGNOSIS — M17.0 BILATERAL PRIMARY OSTEOARTHRITIS OF KNEE: Primary | ICD-10-CM

## 2021-10-28 PROCEDURE — 97140 MANUAL THERAPY 1/> REGIONS: CPT

## 2021-10-28 PROCEDURE — 97112 NEUROMUSCULAR REEDUCATION: CPT

## 2021-10-28 PROCEDURE — 97110 THERAPEUTIC EXERCISES: CPT

## 2021-11-01 ENCOUNTER — OFFICE VISIT (OUTPATIENT)
Dept: PHYSICAL THERAPY | Facility: CLINIC | Age: 86
End: 2021-11-01
Payer: MEDICARE

## 2021-11-01 DIAGNOSIS — M17.0 BILATERAL PRIMARY OSTEOARTHRITIS OF KNEE: Primary | ICD-10-CM

## 2021-11-01 DIAGNOSIS — M25.562 CHRONIC PAIN OF BOTH KNEES: ICD-10-CM

## 2021-11-01 DIAGNOSIS — G89.29 CHRONIC PAIN OF BOTH KNEES: ICD-10-CM

## 2021-11-01 DIAGNOSIS — M25.561 CHRONIC PAIN OF BOTH KNEES: ICD-10-CM

## 2021-11-01 PROCEDURE — 97140 MANUAL THERAPY 1/> REGIONS: CPT

## 2021-11-01 PROCEDURE — 97112 NEUROMUSCULAR REEDUCATION: CPT

## 2021-11-01 PROCEDURE — 97530 THERAPEUTIC ACTIVITIES: CPT

## 2021-11-04 ENCOUNTER — OFFICE VISIT (OUTPATIENT)
Dept: PHYSICAL THERAPY | Facility: CLINIC | Age: 86
End: 2021-11-04
Payer: MEDICARE

## 2021-11-04 DIAGNOSIS — M17.0 BILATERAL PRIMARY OSTEOARTHRITIS OF KNEE: Primary | ICD-10-CM

## 2021-11-04 DIAGNOSIS — M25.562 CHRONIC PAIN OF BOTH KNEES: ICD-10-CM

## 2021-11-04 DIAGNOSIS — G89.29 CHRONIC PAIN OF BOTH KNEES: ICD-10-CM

## 2021-11-04 DIAGNOSIS — M25.561 CHRONIC PAIN OF BOTH KNEES: ICD-10-CM

## 2021-11-04 PROCEDURE — 97140 MANUAL THERAPY 1/> REGIONS: CPT

## 2021-11-04 PROCEDURE — 97110 THERAPEUTIC EXERCISES: CPT

## 2021-11-04 PROCEDURE — 97112 NEUROMUSCULAR REEDUCATION: CPT

## 2021-11-05 PROCEDURE — 1124F ACP DISCUSS-NO DSCNMKR DOCD: CPT | Performed by: INTERNAL MEDICINE

## 2021-11-06 ENCOUNTER — HOSPITAL ENCOUNTER (INPATIENT)
Facility: HOSPITAL | Age: 86
LOS: 5 days | Discharge: HOME | DRG: 287 | End: 2021-11-11
Attending: GENERAL PRACTICE | Admitting: GENERAL PRACTICE
Payer: MEDICARE

## 2021-11-06 DIAGNOSIS — I25.5 ISCHEMIC CARDIOMYOPATHY: ICD-10-CM

## 2021-11-06 DIAGNOSIS — N18.30 CHRONIC KIDNEY DISEASE, STAGE III (MODERATE) (HCC): ICD-10-CM

## 2021-11-06 DIAGNOSIS — I47.2 V TACH (HCC): ICD-10-CM

## 2021-11-06 DIAGNOSIS — I47.2 V-TACH (HCC): Primary | ICD-10-CM

## 2021-11-06 DIAGNOSIS — I50.22 CHRONIC HFREF (HEART FAILURE WITH REDUCED EJECTION FRACTION) (HCC): ICD-10-CM

## 2021-11-06 DIAGNOSIS — N18.32 STAGE 3B CHRONIC KIDNEY DISEASE (HCC): ICD-10-CM

## 2021-11-06 LAB
ALBUMIN SERPL BCP-MCNC: 3 G/DL (ref 3.5–5)
ALP SERPL-CCNC: 92 U/L (ref 46–116)
ALT SERPL W P-5'-P-CCNC: 44 U/L (ref 12–78)
ANION GAP SERPL CALCULATED.3IONS-SCNC: 5 MMOL/L (ref 4–13)
ANION GAP SERPL CALCULATED.3IONS-SCNC: 6 MMOL/L (ref 4–13)
AST SERPL W P-5'-P-CCNC: 59 U/L (ref 5–45)
ATRIAL RATE: 60 BPM
BASOPHILS # BLD AUTO: 0.07 THOUSANDS/ΜL (ref 0–0.1)
BASOPHILS NFR BLD AUTO: 1 % (ref 0–1)
BILIRUB SERPL-MCNC: 0.71 MG/DL (ref 0.2–1)
BUN SERPL-MCNC: 24 MG/DL (ref 5–25)
BUN SERPL-MCNC: 24 MG/DL (ref 5–25)
CALCIUM ALBUM COR SERPL-MCNC: 10.1 MG/DL (ref 8.3–10.1)
CALCIUM SERPL-MCNC: 9 MG/DL (ref 8.3–10.1)
CALCIUM SERPL-MCNC: 9.3 MG/DL (ref 8.3–10.1)
CHLORIDE SERPL-SCNC: 104 MMOL/L (ref 100–108)
CHLORIDE SERPL-SCNC: 105 MMOL/L (ref 100–108)
CO2 SERPL-SCNC: 29 MMOL/L (ref 21–32)
CO2 SERPL-SCNC: 31 MMOL/L (ref 21–32)
CREAT SERPL-MCNC: 1.84 MG/DL (ref 0.6–1.3)
CREAT SERPL-MCNC: 1.99 MG/DL (ref 0.6–1.3)
EOSINOPHIL # BLD AUTO: 0.38 THOUSAND/ΜL (ref 0–0.61)
EOSINOPHIL NFR BLD AUTO: 5 % (ref 0–6)
ERYTHROCYTE [DISTWIDTH] IN BLOOD BY AUTOMATED COUNT: 15 % (ref 11.6–15.1)
GFR SERPL CREATININE-BSD FRML MDRD: 30 ML/MIN/1.73SQ M
GFR SERPL CREATININE-BSD FRML MDRD: 33 ML/MIN/1.73SQ M
GLUCOSE SERPL-MCNC: 108 MG/DL (ref 65–140)
GLUCOSE SERPL-MCNC: 113 MG/DL (ref 65–140)
HCT VFR BLD AUTO: 38.3 % (ref 36.5–49.3)
HGB BLD-MCNC: 12.3 G/DL (ref 12–17)
IMM GRANULOCYTES # BLD AUTO: 0.03 THOUSAND/UL (ref 0–0.2)
IMM GRANULOCYTES NFR BLD AUTO: 0 % (ref 0–2)
LYMPHOCYTES # BLD AUTO: 1.27 THOUSANDS/ΜL (ref 0.6–4.47)
LYMPHOCYTES NFR BLD AUTO: 18 % (ref 14–44)
MAGNESIUM SERPL-MCNC: 2.4 MG/DL (ref 1.6–2.6)
MAGNESIUM SERPL-MCNC: 2.5 MG/DL (ref 1.6–2.6)
MCH RBC QN AUTO: 31.5 PG (ref 26.8–34.3)
MCHC RBC AUTO-ENTMCNC: 32.1 G/DL (ref 31.4–37.4)
MCV RBC AUTO: 98 FL (ref 82–98)
MONOCYTES # BLD AUTO: 1.22 THOUSAND/ΜL (ref 0.17–1.22)
MONOCYTES NFR BLD AUTO: 17 % (ref 4–12)
NEUTROPHILS # BLD AUTO: 4.2 THOUSANDS/ΜL (ref 1.85–7.62)
NEUTS SEG NFR BLD AUTO: 59 % (ref 43–75)
NRBC BLD AUTO-RTO: 0 /100 WBCS
P AXIS: 112 DEGREES
PHOSPHATE SERPL-MCNC: 3.1 MG/DL (ref 2.3–4.1)
PLATELET # BLD AUTO: 240 THOUSANDS/UL (ref 149–390)
PMV BLD AUTO: 10.1 FL (ref 8.9–12.7)
POTASSIUM SERPL-SCNC: 3.7 MMOL/L (ref 3.5–5.3)
POTASSIUM SERPL-SCNC: 4.1 MMOL/L (ref 3.5–5.3)
PR INTERVAL: 148 MS
PROT SERPL-MCNC: 6.8 G/DL (ref 6.4–8.2)
QRS AXIS: 248 DEGREES
QRSD INTERVAL: 184 MS
QT INTERVAL: 536 MS
QTC INTERVAL: 536 MS
RBC # BLD AUTO: 3.9 MILLION/UL (ref 3.88–5.62)
SODIUM SERPL-SCNC: 140 MMOL/L (ref 136–145)
SODIUM SERPL-SCNC: 140 MMOL/L (ref 136–145)
T WAVE AXIS: 23 DEGREES
VENTRICULAR RATE: 60 BPM
WBC # BLD AUTO: 7.17 THOUSAND/UL (ref 4.31–10.16)

## 2021-11-06 PROCEDURE — 93005 ELECTROCARDIOGRAM TRACING: CPT

## 2021-11-06 PROCEDURE — 80048 BASIC METABOLIC PNL TOTAL CA: CPT | Performed by: INTERNAL MEDICINE

## 2021-11-06 PROCEDURE — 83735 ASSAY OF MAGNESIUM: CPT | Performed by: INTERNAL MEDICINE

## 2021-11-06 PROCEDURE — 80053 COMPREHEN METABOLIC PANEL: CPT | Performed by: GENERAL PRACTICE

## 2021-11-06 PROCEDURE — 99223 1ST HOSP IP/OBS HIGH 75: CPT | Performed by: INTERNAL MEDICINE

## 2021-11-06 PROCEDURE — 84100 ASSAY OF PHOSPHORUS: CPT | Performed by: GENERAL PRACTICE

## 2021-11-06 PROCEDURE — 99223 1ST HOSP IP/OBS HIGH 75: CPT | Performed by: GENERAL PRACTICE

## 2021-11-06 PROCEDURE — 93010 ELECTROCARDIOGRAM REPORT: CPT | Performed by: INTERNAL MEDICINE

## 2021-11-06 PROCEDURE — 85025 COMPLETE CBC W/AUTO DIFF WBC: CPT | Performed by: GENERAL PRACTICE

## 2021-11-06 PROCEDURE — 99222 1ST HOSP IP/OBS MODERATE 55: CPT | Performed by: INTERNAL MEDICINE

## 2021-11-06 PROCEDURE — 83735 ASSAY OF MAGNESIUM: CPT | Performed by: GENERAL PRACTICE

## 2021-11-06 RX ORDER — ATORVASTATIN CALCIUM 40 MG/1
40 TABLET, FILM COATED ORAL
Status: DISCONTINUED | OUTPATIENT
Start: 2021-11-06 | End: 2021-11-11 | Stop reason: HOSPADM

## 2021-11-06 RX ORDER — MEXILETINE HYDROCHLORIDE 150 MG/1
150 CAPSULE ORAL 2 TIMES DAILY
Status: DISCONTINUED | OUTPATIENT
Start: 2021-11-06 | End: 2021-11-06

## 2021-11-06 RX ORDER — METOPROLOL SUCCINATE 100 MG/1
100 TABLET, EXTENDED RELEASE ORAL DAILY
Status: DISCONTINUED | OUTPATIENT
Start: 2021-11-07 | End: 2021-11-06

## 2021-11-06 RX ORDER — POTASSIUM CHLORIDE 20 MEQ/1
40 TABLET, EXTENDED RELEASE ORAL ONCE
Status: COMPLETED | OUTPATIENT
Start: 2021-11-06 | End: 2021-11-06

## 2021-11-06 RX ORDER — TAMSULOSIN HYDROCHLORIDE 0.4 MG/1
0.4 CAPSULE ORAL
Status: DISCONTINUED | OUTPATIENT
Start: 2021-11-06 | End: 2021-11-11 | Stop reason: HOSPADM

## 2021-11-06 RX ORDER — OXYBUTYNIN CHLORIDE 5 MG/1
5 TABLET, EXTENDED RELEASE ORAL DAILY
Refills: 6 | Status: DISCONTINUED | OUTPATIENT
Start: 2021-11-07 | End: 2021-11-11 | Stop reason: HOSPADM

## 2021-11-06 RX ORDER — MEXILETINE HYDROCHLORIDE 150 MG/1
150 CAPSULE ORAL EVERY 8 HOURS SCHEDULED
Status: DISCONTINUED | OUTPATIENT
Start: 2021-11-06 | End: 2021-11-11 | Stop reason: HOSPADM

## 2021-11-06 RX ORDER — ASPIRIN 81 MG/1
81 TABLET ORAL DAILY
Status: DISCONTINUED | OUTPATIENT
Start: 2021-11-07 | End: 2021-11-11 | Stop reason: HOSPADM

## 2021-11-06 RX ORDER — POTASSIUM CITRATE 10 MEQ/1
10 TABLET, EXTENDED RELEASE ORAL 2 TIMES DAILY
Status: DISCONTINUED | OUTPATIENT
Start: 2021-11-06 | End: 2021-11-09

## 2021-11-06 RX ORDER — ISOSORBIDE MONONITRATE 30 MG/1
30 TABLET, EXTENDED RELEASE ORAL DAILY
Status: DISCONTINUED | OUTPATIENT
Start: 2021-11-07 | End: 2021-11-06

## 2021-11-06 RX ORDER — MIRTAZAPINE 30 MG/1
30 TABLET, FILM COATED ORAL EVERY EVENING
Status: DISCONTINUED | OUTPATIENT
Start: 2021-11-06 | End: 2021-11-11 | Stop reason: HOSPADM

## 2021-11-06 RX ORDER — METOPROLOL SUCCINATE 50 MG/1
50 TABLET, EXTENDED RELEASE ORAL
Status: DISCONTINUED | OUTPATIENT
Start: 2021-11-06 | End: 2021-11-06

## 2021-11-06 RX ORDER — TORSEMIDE 20 MG/1
60 TABLET ORAL
Status: DISCONTINUED | OUTPATIENT
Start: 2021-11-06 | End: 2021-11-06

## 2021-11-06 RX ORDER — METOPROLOL SUCCINATE 50 MG/1
50 TABLET, EXTENDED RELEASE ORAL
Status: DISCONTINUED | OUTPATIENT
Start: 2021-11-06 | End: 2021-11-11 | Stop reason: HOSPADM

## 2021-11-06 RX ORDER — ISOSORBIDE MONONITRATE 30 MG/1
30 TABLET, EXTENDED RELEASE ORAL DAILY
Status: DISCONTINUED | OUTPATIENT
Start: 2021-11-07 | End: 2021-11-11 | Stop reason: HOSPADM

## 2021-11-06 RX ORDER — METOPROLOL TARTRATE 5 MG/5ML
5 INJECTION INTRAVENOUS ONCE
Status: COMPLETED | OUTPATIENT
Start: 2021-11-06 | End: 2021-11-06

## 2021-11-06 RX ORDER — AMIODARONE HYDROCHLORIDE 200 MG/1
200 TABLET ORAL 2 TIMES DAILY WITH MEALS
Status: DISCONTINUED | OUTPATIENT
Start: 2021-11-06 | End: 2021-11-06

## 2021-11-06 RX ORDER — TORSEMIDE 20 MG/1
40 TABLET ORAL
Status: DISCONTINUED | OUTPATIENT
Start: 2021-11-06 | End: 2021-11-07

## 2021-11-06 RX ORDER — ACETAMINOPHEN 325 MG/1
650 TABLET ORAL EVERY 6 HOURS PRN
Status: DISCONTINUED | OUTPATIENT
Start: 2021-11-06 | End: 2021-11-11 | Stop reason: HOSPADM

## 2021-11-06 RX ORDER — METOPROLOL SUCCINATE 100 MG/1
100 TABLET, EXTENDED RELEASE ORAL DAILY
Status: DISCONTINUED | OUTPATIENT
Start: 2021-11-07 | End: 2021-11-11 | Stop reason: HOSPADM

## 2021-11-06 RX ADMIN — ATORVASTATIN CALCIUM 40 MG: 40 TABLET, FILM COATED ORAL at 17:17

## 2021-11-06 RX ADMIN — DEXTROSE 150 MG: 50 INJECTION, SOLUTION INTRAVENOUS at 20:35

## 2021-11-06 RX ADMIN — TORSEMIDE 40 MG: 20 TABLET ORAL at 17:17

## 2021-11-06 RX ADMIN — AMIODARONE HYDROCHLORIDE 1 MG/MIN: 50 INJECTION, SOLUTION INTRAVENOUS at 20:35

## 2021-11-06 RX ADMIN — MEXILETINE HYDROCHLORIDE 150 MG: 150 CAPSULE ORAL at 21:05

## 2021-11-06 RX ADMIN — POTASSIUM CITRATE 10 MEQ: 10 TABLET, EXTENDED RELEASE ORAL at 20:57

## 2021-11-06 RX ADMIN — METOPROLOL SUCCINATE 50 MG: 50 TABLET, EXTENDED RELEASE ORAL at 21:05

## 2021-11-06 RX ADMIN — TAMSULOSIN HYDROCHLORIDE 0.4 MG: 0.4 CAPSULE ORAL at 17:17

## 2021-11-06 RX ADMIN — ENOXAPARIN SODIUM 30 MG: 30 INJECTION SUBCUTANEOUS at 14:42

## 2021-11-06 RX ADMIN — MIRTAZAPINE 30 MG: 30 TABLET, FILM COATED ORAL at 17:17

## 2021-11-06 RX ADMIN — AMIODARONE HYDROCHLORIDE 200 MG: 200 TABLET ORAL at 17:18

## 2021-11-06 RX ADMIN — AMIODARONE HYDROCHLORIDE 1 MG/MIN: 50 INJECTION, SOLUTION INTRAVENOUS at 20:49

## 2021-11-06 RX ADMIN — MEXILETINE HYDROCHLORIDE 150 MG: 150 CAPSULE ORAL at 14:42

## 2021-11-07 LAB
ANION GAP SERPL CALCULATED.3IONS-SCNC: 3 MMOL/L (ref 4–13)
BUN SERPL-MCNC: 27 MG/DL (ref 5–25)
CALCIUM SERPL-MCNC: 9.2 MG/DL (ref 8.3–10.1)
CHLORIDE SERPL-SCNC: 105 MMOL/L (ref 100–108)
CO2 SERPL-SCNC: 31 MMOL/L (ref 21–32)
CREAT SERPL-MCNC: 1.86 MG/DL (ref 0.6–1.3)
ERYTHROCYTE [DISTWIDTH] IN BLOOD BY AUTOMATED COUNT: 14.8 % (ref 11.6–15.1)
GFR SERPL CREATININE-BSD FRML MDRD: 32 ML/MIN/1.73SQ M
GLUCOSE SERPL-MCNC: 98 MG/DL (ref 65–140)
HCT VFR BLD AUTO: 37.6 % (ref 36.5–49.3)
HGB BLD-MCNC: 12 G/DL (ref 12–17)
MCH RBC QN AUTO: 31.8 PG (ref 26.8–34.3)
MCHC RBC AUTO-ENTMCNC: 31.9 G/DL (ref 31.4–37.4)
MCV RBC AUTO: 100 FL (ref 82–98)
PLATELET # BLD AUTO: 209 THOUSANDS/UL (ref 149–390)
PMV BLD AUTO: 10.5 FL (ref 8.9–12.7)
POTASSIUM SERPL-SCNC: 4.1 MMOL/L (ref 3.5–5.3)
RBC # BLD AUTO: 3.77 MILLION/UL (ref 3.88–5.62)
SODIUM SERPL-SCNC: 139 MMOL/L (ref 136–145)
WBC # BLD AUTO: 6.98 THOUSAND/UL (ref 4.31–10.16)

## 2021-11-07 PROCEDURE — 99232 SBSQ HOSP IP/OBS MODERATE 35: CPT | Performed by: INTERNAL MEDICINE

## 2021-11-07 PROCEDURE — 80048 BASIC METABOLIC PNL TOTAL CA: CPT | Performed by: GENERAL PRACTICE

## 2021-11-07 PROCEDURE — 99233 SBSQ HOSP IP/OBS HIGH 50: CPT | Performed by: INTERNAL MEDICINE

## 2021-11-07 PROCEDURE — 85027 COMPLETE CBC AUTOMATED: CPT | Performed by: GENERAL PRACTICE

## 2021-11-07 RX ORDER — SODIUM CHLORIDE 9 MG/ML
75 INJECTION, SOLUTION INTRAVENOUS CONTINUOUS
Status: DISCONTINUED | OUTPATIENT
Start: 2021-11-08 | End: 2021-11-08

## 2021-11-07 RX ORDER — LORAZEPAM 1 MG/1
1 TABLET ORAL
Status: DISCONTINUED | OUTPATIENT
Start: 2021-11-07 | End: 2021-11-11 | Stop reason: HOSPADM

## 2021-11-07 RX ORDER — TORSEMIDE 20 MG/1
40 TABLET ORAL 2 TIMES DAILY
Status: DISCONTINUED | OUTPATIENT
Start: 2021-11-08 | End: 2021-11-07

## 2021-11-07 RX ORDER — POTASSIUM CHLORIDE 20 MEQ/1
20 TABLET, EXTENDED RELEASE ORAL ONCE
Status: COMPLETED | OUTPATIENT
Start: 2021-11-07 | End: 2021-11-07

## 2021-11-07 RX ADMIN — AMIODARONE HYDROCHLORIDE 0.5 MG/MIN: 50 INJECTION, SOLUTION INTRAVENOUS at 20:08

## 2021-11-07 RX ADMIN — ISOSORBIDE MONONITRATE 30 MG: 30 TABLET, EXTENDED RELEASE ORAL at 08:35

## 2021-11-07 RX ADMIN — POTASSIUM CITRATE 10 MEQ: 10 TABLET, EXTENDED RELEASE ORAL at 20:10

## 2021-11-07 RX ADMIN — ATORVASTATIN CALCIUM 40 MG: 40 TABLET, FILM COATED ORAL at 17:18

## 2021-11-07 RX ADMIN — AMIODARONE HYDROCHLORIDE 0.5 MG/MIN: 50 INJECTION, SOLUTION INTRAVENOUS at 02:36

## 2021-11-07 RX ADMIN — POTASSIUM CITRATE 10 MEQ: 10 TABLET, EXTENDED RELEASE ORAL at 11:54

## 2021-11-07 RX ADMIN — LORAZEPAM 1 MG: 1 TABLET ORAL at 21:08

## 2021-11-07 RX ADMIN — MEXILETINE HYDROCHLORIDE 150 MG: 150 CAPSULE ORAL at 05:06

## 2021-11-07 RX ADMIN — METOPROLOL SUCCINATE 50 MG: 50 TABLET, EXTENDED RELEASE ORAL at 21:06

## 2021-11-07 RX ADMIN — ASPIRIN 81 MG: 81 TABLET, COATED ORAL at 08:33

## 2021-11-07 RX ADMIN — ENOXAPARIN SODIUM 30 MG: 30 INJECTION SUBCUTANEOUS at 08:35

## 2021-11-07 RX ADMIN — METOPROLOL SUCCINATE 100 MG: 100 TABLET, EXTENDED RELEASE ORAL at 08:34

## 2021-11-07 RX ADMIN — Medication 1 TABLET: at 08:33

## 2021-11-07 RX ADMIN — TAMSULOSIN HYDROCHLORIDE 0.4 MG: 0.4 CAPSULE ORAL at 17:18

## 2021-11-07 RX ADMIN — MIRTAZAPINE 30 MG: 30 TABLET, FILM COATED ORAL at 17:18

## 2021-11-07 RX ADMIN — POTASSIUM CHLORIDE 20 MEQ: 1500 TABLET, EXTENDED RELEASE ORAL at 08:34

## 2021-11-07 RX ADMIN — MEXILETINE HYDROCHLORIDE 150 MG: 150 CAPSULE ORAL at 17:18

## 2021-11-07 RX ADMIN — MEXILETINE HYDROCHLORIDE 150 MG: 150 CAPSULE ORAL at 21:08

## 2021-11-07 RX ADMIN — OXYBUTYNIN 5 MG: 5 TABLET, FILM COATED, EXTENDED RELEASE ORAL at 08:34

## 2021-11-07 RX ADMIN — LEVOTHYROXINE SODIUM 175 MCG: 125 TABLET ORAL at 05:06

## 2021-11-08 ENCOUNTER — APPOINTMENT (OUTPATIENT)
Dept: PHYSICAL THERAPY | Facility: CLINIC | Age: 86
End: 2021-11-08
Payer: MEDICARE

## 2021-11-08 LAB
ANION GAP SERPL CALCULATED.3IONS-SCNC: 1 MMOL/L (ref 4–13)
ANION GAP SERPL CALCULATED.3IONS-SCNC: 2 MMOL/L (ref 4–13)
BUN SERPL-MCNC: 24 MG/DL (ref 5–25)
BUN SERPL-MCNC: 28 MG/DL (ref 5–25)
CALCIUM SERPL-MCNC: 9.1 MG/DL (ref 8.3–10.1)
CALCIUM SERPL-MCNC: 9.1 MG/DL (ref 8.3–10.1)
CHLORIDE SERPL-SCNC: 106 MMOL/L (ref 100–108)
CHLORIDE SERPL-SCNC: 107 MMOL/L (ref 100–108)
CO2 SERPL-SCNC: 30 MMOL/L (ref 21–32)
CO2 SERPL-SCNC: 32 MMOL/L (ref 21–32)
CREAT SERPL-MCNC: 1.66 MG/DL (ref 0.6–1.3)
CREAT SERPL-MCNC: 1.88 MG/DL (ref 0.6–1.3)
GFR SERPL CREATININE-BSD FRML MDRD: 32 ML/MIN/1.73SQ M
GFR SERPL CREATININE-BSD FRML MDRD: 37 ML/MIN/1.73SQ M
GLUCOSE SERPL-MCNC: 102 MG/DL (ref 65–140)
GLUCOSE SERPL-MCNC: 109 MG/DL (ref 65–140)
MAGNESIUM SERPL-MCNC: 2.4 MG/DL (ref 1.6–2.6)
POTASSIUM SERPL-SCNC: 4.3 MMOL/L (ref 3.5–5.3)
POTASSIUM SERPL-SCNC: 4.8 MMOL/L (ref 3.5–5.3)
SODIUM SERPL-SCNC: 139 MMOL/L (ref 136–145)
SODIUM SERPL-SCNC: 139 MMOL/L (ref 136–145)

## 2021-11-08 PROCEDURE — C1769 GUIDE WIRE: HCPCS | Performed by: INTERNAL MEDICINE

## 2021-11-08 PROCEDURE — 99232 SBSQ HOSP IP/OBS MODERATE 35: CPT | Performed by: INTERNAL MEDICINE

## 2021-11-08 PROCEDURE — C1894 INTRO/SHEATH, NON-LASER: HCPCS | Performed by: INTERNAL MEDICINE

## 2021-11-08 PROCEDURE — 99222 1ST HOSP IP/OBS MODERATE 55: CPT | Performed by: INTERNAL MEDICINE

## 2021-11-08 PROCEDURE — B2131ZZ FLUOROSCOPY OF MULTIPLE CORONARY ARTERY BYPASS GRAFTS USING LOW OSMOLAR CONTRAST: ICD-10-PCS | Performed by: INTERNAL MEDICINE

## 2021-11-08 PROCEDURE — 80048 BASIC METABOLIC PNL TOTAL CA: CPT | Performed by: NURSE PRACTITIONER

## 2021-11-08 PROCEDURE — 80048 BASIC METABOLIC PNL TOTAL CA: CPT | Performed by: INTERNAL MEDICINE

## 2021-11-08 PROCEDURE — 4A023N7 MEASUREMENT OF CARDIAC SAMPLING AND PRESSURE, LEFT HEART, PERCUTANEOUS APPROACH: ICD-10-PCS | Performed by: INTERNAL MEDICINE

## 2021-11-08 PROCEDURE — 93455 CORONARY ART/GRFT ANGIO S&I: CPT | Performed by: INTERNAL MEDICINE

## 2021-11-08 PROCEDURE — 99152 MOD SED SAME PHYS/QHP 5/>YRS: CPT | Performed by: INTERNAL MEDICINE

## 2021-11-08 PROCEDURE — B2111ZZ FLUOROSCOPY OF MULTIPLE CORONARY ARTERIES USING LOW OSMOLAR CONTRAST: ICD-10-PCS | Performed by: INTERNAL MEDICINE

## 2021-11-08 PROCEDURE — C1760 CLOSURE DEV, VASC: HCPCS | Performed by: INTERNAL MEDICINE

## 2021-11-08 PROCEDURE — 99153 MOD SED SAME PHYS/QHP EA: CPT | Performed by: INTERNAL MEDICINE

## 2021-11-08 PROCEDURE — 83735 ASSAY OF MAGNESIUM: CPT | Performed by: INTERNAL MEDICINE

## 2021-11-08 PROCEDURE — B2181ZZ FLUOROSCOPY OF LEFT INTERNAL MAMMARY BYPASS GRAFT USING LOW OSMOLAR CONTRAST: ICD-10-PCS | Performed by: INTERNAL MEDICINE

## 2021-11-08 DEVICE — PERCLOSE PROGLIDE™ SUTURE-MEDIATED CLOSURE SYSTEM
Type: IMPLANTABLE DEVICE | Status: FUNCTIONAL
Brand: PERCLOSE PROGLIDE™

## 2021-11-08 RX ORDER — SODIUM CHLORIDE 9 MG/ML
75 INJECTION, SOLUTION INTRAVENOUS CONTINUOUS
Status: DISPENSED | OUTPATIENT
Start: 2021-11-08 | End: 2021-11-08

## 2021-11-08 RX ORDER — MIDAZOLAM HYDROCHLORIDE 2 MG/2ML
INJECTION, SOLUTION INTRAMUSCULAR; INTRAVENOUS AS NEEDED
Status: DISCONTINUED | OUTPATIENT
Start: 2021-11-08 | End: 2021-11-08 | Stop reason: HOSPADM

## 2021-11-08 RX ORDER — FENTANYL CITRATE 50 UG/ML
INJECTION, SOLUTION INTRAMUSCULAR; INTRAVENOUS AS NEEDED
Status: DISCONTINUED | OUTPATIENT
Start: 2021-11-08 | End: 2021-11-08 | Stop reason: HOSPADM

## 2021-11-08 RX ORDER — AMIODARONE HYDROCHLORIDE 200 MG/1
200 TABLET ORAL 2 TIMES DAILY WITH MEALS
Status: DISCONTINUED | OUTPATIENT
Start: 2021-11-08 | End: 2021-11-11 | Stop reason: HOSPADM

## 2021-11-08 RX ORDER — LIDOCAINE HYDROCHLORIDE 10 MG/ML
INJECTION, SOLUTION EPIDURAL; INFILTRATION; INTRACAUDAL; PERINEURAL AS NEEDED
Status: DISCONTINUED | OUTPATIENT
Start: 2021-11-08 | End: 2021-11-08 | Stop reason: HOSPADM

## 2021-11-08 RX ADMIN — SODIUM CHLORIDE 75 ML/HR: 0.9 INJECTION, SOLUTION INTRAVENOUS at 04:00

## 2021-11-08 RX ADMIN — SODIUM CHLORIDE 75 ML/HR: 0.9 INJECTION, SOLUTION INTRAVENOUS at 14:26

## 2021-11-08 RX ADMIN — METOPROLOL SUCCINATE 100 MG: 100 TABLET, EXTENDED RELEASE ORAL at 09:23

## 2021-11-08 RX ADMIN — ENOXAPARIN SODIUM 30 MG: 30 INJECTION SUBCUTANEOUS at 09:23

## 2021-11-08 RX ADMIN — ATORVASTATIN CALCIUM 40 MG: 40 TABLET, FILM COATED ORAL at 17:38

## 2021-11-08 RX ADMIN — MEXILETINE HYDROCHLORIDE 150 MG: 150 CAPSULE ORAL at 14:59

## 2021-11-08 RX ADMIN — AMIODARONE HYDROCHLORIDE 200 MG: 200 TABLET ORAL at 17:38

## 2021-11-08 RX ADMIN — MEXILETINE HYDROCHLORIDE 150 MG: 150 CAPSULE ORAL at 21:00

## 2021-11-08 RX ADMIN — POTASSIUM CITRATE 10 MEQ: 10 TABLET, EXTENDED RELEASE ORAL at 20:49

## 2021-11-08 RX ADMIN — POTASSIUM CITRATE 10 MEQ: 10 TABLET, EXTENDED RELEASE ORAL at 09:22

## 2021-11-08 RX ADMIN — LEVOTHYROXINE SODIUM 175 MCG: 125 TABLET ORAL at 05:00

## 2021-11-08 RX ADMIN — TAMSULOSIN HYDROCHLORIDE 0.4 MG: 0.4 CAPSULE ORAL at 17:38

## 2021-11-08 RX ADMIN — LORAZEPAM 1 MG: 1 TABLET ORAL at 20:49

## 2021-11-08 RX ADMIN — Medication 1 TABLET: at 09:22

## 2021-11-08 RX ADMIN — MEXILETINE HYDROCHLORIDE 150 MG: 150 CAPSULE ORAL at 05:28

## 2021-11-08 RX ADMIN — ASPIRIN 81 MG: 81 TABLET, COATED ORAL at 09:22

## 2021-11-08 RX ADMIN — OXYBUTYNIN 5 MG: 5 TABLET, FILM COATED, EXTENDED RELEASE ORAL at 09:22

## 2021-11-08 RX ADMIN — MIRTAZAPINE 30 MG: 30 TABLET, FILM COATED ORAL at 17:38

## 2021-11-08 RX ADMIN — ISOSORBIDE MONONITRATE 30 MG: 30 TABLET, EXTENDED RELEASE ORAL at 09:22

## 2021-11-09 LAB
ANION GAP SERPL CALCULATED.3IONS-SCNC: 0 MMOL/L (ref 4–13)
BUN SERPL-MCNC: 24 MG/DL (ref 5–25)
CALCIUM SERPL-MCNC: 9.3 MG/DL (ref 8.3–10.1)
CHLORIDE SERPL-SCNC: 108 MMOL/L (ref 100–108)
CO2 SERPL-SCNC: 29 MMOL/L (ref 21–32)
CREAT SERPL-MCNC: 1.66 MG/DL (ref 0.6–1.3)
GFR SERPL CREATININE-BSD FRML MDRD: 37 ML/MIN/1.73SQ M
GLUCOSE SERPL-MCNC: 104 MG/DL (ref 65–140)
POTASSIUM SERPL-SCNC: 5.4 MMOL/L (ref 3.5–5.3)
SODIUM SERPL-SCNC: 137 MMOL/L (ref 136–145)

## 2021-11-09 PROCEDURE — 99232 SBSQ HOSP IP/OBS MODERATE 35: CPT | Performed by: INTERNAL MEDICINE

## 2021-11-09 PROCEDURE — 99232 SBSQ HOSP IP/OBS MODERATE 35: CPT | Performed by: FAMILY MEDICINE

## 2021-11-09 PROCEDURE — 99232 SBSQ HOSP IP/OBS MODERATE 35: CPT | Performed by: PHYSICIAN ASSISTANT

## 2021-11-09 PROCEDURE — NC001 PR NO CHARGE: Performed by: PHYSICIAN ASSISTANT

## 2021-11-09 PROCEDURE — 80048 BASIC METABOLIC PNL TOTAL CA: CPT | Performed by: INTERNAL MEDICINE

## 2021-11-09 RX ADMIN — ACETAMINOPHEN 650 MG: 325 TABLET, FILM COATED ORAL at 23:39

## 2021-11-09 RX ADMIN — ENOXAPARIN SODIUM 30 MG: 30 INJECTION SUBCUTANEOUS at 08:30

## 2021-11-09 RX ADMIN — AMIODARONE HYDROCHLORIDE 200 MG: 200 TABLET ORAL at 08:29

## 2021-11-09 RX ADMIN — ATORVASTATIN CALCIUM 40 MG: 40 TABLET, FILM COATED ORAL at 15:43

## 2021-11-09 RX ADMIN — OXYBUTYNIN 5 MG: 5 TABLET, FILM COATED, EXTENDED RELEASE ORAL at 08:26

## 2021-11-09 RX ADMIN — MEXILETINE HYDROCHLORIDE 150 MG: 150 CAPSULE ORAL at 21:13

## 2021-11-09 RX ADMIN — MEXILETINE HYDROCHLORIDE 150 MG: 150 CAPSULE ORAL at 15:44

## 2021-11-09 RX ADMIN — LORAZEPAM 1 MG: 1 TABLET ORAL at 21:16

## 2021-11-09 RX ADMIN — ISOSORBIDE MONONITRATE 30 MG: 30 TABLET, EXTENDED RELEASE ORAL at 08:29

## 2021-11-09 RX ADMIN — POTASSIUM CITRATE 10 MEQ: 10 TABLET, EXTENDED RELEASE ORAL at 08:26

## 2021-11-09 RX ADMIN — LEVOTHYROXINE SODIUM 175 MCG: 125 TABLET ORAL at 05:02

## 2021-11-09 RX ADMIN — MIRTAZAPINE 30 MG: 30 TABLET, FILM COATED ORAL at 17:50

## 2021-11-09 RX ADMIN — ASPIRIN 81 MG: 81 TABLET, COATED ORAL at 08:29

## 2021-11-09 RX ADMIN — METOPROLOL SUCCINATE 50 MG: 50 TABLET, EXTENDED RELEASE ORAL at 21:13

## 2021-11-09 RX ADMIN — MEXILETINE HYDROCHLORIDE 150 MG: 150 CAPSULE ORAL at 05:02

## 2021-11-09 RX ADMIN — AMIODARONE HYDROCHLORIDE 200 MG: 200 TABLET ORAL at 15:43

## 2021-11-09 RX ADMIN — Medication 1 TABLET: at 08:26

## 2021-11-09 RX ADMIN — METOPROLOL SUCCINATE 100 MG: 100 TABLET, EXTENDED RELEASE ORAL at 08:26

## 2021-11-09 RX ADMIN — TAMSULOSIN HYDROCHLORIDE 0.4 MG: 0.4 CAPSULE ORAL at 15:43

## 2021-11-10 ENCOUNTER — TELEPHONE (OUTPATIENT)
Dept: NEPHROLOGY | Facility: CLINIC | Age: 86
End: 2021-11-10

## 2021-11-10 DIAGNOSIS — I12.9 HYPERTENSIVE CHRONIC KIDNEY DISEASE WITH STAGE 1 THROUGH STAGE 4 CHRONIC KIDNEY DISEASE, OR UNSPECIFIED CHRONIC KIDNEY DISEASE: Primary | ICD-10-CM

## 2021-11-10 DIAGNOSIS — N18.32 STAGE 3B CHRONIC KIDNEY DISEASE (HCC): ICD-10-CM

## 2021-11-10 LAB
ANION GAP SERPL CALCULATED.3IONS-SCNC: 4 MMOL/L (ref 4–13)
BUN SERPL-MCNC: 25 MG/DL (ref 5–25)
CALCIUM SERPL-MCNC: 9.2 MG/DL (ref 8.3–10.1)
CHLORIDE SERPL-SCNC: 108 MMOL/L (ref 100–108)
CO2 SERPL-SCNC: 27 MMOL/L (ref 21–32)
CREAT SERPL-MCNC: 1.69 MG/DL (ref 0.6–1.3)
GFR SERPL CREATININE-BSD FRML MDRD: 36 ML/MIN/1.73SQ M
GLUCOSE SERPL-MCNC: 98 MG/DL (ref 65–140)
POTASSIUM SERPL-SCNC: 4.1 MMOL/L (ref 3.5–5.3)
SODIUM SERPL-SCNC: 139 MMOL/L (ref 136–145)

## 2021-11-10 PROCEDURE — 99232 SBSQ HOSP IP/OBS MODERATE 35: CPT | Performed by: INTERNAL MEDICINE

## 2021-11-10 PROCEDURE — 97166 OT EVAL MOD COMPLEX 45 MIN: CPT

## 2021-11-10 PROCEDURE — 99232 SBSQ HOSP IP/OBS MODERATE 35: CPT | Performed by: FAMILY MEDICINE

## 2021-11-10 PROCEDURE — 97163 PT EVAL HIGH COMPLEX 45 MIN: CPT

## 2021-11-10 PROCEDURE — 80048 BASIC METABOLIC PNL TOTAL CA: CPT | Performed by: INTERNAL MEDICINE

## 2021-11-10 PROCEDURE — NC001 PR NO CHARGE: Performed by: PHYSICIAN ASSISTANT

## 2021-11-10 RX ORDER — METOPROLOL TARTRATE 5 MG/5ML
2.5 INJECTION INTRAVENOUS ONCE
Status: COMPLETED | OUTPATIENT
Start: 2021-11-10 | End: 2021-11-10

## 2021-11-10 RX ADMIN — TAMSULOSIN HYDROCHLORIDE 0.4 MG: 0.4 CAPSULE ORAL at 17:18

## 2021-11-10 RX ADMIN — METOPROLOL SUCCINATE 50 MG: 50 TABLET, EXTENDED RELEASE ORAL at 21:34

## 2021-11-10 RX ADMIN — MIRTAZAPINE 30 MG: 30 TABLET, FILM COATED ORAL at 17:18

## 2021-11-10 RX ADMIN — ATORVASTATIN CALCIUM 40 MG: 40 TABLET, FILM COATED ORAL at 17:18

## 2021-11-10 RX ADMIN — METOPROLOL SUCCINATE 100 MG: 100 TABLET, EXTENDED RELEASE ORAL at 08:47

## 2021-11-10 RX ADMIN — ASPIRIN 81 MG: 81 TABLET, COATED ORAL at 08:47

## 2021-11-10 RX ADMIN — OXYBUTYNIN 5 MG: 5 TABLET, FILM COATED, EXTENDED RELEASE ORAL at 08:47

## 2021-11-10 RX ADMIN — ISOSORBIDE MONONITRATE 30 MG: 30 TABLET, EXTENDED RELEASE ORAL at 08:47

## 2021-11-10 RX ADMIN — AMIODARONE HYDROCHLORIDE 200 MG: 200 TABLET ORAL at 08:47

## 2021-11-10 RX ADMIN — MEXILETINE HYDROCHLORIDE 150 MG: 150 CAPSULE ORAL at 05:36

## 2021-11-10 RX ADMIN — ENOXAPARIN SODIUM 30 MG: 30 INJECTION SUBCUTANEOUS at 08:46

## 2021-11-10 RX ADMIN — Medication 1 TABLET: at 08:46

## 2021-11-10 RX ADMIN — LEVOTHYROXINE SODIUM 175 MCG: 125 TABLET ORAL at 05:36

## 2021-11-10 RX ADMIN — AMIODARONE HYDROCHLORIDE 200 MG: 200 TABLET ORAL at 17:18

## 2021-11-10 RX ADMIN — MEXILETINE HYDROCHLORIDE 150 MG: 150 CAPSULE ORAL at 21:34

## 2021-11-10 RX ADMIN — MEXILETINE HYDROCHLORIDE 150 MG: 150 CAPSULE ORAL at 15:10

## 2021-11-10 RX ADMIN — METOROPROLOL TARTRATE 2.5 MG: 5 INJECTION, SOLUTION INTRAVENOUS at 04:19

## 2021-11-11 ENCOUNTER — APPOINTMENT (OUTPATIENT)
Dept: PHYSICAL THERAPY | Facility: CLINIC | Age: 86
End: 2021-11-11
Payer: MEDICARE

## 2021-11-11 VITALS
HEIGHT: 67 IN | RESPIRATION RATE: 18 BRPM | DIASTOLIC BLOOD PRESSURE: 56 MMHG | WEIGHT: 202.38 LBS | TEMPERATURE: 98.6 F | SYSTOLIC BLOOD PRESSURE: 111 MMHG | HEART RATE: 76 BPM | BODY MASS INDEX: 31.76 KG/M2 | OXYGEN SATURATION: 97 %

## 2021-11-11 PROCEDURE — NC001 PR NO CHARGE: Performed by: INTERNAL MEDICINE

## 2021-11-11 PROCEDURE — 99239 HOSP IP/OBS DSCHRG MGMT >30: CPT | Performed by: FAMILY MEDICINE

## 2021-11-11 PROCEDURE — 99232 SBSQ HOSP IP/OBS MODERATE 35: CPT | Performed by: INTERNAL MEDICINE

## 2021-11-11 RX ORDER — METOPROLOL SUCCINATE 50 MG/1
50 TABLET, EXTENDED RELEASE ORAL
Qty: 30 TABLET | Refills: 0 | Status: SHIPPED | OUTPATIENT
Start: 2021-11-11 | End: 2022-02-16 | Stop reason: SDUPTHER

## 2021-11-11 RX ORDER — MEXILETINE HYDROCHLORIDE 150 MG/1
150 CAPSULE ORAL EVERY 8 HOURS SCHEDULED
Qty: 90 CAPSULE | Refills: 0 | Status: SHIPPED | OUTPATIENT
Start: 2021-11-11 | End: 2022-05-18 | Stop reason: SDUPTHER

## 2021-11-11 RX ORDER — TORSEMIDE 20 MG/1
60 TABLET ORAL 2 TIMES DAILY
Status: DISCONTINUED | OUTPATIENT
Start: 2021-11-11 | End: 2021-11-11 | Stop reason: HOSPADM

## 2021-11-11 RX ORDER — AMIODARONE HYDROCHLORIDE 200 MG/1
200 TABLET ORAL 2 TIMES DAILY WITH MEALS
Qty: 60 TABLET | Refills: 0 | Status: SHIPPED | OUTPATIENT
Start: 2021-11-11 | End: 2021-12-04 | Stop reason: HOSPADM

## 2021-11-11 RX ORDER — TORSEMIDE 20 MG/1
40 TABLET ORAL
Status: DISCONTINUED | OUTPATIENT
Start: 2021-11-11 | End: 2021-11-11

## 2021-11-11 RX ORDER — METOPROLOL SUCCINATE 100 MG/1
100 TABLET, EXTENDED RELEASE ORAL DAILY
Qty: 30 TABLET | Refills: 0 | Status: SHIPPED | OUTPATIENT
Start: 2021-11-12 | End: 2022-03-18 | Stop reason: DRUGHIGH

## 2021-11-11 RX ADMIN — ENOXAPARIN SODIUM 30 MG: 30 INJECTION SUBCUTANEOUS at 08:39

## 2021-11-11 RX ADMIN — ASPIRIN 81 MG: 81 TABLET, COATED ORAL at 08:39

## 2021-11-11 RX ADMIN — OXYBUTYNIN 5 MG: 5 TABLET, FILM COATED, EXTENDED RELEASE ORAL at 08:39

## 2021-11-11 RX ADMIN — Medication 1 TABLET: at 08:40

## 2021-11-11 RX ADMIN — MEXILETINE HYDROCHLORIDE 150 MG: 150 CAPSULE ORAL at 05:15

## 2021-11-11 RX ADMIN — ACETAMINOPHEN 650 MG: 325 TABLET, FILM COATED ORAL at 01:29

## 2021-11-11 RX ADMIN — LEVOTHYROXINE SODIUM 175 MCG: 125 TABLET ORAL at 05:15

## 2021-11-11 RX ADMIN — AMIODARONE HYDROCHLORIDE 200 MG: 200 TABLET ORAL at 08:45

## 2021-11-15 ENCOUNTER — APPOINTMENT (OUTPATIENT)
Dept: PHYSICAL THERAPY | Facility: CLINIC | Age: 86
End: 2021-11-15
Payer: MEDICARE

## 2021-11-18 ENCOUNTER — APPOINTMENT (OUTPATIENT)
Dept: PHYSICAL THERAPY | Facility: CLINIC | Age: 86
End: 2021-11-18
Payer: MEDICARE

## 2021-11-19 ENCOUNTER — APPOINTMENT (OUTPATIENT)
Dept: LAB | Facility: CLINIC | Age: 86
End: 2021-11-19
Payer: MEDICARE

## 2021-11-19 ENCOUNTER — TELEPHONE (OUTPATIENT)
Dept: NEPHROLOGY | Facility: CLINIC | Age: 86
End: 2021-11-19

## 2021-11-19 DIAGNOSIS — N18.32 STAGE 3B CHRONIC KIDNEY DISEASE (HCC): ICD-10-CM

## 2021-11-19 DIAGNOSIS — I12.9 HYPERTENSIVE CHRONIC KIDNEY DISEASE WITH STAGE 1 THROUGH STAGE 4 CHRONIC KIDNEY DISEASE, OR UNSPECIFIED CHRONIC KIDNEY DISEASE: ICD-10-CM

## 2021-11-19 DIAGNOSIS — I12.9 HYPERTENSIVE CHRONIC KIDNEY DISEASE WITH STAGE 1 THROUGH STAGE 4 CHRONIC KIDNEY DISEASE, OR UNSPECIFIED CHRONIC KIDNEY DISEASE: Primary | ICD-10-CM

## 2021-11-19 DIAGNOSIS — N20.0 NEPHROLITHIASIS: ICD-10-CM

## 2021-11-19 LAB
ANION GAP SERPL CALCULATED.3IONS-SCNC: 3 MMOL/L (ref 4–13)
BUN SERPL-MCNC: 22 MG/DL (ref 5–25)
CALCIUM SERPL-MCNC: 9.1 MG/DL (ref 8.3–10.1)
CHLORIDE SERPL-SCNC: 105 MMOL/L (ref 100–108)
CO2 SERPL-SCNC: 32 MMOL/L (ref 21–32)
CREAT SERPL-MCNC: 2.02 MG/DL (ref 0.6–1.3)
GFR SERPL CREATININE-BSD FRML MDRD: 29 ML/MIN/1.73SQ M
GLUCOSE SERPL-MCNC: 138 MG/DL (ref 65–140)
POTASSIUM SERPL-SCNC: 4.3 MMOL/L (ref 3.5–5.3)
SODIUM SERPL-SCNC: 140 MMOL/L (ref 136–145)

## 2021-11-19 PROCEDURE — 36415 COLL VENOUS BLD VENIPUNCTURE: CPT

## 2021-11-19 PROCEDURE — 80048 BASIC METABOLIC PNL TOTAL CA: CPT

## 2021-11-22 ENCOUNTER — APPOINTMENT (OUTPATIENT)
Dept: PHYSICAL THERAPY | Facility: CLINIC | Age: 86
End: 2021-11-22
Payer: MEDICARE

## 2021-11-22 ENCOUNTER — DOCUMENTATION (OUTPATIENT)
Dept: NEPHROLOGY | Facility: CLINIC | Age: 86
End: 2021-11-22

## 2021-11-22 ENCOUNTER — OFFICE VISIT (OUTPATIENT)
Dept: CARDIOLOGY CLINIC | Facility: CLINIC | Age: 86
End: 2021-11-22
Payer: MEDICARE

## 2021-11-22 VITALS
SYSTOLIC BLOOD PRESSURE: 124 MMHG | OXYGEN SATURATION: 95 % | HEART RATE: 60 BPM | BODY MASS INDEX: 32.55 KG/M2 | DIASTOLIC BLOOD PRESSURE: 74 MMHG | HEIGHT: 67 IN | WEIGHT: 207.4 LBS

## 2021-11-22 DIAGNOSIS — E78.2 MIXED HYPERLIPIDEMIA: ICD-10-CM

## 2021-11-22 DIAGNOSIS — I48.0 PAROXYSMAL ATRIAL FIBRILLATION (HCC): Primary | ICD-10-CM

## 2021-11-22 DIAGNOSIS — I50.42 CHRONIC COMBINED SYSTOLIC AND DIASTOLIC CONGESTIVE HEART FAILURE (HCC): ICD-10-CM

## 2021-11-22 DIAGNOSIS — I47.2 V TACH (HCC): ICD-10-CM

## 2021-11-22 DIAGNOSIS — I50.22 CHRONIC HFREF (HEART FAILURE WITH REDUCED EJECTION FRACTION) (HCC): ICD-10-CM

## 2021-11-22 DIAGNOSIS — I25.5 ISCHEMIC CARDIOMYOPATHY: ICD-10-CM

## 2021-11-22 DIAGNOSIS — I12.9 HYPERTENSIVE CHRONIC KIDNEY DISEASE WITH STAGE 1 THROUGH STAGE 4 CHRONIC KIDNEY DISEASE, OR UNSPECIFIED CHRONIC KIDNEY DISEASE: ICD-10-CM

## 2021-11-22 DIAGNOSIS — I25.810 CORONARY ARTERY DISEASE INVOLVING CORONARY BYPASS GRAFT OF NATIVE HEART WITHOUT ANGINA PECTORIS: ICD-10-CM

## 2021-11-22 DIAGNOSIS — I10 BENIGN ESSENTIAL HYPERTENSION: ICD-10-CM

## 2021-11-22 PROCEDURE — 99215 OFFICE O/P EST HI 40 MIN: CPT | Performed by: INTERNAL MEDICINE

## 2021-11-22 PROCEDURE — 93000 ELECTROCARDIOGRAM COMPLETE: CPT | Performed by: INTERNAL MEDICINE

## 2021-11-22 RX ORDER — TORSEMIDE 20 MG/1
TABLET ORAL
Qty: 120 TABLET | Refills: 11 | Status: SHIPPED | OUTPATIENT
Start: 2021-11-22 | End: 2021-12-04 | Stop reason: HOSPADM

## 2021-11-22 RX ORDER — LEVOTHYROXINE SODIUM 175 UG/1
175 TABLET ORAL DAILY
COMMUNITY
Start: 2021-10-29

## 2021-11-22 RX ORDER — MIRTAZAPINE 30 MG/1
TABLET, FILM COATED ORAL
COMMUNITY
Start: 2021-10-22 | End: 2021-12-04 | Stop reason: HOSPADM

## 2021-11-23 ENCOUNTER — PREP FOR PROCEDURE (OUTPATIENT)
Dept: CARDIOLOGY CLINIC | Facility: CLINIC | Age: 86
End: 2021-11-23

## 2021-11-23 ENCOUNTER — TELEPHONE (OUTPATIENT)
Dept: CARDIOLOGY CLINIC | Facility: CLINIC | Age: 86
End: 2021-11-23

## 2021-11-23 DIAGNOSIS — I47.2 V TACH (HCC): Primary | ICD-10-CM

## 2021-11-23 DIAGNOSIS — I48.0 PAROXYSMAL ATRIAL FIBRILLATION (HCC): Primary | ICD-10-CM

## 2021-11-24 ENCOUNTER — APPOINTMENT (OUTPATIENT)
Dept: PHYSICAL THERAPY | Facility: CLINIC | Age: 86
End: 2021-11-24
Payer: MEDICARE

## 2021-11-26 ENCOUNTER — APPOINTMENT (OUTPATIENT)
Dept: LAB | Facility: CLINIC | Age: 86
End: 2021-11-26
Payer: MEDICARE

## 2021-11-26 DIAGNOSIS — I12.9 HYPERTENSIVE CHRONIC KIDNEY DISEASE WITH STAGE 1 THROUGH STAGE 4 CHRONIC KIDNEY DISEASE, OR UNSPECIFIED CHRONIC KIDNEY DISEASE: ICD-10-CM

## 2021-11-26 DIAGNOSIS — N20.0 NEPHROLITHIASIS: ICD-10-CM

## 2021-11-26 DIAGNOSIS — I48.0 PAROXYSMAL ATRIAL FIBRILLATION (HCC): ICD-10-CM

## 2021-11-26 DIAGNOSIS — N18.32 STAGE 3B CHRONIC KIDNEY DISEASE (HCC): ICD-10-CM

## 2021-11-26 LAB
ALBUMIN SERPL BCP-MCNC: 3.2 G/DL (ref 3.5–5)
ALP SERPL-CCNC: 92 U/L (ref 46–116)
ALT SERPL W P-5'-P-CCNC: 63 U/L (ref 12–78)
ANION GAP SERPL CALCULATED.3IONS-SCNC: 6 MMOL/L (ref 4–13)
AST SERPL W P-5'-P-CCNC: 74 U/L (ref 5–45)
BASOPHILS # BLD AUTO: 0.06 THOUSANDS/ΜL (ref 0–0.1)
BASOPHILS NFR BLD AUTO: 1 % (ref 0–1)
BILIRUB SERPL-MCNC: 0.82 MG/DL (ref 0.2–1)
BUN SERPL-MCNC: 24 MG/DL (ref 5–25)
CALCIUM ALBUM COR SERPL-MCNC: 10.1 MG/DL (ref 8.3–10.1)
CALCIUM SERPL-MCNC: 9.5 MG/DL (ref 8.3–10.1)
CHLORIDE SERPL-SCNC: 105 MMOL/L (ref 100–108)
CO2 SERPL-SCNC: 31 MMOL/L (ref 21–32)
CREAT SERPL-MCNC: 1.91 MG/DL (ref 0.6–1.3)
EOSINOPHIL # BLD AUTO: 0.39 THOUSAND/ΜL (ref 0–0.61)
EOSINOPHIL NFR BLD AUTO: 6 % (ref 0–6)
ERYTHROCYTE [DISTWIDTH] IN BLOOD BY AUTOMATED COUNT: 14.9 % (ref 11.6–15.1)
GFR SERPL CREATININE-BSD FRML MDRD: 31 ML/MIN/1.73SQ M
GLUCOSE SERPL-MCNC: 125 MG/DL (ref 65–140)
HCT VFR BLD AUTO: 38.4 % (ref 36.5–49.3)
HGB BLD-MCNC: 12.3 G/DL (ref 12–17)
IMM GRANULOCYTES # BLD AUTO: 0.02 THOUSAND/UL (ref 0–0.2)
IMM GRANULOCYTES NFR BLD AUTO: 0 % (ref 0–2)
LYMPHOCYTES # BLD AUTO: 1.27 THOUSANDS/ΜL (ref 0.6–4.47)
LYMPHOCYTES NFR BLD AUTO: 19 % (ref 14–44)
MCH RBC QN AUTO: 31.7 PG (ref 26.8–34.3)
MCHC RBC AUTO-ENTMCNC: 32 G/DL (ref 31.4–37.4)
MCV RBC AUTO: 99 FL (ref 82–98)
MONOCYTES # BLD AUTO: 1.06 THOUSAND/ΜL (ref 0.17–1.22)
MONOCYTES NFR BLD AUTO: 16 % (ref 4–12)
NEUTROPHILS # BLD AUTO: 3.97 THOUSANDS/ΜL (ref 1.85–7.62)
NEUTS SEG NFR BLD AUTO: 58 % (ref 43–75)
NRBC BLD AUTO-RTO: 0 /100 WBCS
PLATELET # BLD AUTO: 258 THOUSANDS/UL (ref 149–390)
PMV BLD AUTO: 11 FL (ref 8.9–12.7)
POTASSIUM SERPL-SCNC: 4.5 MMOL/L (ref 3.5–5.3)
PROT SERPL-MCNC: 6.7 G/DL (ref 6.4–8.2)
RBC # BLD AUTO: 3.88 MILLION/UL (ref 3.88–5.62)
SODIUM SERPL-SCNC: 142 MMOL/L (ref 136–145)
WBC # BLD AUTO: 6.77 THOUSAND/UL (ref 4.31–10.16)

## 2021-11-26 PROCEDURE — 80053 COMPREHEN METABOLIC PANEL: CPT

## 2021-11-26 PROCEDURE — 84166 PROTEIN E-PHORESIS/URINE/CSF: CPT

## 2021-11-26 PROCEDURE — 86334 IMMUNOFIX E-PHORESIS SERUM: CPT

## 2021-11-26 PROCEDURE — 86334 IMMUNOFIX E-PHORESIS SERUM: CPT | Performed by: PATHOLOGY

## 2021-11-26 PROCEDURE — 36415 COLL VENOUS BLD VENIPUNCTURE: CPT

## 2021-11-26 PROCEDURE — 83883 ASSAY NEPHELOMETRY NOT SPEC: CPT

## 2021-11-26 PROCEDURE — 84166 PROTEIN E-PHORESIS/URINE/CSF: CPT | Performed by: PATHOLOGY

## 2021-11-26 PROCEDURE — 84165 PROTEIN E-PHORESIS SERUM: CPT

## 2021-11-26 PROCEDURE — 85025 COMPLETE CBC W/AUTO DIFF WBC: CPT

## 2021-11-26 PROCEDURE — 84165 PROTEIN E-PHORESIS SERUM: CPT | Performed by: PATHOLOGY

## 2021-11-27 LAB
KAPPA LC FREE SER-MCNC: 26.7 MG/L (ref 3.3–19.4)
KAPPA LC FREE/LAMBDA FREE SER: 1.2 {RATIO} (ref 0.26–1.65)
LAMBDA LC FREE SERPL-MCNC: 22.2 MG/L (ref 5.7–26.3)

## 2021-11-29 ENCOUNTER — TELEPHONE (OUTPATIENT)
Dept: NEPHROLOGY | Facility: CLINIC | Age: 86
End: 2021-11-29

## 2021-11-29 ENCOUNTER — APPOINTMENT (OUTPATIENT)
Dept: PHYSICAL THERAPY | Facility: CLINIC | Age: 86
End: 2021-11-29
Payer: MEDICARE

## 2021-11-29 LAB
ALBUMIN SERPL ELPH-MCNC: 3.56 G/DL (ref 3.5–5)
ALBUMIN SERPL ELPH-MCNC: 55.7 % (ref 52–65)
ALBUMIN UR ELPH-MCNC: 100 %
ALPHA1 GLOB MFR UR ELPH: 0 %
ALPHA1 GLOB SERPL ELPH-MCNC: 0.38 G/DL (ref 0.1–0.4)
ALPHA1 GLOB SERPL ELPH-MCNC: 6 % (ref 2.5–5)
ALPHA2 GLOB MFR UR ELPH: 0 %
ALPHA2 GLOB SERPL ELPH-MCNC: 0.95 G/DL (ref 0.4–1.2)
ALPHA2 GLOB SERPL ELPH-MCNC: 14.8 % (ref 7–13)
B-GLOBULIN MFR UR ELPH: 0 %
BETA GLOB ABNORMAL SERPL ELPH-MCNC: 0.42 G/DL (ref 0.4–0.8)
BETA1 GLOB SERPL ELPH-MCNC: 6.6 % (ref 5–13)
BETA2 GLOB SERPL ELPH-MCNC: 5.6 % (ref 2–8)
BETA2+GAMMA GLOB SERPL ELPH-MCNC: 0.36 G/DL (ref 0.2–0.5)
GAMMA GLOB ABNORMAL SERPL ELPH-MCNC: 0.72 G/DL (ref 0.5–1.6)
GAMMA GLOB MFR UR ELPH: 0 %
GAMMA GLOB SERPL ELPH-MCNC: 11.3 % (ref 12–22)
IGG/ALB SER: 1.26 {RATIO} (ref 1.1–1.8)
INTERPRETATION UR IFE-IMP: NORMAL
M PROTEIN 1 MFR SERPL ELPH: 2.8 %
M PROTEIN 1 SERPL ELPH-MCNC: 0.18 G/DL
PROT PATTERN SERPL ELPH-IMP: ABNORMAL
PROT PATTERN UR ELPH-IMP: ABNORMAL
PROT SERPL-MCNC: 6.4 G/DL (ref 6.4–8.2)
PROT UR-MCNC: 19 MG/DL

## 2021-12-01 ENCOUNTER — IMMUNIZATIONS (OUTPATIENT)
Dept: FAMILY MEDICINE CLINIC | Facility: HOSPITAL | Age: 86
End: 2021-12-01

## 2021-12-01 DIAGNOSIS — Z23 ENCOUNTER FOR IMMUNIZATION: Primary | ICD-10-CM

## 2021-12-01 PROCEDURE — 0064A COVID-19 MODERNA VACC 0.25 ML BOOSTER: CPT

## 2021-12-01 PROCEDURE — 91306 COVID-19 MODERNA VACC 0.25 ML BOOSTER: CPT

## 2021-12-02 ENCOUNTER — TELEPHONE (OUTPATIENT)
Dept: HEMATOLOGY ONCOLOGY | Facility: CLINIC | Age: 86
End: 2021-12-02

## 2021-12-02 ENCOUNTER — ANESTHESIA EVENT (OUTPATIENT)
Dept: NON INVASIVE DIAGNOSTICS | Facility: HOSPITAL | Age: 86
End: 2021-12-02
Payer: MEDICARE

## 2021-12-03 ENCOUNTER — HOSPITAL ENCOUNTER (OUTPATIENT)
Facility: HOSPITAL | Age: 86
Setting detail: OUTPATIENT SURGERY
Discharge: HOME | End: 2021-12-04
Attending: INTERNAL MEDICINE | Admitting: INTERNAL MEDICINE
Payer: MEDICARE

## 2021-12-03 ENCOUNTER — ANESTHESIA (OUTPATIENT)
Dept: NON INVASIVE DIAGNOSTICS | Facility: HOSPITAL | Age: 86
End: 2021-12-03
Payer: MEDICARE

## 2021-12-03 DIAGNOSIS — I25.810 CORONARY ARTERY DISEASE INVOLVING CORONARY BYPASS GRAFT OF NATIVE HEART WITHOUT ANGINA PECTORIS: Primary | ICD-10-CM

## 2021-12-03 DIAGNOSIS — I47.2 V TACH (HCC): ICD-10-CM

## 2021-12-03 LAB
ALBUMIN SERPL BCP-MCNC: 3.1 G/DL (ref 3.5–5)
ALP SERPL-CCNC: 95 U/L (ref 46–116)
ALT SERPL W P-5'-P-CCNC: 51 U/L (ref 12–78)
ANION GAP SERPL CALCULATED.3IONS-SCNC: 5 MMOL/L (ref 4–13)
AST SERPL W P-5'-P-CCNC: 63 U/L (ref 5–45)
ATRIAL RATE: 55 BPM
ATRIAL RATE: 65 BPM
ATRIAL RATE: 69 BPM
BASOPHILS # BLD AUTO: 0.04 THOUSANDS/ΜL (ref 0–0.1)
BASOPHILS NFR BLD AUTO: 1 % (ref 0–1)
BILIRUB SERPL-MCNC: 0.68 MG/DL (ref 0.2–1)
BUN SERPL-MCNC: 27 MG/DL (ref 5–25)
CALCIUM ALBUM COR SERPL-MCNC: 9.9 MG/DL (ref 8.3–10.1)
CALCIUM SERPL-MCNC: 9.2 MG/DL (ref 8.3–10.1)
CHLORIDE SERPL-SCNC: 106 MMOL/L (ref 100–108)
CO2 SERPL-SCNC: 30 MMOL/L (ref 21–32)
CREAT SERPL-MCNC: 1.88 MG/DL (ref 0.6–1.3)
EOSINOPHIL # BLD AUTO: 0.43 THOUSAND/ΜL (ref 0–0.61)
EOSINOPHIL NFR BLD AUTO: 7 % (ref 0–6)
ERYTHROCYTE [DISTWIDTH] IN BLOOD BY AUTOMATED COUNT: 14.7 % (ref 11.6–15.1)
FLUAV RNA RESP QL NAA+PROBE: NEGATIVE
FLUBV RNA RESP QL NAA+PROBE: NEGATIVE
GFR SERPL CREATININE-BSD FRML MDRD: 32 ML/MIN/1.73SQ M
GLUCOSE P FAST SERPL-MCNC: 120 MG/DL (ref 65–99)
GLUCOSE SERPL-MCNC: 120 MG/DL (ref 65–140)
HCT VFR BLD AUTO: 39.1 % (ref 36.5–49.3)
HGB BLD-MCNC: 12.7 G/DL (ref 12–17)
IMM GRANULOCYTES # BLD AUTO: 0.01 THOUSAND/UL (ref 0–0.2)
IMM GRANULOCYTES NFR BLD AUTO: 0 % (ref 0–2)
INR PPP: 1.14 (ref 0.84–1.19)
KCT BLD-ACNC: 232 SEC (ref 89–137)
KCT BLD-ACNC: 334 SEC (ref 89–137)
KCT BLD-ACNC: 385 SEC (ref 89–137)
KCT BLD-ACNC: 392 SEC (ref 89–137)
LYMPHOCYTES # BLD AUTO: 1.07 THOUSANDS/ΜL (ref 0.6–4.47)
LYMPHOCYTES NFR BLD AUTO: 17 % (ref 14–44)
MAGNESIUM SERPL-MCNC: 2.3 MG/DL (ref 1.6–2.6)
MCH RBC QN AUTO: 32.1 PG (ref 26.8–34.3)
MCHC RBC AUTO-ENTMCNC: 32.5 G/DL (ref 31.4–37.4)
MCV RBC AUTO: 99 FL (ref 82–98)
MONOCYTES # BLD AUTO: 0.98 THOUSAND/ΜL (ref 0.17–1.22)
MONOCYTES NFR BLD AUTO: 16 % (ref 4–12)
NEUTROPHILS # BLD AUTO: 3.76 THOUSANDS/ΜL (ref 1.85–7.62)
NEUTS SEG NFR BLD AUTO: 59 % (ref 43–75)
NRBC BLD AUTO-RTO: 0 /100 WBCS
P AXIS: 27 DEGREES
P AXIS: 32 DEGREES
PLATELET # BLD AUTO: 197 THOUSANDS/UL (ref 149–390)
PMV BLD AUTO: 10.3 FL (ref 8.9–12.7)
POTASSIUM SERPL-SCNC: 3.8 MMOL/L (ref 3.5–5.3)
PR INTERVAL: 146 MS
PR INTERVAL: 158 MS
PROT SERPL-MCNC: 7 G/DL (ref 6.4–8.2)
PROTHROMBIN TIME: 14.2 SECONDS (ref 11.6–14.5)
QRS AXIS: -72 DEGREES
QRS AXIS: -80 DEGREES
QRS AXIS: 244 DEGREES
QRSD INTERVAL: 178 MS
QRSD INTERVAL: 182 MS
QRSD INTERVAL: 186 MS
QT INTERVAL: 522 MS
QT INTERVAL: 534 MS
QT INTERVAL: 544 MS
QTC INTERVAL: 542 MS
QTC INTERVAL: 544 MS
QTC INTERVAL: 572 MS
RBC # BLD AUTO: 3.96 MILLION/UL (ref 3.88–5.62)
RSV RNA RESP QL NAA+PROBE: NEGATIVE
SARS-COV-2 RNA RESP QL NAA+PROBE: NEGATIVE
SODIUM SERPL-SCNC: 141 MMOL/L (ref 136–145)
SPECIMEN SOURCE: ABNORMAL
T WAVE AXIS: 113 DEGREES
T WAVE AXIS: 127 DEGREES
T WAVE AXIS: 145 DEGREES
VENTRICULAR RATE: 60 BPM
VENTRICULAR RATE: 65 BPM
VENTRICULAR RATE: 69 BPM
WBC # BLD AUTO: 6.29 THOUSAND/UL (ref 4.31–10.16)

## 2021-12-03 PROCEDURE — 93005 ELECTROCARDIOGRAM TRACING: CPT

## 2021-12-03 PROCEDURE — 80053 COMPREHEN METABOLIC PANEL: CPT | Performed by: PHYSICIAN ASSISTANT

## 2021-12-03 PROCEDURE — 93462 L HRT CATH TRNSPTL PUNCTURE: CPT | Performed by: INTERNAL MEDICINE

## 2021-12-03 PROCEDURE — 85347 COAGULATION TIME ACTIVATED: CPT

## 2021-12-03 PROCEDURE — 93654 COMPRE EP EVAL TX VT: CPT | Performed by: INTERNAL MEDICINE

## 2021-12-03 PROCEDURE — 83735 ASSAY OF MAGNESIUM: CPT | Performed by: PHYSICIAN ASSISTANT

## 2021-12-03 PROCEDURE — 93621 COMP EP EVL L PAC&REC C SINS: CPT | Performed by: INTERNAL MEDICINE

## 2021-12-03 PROCEDURE — C1730 CATH, EP, 19 OR FEW ELECT: HCPCS | Performed by: INTERNAL MEDICINE

## 2021-12-03 PROCEDURE — C1894 INTRO/SHEATH, NON-LASER: HCPCS | Performed by: INTERNAL MEDICINE

## 2021-12-03 PROCEDURE — C1893 INTRO/SHEATH, FIXED,NON-PEEL: HCPCS | Performed by: INTERNAL MEDICINE

## 2021-12-03 PROCEDURE — C1731 CATH, EP, 20 OR MORE ELEC: HCPCS | Performed by: INTERNAL MEDICINE

## 2021-12-03 PROCEDURE — 85610 PROTHROMBIN TIME: CPT | Performed by: PHYSICIAN ASSISTANT

## 2021-12-03 PROCEDURE — 93662 INTRACARDIAC ECG (ICE): CPT | Performed by: INTERNAL MEDICINE

## 2021-12-03 PROCEDURE — C1759 CATH, INTRA ECHOCARDIOGRAPHY: HCPCS | Performed by: INTERNAL MEDICINE

## 2021-12-03 PROCEDURE — C1769 GUIDE WIRE: HCPCS | Performed by: INTERNAL MEDICINE

## 2021-12-03 PROCEDURE — 92960 CARDIOVERSION ELECTRIC EXT: CPT | Performed by: INTERNAL MEDICINE

## 2021-12-03 PROCEDURE — C1766 INTRO/SHEATH,STRBLE,NON-PEEL: HCPCS | Performed by: INTERNAL MEDICINE

## 2021-12-03 PROCEDURE — 0241U HB NFCT DS VIR RESP RNA 4 TRGT: CPT | Performed by: PHYSICIAN ASSISTANT

## 2021-12-03 PROCEDURE — 85025 COMPLETE CBC W/AUTO DIFF WBC: CPT | Performed by: PHYSICIAN ASSISTANT

## 2021-12-03 PROCEDURE — 93010 ELECTROCARDIOGRAM REPORT: CPT | Performed by: INTERNAL MEDICINE

## 2021-12-03 RX ORDER — ONDANSETRON 2 MG/ML
INJECTION INTRAMUSCULAR; INTRAVENOUS AS NEEDED
Status: DISCONTINUED | OUTPATIENT
Start: 2021-12-03 | End: 2021-12-03

## 2021-12-03 RX ORDER — METOPROLOL SUCCINATE 50 MG/1
50 TABLET, EXTENDED RELEASE ORAL
Status: DISCONTINUED | OUTPATIENT
Start: 2021-12-03 | End: 2021-12-04 | Stop reason: HOSPADM

## 2021-12-03 RX ORDER — LORAZEPAM 1 MG/1
1 TABLET ORAL EVERY 6 HOURS PRN
Status: DISCONTINUED | OUTPATIENT
Start: 2021-12-03 | End: 2021-12-04 | Stop reason: HOSPADM

## 2021-12-03 RX ORDER — ATORVASTATIN CALCIUM 40 MG/1
40 TABLET, FILM COATED ORAL
Status: DISCONTINUED | OUTPATIENT
Start: 2021-12-03 | End: 2021-12-04 | Stop reason: HOSPADM

## 2021-12-03 RX ORDER — SODIUM CHLORIDE 9 MG/ML
INJECTION, SOLUTION INTRAVENOUS CONTINUOUS PRN
Status: DISCONTINUED | OUTPATIENT
Start: 2021-12-03 | End: 2021-12-03

## 2021-12-03 RX ORDER — ASPIRIN 81 MG/1
81 TABLET, CHEWABLE ORAL DAILY
Status: DISCONTINUED | OUTPATIENT
Start: 2021-12-04 | End: 2021-12-04 | Stop reason: HOSPADM

## 2021-12-03 RX ORDER — AMIODARONE HYDROCHLORIDE 200 MG/1
200 TABLET ORAL 2 TIMES DAILY WITH MEALS
Status: DISCONTINUED | OUTPATIENT
Start: 2021-12-03 | End: 2021-12-04 | Stop reason: HOSPADM

## 2021-12-03 RX ORDER — POTASSIUM CITRATE 10 MEQ/1
10 TABLET, EXTENDED RELEASE ORAL 2 TIMES DAILY
Status: DISCONTINUED | OUTPATIENT
Start: 2021-12-03 | End: 2021-12-04 | Stop reason: HOSPADM

## 2021-12-03 RX ORDER — NITROGLYCERIN 0.4 MG/1
0.4 TABLET SUBLINGUAL
Status: DISCONTINUED | OUTPATIENT
Start: 2021-12-03 | End: 2021-12-04 | Stop reason: HOSPADM

## 2021-12-03 RX ORDER — METOPROLOL SUCCINATE 100 MG/1
100 TABLET, EXTENDED RELEASE ORAL DAILY
Status: DISCONTINUED | OUTPATIENT
Start: 2021-12-04 | End: 2021-12-04 | Stop reason: HOSPADM

## 2021-12-03 RX ORDER — ISOSORBIDE MONONITRATE 30 MG/1
30 TABLET, EXTENDED RELEASE ORAL DAILY
Status: DISCONTINUED | OUTPATIENT
Start: 2021-12-03 | End: 2021-12-04 | Stop reason: HOSPADM

## 2021-12-03 RX ORDER — PROPOFOL 10 MG/ML
INJECTION, EMULSION INTRAVENOUS AS NEEDED
Status: DISCONTINUED | OUTPATIENT
Start: 2021-12-03 | End: 2021-12-03

## 2021-12-03 RX ORDER — MIRTAZAPINE 30 MG/1
30 TABLET, FILM COATED ORAL
Status: DISCONTINUED | OUTPATIENT
Start: 2021-12-03 | End: 2021-12-04 | Stop reason: HOSPADM

## 2021-12-03 RX ORDER — MEXILETINE HYDROCHLORIDE 150 MG/1
150 CAPSULE ORAL EVERY 8 HOURS SCHEDULED
Status: DISCONTINUED | OUTPATIENT
Start: 2021-12-03 | End: 2021-12-04 | Stop reason: HOSPADM

## 2021-12-03 RX ORDER — OXYCODONE HYDROCHLORIDE 5 MG/1
5 TABLET ORAL EVERY 4 HOURS PRN
Status: DISCONTINUED | OUTPATIENT
Start: 2021-12-03 | End: 2021-12-04 | Stop reason: HOSPADM

## 2021-12-03 RX ORDER — ACETAMINOPHEN 325 MG/1
500 TABLET ORAL EVERY 6 HOURS PRN
Status: DISCONTINUED | OUTPATIENT
Start: 2021-12-03 | End: 2021-12-04 | Stop reason: HOSPADM

## 2021-12-03 RX ORDER — HEPARIN SODIUM 1000 [USP'U]/ML
INJECTION, SOLUTION INTRAVENOUS; SUBCUTANEOUS AS NEEDED
Status: DISCONTINUED | OUTPATIENT
Start: 2021-12-03 | End: 2021-12-03 | Stop reason: HOSPADM

## 2021-12-03 RX ORDER — LIDOCAINE HYDROCHLORIDE 10 MG/ML
INJECTION, SOLUTION EPIDURAL; INFILTRATION; INTRACAUDAL; PERINEURAL AS NEEDED
Status: DISCONTINUED | OUTPATIENT
Start: 2021-12-03 | End: 2021-12-03 | Stop reason: HOSPADM

## 2021-12-03 RX ORDER — PROPOFOL 10 MG/ML
INJECTION, EMULSION INTRAVENOUS CONTINUOUS PRN
Status: DISCONTINUED | OUTPATIENT
Start: 2021-12-03 | End: 2021-12-03

## 2021-12-03 RX ORDER — CEFAZOLIN SODIUM 2 G/50ML
SOLUTION INTRAVENOUS AS NEEDED
Status: DISCONTINUED | OUTPATIENT
Start: 2021-12-03 | End: 2021-12-03

## 2021-12-03 RX ORDER — TAMSULOSIN HYDROCHLORIDE 0.4 MG/1
0.4 CAPSULE ORAL
Status: DISCONTINUED | OUTPATIENT
Start: 2021-12-03 | End: 2021-12-04 | Stop reason: HOSPADM

## 2021-12-03 RX ORDER — PROTAMINE SULFATE 10 MG/ML
INJECTION, SOLUTION INTRAVENOUS AS NEEDED
Status: DISCONTINUED | OUTPATIENT
Start: 2021-12-03 | End: 2021-12-03

## 2021-12-03 RX ORDER — TORSEMIDE 20 MG/1
40 TABLET ORAL 2 TIMES DAILY
Status: DISCONTINUED | OUTPATIENT
Start: 2021-12-03 | End: 2021-12-04 | Stop reason: HOSPADM

## 2021-12-03 RX ADMIN — ATORVASTATIN CALCIUM 40 MG: 40 TABLET, FILM COATED ORAL at 17:06

## 2021-12-03 RX ADMIN — POTASSIUM CITRATE 10 MEQ: 10 TABLET, EXTENDED RELEASE ORAL at 22:59

## 2021-12-03 RX ADMIN — CEFAZOLIN SODIUM 2000 MG: 2 SOLUTION INTRAVENOUS at 10:30

## 2021-12-03 RX ADMIN — PROPOFOL 10 MG: 10 INJECTION, EMULSION INTRAVENOUS at 10:37

## 2021-12-03 RX ADMIN — PROTAMINE SULFATE 30 MG: 10 INJECTION, SOLUTION INTRAVENOUS at 12:53

## 2021-12-03 RX ADMIN — PROPOFOL 10 MG: 10 INJECTION, EMULSION INTRAVENOUS at 11:41

## 2021-12-03 RX ADMIN — MEXILETINE HYDROCHLORIDE 150 MG: 150 CAPSULE ORAL at 15:21

## 2021-12-03 RX ADMIN — NOREPINEPHRINE BITARTRATE 4 MCG/KG/MIN: 1 INJECTION, SOLUTION, CONCENTRATE INTRAVENOUS at 12:00

## 2021-12-03 RX ADMIN — PROPOFOL 40 MCG/KG/MIN: 10 INJECTION, EMULSION INTRAVENOUS at 10:37

## 2021-12-03 RX ADMIN — APIXABAN 2.5 MG: 2.5 TABLET, FILM COATED ORAL at 15:21

## 2021-12-03 RX ADMIN — MEXILETINE HYDROCHLORIDE 150 MG: 150 CAPSULE ORAL at 22:58

## 2021-12-03 RX ADMIN — SODIUM CHLORIDE: 0.9 INJECTION, SOLUTION INTRAVENOUS at 09:16

## 2021-12-03 RX ADMIN — ISOSORBIDE MONONITRATE 30 MG: 30 TABLET, EXTENDED RELEASE ORAL at 15:22

## 2021-12-03 RX ADMIN — MIRTAZAPINE 30 MG: 30 TABLET, FILM COATED ORAL at 21:45

## 2021-12-03 RX ADMIN — Medication 1 MCG/MIN: at 10:37

## 2021-12-03 RX ADMIN — AMIODARONE HYDROCHLORIDE 200 MG: 200 TABLET ORAL at 15:23

## 2021-12-03 RX ADMIN — ONDANSETRON 4 MG: 2 INJECTION INTRAMUSCULAR; INTRAVENOUS at 10:40

## 2021-12-03 RX ADMIN — PROPOFOL 10 MG: 10 INJECTION, EMULSION INTRAVENOUS at 10:30

## 2021-12-03 RX ADMIN — PROPOFOL 10 MG: 10 INJECTION, EMULSION INTRAVENOUS at 10:35

## 2021-12-03 RX ADMIN — PROPOFOL 10 MG: 10 INJECTION, EMULSION INTRAVENOUS at 11:29

## 2021-12-03 RX ADMIN — TAMSULOSIN HYDROCHLORIDE 0.4 MG: 0.4 CAPSULE ORAL at 17:06

## 2021-12-04 VITALS
TEMPERATURE: 98.7 F | SYSTOLIC BLOOD PRESSURE: 117 MMHG | DIASTOLIC BLOOD PRESSURE: 60 MMHG | RESPIRATION RATE: 18 BRPM | OXYGEN SATURATION: 96 % | HEIGHT: 67 IN | HEART RATE: 62 BPM | BODY MASS INDEX: 30.92 KG/M2 | WEIGHT: 197 LBS

## 2021-12-04 LAB
ANION GAP SERPL CALCULATED.3IONS-SCNC: 5 MMOL/L (ref 4–13)
BUN SERPL-MCNC: 26 MG/DL (ref 5–25)
CALCIUM SERPL-MCNC: 8.4 MG/DL (ref 8.3–10.1)
CHLORIDE SERPL-SCNC: 111 MMOL/L (ref 100–108)
CO2 SERPL-SCNC: 28 MMOL/L (ref 21–32)
CREAT SERPL-MCNC: 1.8 MG/DL (ref 0.6–1.3)
ERYTHROCYTE [DISTWIDTH] IN BLOOD BY AUTOMATED COUNT: 15.4 % (ref 11.6–15.1)
GFR SERPL CREATININE-BSD FRML MDRD: 34 ML/MIN/1.73SQ M
GLUCOSE P FAST SERPL-MCNC: 134 MG/DL (ref 65–99)
GLUCOSE SERPL-MCNC: 134 MG/DL (ref 65–140)
HCT VFR BLD AUTO: 35.9 % (ref 36.5–49.3)
HGB BLD-MCNC: 11.2 G/DL (ref 12–17)
MCH RBC QN AUTO: 32 PG (ref 26.8–34.3)
MCHC RBC AUTO-ENTMCNC: 31.2 G/DL (ref 31.4–37.4)
MCV RBC AUTO: 103 FL (ref 82–98)
PLATELET # BLD AUTO: 173 THOUSANDS/UL (ref 149–390)
PMV BLD AUTO: 10.5 FL (ref 8.9–12.7)
POTASSIUM SERPL-SCNC: 3.8 MMOL/L (ref 3.5–5.3)
RBC # BLD AUTO: 3.5 MILLION/UL (ref 3.88–5.62)
SODIUM SERPL-SCNC: 144 MMOL/L (ref 136–145)
WBC # BLD AUTO: 7.42 THOUSAND/UL (ref 4.31–10.16)

## 2021-12-04 PROCEDURE — 85027 COMPLETE CBC AUTOMATED: CPT | Performed by: PHYSICIAN ASSISTANT

## 2021-12-04 PROCEDURE — NC001 PR NO CHARGE: Performed by: NURSE PRACTITIONER

## 2021-12-04 PROCEDURE — 80048 BASIC METABOLIC PNL TOTAL CA: CPT | Performed by: PHYSICIAN ASSISTANT

## 2021-12-04 RX ORDER — TORSEMIDE 20 MG/1
40 TABLET ORAL 2 TIMES DAILY
Qty: 60 TABLET | Refills: 3 | Status: SHIPPED | OUTPATIENT
Start: 2021-12-04 | End: 2022-02-15 | Stop reason: SDUPTHER

## 2021-12-04 RX ORDER — MIRTAZAPINE 30 MG/1
30 TABLET, FILM COATED ORAL
Qty: 30 TABLET | Refills: 1 | Status: CANCELLED | OUTPATIENT
Start: 2021-12-04

## 2021-12-04 RX ORDER — MIRTAZAPINE 30 MG/1
30 TABLET, FILM COATED ORAL
Qty: 30 TABLET | Refills: 2 | Status: SHIPPED | OUTPATIENT
Start: 2021-12-04 | End: 2022-01-24

## 2021-12-04 RX ORDER — AMIODARONE HYDROCHLORIDE 200 MG/1
200 TABLET ORAL 2 TIMES DAILY WITH MEALS
Qty: 30 TABLET | Refills: 3 | Status: SHIPPED | OUTPATIENT
Start: 2021-12-04 | End: 2022-03-02 | Stop reason: SDUPTHER

## 2021-12-04 RX ORDER — NITROGLYCERIN 0.4 MG/1
0.4 TABLET SUBLINGUAL
Qty: 30 TABLET | Refills: 2 | Status: SHIPPED | OUTPATIENT
Start: 2021-12-04

## 2021-12-04 RX ORDER — ATORVASTATIN CALCIUM 40 MG/1
40 TABLET, FILM COATED ORAL
Qty: 30 TABLET | Refills: 11 | Status: SHIPPED | OUTPATIENT
Start: 2021-12-04

## 2021-12-04 RX ADMIN — MEXILETINE HYDROCHLORIDE 150 MG: 150 CAPSULE ORAL at 06:19

## 2021-12-04 RX ADMIN — APIXABAN 2.5 MG: 2.5 TABLET, FILM COATED ORAL at 10:01

## 2021-12-04 RX ADMIN — POTASSIUM CITRATE 10 MEQ: 10 TABLET, EXTENDED RELEASE ORAL at 10:06

## 2021-12-04 RX ADMIN — AMIODARONE HYDROCHLORIDE 200 MG: 200 TABLET ORAL at 10:01

## 2021-12-04 RX ADMIN — LEVOTHYROXINE SODIUM 175 MCG: 125 TABLET ORAL at 05:23

## 2021-12-04 RX ADMIN — ASPIRIN 81 MG CHEWABLE TABLET 81 MG: 81 TABLET CHEWABLE at 10:01

## 2021-12-04 RX ADMIN — METOPROLOL SUCCINATE 100 MG: 100 TABLET, EXTENDED RELEASE ORAL at 10:02

## 2021-12-04 RX ADMIN — TORSEMIDE 40 MG: 20 TABLET ORAL at 10:01

## 2021-12-04 RX ADMIN — ISOSORBIDE MONONITRATE 30 MG: 30 TABLET, EXTENDED RELEASE ORAL at 10:01

## 2021-12-06 ENCOUNTER — TRANSCRIBE ORDERS (OUTPATIENT)
Dept: LAB | Facility: CLINIC | Age: 86
End: 2021-12-06

## 2021-12-06 ENCOUNTER — APPOINTMENT (OUTPATIENT)
Dept: LAB | Facility: CLINIC | Age: 86
End: 2021-12-06
Payer: MEDICARE

## 2021-12-06 DIAGNOSIS — E03.9 HYPOTHYROIDISM, UNSPECIFIED TYPE: Primary | ICD-10-CM

## 2021-12-06 DIAGNOSIS — E03.9 HYPOTHYROIDISM, UNSPECIFIED TYPE: ICD-10-CM

## 2021-12-06 DIAGNOSIS — N18.31 CHRONIC KIDNEY DISEASE (CKD) STAGE G3A/A2, MODERATELY DECREASED GLOMERULAR FILTRATION RATE (GFR) BETWEEN 45-59 ML/MIN/1.73 SQUARE METER AND ALBUMINURIA CREATININE RATIO BETWEEN 30-299 MG/G (HCC): ICD-10-CM

## 2021-12-06 DIAGNOSIS — I47.2 V TACH (HCC): ICD-10-CM

## 2021-12-06 LAB
ALBUMIN SERPL BCP-MCNC: 3 G/DL (ref 3.5–5)
ALP SERPL-CCNC: 82 U/L (ref 46–116)
ALT SERPL W P-5'-P-CCNC: 28 U/L (ref 12–78)
ANION GAP SERPL CALCULATED.3IONS-SCNC: 6 MMOL/L (ref 4–13)
AST SERPL W P-5'-P-CCNC: 31 U/L (ref 5–45)
BILIRUB SERPL-MCNC: 0.88 MG/DL (ref 0.2–1)
BUN SERPL-MCNC: 26 MG/DL (ref 5–25)
CALCIUM ALBUM COR SERPL-MCNC: 10.2 MG/DL (ref 8.3–10.1)
CALCIUM SERPL-MCNC: 9.4 MG/DL (ref 8.3–10.1)
CHLORIDE SERPL-SCNC: 108 MMOL/L (ref 100–108)
CO2 SERPL-SCNC: 27 MMOL/L (ref 21–32)
CREAT SERPL-MCNC: 1.87 MG/DL (ref 0.6–1.3)
GFR SERPL CREATININE-BSD FRML MDRD: 32 ML/MIN/1.73SQ M
GLUCOSE P FAST SERPL-MCNC: 116 MG/DL (ref 65–99)
POTASSIUM SERPL-SCNC: 4.2 MMOL/L (ref 3.5–5.3)
PROT SERPL-MCNC: 6.6 G/DL (ref 6.4–8.2)
SODIUM SERPL-SCNC: 141 MMOL/L (ref 136–145)
TSH SERPL DL<=0.05 MIU/L-ACNC: 2.54 UIU/ML (ref 0.36–3.74)

## 2021-12-06 PROCEDURE — 84443 ASSAY THYROID STIM HORMONE: CPT

## 2021-12-06 PROCEDURE — 80053 COMPREHEN METABOLIC PANEL: CPT

## 2021-12-06 PROCEDURE — 36415 COLL VENOUS BLD VENIPUNCTURE: CPT

## 2021-12-09 ENCOUNTER — REMOTE DEVICE CLINIC VISIT (OUTPATIENT)
Dept: CARDIOLOGY CLINIC | Facility: CLINIC | Age: 86
End: 2021-12-09
Payer: MEDICARE

## 2021-12-09 DIAGNOSIS — Z95.810 AICD (AUTOMATIC CARDIOVERTER/DEFIBRILLATOR) PRESENT: Primary | ICD-10-CM

## 2021-12-09 PROCEDURE — 93296 REM INTERROG EVL PM/IDS: CPT | Performed by: INTERNAL MEDICINE

## 2021-12-09 PROCEDURE — 93295 DEV INTERROG REMOTE 1/2/MLT: CPT | Performed by: INTERNAL MEDICINE

## 2022-01-17 ENCOUNTER — TELEPHONE (OUTPATIENT)
Dept: NEPHROLOGY | Facility: CLINIC | Age: 87
End: 2022-01-17

## 2022-01-17 NOTE — TELEPHONE ENCOUNTER
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

## 2022-01-17 NOTE — TELEPHONE ENCOUNTER
Patients wife advised they just got back over the weekend and they will be calling in about a week after they ar settled to schedule hematology appointment

## 2022-01-21 ENCOUNTER — APPOINTMENT (OUTPATIENT)
Dept: LAB | Facility: CLINIC | Age: 87
End: 2022-01-21
Payer: MEDICARE

## 2022-01-21 ENCOUNTER — TELEPHONE (OUTPATIENT)
Dept: NEPHROLOGY | Facility: CLINIC | Age: 87
End: 2022-01-21

## 2022-01-21 DIAGNOSIS — I12.9 HYPERTENSIVE CHRONIC KIDNEY DISEASE WITH STAGE 1 THROUGH STAGE 4 CHRONIC KIDNEY DISEASE, OR UNSPECIFIED CHRONIC KIDNEY DISEASE: ICD-10-CM

## 2022-01-21 DIAGNOSIS — E78.5 DYSLIPIDEMIA: ICD-10-CM

## 2022-01-21 DIAGNOSIS — I10 BENIGN ESSENTIAL HYPERTENSION: Primary | ICD-10-CM

## 2022-01-21 DIAGNOSIS — N18.32 STAGE 3B CHRONIC KIDNEY DISEASE (HCC): ICD-10-CM

## 2022-01-21 DIAGNOSIS — R60.0 LOCALIZED EDEMA: ICD-10-CM

## 2022-01-21 DIAGNOSIS — N20.0 NEPHROLITHIASIS: ICD-10-CM

## 2022-01-21 DIAGNOSIS — R31.29 MICROSCOPIC HEMATURIA: ICD-10-CM

## 2022-01-21 DIAGNOSIS — E55.9 VITAMIN D DEFICIENCY: ICD-10-CM

## 2022-01-21 DIAGNOSIS — N25.81 SECONDARY HYPERPARATHYROIDISM OF RENAL ORIGIN (HCC): ICD-10-CM

## 2022-01-21 LAB
ALBUMIN SERPL BCP-MCNC: 3.5 G/DL (ref 3.5–5)
ALP SERPL-CCNC: 101 U/L (ref 46–116)
ALT SERPL W P-5'-P-CCNC: 35 U/L (ref 12–78)
ANION GAP SERPL CALCULATED.3IONS-SCNC: 5 MMOL/L (ref 4–13)
AST SERPL W P-5'-P-CCNC: 46 U/L (ref 5–45)
BILIRUB SERPL-MCNC: 0.77 MG/DL (ref 0.2–1)
BUN SERPL-MCNC: 25 MG/DL (ref 5–25)
CALCIUM SERPL-MCNC: 10.5 MG/DL (ref 8.3–10.1)
CHLORIDE SERPL-SCNC: 105 MMOL/L (ref 100–108)
CO2 SERPL-SCNC: 31 MMOL/L (ref 21–32)
CREAT SERPL-MCNC: 1.76 MG/DL (ref 0.6–1.3)
CREAT UR-MCNC: 98.4 MG/DL
ERYTHROCYTE [DISTWIDTH] IN BLOOD BY AUTOMATED COUNT: 15.4 % (ref 11.6–15.1)
GFR SERPL CREATININE-BSD FRML MDRD: 34 ML/MIN/1.73SQ M
GLUCOSE P FAST SERPL-MCNC: 112 MG/DL (ref 65–99)
HCT VFR BLD AUTO: 39 % (ref 36.5–49.3)
HGB BLD-MCNC: 12.5 G/DL (ref 12–17)
MAGNESIUM SERPL-MCNC: 2.4 MG/DL (ref 1.6–2.6)
MCH RBC QN AUTO: 32.6 PG (ref 26.8–34.3)
MCHC RBC AUTO-ENTMCNC: 32.1 G/DL (ref 31.4–37.4)
MCV RBC AUTO: 102 FL (ref 82–98)
PHOSPHATE SERPL-MCNC: 3.3 MG/DL (ref 2.3–4.1)
PLATELET # BLD AUTO: 246 THOUSANDS/UL (ref 149–390)
PMV BLD AUTO: 10.7 FL (ref 8.9–12.7)
POTASSIUM SERPL-SCNC: 4 MMOL/L (ref 3.5–5.3)
PROT SERPL-MCNC: 7.4 G/DL (ref 6.4–8.2)
PROT UR-MCNC: 26 MG/DL
PROT/CREAT UR: 0.26 MG/G{CREAT} (ref 0–0.1)
PTH-INTACT SERPL-MCNC: 141.8 PG/ML (ref 18.4–80.1)
RBC # BLD AUTO: 3.84 MILLION/UL (ref 3.88–5.62)
SODIUM SERPL-SCNC: 141 MMOL/L (ref 136–145)
WBC # BLD AUTO: 7.97 THOUSAND/UL (ref 4.31–10.16)

## 2022-01-21 PROCEDURE — 80053 COMPREHEN METABOLIC PANEL: CPT

## 2022-01-21 PROCEDURE — 36415 COLL VENOUS BLD VENIPUNCTURE: CPT

## 2022-01-21 PROCEDURE — 83970 ASSAY OF PARATHORMONE: CPT

## 2022-01-21 PROCEDURE — 84156 ASSAY OF PROTEIN URINE: CPT

## 2022-01-21 PROCEDURE — 82570 ASSAY OF URINE CREATININE: CPT

## 2022-01-21 PROCEDURE — 85027 COMPLETE CBC AUTOMATED: CPT

## 2022-01-21 PROCEDURE — 84100 ASSAY OF PHOSPHORUS: CPT

## 2022-01-21 PROCEDURE — 83735 ASSAY OF MAGNESIUM: CPT

## 2022-01-21 NOTE — TELEPHONE ENCOUNTER
----- Message from Serafin Fraga MD sent at 1/21/2022  3:40 PM EST -----  IN GENERAL LABS LOOK GOOD HOWEVER CALCIUM ELEVATED  RECOMMEND:  -STOP MULTI VITAMINS/ALL CALCIUM PRODUCTS/VITAMIN-D PRODUCTS    -REPEAT A BASIC METABOLIC PROFILE/IONIZED CALCIUM 2 WEEKS AFTER MAKING THE ABOVE MEDICATION CHANGES

## 2022-01-24 ENCOUNTER — OFFICE VISIT (OUTPATIENT)
Dept: UROLOGY | Facility: CLINIC | Age: 87
End: 2022-01-24
Payer: MEDICARE

## 2022-01-24 VITALS — HEIGHT: 67 IN | BODY MASS INDEX: 32.18 KG/M2 | WEIGHT: 205 LBS

## 2022-01-24 DIAGNOSIS — N40.1 BPH WITH OBSTRUCTION/LOWER URINARY TRACT SYMPTOMS: Primary | ICD-10-CM

## 2022-01-24 DIAGNOSIS — N13.8 BPH WITH OBSTRUCTION/LOWER URINARY TRACT SYMPTOMS: Primary | ICD-10-CM

## 2022-01-24 PROCEDURE — 99213 OFFICE O/P EST LOW 20 MIN: CPT | Performed by: PHYSICIAN ASSISTANT

## 2022-01-24 RX ORDER — TAMSULOSIN HYDROCHLORIDE 0.4 MG/1
0.4 CAPSULE ORAL
Qty: 90 CAPSULE | Refills: 3 | Status: SHIPPED | OUTPATIENT
Start: 2022-01-24 | End: 2022-01-24 | Stop reason: SDUPTHER

## 2022-01-24 NOTE — TELEPHONE ENCOUNTER
I spoke to the patients wife, will stop calcium/ vitamin D/ multivitamins  Repeat blood work in 2 weeks

## 2022-01-24 NOTE — PROGRESS NOTES
UROLOGY PROGRESS NOTE   Patient Identifiers: Madison Harada (MRN [id number])  Date of Service: 1/24/2022    Subjective:     80-year-old man history of kidney stones and micro hematuria  He has had PCNL procedure in the past and had been previously managed by Dr Oumou Fowler  16 mm stone seen in the left kidney  8 mm on the right  Feels comfortable without symptoms  Reason for visit:  Kidney stone and micro hematuria follow-up     Objective:     VITALS:    There were no vitals filed for this visit  LABS:  Lab Results   Component Value Date    HGB 12 5 01/21/2022    HCT 39 0 01/21/2022    WBC 7 97 01/21/2022     01/21/2022   ]    Lab Results   Component Value Date     10/19/2015    K 4 0 01/21/2022     01/21/2022    CO2 31 01/21/2022    BUN 25 01/21/2022    CREATININE 1 76 (H) 01/21/2022    CALCIUM 10 5 (H) 01/21/2022    GLUCOSE 112 10/19/2015   ]        INPATIENT MEDS:    Current Outpatient Medications:     acetaminophen (TYLENOL) 500 mg tablet, Take 500 mg by mouth every 6 (six) hours as needed for mild pain , Disp: , Rfl:     amiodarone 200 mg tablet, Take 1 tablet (200 mg total) by mouth 2 (two) times a day with meals, Disp: 30 tablet, Rfl: 3    apixaban (Eliquis) 2 5 mg, Take 1 tablet (2 5 mg total) by mouth 2 (two) times a day, Disp: 60 tablet, Rfl: 0    aspirin 81 MG tablet, Take 81 mg by mouth daily  , Disp: , Rfl:     atorvastatin (LIPITOR) 40 mg tablet, Take 1 tablet (40 mg total) by mouth daily with dinner, Disp: 30 tablet, Rfl: 11    isosorbide mononitrate (IMDUR) 30 mg 24 hr tablet, Take 1 tablet (30 mg total) by mouth daily, Disp: 90 tablet, Rfl: 3    levothyroxine 175 mcg tablet, Take 175 mcg by mouth daily  , Disp: , Rfl:     LORazepam (ATIVAN) 1 mg tablet, Take 1 mg by mouth as needed for anxiety  , Disp: , Rfl:     metolazone (ZAROXOLYN) 2 5 mg tablet, As needed no more than twice weekly for leg swelling, Disp: 10 tablet, Rfl: 3    metoprolol succinate (TOPROL-XL) 100 mg 24 hr tablet, Take 1 tablet (100 mg total) by mouth daily, Disp: 30 tablet, Rfl: 0    metoprolol succinate (TOPROL-XL) 50 mg 24 hr tablet, Take 1 tablet (50 mg total) by mouth daily at bedtime, Disp: 30 tablet, Rfl: 0    mexiletine (MEXITIL) 150 mg capsule, Take 1 capsule (150 mg total) by mouth every 8 (eight) hours, Disp: 90 capsule, Rfl: 0    mirtazapine (REMERON) 30 mg tablet, Take 1 tablet (30 mg total) by mouth daily at bedtime, Disp: 30 tablet, Rfl: 2    Multiple Vitamins-Minerals (MULTIVITAMIN WITH MINERALS) tablet, Take 1 tablet by mouth daily  , Disp: , Rfl:     nitroglycerin (NITROSTAT) 0 4 mg SL tablet, Place 1 tablet (0 4 mg total) under the tongue every 5 (five) minutes as needed for chest pain, Disp: 30 tablet, Rfl: 2    potassium citrate (Urocit-K 10) 10 mEq, Take 1 tablet (10 mEq total) by mouth 2 (two) times a day, Disp: 180 tablet, Rfl: 3    tamsulosin (FLOMAX) 0 4 mg, Take 1 capsule (0 4 mg total) by mouth daily with dinner, Disp: 90 capsule, Rfl: 3    torsemide (DEMADEX) 20 mg tablet, Take 2 tablets (40 mg total) by mouth 2 (two) times a day, Disp: 60 tablet, Rfl: 3      Physical Exam:   There were no vitals taken for this visit  GEN: no acute distress    RESP: breathing comfortably with no accessory muscle use    ABD: soft, non-tender, non-distended   INCISION:    EXT: no significant peripheral edema   (Male): Penis circumcised, phallus normal, meatus patent  Testicles descended into scrotum bilaterally without masses nor tenderness  No inguinal hernias bilaterally  JUANY: Prostate is not enlarged at 30 grams  The prostate is not boggy  The prostate is not tender  No nodules noted      RADIOLOGY:    CT ABDOMEN AND PELVIS WITHOUT IV CONTRAST   KIDNEYS/URETERS: Multiple bilateral renal calculi, largest on the right measuring 8 mm, and on the left measuring 16 mm  No ureteral calculi  No hydronephrosis   One or more simple renal cyst(s) is noted   IMPRESSION: Cysts and nonobstructing calculi bilaterally in the kidneys  Colonic diverticulosis without diverticulitis  Cholelithiasis      Assessment:     1  BPH   2   Nephrolithiasis     Plan:   - trial of tamsulosin  - he did not take Myrbetriq due to  extreme cost  -  -

## 2022-01-25 ENCOUNTER — OFFICE VISIT (OUTPATIENT)
Dept: CARDIOLOGY CLINIC | Facility: CLINIC | Age: 87
End: 2022-01-25
Payer: MEDICARE

## 2022-01-25 VITALS
HEIGHT: 67 IN | HEART RATE: 60 BPM | BODY MASS INDEX: 31.86 KG/M2 | OXYGEN SATURATION: 95 % | SYSTOLIC BLOOD PRESSURE: 132 MMHG | WEIGHT: 203 LBS | DIASTOLIC BLOOD PRESSURE: 66 MMHG

## 2022-01-25 DIAGNOSIS — I10 BENIGN ESSENTIAL HYPERTENSION: ICD-10-CM

## 2022-01-25 DIAGNOSIS — I47.2 V TACH (HCC): ICD-10-CM

## 2022-01-25 DIAGNOSIS — I48.0 PAROXYSMAL ATRIAL FIBRILLATION (HCC): Primary | ICD-10-CM

## 2022-01-25 DIAGNOSIS — E78.2 MIXED HYPERLIPIDEMIA: ICD-10-CM

## 2022-01-25 DIAGNOSIS — I50.42 CHRONIC COMBINED SYSTOLIC AND DIASTOLIC CONGESTIVE HEART FAILURE (HCC): ICD-10-CM

## 2022-01-25 DIAGNOSIS — N18.32 STAGE 3B CHRONIC KIDNEY DISEASE (HCC): ICD-10-CM

## 2022-01-25 DIAGNOSIS — I25.810 CORONARY ARTERY DISEASE INVOLVING CORONARY BYPASS GRAFT OF NATIVE HEART WITHOUT ANGINA PECTORIS: ICD-10-CM

## 2022-01-25 DIAGNOSIS — I25.5 ISCHEMIC CARDIOMYOPATHY: ICD-10-CM

## 2022-01-25 PROCEDURE — 99215 OFFICE O/P EST HI 40 MIN: CPT | Performed by: INTERNAL MEDICINE

## 2022-01-25 PROCEDURE — 93000 ELECTROCARDIOGRAM COMPLETE: CPT | Performed by: INTERNAL MEDICINE

## 2022-01-25 NOTE — PROGRESS NOTES
Follow-up - Cardiology   Nalini Acharya 80 y o  male MRN: [id number]        Problems    1  Paroxysmal atrial fibrillation (HCC)  POCT ECG   2  Chronic combined systolic and diastolic congestive heart failure ([facility] )     3  Mixed hyperlipidemia     4  Coronary artery disease involving coronary bypass graft of native heart without angina pectoris     5  Ischemic cardiomyopathy     6  V tach ([facility] )     7  Benign essential hypertension     8  Stage 3b chronic kidney disease ([facility] )       Jim Jarrell  Returns for a follow-up visit my [place] office     Coronary artery disease   remote CABG   severe ischemic cardiomyopathy is longstanding   slight decline in LVEF this year to 20%  Hospitalized recently for frequent treated VT  All grafts patent by cardiac catheterization  No new ischemic symptoms at this time    Chronic systolic CHF   LVEF 31-46% due to ischemic cardiomyopathy chronically  CRT-D with generator change 1/20 (not MRI compatible)  Home dry weight is about 197 lb  Creatinine has improved to baseline 1 76  Current torsemide 40 mg a m  And p m , 60 mg twice a day if weight gain of 2-3 lb  He is euvolemic    Hypertension  Blood pressure is excellent    Hyperlipidemia  Lipids are well controlled    Hypothyroidism  Rise in TSH to 12 5 was met with levothyroxine changes, responded down to 2 5, well controlled, likely partly influenced by amiodarone increased to 400 mg a day for suppression of symptomatic, treated VT    Ventricular tachycardia  Device shocks x2, asymptomatic episodes status post antitachycardia pacing, despite amiodarone 200 mg twice a day, mexiletine 150 mg twice a day prompted evaluation by EP and referral for VT ablation which was done 12/3/21  Device check soon after revealed no episodes  He has had no other symptomatic episodes, there have been no device alarms or alerts  Follow-up check 3/8/2022 is planned  EKG AV paced today with single PVC      Plan    Follow-up in March, if no further VT, will decrease amiodarone to 200 mg daily to mitigate overall risk  Continue amiodarone 200 mg twice a day, mexiletine 150 mg 3 times a day, and metoprolol at current dosing  Heart failure is under good control, he is euvolemic, pain close attention, has not needed the escalated dosing of torsemide 60 mg twice a day recently  Also happy that his renal function continues to improve after his prior cardiac catheterization 11/21, when his creatinine and reached 2 1, now down to 1 76  HPI: Almaz Hatfield is a 80y o  year old male  With a longstanding history of CAD, CABG, ischemic cardiomyopathy with last known ejection fraction 67%, chronic systolic/ diastolic CHF, ventricular tachycardia and paroxysmal atrial fibrillation, on long-term amiodarone and mexiletine  Rudy Wren finally underwent VT ablation 12/3/2021, appears to be a successful, no device alert at this point, device interrogation the week after revealed no events, he has a single PVC on EKG today, and has had no clinical events of device shocks or syncope  He did fall twice, on his trip to [place], 1 on the plane in 1 in the shower, both were mechanical   He did end up with a gluteus ecchymosis, but that has slowly improved  No significant head impact or trauma, did not seek medical evaluation  He is AV paced today, his blood pressure is stable, his creatinine improves down to 1 76, he had a slightly elevated IgG kappa for which Nephrology has referred him to Hematology  His weight is 197 lb at home, 205 in the office, consistent with his typical dry weight, edema is relatively well controlled, he wears the zip up compression stockings  He denies any significant orthopnea or any new active cardiovascular events    He still has twitching on occasion  He is having some difficulty with urination, he is seeing Neurology  He continues on high-dose amiodarone for VT suppression, with a previously rising TSH 12 3 that did improve with thyroid supplementation adjustment, follow-up TSH down to 2 5      Review of Systems   Constitutional: Negative  HENT: Negative  Eyes: Negative  Respiratory: Negative  Cardiovascular: Negative  Gastrointestinal: Negative  Endocrine: Negative  Genitourinary: Positive for difficulty urinating and frequency  Musculoskeletal: Negative  Skin: Negative  Neurological: Negative  Hematological: Negative  Psychiatric/Behavioral: Negative  Past Medical History:   Diagnosis Date    AICD (automatic cardioverter/defibrillator) present 2004    AICD (automatic cardioverter/defibrillator) present 2006    AICD (automatic cardioverter/defibrillator) present 2013    [facility]    Arthritis     R knee and neck    Atrial fibrillation (HCC)     BPH (benign prostatic hyperplasia)     CAD (coronary artery disease) of artery bypass graft     CHF (congestive heart failure) (HCC)     combine    Coronary artery disease     Disease of thyroid gland     Hyperlipidemia     Hypertension     Ischemic cardiomyopathy     Left ureteral calculus 10/17/2017    Renal disorder     V tach (HCC)      Social History     Substance and Sexual Activity   Alcohol Use Yes     Social History     Substance and Sexual Activity   Drug Use No     Social History     Tobacco Use   Smoking Status Never Smoker   Smokeless Tobacco Never Used       Allergies:  No Known Allergies    Medications:     Current Outpatient Medications:     acetaminophen (TYLENOL) 500 mg tablet, Take 500 mg by mouth every 6 (six) hours as needed for mild pain , Disp: , Rfl:     amiodarone 200 mg tablet, Take 1 tablet (200 mg total) by mouth 2 (two) times a day with meals, Disp: 30 tablet, Rfl: 3    aspirin 81 MG tablet, Take 81 mg by mouth daily  , Disp: , Rfl:     atorvastatin (LIPITOR) 40 mg tablet, Take 1 tablet (40 mg total) by mouth daily with dinner, Disp: 30 tablet, Rfl: 11    isosorbide mononitrate (IMDUR) 30 mg 24 hr tablet, Take 1 tablet (30 mg total) by mouth daily, Disp: 90 tablet, Rfl: 3    levothyroxine 175 mcg tablet, Take 175 mcg by mouth daily  , Disp: , Rfl:     LORazepam (ATIVAN) 1 mg tablet, Take 1 mg by mouth as needed for anxiety  , Disp: , Rfl:     metolazone (ZAROXOLYN) 2 5 mg tablet, As needed no more than twice weekly for leg swelling, Disp: 10 tablet, Rfl: 3    metoprolol succinate (TOPROL-XL) 100 mg 24 hr tablet, Take 1 tablet (100 mg total) by mouth daily, Disp: 30 tablet, Rfl: 0    metoprolol succinate (TOPROL-XL) 50 mg 24 hr tablet, Take 1 tablet (50 mg total) by mouth daily at bedtime, Disp: 30 tablet, Rfl: 0    mexiletine (MEXITIL) 150 mg capsule, Take 1 capsule (150 mg total) by mouth every 8 (eight) hours, Disp: 90 capsule, Rfl: 0    nitroglycerin (NITROSTAT) 0 4 mg SL tablet, Place 1 tablet (0 4 mg total) under the tongue every 5 (five) minutes as needed for chest pain, Disp: 30 tablet, Rfl: 2    potassium citrate (Urocit-K 10) 10 mEq, Take 1 tablet (10 mEq total) by mouth 2 (two) times a day, Disp: 180 tablet, Rfl: 3    tamsulosin (FLOMAX) 0 4 mg, Take 1 capsule (0 4 mg total) by mouth daily with dinner, Disp: 90 capsule, Rfl: 3    torsemide (DEMADEX) 20 mg tablet, Take 2 tablets (40 mg total) by mouth 2 (two) times a day, Disp: 60 tablet, Rfl: 3    Multiple Vitamins-Minerals (MULTIVITAMIN WITH MINERALS) tablet, Take 1 tablet by mouth daily  (Patient not taking: Reported on 1/25/2022 ), Disp: , Rfl:       Vitals:    01/25/22 0947   BP: 132/66   Pulse: 60   SpO2: 95%     Weight (last 2 days)     Date/Time Weight    01/25/22 0947 92 1 (203)        Physical Exam  Constitutional:       General: He is not in acute distress  Appearance: Normal appearance  He is well-developed  He is not diaphoretic  HENT:      Head: Normocephalic and atraumatic  Eyes:      General: No scleral icterus  Conjunctiva/sclera: Conjunctivae normal       Pupils: Pupils are equal, round, and reactive to light  Neck:      Thyroid: No thyromegaly  Vascular: Hepatojugular reflux present  No JVD  Cardiovascular:      Rate and Rhythm: Normal rate and regular rhythm  Heart sounds: Normal heart sounds  No murmur heard  No friction rub  No gallop  Pulmonary:      Effort: Pulmonary effort is normal  No respiratory distress  Breath sounds: Normal breath sounds  No wheezing or rales  Abdominal:      General: Bowel sounds are normal  There is no distension  Palpations: Abdomen is soft  Tenderness: There is no abdominal tenderness  Musculoskeletal:         General: No deformity  Normal range of motion  Cervical back: Normal range of motion and neck supple  Right lower leg: Edema present  Left lower leg: Edema present  Skin:     General: Skin is warm and dry  Findings: No erythema or rash  Neurological:      General: No focal deficit present  Mental Status: He is alert and oriented to person, place, and time  Cranial Nerves: No cranial nerve deficit  Motor: No weakness             Laboratory Studies:    Lab studies personally reviewed    Cardiac testing:   EKG reviewed personally:   Results for orders placed or performed in visit on 01/25/22   POCT ECG    Impression    AV paced rhythm with single PVC         Echocardiogram  2018-EF 30%, LV dilated, akinesis basal to mid inferior wall, and inferolateral wall, grade 3 diastolic dysfunction, RV dilated, left atrium dilated, mild MR, moderate TR, severe pulmonary hypertension   9/21-personally reviewed -EF 25%, LV dilated, regional wall motion abnormalities as noted above, moderate TR, severe pulmonary hypertension, dilated IVC     cardiac catheterization   11/21-all grafts patent    Device check  3/1/2021-nonsustained VT, normal Corvue  9/9/2021- normal device function, 76% a paced, 99% V paced, multiple nonsustained VT episodes, longest 32 beats,  No AFib detected  11/22/2021- normal device function,  VT episodes with successful ATP  12/21-normal device function, no VT episodes    Viktor Chicas MD    Portions of the record may have been created with voice recognition software   Occasional wrong word or "sound a like" substitutions may have occurred due to the inherent limitations of voice recognition software   Read the chart carefully and recognize, using context, where substitutions have occurred

## 2022-02-14 ENCOUNTER — TELEPHONE (OUTPATIENT)
Dept: CARDIOLOGY CLINIC | Facility: CLINIC | Age: 87
End: 2022-02-14

## 2022-02-14 ENCOUNTER — TRANSCRIBE ORDERS (OUTPATIENT)
Dept: LAB | Facility: CLINIC | Age: 87
End: 2022-02-14

## 2022-02-14 ENCOUNTER — APPOINTMENT (OUTPATIENT)
Dept: LAB | Facility: CLINIC | Age: 87
End: 2022-02-14
Payer: MEDICARE

## 2022-02-14 DIAGNOSIS — E03.4 IDIOPATHIC ATROPHIC HYPOTHYROIDISM: Primary | ICD-10-CM

## 2022-02-14 DIAGNOSIS — N20.0 NEPHROLITHIASIS: ICD-10-CM

## 2022-02-14 DIAGNOSIS — E78.2 MIXED HYPERLIPIDEMIA: ICD-10-CM

## 2022-02-14 DIAGNOSIS — E03.4 IDIOPATHIC ATROPHIC HYPOTHYROIDISM: ICD-10-CM

## 2022-02-14 DIAGNOSIS — N18.32 CHRONIC KIDNEY DISEASE (CKD) STAGE G3B/A1, MODERATELY DECREASED GLOMERULAR FILTRATION RATE (GFR) BETWEEN 30-44 ML/MIN/1.73 SQUARE METER AND ALBUMINURIA CREATININE RATIO LESS THAN 30 MG/G (HCC): ICD-10-CM

## 2022-02-14 DIAGNOSIS — N18.32 STAGE 3B CHRONIC KIDNEY DISEASE (HCC): ICD-10-CM

## 2022-02-14 DIAGNOSIS — I10 BENIGN ESSENTIAL HYPERTENSION: ICD-10-CM

## 2022-02-14 LAB
ALBUMIN SERPL BCP-MCNC: 3.1 G/DL (ref 3.5–5)
ALP SERPL-CCNC: 104 U/L (ref 46–116)
ALT SERPL W P-5'-P-CCNC: 45 U/L (ref 12–78)
ANION GAP SERPL CALCULATED.3IONS-SCNC: 5 MMOL/L (ref 4–13)
AST SERPL W P-5'-P-CCNC: 56 U/L (ref 5–45)
BACTERIA UR QL AUTO: ABNORMAL /HPF
BASOPHILS # BLD AUTO: 0.04 THOUSANDS/ΜL (ref 0–0.1)
BASOPHILS NFR BLD AUTO: 1 % (ref 0–1)
BILIRUB SERPL-MCNC: 0.73 MG/DL (ref 0.2–1)
BILIRUB UR QL STRIP: NEGATIVE
BUN SERPL-MCNC: 24 MG/DL (ref 5–25)
CA-I BLD-SCNC: 1.18 MMOL/L (ref 1.12–1.32)
CALCIUM ALBUM COR SERPL-MCNC: 9.7 MG/DL (ref 8.3–10.1)
CALCIUM SERPL-MCNC: 9 MG/DL (ref 8.3–10.1)
CHLORIDE SERPL-SCNC: 109 MMOL/L (ref 100–108)
CHOLEST SERPL-MCNC: 117 MG/DL
CLARITY UR: CLEAR
CO2 SERPL-SCNC: 28 MMOL/L (ref 21–32)
COLOR UR: YELLOW
CREAT SERPL-MCNC: 1.88 MG/DL (ref 0.6–1.3)
EOSINOPHIL # BLD AUTO: 0.42 THOUSAND/ΜL (ref 0–0.61)
EOSINOPHIL NFR BLD AUTO: 7 % (ref 0–6)
ERYTHROCYTE [DISTWIDTH] IN BLOOD BY AUTOMATED COUNT: 14.4 % (ref 11.6–15.1)
GFR SERPL CREATININE-BSD FRML MDRD: 31 ML/MIN/1.73SQ M
GLUCOSE P FAST SERPL-MCNC: 115 MG/DL (ref 65–99)
GLUCOSE UR STRIP-MCNC: NEGATIVE MG/DL
HCT VFR BLD AUTO: 39.5 % (ref 36.5–49.3)
HDLC SERPL-MCNC: 34 MG/DL
HGB BLD-MCNC: 12.4 G/DL (ref 12–17)
HGB UR QL STRIP.AUTO: NORMAL
IMM GRANULOCYTES # BLD AUTO: 0.02 THOUSAND/UL (ref 0–0.2)
IMM GRANULOCYTES NFR BLD AUTO: 0 % (ref 0–2)
KETONES UR STRIP-MCNC: NEGATIVE MG/DL
LDLC SERPL CALC-MCNC: 59 MG/DL (ref 0–100)
LEUKOCYTE ESTERASE UR QL STRIP: NEGATIVE
LYMPHOCYTES # BLD AUTO: 1.24 THOUSANDS/ΜL (ref 0.6–4.47)
LYMPHOCYTES NFR BLD AUTO: 20 % (ref 14–44)
MCH RBC QN AUTO: 31.9 PG (ref 26.8–34.3)
MCHC RBC AUTO-ENTMCNC: 31.4 G/DL (ref 31.4–37.4)
MCV RBC AUTO: 102 FL (ref 82–98)
MONOCYTES # BLD AUTO: 0.92 THOUSAND/ΜL (ref 0.17–1.22)
MONOCYTES NFR BLD AUTO: 15 % (ref 4–12)
MUCOUS THREADS UR QL AUTO: ABNORMAL
NEUTROPHILS # BLD AUTO: 3.44 THOUSANDS/ΜL (ref 1.85–7.62)
NEUTS SEG NFR BLD AUTO: 57 % (ref 43–75)
NITRITE UR QL STRIP: NEGATIVE
NON-SQ EPI CELLS URNS QL MICRO: ABNORMAL /HPF
NONHDLC SERPL-MCNC: 83 MG/DL
NRBC BLD AUTO-RTO: 0 /100 WBCS
PH UR STRIP.AUTO: 6 [PH]
PLATELET # BLD AUTO: 219 THOUSANDS/UL (ref 149–390)
PMV BLD AUTO: 10.6 FL (ref 8.9–12.7)
POTASSIUM SERPL-SCNC: 3.9 MMOL/L (ref 3.5–5.3)
PROT SERPL-MCNC: 7.1 G/DL (ref 6.4–8.2)
PROT UR STRIP-MCNC: NEGATIVE MG/DL
RBC # BLD AUTO: 3.89 MILLION/UL (ref 3.88–5.62)
RBC #/AREA URNS AUTO: ABNORMAL /HPF
SODIUM SERPL-SCNC: 142 MMOL/L (ref 136–145)
SP GR UR STRIP.AUTO: 1.02 (ref 1–1.03)
TRIGL SERPL-MCNC: 120 MG/DL
TSH SERPL DL<=0.05 MIU/L-ACNC: 1.64 UIU/ML (ref 0.36–3.74)
UROBILINOGEN UR QL STRIP.AUTO: 0.2 E.U./DL
WBC # BLD AUTO: 6.08 THOUSAND/UL (ref 4.31–10.16)
WBC #/AREA URNS AUTO: ABNORMAL /HPF

## 2022-02-14 PROCEDURE — 85025 COMPLETE CBC W/AUTO DIFF WBC: CPT

## 2022-02-14 PROCEDURE — 80053 COMPREHEN METABOLIC PANEL: CPT

## 2022-02-14 PROCEDURE — 81001 URINALYSIS AUTO W/SCOPE: CPT

## 2022-02-14 PROCEDURE — 80061 LIPID PANEL: CPT

## 2022-02-14 PROCEDURE — 36415 COLL VENOUS BLD VENIPUNCTURE: CPT

## 2022-02-14 PROCEDURE — 84443 ASSAY THYROID STIM HORMONE: CPT

## 2022-02-14 PROCEDURE — 82330 ASSAY OF CALCIUM: CPT

## 2022-02-14 NOTE — TELEPHONE ENCOUNTER
S/w Patients wife Helen Cooks advised findings on device check  Salud Vaughan is currently out getting b/w completed  C/o increased LE edema despite wearing compression stockings, worsening SOB w/ exertion and decrease in urine output x7 days or so  Denies any CP, lightheadedness/dizziness  No palpitations  Weight has been stable around 195-197 lbs  Home dry weight per your most recent note 197 lbs  Admits to possibly drinking too much fluid but adheres to a low sodium diet  Current medication regimen; Torsemide 40 mg BID w/ extra 20 mg BID PRN for weight gain   Metolazone 2 5 mg daily no more than twice weekly for edema   Potassium 10 meq BID w/ extra 10 meq PRN w/ Torsemide/Metolazone    Took Torsemide 60 mg BID w/ extra K on 2/11 w/ some relief  Spouse is unsure if Salud Vaughan responds better to Torsemide or Metolazone  CMP is pending amongst other labs  I advised Mara Bagleys to have Wesly take Torsemide 60 mg upon arriving home and I will c/b once I hear back from you w/ further instructions-- verbally understood

## 2022-02-14 NOTE — TELEPHONE ENCOUNTER
Labs are good, Renal function stable  Without weight change, hard to believe he can be that decompensated  He can increase Metolazone to 3 times a week as needed    If they want to see me you can ask them to come to [place] office tomorrow at 1:40pm

## 2022-02-14 NOTE — TELEPHONE ENCOUNTER
----- Message from [facility] sent at 2/14/2022  8:50 AM EST -----  Regarding: cor-fred crossed  Josh Dense,  Pts cor-fred crossed x 7 days  PT takes demadex, zaroxolyn, amiodarone, metoprolol succ, mexiletine, asa 81mg  EF: 20% (echo 6/17/21)  Thanks,  ~Nancy   NON-BILLABLE MERLIN TRANSMISSION: BATTERY VOLTAGE ADEQUATE (4 4 YR)  AP: 99%  BP: >99%  ALL AVAILABLE LEAD PARAMETERS WITHIN NORMAL LIMITS  2 NON-SUSTIANED EPISODES W/ EGMS SHOWING NSVT 11 BEATS @ 149 BPM & 13 BEATS @ 147 BPM  PT TAKES AMIDOARONE, METOPROLOL SUCC, MEXILETINE, ASA 81MG  S/P VT ABL 12/3/21  EF: 20% (ECHO 6/17/21)  CORVUE IMPEDANCE THRESHOLD CROSSED  X 7 DAYS  PT TAKES Blade Garcia  TASK TO HF TEAM  APPROPRIATELY FUNCTIONING BI-V ICD    [address]

## 2022-02-14 NOTE — TELEPHONE ENCOUNTER
S/w Mrs Prashanth Waddell after discussing assessment again w/ her, she decided to bring Amarilis Cornea in tomorrow  Scheduled for 140p tomorrow  CMP received w/ her as well  Amarilis Cornea will take 60 mg of Torsemide again this afternoon

## 2022-02-15 ENCOUNTER — OFFICE VISIT (OUTPATIENT)
Dept: CARDIOLOGY CLINIC | Facility: CLINIC | Age: 87
End: 2022-02-15
Payer: MEDICARE

## 2022-02-15 ENCOUNTER — TELEPHONE (OUTPATIENT)
Dept: NEPHROLOGY | Facility: CLINIC | Age: 87
End: 2022-02-15

## 2022-02-15 VITALS
BODY MASS INDEX: 31.64 KG/M2 | HEIGHT: 67 IN | HEART RATE: 61 BPM | SYSTOLIC BLOOD PRESSURE: 102 MMHG | OXYGEN SATURATION: 96 % | DIASTOLIC BLOOD PRESSURE: 58 MMHG | WEIGHT: 201.6 LBS

## 2022-02-15 DIAGNOSIS — N18.32 STAGE 3B CHRONIC KIDNEY DISEASE (HCC): Primary | ICD-10-CM

## 2022-02-15 DIAGNOSIS — I50.42 CHRONIC COMBINED SYSTOLIC AND DIASTOLIC CONGESTIVE HEART FAILURE (HCC): Primary | ICD-10-CM

## 2022-02-15 DIAGNOSIS — I25.810 CORONARY ARTERY DISEASE INVOLVING CORONARY BYPASS GRAFT OF NATIVE HEART WITHOUT ANGINA PECTORIS: ICD-10-CM

## 2022-02-15 PROCEDURE — 99214 OFFICE O/P EST MOD 30 MIN: CPT | Performed by: INTERNAL MEDICINE

## 2022-02-15 RX ORDER — TORSEMIDE 20 MG/1
60 TABLET ORAL 2 TIMES DAILY
Qty: 270 TABLET | Refills: 3 | Status: SHIPPED | OUTPATIENT
Start: 2022-02-15

## 2022-02-15 NOTE — PROGRESS NOTES
Follow-up - Cardiology   Radha Hernandez 80 y o  male MRN: [id number]        Problems    1  Chronic combined systolic and diastolic congestive heart failure (HCC)  Comprehensive metabolic panel   2  Coronary artery disease involving coronary bypass graft of native heart without angina pectoris  torsemide (DEMADEX) 20 mg tablet     Jim Jarrell  Returns for a follow-up visit my [facility] office     Coronary artery disease   remote CABG   severe ischemic cardiomyopathy is longstanding   slight decline in LVEF this year to 20%  All grafts patent by prior cardiac catheterization in late 2021   He has no new ischemic symptoms    Chronic systolic CHF   LVEF 35-96% due to ischemic cardiomyopathy chronically  CRT-D with generator change 1/20 (not MRI compatible)  Home dry weight was 197, but he has been losing some caloric weight, his home weight today in the setting of mild volume overload with recent Corvue ICD impedance threshold crossed, is 193 lb  , correlating with 201 lb in the office  He took 60 mg of torsemide last night with much improved urine output, approximately 1 L, his wife measures a pretty intently  Renal function stable with creatinine 1 88  Hypertension  Blood pressure is controlled    Hyperlipidemia  Lipids are well controlled    Hypothyroidism  Rise in TSH to 12 5 was met with levothyroxine changes, responded down to 2 5, well controlled, likely partly influenced by amiodarone increased to 400 mg a day for suppression of symptomatic, treated VT    Ventricular tachycardia  Device shocks x2, asymptomatic episodes status post antitachycardia pacing, despite amiodarone 200 mg twice a day, mexiletine 150 mg twice a day prompted evaluation by EP and referral for VT ablation which was done 12/3/21  Device check soon after revealed no episodes  Recent repeat device check yesterday 2/14/2021, revealed 2 episodes of nonsustained VT at 11 beats and 13 beats    He had no associated symptoms  Plan    Torsemide 60 mg twice a day from now on   CMP in 1-2 weeks  His recent minimal LFT elevation might be due to congestion due to heart failure   I suspect his new baseline weight at home will be around 191-192 lb, if he dramatically lowers below this, I asked his wife to give me a call in possibly back down his torsemide again    HPI: Letty Salvador is a 80y o  year old male  With a longstanding history of CAD, CABG, ischemic cardiomyopathy with last known ejection fraction 27%, chronic systolic/ diastolic CHF, ventricular tachycardia and paroxysmal atrial fibrillation, on long-term amiodarone and mexiletine  Krystin Henriquez had device check yesterday which revealed 2 episodes of nonsustained VT, he is status post ablation in December  No device shocks, no therapies  He has had some increasing shortness of breath and fatigue, some recent decreased urination, improved yesterday with dose escalation in torsemide to 60 mg, home weight today 193 lb which is about 3-4 lb lower than his previous dry weight any actually presents volume overloaded  Renal function stable, creatinine 1 88, but LFTs marginally elevated and might have to do with congestion  Review of Systems   Constitutional: Negative for appetite change, diaphoresis, fatigue and fever  Respiratory: Positive for shortness of breath  Negative for chest tightness and wheezing  Cardiovascular: Positive for leg swelling  Negative for chest pain and palpitations  Gastrointestinal: Negative for abdominal pain and blood in stool  Musculoskeletal: Negative for arthralgias and joint swelling  Skin: Negative for rash  Neurological: Negative for dizziness, syncope and light-headedness           Past Medical History:   Diagnosis Date    AICD (automatic cardioverter/defibrillator) present 2004    AICD (automatic cardioverter/defibrillator) present 2006    AICD (automatic cardioverter/defibrillator) present 2013    [facility]  Arthritis     R knee and neck    Atrial fibrillation (HCC)     BPH (benign prostatic hyperplasia)     CAD (coronary artery disease) of artery bypass graft     CHF (congestive heart failure) (HCC)     combine    Coronary artery disease     Disease of thyroid gland     Hyperlipidemia     Hypertension     Ischemic cardiomyopathy     Left ureteral calculus 10/17/2017    Renal disorder     V tach (HCC)      Social History     Substance and Sexual Activity   Alcohol Use Yes     Social History     Substance and Sexual Activity   Drug Use No     Social History     Tobacco Use   Smoking Status Never Smoker   Smokeless Tobacco Never Used       Allergies:  No Known Allergies    Medications:     Current Outpatient Medications:     acetaminophen (TYLENOL) 500 mg tablet, Take 500 mg by mouth every 6 (six) hours as needed for mild pain , Disp: , Rfl:     amiodarone 200 mg tablet, Take 1 tablet (200 mg total) by mouth 2 (two) times a day with meals, Disp: 30 tablet, Rfl: 3    aspirin 81 MG tablet, Take 81 mg by mouth daily  , Disp: , Rfl:     atorvastatin (LIPITOR) 40 mg tablet, Take 1 tablet (40 mg total) by mouth daily with dinner, Disp: 30 tablet, Rfl: 11    isosorbide mononitrate (IMDUR) 30 mg 24 hr tablet, Take 1 tablet (30 mg total) by mouth daily, Disp: 90 tablet, Rfl: 3    levothyroxine 175 mcg tablet, Take 175 mcg by mouth daily  , Disp: , Rfl:     LORazepam (ATIVAN) 1 mg tablet, Take 1 mg by mouth as needed for anxiety  , Disp: , Rfl:     metolazone (ZAROXOLYN) 2 5 mg tablet, As needed no more than twice weekly for leg swelling, Disp: 10 tablet, Rfl: 3    metoprolol succinate (TOPROL-XL) 100 mg 24 hr tablet, Take 1 tablet (100 mg total) by mouth daily, Disp: 30 tablet, Rfl: 0    metoprolol succinate (TOPROL-XL) 50 mg 24 hr tablet, Take 1 tablet (50 mg total) by mouth daily at bedtime, Disp: 30 tablet, Rfl: 0    mexiletine (MEXITIL) 150 mg capsule, Take 1 capsule (150 mg total) by mouth every 8 (eight) hours, Disp: 90 capsule, Rfl: 0    nitroglycerin (NITROSTAT) 0 4 mg SL tablet, Place 1 tablet (0 4 mg total) under the tongue every 5 (five) minutes as needed for chest pain, Disp: 30 tablet, Rfl: 2    potassium citrate (Urocit-K 10) 10 mEq, Take 1 tablet (10 mEq total) by mouth 2 (two) times a day, Disp: 180 tablet, Rfl: 3    tamsulosin (FLOMAX) 0 4 mg, Take 1 capsule (0 4 mg total) by mouth daily with dinner, Disp: 90 capsule, Rfl: 3    torsemide (DEMADEX) 20 mg tablet, Take 3 tablets (60 mg total) by mouth 2 (two) times a day, Disp: 270 tablet, Rfl: 3      Vitals:    02/15/22 1327   BP: 102/58   Pulse: 61   SpO2: 96%     Weight (last 2 days)     Date/Time Weight    02/15/22 1327 91 4 (201 6)        Physical Exam  Constitutional:       General: He is not in acute distress  Appearance: He is not diaphoretic  HENT:      Head: Normocephalic and atraumatic  Eyes:      General: No scleral icterus  Conjunctiva/sclera: Conjunctivae normal    Neck:      Vascular: JVD present  Cardiovascular:      Rate and Rhythm: Normal rate and regular rhythm  Heart sounds: Normal heart sounds  No murmur heard  Pulmonary:      Effort: Pulmonary effort is normal  No respiratory distress  Breath sounds: Normal breath sounds  No decreased breath sounds, wheezing, rhonchi or rales  Musculoskeletal:      Cervical back: Normal range of motion  Right lower leg: Edema present  Left lower leg: Edema present  Skin:     General: Skin is warm and dry  Neurological:      Mental Status: He is alert and oriented to person, place, and time  Laboratory Studies:    Lab studies personally reviewed    Cardiac testing:   EKG reviewed personally:   No results found for this visit on 02/15/22        Echocardiogram  2018-EF 30%, LV dilated, akinesis basal to mid inferior wall, and inferolateral wall, grade 3 diastolic dysfunction, RV dilated, left atrium dilated, mild MR, moderate TR, severe pulmonary hypertension   9/21-personally reviewed -EF 25%, LV dilated, regional wall motion abnormalities as noted above, moderate TR, severe pulmonary hypertension, dilated IVC     cardiac catheterization   11/21-all grafts patent    Device check  3/1/2021-nonsustained VT, normal Corvue  9/9/2021- normal device function, 76% a paced, 99% V paced, multiple nonsustained VT episodes, longest 32 beats,  No AFib detected  11/22/2021- normal device function,  VT episodes with successful ATP  12/21-normal device function, no VT episodes    Otilio Hunt MD    Portions of the record may have been created with voice recognition software   Occasional wrong word or "sound a like" substitutions may have occurred due to the inherent limitations of voice recognition software   Read the chart carefully and recognize, using context, where substitutions have occurred

## 2022-02-15 NOTE — TELEPHONE ENCOUNTER
----- Message from Mishel Boyle MD sent at 2/15/2022 12:57 PM EST -----  Labs look stable  We need to make sure that Dr Chris Pascual is aware and address is the slight elevation in AST/liver function studies!     At the very least I would order a CMP right prior to my visit with the patient

## 2022-02-15 NOTE — PATIENT INSTRUCTIONS
You can increase her water intake to 1 8 L per day  Left stay on the torsemide 60 mg twice a day for now  You look like your losing a bit of body weight, you have a little bit of fluid present today, and your weight is lower than my prior visit  We will check your blood work in 2 weeks, and see if your kidney number stay stable, your potassium stay stable, and see if your liver numbers improved  There were only marginally elevated on your recent blood work    This may be due to water retention

## 2022-02-15 NOTE — TELEPHONE ENCOUNTER
Spoke with patient's wife, Qamar, letting them know his kidney function is stable  Let them know he wants a CMP repeated before his next appt  She said patient is going for lab work for another provider in 2 weeks and that he'll do it when he has that lab work done  Called Dr Hiren Corcoran office to let them know about slight elevation in AST/liver function studies  Lab results faxed to [phone]

## 2022-02-16 DIAGNOSIS — I47.2 V-TACH (HCC): ICD-10-CM

## 2022-02-16 RX ORDER — METOPROLOL SUCCINATE 50 MG/1
50 TABLET, EXTENDED RELEASE ORAL SEE ADMIN INSTRUCTIONS
Qty: 270 TABLET | Refills: 3 | OUTPATIENT
Start: 2022-02-16

## 2022-03-02 DIAGNOSIS — I47.2 V TACH (HCC): ICD-10-CM

## 2022-03-07 ENCOUNTER — TRANSCRIBE ORDERS (OUTPATIENT)
Dept: LAB | Facility: CLINIC | Age: 87
End: 2022-03-07

## 2022-03-07 ENCOUNTER — APPOINTMENT (OUTPATIENT)
Dept: LAB | Facility: CLINIC | Age: 87
End: 2022-03-07
Payer: MEDICARE

## 2022-03-07 DIAGNOSIS — N18.32 STAGE 3B CHRONIC KIDNEY DISEASE (HCC): ICD-10-CM

## 2022-03-07 DIAGNOSIS — E53.8 BIOTIN-(PROPIONYL-COA-CARBOXYLASE) LIGASE DEFICIENCY: ICD-10-CM

## 2022-03-07 DIAGNOSIS — I50.42 CHRONIC COMBINED SYSTOLIC AND DIASTOLIC CONGESTIVE HEART FAILURE (HCC): ICD-10-CM

## 2022-03-07 DIAGNOSIS — E53.8 BIOTIN-(PROPIONYL-COA-CARBOXYLASE) LIGASE DEFICIENCY: Primary | ICD-10-CM

## 2022-03-07 LAB
ALBUMIN SERPL BCP-MCNC: 3.3 G/DL (ref 3.5–5)
ALP SERPL-CCNC: 108 U/L (ref 46–116)
ALT SERPL W P-5'-P-CCNC: 31 U/L (ref 12–78)
ANION GAP SERPL CALCULATED.3IONS-SCNC: 6 MMOL/L (ref 4–13)
AST SERPL W P-5'-P-CCNC: 32 U/L (ref 5–45)
BILIRUB SERPL-MCNC: 0.53 MG/DL (ref 0.2–1)
BUN SERPL-MCNC: 23 MG/DL (ref 5–25)
CALCIUM ALBUM COR SERPL-MCNC: 9.9 MG/DL (ref 8.3–10.1)
CALCIUM SERPL-MCNC: 9.3 MG/DL (ref 8.3–10.1)
CHLORIDE SERPL-SCNC: 101 MMOL/L (ref 100–108)
CO2 SERPL-SCNC: 30 MMOL/L (ref 21–32)
CREAT SERPL-MCNC: 1.79 MG/DL (ref 0.6–1.3)
GFR SERPL CREATININE-BSD FRML MDRD: 33 ML/MIN/1.73SQ M
GLUCOSE P FAST SERPL-MCNC: 120 MG/DL (ref 65–99)
POTASSIUM SERPL-SCNC: 4 MMOL/L (ref 3.5–5.3)
PROT SERPL-MCNC: 7.4 G/DL (ref 6.4–8.2)
SODIUM SERPL-SCNC: 137 MMOL/L (ref 136–145)
VIT B12 SERPL-MCNC: 339 PG/ML (ref 100–900)

## 2022-03-07 PROCEDURE — 80053 COMPREHEN METABOLIC PANEL: CPT

## 2022-03-07 PROCEDURE — 82607 VITAMIN B-12: CPT

## 2022-03-07 PROCEDURE — 36415 COLL VENOUS BLD VENIPUNCTURE: CPT

## 2022-03-07 RX ORDER — AMIODARONE HYDROCHLORIDE 200 MG/1
200 TABLET ORAL 2 TIMES DAILY WITH MEALS
Qty: 30 TABLET | Refills: 11 | Status: SHIPPED | OUTPATIENT
Start: 2022-03-07 | End: 2022-03-15 | Stop reason: SDUPTHER

## 2022-03-11 ENCOUNTER — TELEPHONE (OUTPATIENT)
Dept: NEPHROLOGY | Facility: CLINIC | Age: 87
End: 2022-03-11

## 2022-03-11 NOTE — TELEPHONE ENCOUNTER
Appointment Confirmation   Person confirmed appointment with  If not patient, name of the person Patient    Date and time of appointment 3/14  4:30    Patient acknowledged and will be at appointment? yes    Did you advise the patient that they will need a urine sample if they are a new patient?  N/A    Did you advise the patient to bring their current medications for verification? (including any OTC) Yes    Additional Information

## 2022-03-14 ENCOUNTER — OFFICE VISIT (OUTPATIENT)
Dept: NEPHROLOGY | Facility: CLINIC | Age: 87
End: 2022-03-14
Payer: MEDICARE

## 2022-03-14 VITALS — WEIGHT: 203 LBS | BODY MASS INDEX: 31.86 KG/M2 | HEIGHT: 67 IN

## 2022-03-14 DIAGNOSIS — N18.32 STAGE 3B CHRONIC KIDNEY DISEASE (HCC): ICD-10-CM

## 2022-03-14 DIAGNOSIS — N20.0 NEPHROLITHIASIS: ICD-10-CM

## 2022-03-14 DIAGNOSIS — E78.5 DYSLIPIDEMIA: ICD-10-CM

## 2022-03-14 DIAGNOSIS — E78.2 MIXED HYPERLIPIDEMIA: ICD-10-CM

## 2022-03-14 DIAGNOSIS — I12.9 HYPERTENSIVE CHRONIC KIDNEY DISEASE WITH STAGE 1 THROUGH STAGE 4 CHRONIC KIDNEY DISEASE, OR UNSPECIFIED CHRONIC KIDNEY DISEASE: Primary | ICD-10-CM

## 2022-03-14 DIAGNOSIS — R31.29 MICROSCOPIC HEMATURIA: ICD-10-CM

## 2022-03-14 DIAGNOSIS — N25.81 SECONDARY HYPERPARATHYROIDISM OF RENAL ORIGIN (HCC): ICD-10-CM

## 2022-03-14 DIAGNOSIS — E55.9 VITAMIN D DEFICIENCY: ICD-10-CM

## 2022-03-14 DIAGNOSIS — R60.0 LOCALIZED EDEMA: ICD-10-CM

## 2022-03-14 PROCEDURE — 99214 OFFICE O/P EST MOD 30 MIN: CPT | Performed by: INTERNAL MEDICINE

## 2022-03-14 NOTE — PATIENT INSTRUCTIONS
1  Medication changes today:   No medication changes today  2  Please take 1 week a blood pressure readings  in the next 4 weeks     AS FOLLOWS  MORNING AND EVENING, SITTING AND STANDING as follows:  · TAKE THE MORNING READINGS BEFORE ANY MEDICATIONS AND WHEN YOU ARE RELAXED FOR SEVERAL MINUTES  · TAKE THE EVENING READINGS:  BETWEEN 7-10 P M ; PRIOR TO ANY MEDICATIONS; AT LEAST IN OUR  FROM DINNER; AND CERTAINLY AFTER RELAXING FOR A FEW MINUTES  · PLEASE INCLUDE HEART RATE WITH YOUR BLOOD PRESSURE READINGS  · When taking standing readings, keep your arm supported at heart level and not dangling  · Make sure you are sitting with your back supported and feet on the ground and do not cross your legs or feet  · Make sure you have not taken any coffee or caffeine products or exercised or smoke cigarettes at least 30 minutes before taking your blood pressure  Then please mail these readings into the office    3  Follow-up in 4  months   Please bring in 1 week a blood pressure readings morning evening, sitting and standing is outlined above   Please go for fasting lab work 1-2 weeks prior to your appointment      4  General instructions:   AVOID SALT BUT NOT ADDING AN READING LABELS TO MAKE SURE THERE IS LOW-SALT IN THE FOOD THAT YOU ARE EATING  o Goal is less than 2 g of sodium intake or less than 5 g of sodium chloride intake per day     Avoid nonsteroidal anti-inflammatory drugs such as Naprosyn, ibuprofen, Aleve, Advil, Celebrex, Meloxicam (Mobic) etc   You can use Tylenol as needed if you do not have any liver condition to be concerned about     Avoid medications such as Sudafed or decongestants and antihistamines that contained the D component which is the decongestant  You can take antihistamines without the decongestant or D component   Try to avoid medications such as pantoprazole or  Protonix/Nexium or Esomeprazole)/Prilosec or omeprazole/Prevacid or lansoprazole/AcipHex or Rabeprazole  If you are able to, use Pepcid as this is safer for your kidneys   Try to exercise at least 30 minutes 3 days a week to begin with with an ultimate goal of 5 days a week for at least 30 minutes, as able     Try to lose 5-10 lb by your next visit     Please do not drink more than 2 glasses of alcohol/wine on a daily basis as this may contribute to your high blood pressure

## 2022-03-14 NOTE — PROGRESS NOTES
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  · Baseline creatinine:  1 5-2 0  · Current creatinine: 1 70 at current baseline to keep euvolemic  · Urine protein creatinine ratio:  0 26 g at goal  · UA:  02/14/2022:  4-10 RBCs, no WBCs and no proteinuria:  Mayelin Dixon    Recommendations:  · Treat hypertension-please see below  · Treat dyslipidemia-please see below  · Maintain proteinuria less than 1 g or as low as possible  · Avoid nephrotoxic agents such as NSAIDs, patient counseled as such    2   Volume:  TSH was acceptable; venous duplex negative; no significant edema at this time  Patient gained 7 lb recently seen by Dr Mirna Zapata 10/5 adjusted torsemide 60 mg twice a day eventually down to 40 mg twice a day when he goes below 200 lb  · Currently taking torsemide 40 mg b i d  Which works well, only rarely takes 60 mg twice a day      3   Hypertension:       Current blood pressure averages:   Blood pressure readings:  A m :  135/76 sitting, 127/73 standing  P m :  129/63 sitting, 127/65 standing  Heart rate:  60-70 range     · Goal blood pressure:  Less than 125/80 given CAD     Recommendations:  · Push nonmedical regimen including weight loss, isotonic exercise and avoidance of salt, patient counseled as such  · Medication changes today:   · Patient with low diastolic readings, had mildly elevated systolic readings in the morning but he had a URI at the time I would simply recommend rechecking blood pressures in about 4 weeks    4   Electrolytes:  all acceptable    5   Mineral bone disorder:  · Calcium/magnesium/phosphorus:  Calcium and magnesium and phosphorus all normal  · PTH intact:  141 8:  Elevated but I would avoid over treating to avoid adynamic bone disease  · Vitamin-D:  34 4:  At goal continue supplementation from prior evaluation  6   Dyslipidemia:  · Goal LDL:  Goal less than 70  · Current lipid profile:  LDL  59/HDL 34/triglycerides 120  Recommendations:  At goal so no changes    7   Anemia:  Stable at 12 4     8   Nephrolithiasis:  Also followed by urology:  Patient with no further stone passage  · 24 hour urine for stone risk::   · Calcium 78 mg acceptable  · Citrate 309 mg below goal  · Normal cystine  · Oxalate 50 mg above goal  · Sodium 123 mEq actually quite good  · Uric acid 476 mg acceptable  · Volume:  1500 mL significantly under goal of 2 5-3 L  Recommendations:  · Modest fluid intake with water, try to remove soda, we are restricted because of ischemic cardiomyopathy and fluid overload  · General stone dietary recommendations please see patient's recommendations  · Potassium citrate  · Low oxalate diet        9   Other problems:  · Cardiac:  Followed by Dr Dariana Bryant with ischemic cardiomyopathy/ICD/systolic CHF/CABG   Ejection fraction 30% without any reversible ischemia on nuclear stress test   He has had recent biventricular pacemaker placement  Recent ejection fraction 20% due to ischemic cardiomyopathy adjustment of torsemide see above  · Arrhythmias:  PAF: Status post VT ablation 12/3/2021  · BPH  · Hypothyroidism    GI health maintenance:  Not needed unless an acute event    PATIENT INSTRUCTIONS:    Patient Instructions   1  Medication changes today:   No medication changes today          2  Please take 1 week a blood pressure readings  in the next 4 weeks     AS FOLLOWS  MORNING AND EVENING, SITTING AND STANDING as follows:  · TAKE THE MORNING READINGS BEFORE ANY MEDICATIONS AND WHEN YOU ARE RELAXED FOR SEVERAL MINUTES  · TAKE THE EVENING READINGS:  BETWEEN 7-10 P M ; PRIOR TO ANY MEDICATIONS; AT LEAST IN OUR  FROM DINNER; AND CERTAINLY AFTER RELAXING FOR A FEW MINUTES  · PLEASE INCLUDE HEART RATE WITH YOUR BLOOD PRESSURE READINGS  · When taking standing readings, keep your arm supported at heart level and not dangling  · Make sure you are sitting with your back supported and feet on the ground and do not cross your legs or feet  · Make sure you have not taken any coffee or caffeine products or exercised or smoke cigarettes at least 30 minutes before taking your blood pressure  Then please mail these readings into the office    3  Follow-up in 4  months   Please bring in 1 week a blood pressure readings morning evening, sitting and standing is outlined above   Please go for fasting lab work 1-2 weeks prior to your appointment      4  General instructions:   AVOID SALT BUT NOT ADDING AN READING LABELS TO MAKE SURE THERE IS LOW-SALT IN THE FOOD THAT YOU ARE EATING  o Goal is less than 2 g of sodium intake or less than 5 g of sodium chloride intake per day     Avoid nonsteroidal anti-inflammatory drugs such as Naprosyn, ibuprofen, Aleve, Advil, Celebrex, Meloxicam (Mobic) etc   You can use Tylenol as needed if you do not have any liver condition to be concerned about     Avoid medications such as Sudafed or decongestants and antihistamines that contained the D component which is the decongestant  You can take antihistamines without the decongestant or D component   Try to avoid medications such as pantoprazole or  Protonix/Nexium or Esomeprazole)/Prilosec or omeprazole/Prevacid or lansoprazole/AcipHex or Rabeprazole  If you are able to, use Pepcid as this is safer for your kidneys   Try to exercise at least 30 minutes 3 days a week to begin with with an ultimate goal of 5 days a week for at least 30 minutes, as able     Try to lose 5-10 lb by your next visit     Please do not drink more than 2 glasses of alcohol/wine on a daily basis as this may contribute to your high blood pressure  Subjective: There has been no hospitalizations or acute illnesses since last visit    The patient no recent URI with mild residual cough  No fevers, chills  Good appetite and fair energy  No hematuria, dysuria and no bubbles:  Difficulty controlling the stream follows with Urology  No gastrointestinal symptoms except for occasional loose stools depending upon what he is  No chest pain, chronic dyspnea on exertion unchanged, leg swelling is doing well  No headaches, rare dizziness or lightheadedness upon standing  Blood pressure medications:   Torsemide 40 mg twice a day, occasionally takes 60 mg twice a day but that is rare every few weeks   Not really taking metolazone   Urocit-K 10 mEq twice a day   Toprol  mg the morning 50 mg in the evening   Imdur 30 mg daily      ROS:  See HPI, otherwise review of systems as completely reviewed with the patient are negative    Past Medical History:   Diagnosis Date    AICD (automatic cardioverter/defibrillator) present 2004    AICD (automatic cardioverter/defibrillator) present 2006    AICD (automatic cardioverter/defibrillator) present 2013    [facility]    Arthritis     R knee and neck    Atrial fibrillation ([facility] [address] )     BPH (benign prostatic hyperplasia)     CAD (coronary artery disease) of artery bypass graft     CHF (congestive heart failure) ([facility] [address] )     combine    Coronary artery disease     Disease of thyroid gland     Hyperlipidemia     Hypertension     Ischemic cardiomyopathy     Left ureteral calculus 10/17/2017    Renal disorder     [facility])      Past Surgical History:   Procedure Laterality Date    CARDIAC CATHETERIZATION      CARDIAC CATHETERIZATION Left 11/8/2021    Procedure: Cardiac Left Heart Cath;  Surgeon: Hunter Talavera MD;  [facility]; Service: Cardiology    CARDIAC CATHETERIZATION N/A 11/8/2021    Procedure: Cardiac Coronary Angiogram;  Surgeon: Hunter Talavera MD;  [facility]; Service: Cardiology    CARDIAC ELECTROPHYSIOLOGY PROCEDURE N/A 12/3/2021    Procedure: Cardiac eps/vt ablation;  Surgeon: Queenie Muniz DO;  [facility];   Service: Cardiology    CARDIAC SURGERY      Pacemaker    CORONARY ARTERY BYPASS GRAFT  WI CYSTOURETHROSCOPY,URETER CATHETER N/A 10/17/2017    Procedure: CYSTOSCOPY, left  RETROGRADE PYELOGRAM WITH LEFT URETEROSCOPY , stone extraction,LEFT STENT INSERTION;  Surgeon: Michelle Horta MD;  Location: [facility];  Service: Urology     Family History   Problem Relation Age of Onset    Tuberculosis Father         WWI    Hypertension Mother     Stroke Brother       reports that he has never smoked  He has never used smokeless tobacco  He reports current alcohol use  He reports that he does not use drugs  I COMPLETELY REVIEWED THE PAST MEDICAL HISTORY/PAST SURGICAL HISTORY/SOCIAL HISTORY/FAMILY HISTORY/AND MEDICATIONS  AND UPDATED ALL    Objective:     Vitals:   BP sitting on left:  132/68 heart rate of 70 and regular  BP standing on left:  126/60 with a heart rate of 64 regular    Weight (last 2 days)     Date/Time Weight    03/14/22 1634 92 1 (203)        Wt Readings from Last 3 Encounters:   03/14/22 92 1 kg (203 lb)   02/15/22 91 4 kg (201 lb 9 6 oz)   01/25/22 92 1 kg (203 lb)       Body mass index is 31 79 kg/m²      Physical Exam: General:  No acute distress/obese  Skin:  No acute rash  Eyes:  No scleral icterus, noninjected, no discharge from eyes  ENT:  Moist mucous membranes  Neck:  Supple, no jugular venous distention, trachea is midline, no lymphadenopathy and no thyromegaly  Back   No CVAT  Chest:  Clear to auscultation and percussion, good respiratory effort  CVS:  Regular rate and rhythm without a rub, or gallops or murmurs  Abdomen:  Obese,Soft and nontender with normal bowel sounds  Extremities:  No cyanosis and 1+ minimally pitting edema 1/2 the way up the pretibial region bilaterally, support hose in place, no arthritic changes, normal range of motion  Neuro:  Grossly intact  Psych:  Alert, oriented x3 and appropriate      Medications:    Current Outpatient Medications:     acetaminophen (TYLENOL) 500 mg tablet, Take 500 mg by mouth every 6 (six) hours as needed for mild pain , Disp: , Rfl:     amiodarone 200 mg tablet, Take 1 tablet (200 mg total) by mouth 2 (two) times a day with meals, Disp: 30 tablet, Rfl: 11    aspirin 81 MG tablet, Take 81 mg by mouth daily  , Disp: , Rfl:     atorvastatin (LIPITOR) 40 mg tablet, Take 1 tablet (40 mg total) by mouth daily with dinner, Disp: 30 tablet, Rfl: 11    isosorbide mononitrate (IMDUR) 30 mg 24 hr tablet, Take 1 tablet (30 mg total) by mouth daily, Disp: 90 tablet, Rfl: 3    levothyroxine 175 mcg tablet, Take 175 mcg by mouth daily  , Disp: , Rfl:     LORazepam (ATIVAN) 1 mg tablet, Take 1 mg by mouth as needed for anxiety  , Disp: , Rfl:     metolazone (ZAROXOLYN) 2 5 mg tablet, As needed no more than twice weekly for leg swelling, Disp: 10 tablet, Rfl: 3    metoprolol succinate (TOPROL-XL) 100 mg 24 hr tablet, Take 1 tablet (100 mg total) by mouth daily, Disp: 30 tablet, Rfl: 0    metoprolol succinate (TOPROL-XL) 50 mg 24 hr tablet, Take 1 tablet (50 mg total) by mouth see administration instructions Take 2 tabs( 100 mg ) in am  And 1tab(50  Mg) in pm , Disp: 270 tablet, Rfl: 3    mexiletine (MEXITIL) 150 mg capsule, Take 1 capsule (150 mg total) by mouth every 8 (eight) hours, Disp: 90 capsule, Rfl: 0    nitroglycerin (NITROSTAT) 0 4 mg SL tablet, Place 1 tablet (0 4 mg total) under the tongue every 5 (five) minutes as needed for chest pain, Disp: 30 tablet, Rfl: 2    potassium citrate (Urocit-K 10) 10 mEq, Take 1 tablet (10 mEq total) by mouth 2 (two) times a day, Disp: 180 tablet, Rfl: 3    tamsulosin (FLOMAX) 0 4 mg, Take 1 capsule (0 4 mg total) by mouth daily with dinner, Disp: 90 capsule, Rfl: 3    torsemide (DEMADEX) 20 mg tablet, Take 3 tablets (60 mg total) by mouth 2 (two) times a day (Patient taking differently: Take 40 mg by mouth 2 (two) times a day  ), Disp: 270 tablet, Rfl: 3    Lab, Imaging and other studies: I have personally reviewed pertinent labs    Laboratory Results:  Results for orders placed or performed in visit on 03/07/22   Comprehensive metabolic panel   Result Value Ref Range    Sodium 137 136 - 145 mmol/L    Potassium 4 0 3 5 - 5 3 mmol/L    Chloride 101 100 - 108 mmol/L    CO2 30 21 - 32 mmol/L    ANION GAP 6 4 - 13 mmol/L    BUN 23 5 - 25 mg/dL    Creatinine 1 79 (H) 0 60 - 1 30 mg/dL    Glucose, Fasting 120 (H) 65 - 99 mg/dL    Calcium 9 3 8 3 - 10 1 mg/dL    Corrected Calcium 9 9 8 3 - 10 1 mg/dL    AST 32 5 - 45 U/L    ALT 31 12 - 78 U/L    Alkaline Phosphatase 108 46 - 116 U/L    Total Protein 7 4 6 4 - 8 2 g/dL    Albumin 3 3 (L) 3 5 - 5 0 g/dL    Total Bilirubin 0 53 0 20 - 1 00 mg/dL    eGFR 33 ml/min/1 73sq m   Vitamin B12   Result Value Ref Range    Vitamin B-12 339 100 - 900 pg/mL             Invalid input(s): ALBUMIN      Radiology review:   chest X-ray    Ultrasound      Portions of the record may have been created with voice recognition software  Occasional wrong word or "sound a like" substitutions may have occurred due to the inherent limitations of voice recognition software  Read the chart carefully and recognize, using context, where substitutions have occurred

## 2022-03-14 NOTE — LETTER
March 14, 2022     Kina Rosas  [address] [place] [address]    Patient: Collins Putnam   YOB: 1935   Date of Visit: 3/14/2022       Dear Dr Betsey Goodrich:    Thank you for referring Collins Putnam to me for evaluation  Below are my notes for this consultation  If you have questions, please do not hesitate to call me  I look forward to following your patient along with you  Sincerely,        Ashvin Belcher MD        CC: Nicolasa Couch MD Daina Drafts, PA-C Augusto Alamin, MD  3/14/2022  5:18 PM  Sign when Signing Visit  RENAL FOLLOW UP NOTE: td     ASSESSMENT AND PLAN:  1  Carmela Rios :  · Etiology:  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  · Baseline creatinine:  1 5-2 0  · Current creatinine: 1 70 at current baseline to keep euvolemic  · Urine protein creatinine ratio:  0 26 g at goal  · UA:  02/14/2022:  4-10 RBCs, no WBCs and no proteinuria:  Jeffrey Dixon    Recommendations:  · Treat hypertension-please see below  · Treat dyslipidemia-please see below  · Maintain proteinuria less than 1 g or as low as possible  · Avoid nephrotoxic agents such as NSAIDs, patient counseled as such    2   Volume:  TSH was acceptable; venous duplex negative; no significant edema at this time  Patient gained 7 lb recently seen by Dr Rocky Manzano 10/5 adjusted torsemide 60 mg twice a day eventually down to 40 mg twice a day when he goes below 200 lb  · Currently taking torsemide 40 mg b i d  Which works well, only rarely takes 60 mg twice a day      3   Hypertension:       Current blood pressure averages:   Blood pressure readings:  A m :  135/76 sitting, 127/73 standing  P m :  129/63 sitting, 127/65 standing  Heart rate:  60-70 range     · Goal blood pressure:  Less than 125/80 given CAD     Recommendations:  · Push nonmedical regimen including weight loss, isotonic exercise and avoidance of salt, patient counseled as such  · Medication changes today:   · Patient with low diastolic readings, had mildly elevated systolic readings in the morning but he had a URI at the time I would simply recommend rechecking blood pressures in about 4 weeks    4   Electrolytes:  all acceptable    5   Mineral bone disorder:  · Calcium/magnesium/phosphorus:  Calcium and magnesium and phosphorus all normal  · PTH intact:  141 8:  Elevated but I would avoid over treating to avoid adynamic bone disease  · Vitamin-D:  34 4:  At goal continue supplementation from prior evaluation  6   Dyslipidemia:  · Goal LDL:  Goal less than 70  · Current lipid profile:  LDL  59/HDL 34/triglycerides 120  Recommendations:  At goal so no changes    7   Anemia:  Stable at 12 4     8   Nephrolithiasis:  Also followed by urology:  Patient with no further stone passage  · 24 hour urine for stone risk::   · Calcium 78 mg acceptable  · Citrate 309 mg below goal  · Normal cystine  · Oxalate 50 mg above goal  · Sodium 123 mEq actually quite good  · Uric acid 476 mg acceptable  · Volume:  1500 mL significantly under goal of 2 5-3 L  Recommendations:  · Modest fluid intake with water, try to remove soda, we are restricted because of ischemic cardiomyopathy and fluid overload  · General stone dietary recommendations please see patient's recommendations  · Potassium citrate  · Low oxalate diet        9   Other problems:  · Cardiac:  Followed by Dr Dariana Bryant with ischemic cardiomyopathy/ICD/systolic CHF/CABG   Ejection fraction 30% without any reversible ischemia on nuclear stress test   He has had recent biventricular pacemaker placement  Recent ejection fraction 20% due to ischemic cardiomyopathy adjustment of torsemide see above  · Arrhythmias:  PAF: Status post VT ablation 12/3/2021  · BPH  · Hypothyroidism    GI health maintenance:  Not needed unless an acute event    PATIENT INSTRUCTIONS:    Patient Instructions   1   Medication changes today:   No medication changes today         2  Please take 1 week a blood pressure readings  in the next 4 weeks     AS FOLLOWS  MORNING AND EVENING, SITTING AND STANDING as follows:  · TAKE THE MORNING READINGS BEFORE ANY MEDICATIONS AND WHEN YOU ARE RELAXED FOR SEVERAL MINUTES  · TAKE THE EVENING READINGS:  BETWEEN 7-10 P M ; PRIOR TO ANY MEDICATIONS; AT LEAST IN OUR  FROM DINNER; AND CERTAINLY AFTER RELAXING FOR A FEW MINUTES  · PLEASE INCLUDE HEART RATE WITH YOUR BLOOD PRESSURE READINGS  · When taking standing readings, keep your arm supported at heart level and not dangling  · Make sure you are sitting with your back supported and feet on the ground and do not cross your legs or feet  · Make sure you have not taken any coffee or caffeine products or exercised or smoke cigarettes at least 30 minutes before taking your blood pressure  Then please mail these readings into the office    3  Follow-up in 4  months   Please bring in 1 week a blood pressure readings morning evening, sitting and standing is outlined above   Please go for fasting lab work 1-2 weeks prior to your appointment      4  General instructions:   AVOID SALT BUT NOT ADDING AN READING LABELS TO MAKE SURE THERE IS LOW-SALT IN THE FOOD THAT YOU ARE EATING  o Goal is less than 2 g of sodium intake or less than 5 g of sodium chloride intake per day     Avoid nonsteroidal anti-inflammatory drugs such as Naprosyn, ibuprofen, Aleve, Advil, Celebrex, Meloxicam (Mobic) etc   You can use Tylenol as needed if you do not have any liver condition to be concerned about     Avoid medications such as Sudafed or decongestants and antihistamines that contained the D component which is the decongestant  You can take antihistamines without the decongestant or D component   Try to avoid medications such as pantoprazole or  Protonix/Nexium or Esomeprazole)/Prilosec or omeprazole/Prevacid or lansoprazole/AcipHex or Rabeprazole    If you are able to, use Pepcid as this is safer for your kidneys   Try to exercise at least 30 minutes 3 days a week to begin with with an ultimate goal of 5 days a week for at least 30 minutes, as able     Try to lose 5-10 lb by your next visit     Please do not drink more than 2 glasses of alcohol/wine on a daily basis as this may contribute to your high blood pressure  Subjective: There has been no hospitalizations or acute illnesses since last visit    The patient no recent URI with mild residual cough  No fevers, chills  Good appetite and fair energy  No hematuria, dysuria and no bubbles:  Difficulty controlling the stream follows with Urology  No gastrointestinal symptoms except for occasional loose stools depending upon what he is  No chest pain, chronic dyspnea on exertion unchanged, leg swelling is doing well  No headaches, rare dizziness or lightheadedness upon standing  Blood pressure medications:   Torsemide 40 mg twice a day, occasionally takes 60 mg twice a day but that is rare every few weeks   Not really taking metolazone   Urocit-K 10 mEq twice a day   Toprol  mg the morning 50 mg in the evening   Imdur 30 mg daily      ROS:  See HPI, otherwise review of systems as completely reviewed with the patient are negative    Past Medical History:   Diagnosis Date    AICD (automatic cardioverter/defibrillator) present 2004    AICD (automatic cardioverter/defibrillator) present 2006    AICD (automatic cardioverter/defibrillator) present 2013    [facility]    Arthritis     R knee and neck    Atrial fibrillation ([facility] [address] )     BPH (benign prostatic hyperplasia)     CAD (coronary artery disease) of artery bypass graft     CHF (congestive heart failure) ([address] )     combine    Coronary artery disease     Disease of thyroid gland     Hyperlipidemia     Hypertension     Ischemic cardiomyopathy     Left ureteral calculus 10/17/2017    Renal disorder     V tach [facility])      Past Surgical History:   Procedure Laterality Date    CARDIAC CATHETERIZATION      CARDIAC CATHETERIZATION Left 11/8/2021    Procedure: Cardiac Left Heart Cath;  Surgeon: Misbah Douglas MD;  [facility]; Service: Cardiology    CARDIAC CATHETERIZATION N/A 11/8/2021    Procedure: Cardiac Coronary Angiogram;  Surgeon: Misbah Douglas MD;  [facility]; Service: Cardiology    CARDIAC ELECTROPHYSIOLOGY PROCEDURE N/A 12/3/2021    Procedure: Cardiac eps/vt ablation;  Surgeon: Jack Atkinson DO;  [facility]; Service: Cardiology    CARDIAC SURGERY      Pacemaker    CORONARY ARTERY BYPASS GRAFT      MN CYSTOURETHROSCOPY,URETER CATHETER N/A 10/17/2017    Procedure: CYSTOSCOPY, left  RETROGRADE PYELOGRAM WITH LEFT URETEROSCOPY , stone extraction,LEFT STENT INSERTION;  Surgeon: Danii Dominguez MD;  Location: [facility];  Service: Urology     Family History   Problem Relation Age of Onset    Tuberculosis Father         WWI    Hypertension Mother     Stroke Brother       reports that he has never smoked  He has never used smokeless tobacco  He reports current alcohol use  He reports that he does not use drugs  I COMPLETELY REVIEWED THE PAST MEDICAL HISTORY/PAST SURGICAL HISTORY/SOCIAL HISTORY/FAMILY HISTORY/AND MEDICATIONS  AND UPDATED ALL    Objective:     Vitals:   BP sitting on left:  132/68 heart rate of 70 and regular  BP standing on left:  126/60 with a heart rate of 64 regular    Weight (last 2 days)     Date/Time Weight    03/14/22 1634 92 1 (203)        Wt Readings from Last 3 Encounters:   03/14/22 92 1 kg (203 lb)   02/15/22 91 4 kg (201 lb 9 6 oz)   01/25/22 92 1 kg (203 lb)       Body mass index is 31 79 kg/m²      Physical Exam: General:  No acute distress/obese  Skin:  No acute rash  Eyes:  No scleral icterus, noninjected, no discharge from eyes  ENT:  Moist mucous membranes  Neck:  Supple, no jugular venous distention, trachea is midline, no lymphadenopathy and no thyromegaly  Back   No CVAT  Chest: Clear to auscultation and percussion, good respiratory effort  CVS:  Regular rate and rhythm without a rub, or gallops or murmurs  Abdomen:  Obese,Soft and nontender with normal bowel sounds  Extremities:  No cyanosis and 1+ minimally pitting edema 1/2 the way up the pretibial region bilaterally, support hose in place, no arthritic changes, normal range of motion  Neuro:  Grossly intact  Psych:  Alert, oriented x3 and appropriate      Medications:    Current Outpatient Medications:     acetaminophen (TYLENOL) 500 mg tablet, Take 500 mg by mouth every 6 (six) hours as needed for mild pain , Disp: , Rfl:     amiodarone 200 mg tablet, Take 1 tablet (200 mg total) by mouth 2 (two) times a day with meals, Disp: 30 tablet, Rfl: 11    aspirin 81 MG tablet, Take 81 mg by mouth daily  , Disp: , Rfl:     atorvastatin (LIPITOR) 40 mg tablet, Take 1 tablet (40 mg total) by mouth daily with dinner, Disp: 30 tablet, Rfl: 11    isosorbide mononitrate (IMDUR) 30 mg 24 hr tablet, Take 1 tablet (30 mg total) by mouth daily, Disp: 90 tablet, Rfl: 3    levothyroxine 175 mcg tablet, Take 175 mcg by mouth daily  , Disp: , Rfl:     LORazepam (ATIVAN) 1 mg tablet, Take 1 mg by mouth as needed for anxiety  , Disp: , Rfl:     metolazone (ZAROXOLYN) 2 5 mg tablet, As needed no more than twice weekly for leg swelling, Disp: 10 tablet, Rfl: 3    metoprolol succinate (TOPROL-XL) 100 mg 24 hr tablet, Take 1 tablet (100 mg total) by mouth daily, Disp: 30 tablet, Rfl: 0    metoprolol succinate (TOPROL-XL) 50 mg 24 hr tablet, Take 1 tablet (50 mg total) by mouth see administration instructions Take 2 tabs( 100 mg ) in am  And 1tab(50  Mg) in pm , Disp: 270 tablet, Rfl: 3    mexiletine (MEXITIL) 150 mg capsule, Take 1 capsule (150 mg total) by mouth every 8 (eight) hours, Disp: 90 capsule, Rfl: 0    nitroglycerin (NITROSTAT) 0 4 mg SL tablet, Place 1 tablet (0 4 mg total) under the tongue every 5 (five) minutes as needed for chest pain, Disp: 30 tablet, Rfl: 2    potassium citrate (Urocit-K 10) 10 mEq, Take 1 tablet (10 mEq total) by mouth 2 (two) times a day, Disp: 180 tablet, Rfl: 3    tamsulosin (FLOMAX) 0 4 mg, Take 1 capsule (0 4 mg total) by mouth daily with dinner, Disp: 90 capsule, Rfl: 3    torsemide (DEMADEX) 20 mg tablet, Take 3 tablets (60 mg total) by mouth 2 (two) times a day (Patient taking differently: Take 40 mg by mouth 2 (two) times a day  ), Disp: 270 tablet, Rfl: 3    Lab, Imaging and other studies: I have personally reviewed pertinent labs  Laboratory Results:  Results for orders placed or performed in visit on 03/07/22   Comprehensive metabolic panel   Result Value Ref Range    Sodium 137 136 - 145 mmol/L    Potassium 4 0 3 5 - 5 3 mmol/L    Chloride 101 100 - 108 mmol/L    CO2 30 21 - 32 mmol/L    ANION GAP 6 4 - 13 mmol/L    BUN 23 5 - 25 mg/dL    Creatinine 1 79 (H) 0 60 - 1 30 mg/dL    Glucose, Fasting 120 (H) 65 - 99 mg/dL    Calcium 9 3 8 3 - 10 1 mg/dL    Corrected Calcium 9 9 8 3 - 10 1 mg/dL    AST 32 5 - 45 U/L    ALT 31 12 - 78 U/L    Alkaline Phosphatase 108 46 - 116 U/L    Total Protein 7 4 6 4 - 8 2 g/dL    Albumin 3 3 (L) 3 5 - 5 0 g/dL    Total Bilirubin 0 53 0 20 - 1 00 mg/dL    eGFR 33 ml/min/1 73sq m   Vitamin B12   Result Value Ref Range    Vitamin B-12 339 100 - 900 pg/mL             Invalid input(s): ALBUMIN      Radiology review:   chest X-ray    Ultrasound      Portions of the record may have been created with voice recognition software  Occasional wrong word or "sound a like" substitutions may have occurred due to the inherent limitations of voice recognition software  Read the chart carefully and recognize, using context, where substitutions have occurred

## 2022-03-15 ENCOUNTER — IN-CLINIC DEVICE VISIT (OUTPATIENT)
Dept: CARDIOLOGY CLINIC | Facility: CLINIC | Age: 87
End: 2022-03-15
Payer: MEDICARE

## 2022-03-15 ENCOUNTER — OFFICE VISIT (OUTPATIENT)
Dept: CARDIOLOGY CLINIC | Facility: CLINIC | Age: 87
End: 2022-03-15
Payer: MEDICARE

## 2022-03-15 VITALS
OXYGEN SATURATION: 96 % | RESPIRATION RATE: 18 BRPM | HEIGHT: 67 IN | SYSTOLIC BLOOD PRESSURE: 128 MMHG | DIASTOLIC BLOOD PRESSURE: 70 MMHG | BODY MASS INDEX: 32.02 KG/M2 | HEART RATE: 60 BPM | WEIGHT: 204 LBS

## 2022-03-15 DIAGNOSIS — I25.5 ISCHEMIC CARDIOMYOPATHY: ICD-10-CM

## 2022-03-15 DIAGNOSIS — Z95.810 BIVENTRICULAR IMPLANTABLE CARDIOVERTER-DEFIBRILLATOR IN SITU: Primary | ICD-10-CM

## 2022-03-15 DIAGNOSIS — E78.49 OTHER HYPERLIPIDEMIA: ICD-10-CM

## 2022-03-15 DIAGNOSIS — I10 BENIGN ESSENTIAL HYPERTENSION: ICD-10-CM

## 2022-03-15 DIAGNOSIS — N18.32 STAGE 3B CHRONIC KIDNEY DISEASE (HCC): ICD-10-CM

## 2022-03-15 DIAGNOSIS — I48.0 PAROXYSMAL ATRIAL FIBRILLATION (HCC): Primary | ICD-10-CM

## 2022-03-15 DIAGNOSIS — I25.810 CORONARY ARTERY DISEASE INVOLVING CORONARY BYPASS GRAFT OF NATIVE HEART WITHOUT ANGINA PECTORIS: ICD-10-CM

## 2022-03-15 DIAGNOSIS — I47.2 V TACH (HCC): ICD-10-CM

## 2022-03-15 DIAGNOSIS — I50.42 CHRONIC COMBINED SYSTOLIC AND DIASTOLIC CONGESTIVE HEART FAILURE (HCC): ICD-10-CM

## 2022-03-15 PROCEDURE — 99215 OFFICE O/P EST HI 40 MIN: CPT | Performed by: INTERNAL MEDICINE

## 2022-03-15 PROCEDURE — 93284 PRGRMG EVAL IMPLANTABLE DFB: CPT | Performed by: INTERNAL MEDICINE

## 2022-03-15 PROCEDURE — 93000 ELECTROCARDIOGRAM COMPLETE: CPT | Performed by: INTERNAL MEDICINE

## 2022-03-15 RX ORDER — AMIODARONE HYDROCHLORIDE 200 MG/1
200 TABLET ORAL DAILY
Qty: 90 TABLET | Refills: 3 | Status: SHIPPED | OUTPATIENT
Start: 2022-03-15

## 2022-03-15 NOTE — PROGRESS NOTES
Results for orders placed or performed in visit on 03/15/22   Cardiac EP device report    Narrative    SJM CRT-D/DDDR MODENOT MRI CONDITIONAL  DEVICE INTERROGATED IN THE [facility]: BATTERY VOLTAGE ADEQUATE (4 4-4 8 YRS)  AP 99% BVP >99% (DDDR 60)  ALL LEAD PARAMETERS WITHIN NORMAL LIMITS/STABLE; 1 NEW VT-1 EPISODE SINCE LAST REMOTE ALERT 2/16/22 WITH EGRM SHOWING NSVT, 10 BEATS @  BPM  EF 20% (6/17/21 ECHO)  PATIENT IS S/P VT ABLATION & TAKES ASA 81, MEXILETINE, AMIODORONE, METOPROLOL SUCC  CORVUE IMPEDANCE MONITORING WITHIN NORMAL LIMITS  NO PROGRAMMING CHANGES MADE TO DEVICE PARAMETERS  PATIENT SEEN IN OFFICE BY DR WILDER  APPROPRIATELY FUNCTIONING BI-V ICD

## 2022-03-15 NOTE — PATIENT INSTRUCTIONS
Decreased your amiodarone to 200 mg just once a day  If you skip a torsemide dose, the next day please take 60 mg twice, and then you can continue taking 40 mg twice a day after that  Blood work is good, your device check is good, your liver function normalized on your recent blood work  I think a good dry weight for you at home is probably around 193/194 lb  You will need to have your thyroid reach checked in June to ensure your thyroid function does not going the wrong direction while we back off of your amiodarone dosing

## 2022-03-15 NOTE — PROGRESS NOTES
Follow-up - Cardiology   Vergia Limb 80 y o  male MRN: [id number]        Problems    1  Paroxysmal atrial fibrillation (HCC)  POCT ECG   2  Chronic combined systolic and diastolic congestive heart failure (HCC)  TSH, 3rd generation with Free T4 reflex   3  Other hyperlipidemia     4  Coronary artery disease involving coronary bypass graft of native heart without angina pectoris     5  Ischemic cardiomyopathy     6  V tach (HCC)  amiodarone 200 mg tablet   7  Benign essential hypertension     8  Stage 3b chronic kidney disease ([address] )       Impression    Wesly  Returns for a follow-up visit my [facility] office     Coronary artery disease  He has had a remote CABG  He has a longstanding severe ischemic cardiomyopathy  LVEF in 2021 declined to 20% amongst ventricular dysrhythmia  All grafts patent by cardiac catheterization 12/21  He offers no new ischemic symptoms    Chronic systolic CHF   LVEF 88-56% due to ischemic cardiomyopathy chronically  CRT-D with generator change 1/20 (not MRI compatible)  High Corvue impedance, increased LFTs 1 times normal, lower extremity edema prompted increasing torsemide to 60 mg twice a day at last visit  Follow-up blood work last week shows normalized LFTs, home weight had been 192-193, but he only took 1 dose of torsemide yesterday due to being at [employer], and today his weight was up to 195 lb, he has trivial leg edema, JVD, but feels well  He has been more consistently on a torsemide 40 mg twice a day dose for the last 1-2 weeks  It appears it has been effective    His device check today showed normal Corvue impedance    Hypertension  Blood pressure is excellent    Hyperlipidemia  Lipids well controlled    Hypothyroidism  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 with a TSH of 12 5 in 2021 was treated with increasing doses of levothyroxine, most recent TSH is down to 1 6    Ventricular tachycardia  Symptomatic VT treated with antitachycardia pacing, device shocks in late 2021 prompted cardiac catheterization with discovery of normal graft, persistence despite maximum dose mexiletine, and increasing amiodarone to 200 mg twice a day prompted VT ablation 12/21  It has been 4 months, he has had only 3 episodes of nonsustained VT longest 13 beats  We discussed amiodarone toxicity    Plan    Decrease amiodarone to 200 mg daily  We discussed the risk of long-term amiodarone at higher doses, verses the risk of lowering the dose and potentially having some recurrence of VT  He has done well for 4 months, I think ablation has worked well, I think it is in our best interest to lower the dose  With this, he may have a decrease in his thyroid effects, and therefore will check TSH in 3 months when he returns for a follow-up visit to ensure he does not become hyperthyroid on current supplementation levels  Urged him to take torsemide 60 mg twice a day any time he has a weight gain of 2 or 3 lb, or goes over 195 lb, or skips a dose of torsemide due to being out of the house for doctor visits  He should do this for 1-2 days, and then resume the 40 mg twice daily dosing  HPI: Daniel Ogden is a 80y o  year old male  With a longstanding history of CAD, CABG, ischemic cardiomyopathy with declining ejection fraction over the last couple years down to 20%, chronic systolic/ diastolic CHF, ventricular tachycardia and paroxysmal atrial fibrillation, on long-term amiodarone and mexiletine  Regi Hi returns for a 1 month follow-up  He has had a lot easily decompensated CHF, recently had elevated LFTs, 1 times normal, and his Corvue impedance threshold was crossed suggesting volume overload, we recommended torsemide 60 mg twice a day a month ago, took that for about a week or 2 but scaled back to 40 mg twice a day due to feeling like his body weight was getting a little bit too low around 192 lb    He skipped a dose yesterday, comes in today with a home weight of 195, correlating with an office weight of 204, without significant complaints of worsening edema, orthopnea or shortness of breath  His device checks do not reveal any significant further VT, he only had 1 episode of nonsustained VT on 2/16/2022, and was volume overloaded at that time he is still on high-dose amiodarone 200 mg twice a day  In the past he required Synthroid dose escalation for amiodarone induced hypothyroidism, but recent TSH is down to 1 6 his creatinine is stable, 1 78, improved in the presence of euvolemia  Review of Systems   Constitutional: Positive for diaphoresis  Negative for activity change and appetite change  HENT: Negative  Eyes: Negative  Respiratory: Positive for shortness of breath  Negative for chest tightness  Cardiovascular: Negative for chest pain, palpitations and leg swelling  Gastrointestinal: Negative  Endocrine: Negative  Genitourinary: Negative  Musculoskeletal: Negative  Skin: Negative  Neurological: Negative  Hematological: Negative  Psychiatric/Behavioral: Negative            Past Medical History:   Diagnosis Date    AICD (automatic cardioverter/defibrillator) present 2004    AICD (automatic cardioverter/defibrillator) present 2006    AICD (automatic cardioverter/defibrillator) present 2013    [facility]    Arthritis     R knee and neck    Atrial fibrillation (HCC)     BPH (benign prostatic hyperplasia)     CAD (coronary artery disease) of artery bypass graft     [facility]est[facility]     combine    Coronary artery disease     Disease of thyroid gland     Hyperlipidemia     Hypertension     Ischemic cardiomyopathy     Left ureteral calculus 10/17/2017    Renal disorder     V tach ([facility] )      Social History     Substance and Sexual Activity   Alcohol Use Yes     Social History     Substance and Sexual Activity   Drug Use No     Social History     Tobacco Use   Smoking Status Never Smoker   Smokeless Tobacco Never Used       Allergies:  No Known Allergies    Medications:     Current Outpatient Medications:     acetaminophen (TYLENOL) 500 mg tablet, Take 500 mg by mouth every 6 (six) hours as needed for mild pain , Disp: , Rfl:     amiodarone 200 mg tablet, Take 1 tablet (200 mg total) by mouth daily, Disp: 90 tablet, Rfl: 3    aspirin 81 MG tablet, Take 81 mg by mouth daily  , Disp: , Rfl:     atorvastatin (LIPITOR) 40 mg tablet, Take 1 tablet (40 mg total) by mouth daily with dinner, Disp: 30 tablet, Rfl: 11    isosorbide mononitrate (IMDUR) 30 mg 24 hr tablet, Take 1 tablet (30 mg total) by mouth daily, Disp: 90 tablet, Rfl: 3    levothyroxine 175 mcg tablet, Take 175 mcg by mouth daily  , Disp: , Rfl:     LORazepam (ATIVAN) 1 mg tablet, Take 1 mg by mouth as needed for anxiety  , Disp: , Rfl:     metoprolol succinate (TOPROL-XL) 100 mg 24 hr tablet, Take 1 tablet (100 mg total) by mouth daily, Disp: 30 tablet, Rfl: 0    metoprolol succinate (TOPROL-XL) 50 mg 24 hr tablet, Take 1 tablet (50 mg total) by mouth see administration instructions Take 2 tabs( 100 mg ) in am  And 1tab(50  Mg) in pm , Disp: 270 tablet, Rfl: 3    mexiletine (MEXITIL) 150 mg capsule, Take 1 capsule (150 mg total) by mouth every 8 (eight) hours, Disp: 90 capsule, Rfl: 0    potassium citrate (Urocit-K 10) 10 mEq, Take 1 tablet (10 mEq total) by mouth 2 (two) times a day, Disp: 180 tablet, Rfl: 3    tamsulosin (FLOMAX) 0 4 mg, Take 1 capsule (0 4 mg total) by mouth daily with dinner, Disp: 90 capsule, Rfl: 3    torsemide (DEMADEX) 20 mg tablet, Take 3 tablets (60 mg total) by mouth 2 (two) times a day (Patient taking differently: Take 40 mg by mouth 2 (two) times a day  ), Disp: 270 tablet, Rfl: 3    metolazone (ZAROXOLYN) 2 5 mg tablet, As needed no more than twice weekly for leg swelling (Patient not taking: Reported on 3/15/2022 ), Disp: 10 tablet, Rfl: 3    nitroglycerin (NITROSTAT) 0 4 mg SL tablet, Place 1 tablet (0 4 mg total) under the tongue every 5 (five) minutes as needed for chest pain (Patient not taking: Reported on 3/15/2022 ), Disp: 30 tablet, Rfl: 2      Vitals:    03/15/22 0847   BP: 128/70   Pulse: 60   Resp: 18   SpO2: 96%     Weight (last 2 days)     Date/Time Weight    03/15/22 0847 92 5 (204)        Physical Exam  Constitutional:       General: He is not in acute distress  Appearance: Normal appearance  He is well-developed  He is not diaphoretic  HENT:      Head: Normocephalic and atraumatic  Eyes:      General: No scleral icterus  Conjunctiva/sclera: Conjunctivae normal       Pupils: Pupils are equal, round, and reactive to light  Neck:      Thyroid: No thyromegaly  Vascular: Hepatojugular reflux and JVD present  Cardiovascular:      Rate and Rhythm: Normal rate and regular rhythm  Heart sounds: Normal heart sounds  No murmur heard  No friction rub  No gallop  Pulmonary:      Effort: Pulmonary effort is normal  No respiratory distress  Breath sounds: Normal breath sounds  No wheezing or rales  Abdominal:      General: Bowel sounds are normal  There is no distension  Palpations: Abdomen is soft  Tenderness: There is no abdominal tenderness  Musculoskeletal:         General: No deformity  Normal range of motion  Cervical back: Normal range of motion and neck supple  Right lower leg: No edema  Left lower leg: No edema  Skin:     General: Skin is warm and dry  Findings: No erythema or rash  Neurological:      General: No focal deficit present  Mental Status: He is alert and oriented to person, place, and time  Cranial Nerves: No cranial nerve deficit  Motor: No weakness  Laboratory Studies:     All laboratory studies have been personally reviewed    Cardiac testing:   EKG reviewed personally:   Results for orders placed or performed in visit on 03/15/22   POCT ECG    Impression    Sinus rhythm with biventricular pacing Echocardiogram  2018-EF 30%, LV dilated, akinesis basal to mid inferior wall, and inferolateral wall, grade 3 diastolic dysfunction, RV dilated, left atrium dilated, mild MR, moderate TR, severe pulmonary hypertension   9/21-personally reviewed -EF 25%, LV dilated, regional wall motion abnormalities as noted above, moderate TR, severe pulmonary hypertension, dilated IVC     cardiac catheterization   11/21-all grafts patent    Device check  3/1/2021-nonsustained VT, normal Corvue  9/9/2021- normal device function, 76% a paced, 99% V paced, multiple nonsustained VT episodes, longest 32 beats,  No AFib detected  11/22/2021- normal device function,  VT episodes with successful ATP  12/21-normal device function, no VT episodes    Alexa Hartman MD    Portions of the record may have been created with voice recognition software   Occasional wrong word or "sound a like" substitutions may have occurred due to the inherent limitations of voice recognition software   Read the chart carefully and recognize, using context, where substitutions have occurred

## 2022-03-18 ENCOUNTER — TELEPHONE (OUTPATIENT)
Dept: NEPHROLOGY | Facility: CLINIC | Age: 87
End: 2022-03-18

## 2022-03-18 NOTE — TELEPHONE ENCOUNTER
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  UPEP and light chain ratio normal    I left a message for him to call the office    Definitely recommend hematology consultation to make sure there is nothing significant  I spoke with the patient again today  I also spoke with him November 29, 2021    Again at this time he adamantly refuses to follow-up with a hematologist   He understands that he could have bone marrow cancer which could be deadly and eventually he could die from this if we do not investigate do anything further  He states he sees too many physicians at this time understands it could be cancer but absolutely refuses to see anyone at this time and will notify us when he would be willing to if at all  Again I want to emphasize he does understand that there is a potential this could be bone marrow cancer but absolutely refuses any further investigation or treatment at this time and understands again that he could potentially die from this

## 2022-05-02 ENCOUNTER — TELEPHONE (OUTPATIENT)
Dept: NEPHROLOGY | Facility: CLINIC | Age: 87
End: 2022-05-02

## 2022-05-18 DIAGNOSIS — I47.2 V-TACH (HCC): ICD-10-CM

## 2022-05-18 DIAGNOSIS — E78.5 DYSLIPIDEMIA: ICD-10-CM

## 2022-05-18 DIAGNOSIS — R60.0 LOCALIZED EDEMA: ICD-10-CM

## 2022-05-18 DIAGNOSIS — N20.0 NEPHROLITHIASIS: ICD-10-CM

## 2022-05-18 DIAGNOSIS — N20.1 LEFT URETERAL CALCULUS: ICD-10-CM

## 2022-05-18 DIAGNOSIS — I12.9 HYPERTENSIVE CHRONIC KIDNEY DISEASE WITH STAGE 1 THROUGH STAGE 4 CHRONIC KIDNEY DISEASE, OR UNSPECIFIED CHRONIC KIDNEY DISEASE: ICD-10-CM

## 2022-05-18 DIAGNOSIS — N18.30 CHRONIC KIDNEY DISEASE, STAGE III (MODERATE) (HCC): ICD-10-CM

## 2022-05-19 RX ORDER — POTASSIUM CITRATE 10 MEQ/1
10 TABLET, EXTENDED RELEASE ORAL 2 TIMES DAILY
Qty: 180 TABLET | Refills: 3 | Status: SHIPPED | OUTPATIENT
Start: 2022-05-19

## 2022-05-19 RX ORDER — MEXILETINE HYDROCHLORIDE 150 MG/1
150 CAPSULE ORAL EVERY 8 HOURS SCHEDULED
Qty: 270 CAPSULE | Refills: 3 | Status: SHIPPED | OUTPATIENT
Start: 2022-05-19

## 2022-05-26 ENCOUNTER — OFFICE VISIT (OUTPATIENT)
Dept: UROLOGY | Facility: CLINIC | Age: 87
End: 2022-05-26
Payer: MEDICARE

## 2022-05-26 VITALS
BODY MASS INDEX: 30.92 KG/M2 | HEIGHT: 67 IN | DIASTOLIC BLOOD PRESSURE: 66 MMHG | OXYGEN SATURATION: 96 % | HEART RATE: 60 BPM | SYSTOLIC BLOOD PRESSURE: 120 MMHG | WEIGHT: 197 LBS

## 2022-05-26 DIAGNOSIS — N40.1 BPH WITH OBSTRUCTION/LOWER URINARY TRACT SYMPTOMS: Primary | ICD-10-CM

## 2022-05-26 DIAGNOSIS — N20.0 KIDNEY STONES: ICD-10-CM

## 2022-05-26 DIAGNOSIS — N13.8 BPH WITH OBSTRUCTION/LOWER URINARY TRACT SYMPTOMS: Primary | ICD-10-CM

## 2022-05-26 LAB — POST-VOID RESIDUAL VOLUME, ML POC: 155 ML

## 2022-05-26 PROCEDURE — 51798 US URINE CAPACITY MEASURE: CPT | Performed by: PHYSICIAN ASSISTANT

## 2022-05-26 PROCEDURE — 99213 OFFICE O/P EST LOW 20 MIN: CPT | Performed by: PHYSICIAN ASSISTANT

## 2022-05-26 NOTE — PROGRESS NOTES
UROLOGY PROGRESS NOTE   Patient Identifiers: Caitlin Perry (MRN [id number])  Date of Service: 5/26/2022    Subjective:    20-year-old man history kidney stones and micro hematuria  He does have urinary frequency  Myrbetriq was too costly  He is on tamsulosin  He had a cystoscopy that showed no significant obstruction      Reason for visit:  Kidney stone follow-up    Objective:     VITALS:    Vitals:    05/26/22 1022   BP: 120/66   Pulse: 60   SpO2: 96%     AUA SYMPTOM SCORE    Flowsheet Row Most Recent Value   AUA SYMPTOM SCORE    How often have you had a sensation of not emptying your bladder completely after you finished urinating? 5 (P)     How often have you had to urinate again less than two hours after you finished urinating? 5 (P)     How often have you found you stopped and started again several times when you urinate? 1 (P)     How often have you found it difficult to postpone urination? 4 (P)     How often have you had a weak urinary stream? 3 (P)     How often have you had to push or strain to begin urination? 3 (P)     How many times did you most typically get up to urinate from the time you went to bed at night until the time you got up in the morning? 3 (P)     Quality of Life: If you were to spend the rest of your life with your urinary condition just the way it is now, how would you feel about that? 5 (P)     AUA SYMPTOM SCORE 24 (P)             LABS:  Lab Results   Component Value Date    HGB 12 4 02/14/2022    HCT 39 5 02/14/2022    WBC 6 08 02/14/2022     02/14/2022   ]    Lab Results   Component Value Date     10/19/2015    K 4 0 03/07/2022     03/07/2022    CO2 30 03/07/2022    BUN 23 03/07/2022    CREATININE 1 79 (H) 03/07/2022    CALCIUM 9 3 03/07/2022    GLUCOSE 112 10/19/2015   ]        INPATIENT MEDS:    Current Outpatient Medications:     acetaminophen (TYLENOL) 500 mg tablet, Take 500 mg by mouth every 6 (six) hours as needed for mild pain , Disp: , Rfl:    amiodarone 200 mg tablet, Take 1 tablet (200 mg total) by mouth daily, Disp: 90 tablet, Rfl: 3    aspirin 81 MG tablet, Take 81 mg by mouth daily  , Disp: , Rfl:     atorvastatin (LIPITOR) 40 mg tablet, Take 1 tablet (40 mg total) by mouth daily with dinner, Disp: 30 tablet, Rfl: 11    isosorbide mononitrate (IMDUR) 30 mg 24 hr tablet, Take 1 tablet (30 mg total) by mouth daily, Disp: 90 tablet, Rfl: 3    levothyroxine 175 mcg tablet, Take 175 mcg by mouth daily  , Disp: , Rfl:     LORazepam (ATIVAN) 1 mg tablet, Take 1 mg by mouth as needed for anxiety  , Disp: , Rfl:     metoprolol succinate (TOPROL-XL) 50 mg 24 hr tablet, Take 1 tablet (50 mg total) by mouth see administration instructions Take 2 tabs( 100 mg ) in am  And 1tab(50  Mg) in pm , Disp: 270 tablet, Rfl: 3    mexiletine (MEXITIL) 150 mg capsule, Take 1 capsule (150 mg total) by mouth every 8 (eight) hours, Disp: 270 capsule, Rfl: 3    nitroglycerin (NITROSTAT) 0 4 mg SL tablet, Place 1 tablet (0 4 mg total) under the tongue every 5 (five) minutes as needed for chest pain, Disp: 30 tablet, Rfl: 2    potassium citrate (Urocit-K 10) 10 mEq, Take 1 tablet (10 mEq total) by mouth in the morning and 1 tablet (10 mEq total) before bedtime  , Disp: 180 tablet, Rfl: 3    tamsulosin (FLOMAX) 0 4 mg, Take 1 capsule (0 4 mg total) by mouth daily with dinner, Disp: 90 capsule, Rfl: 3    torsemide (DEMADEX) 20 mg tablet, Take 3 tablets (60 mg total) by mouth 2 (two) times a day (Patient taking differently: Take 40 mg by mouth 2 (two) times a day), Disp: 270 tablet, Rfl: 3    metolazone (ZAROXOLYN) 2 5 mg tablet, As needed no more than twice weekly for leg swelling (Patient not taking: No sig reported), Disp: 10 tablet, Rfl: 3      Physical Exam:   /66   Pulse 60   Ht 5' 7" (1 702 m)   Wt 89 4 kg (197 lb)   SpO2 96%   BMI 30 85 kg/m²   GEN: no acute distress    RESP: breathing comfortably with no accessory muscle use    ABD: soft, non-tender, non-distended   INCISION:    EXT: no significant peripheral edema     RADIOLOGY:   None     Assessment:   1  BPH with urinary frequency  2   Kidney stones     Plan:   -follow-up in 6 months with CT scan prior to visit  -  -  -

## 2022-06-02 ENCOUNTER — HOSPITAL ENCOUNTER (OUTPATIENT)
Dept: CT IMAGING | Facility: HOSPITAL | Age: 87
Discharge: HOME | End: 2022-06-02
Payer: MEDICARE

## 2022-06-02 DIAGNOSIS — N20.0 KIDNEY STONES: ICD-10-CM

## 2022-06-02 PROCEDURE — G1004 CDSM NDSC: HCPCS

## 2022-06-02 PROCEDURE — 74176 CT ABD & PELVIS W/O CONTRAST: CPT

## 2022-06-23 ENCOUNTER — REMOTE DEVICE CLINIC VISIT (OUTPATIENT)
Dept: CARDIOLOGY CLINIC | Facility: CLINIC | Age: 87
End: 2022-06-23
Payer: MEDICARE

## 2022-06-23 DIAGNOSIS — Z95.810 AICD (AUTOMATIC CARDIOVERTER/DEFIBRILLATOR) PRESENT: Primary | ICD-10-CM

## 2022-06-23 PROCEDURE — 93295 DEV INTERROG REMOTE 1/2/MLT: CPT | Performed by: INTERNAL MEDICINE

## 2022-06-23 PROCEDURE — 93296 REM INTERROG EVL PM/IDS: CPT | Performed by: INTERNAL MEDICINE

## 2022-06-23 NOTE — PROGRESS NOTES
Results for orders placed or performed in visit on 06/23/22   Cardiac EP device report    Narrative    SJM CRT-D/DDDR MODENOT MRI CONDITIONAL  MERLIN TRANSMISSION: BATTERY VOLTAGE ADEQUATE (4 1-4 5 YRS)  AP-98%, BVP>99%  ALL AVAILABLE LEAD PARAMETERS WITHIN NORMAL LIMITS  2 NEW NSVT EPISODES ON SAME DAY- 16 BEATS, AVG CL~425MS & 10 BEATS, AVG CL~430MS  PT ON ASA 81MG, AMIO, MEXILETINE & METOPROLOL  CORVUE IMPEDANCE THRESHOLD CROSSED LAST 10 DAYS  PT ON TORSEMIDE  WILL RECHECK IN 1 MONTH  TASKED HF-RN  NORMAL DEVICE FUNCTION   GV

## 2022-06-24 ENCOUNTER — TELEPHONE (OUTPATIENT)
Dept: CARDIOLOGY CLINIC | Facility: CLINIC | Age: 87
End: 2022-06-24

## 2022-06-24 NOTE — TELEPHONE ENCOUNTER
S/w Wesly, advised reason for my call  Denies any complaints  No signs of HF  Weights at home have been 191-193 lbs  Dry weight per Dr Alisia Hackett' note 193-194 lbs  Reminded patient of o/v 7/15 @ 1140  Advised to call office w/ symptoms or weight gain-- verbally understood

## 2022-06-24 NOTE — TELEPHONE ENCOUNTER
----- Message from Dawna Thomson RN sent at 6/23/2022  2:06 PM EDT -----  Regarding: OTTO  FYI:    CORVUE IMPEDANCE THRESHOLD CROSSED LAST 10 DAYS  PT ON TORSEMIDE  WILL RECHECK IN 1 MONTH       Thanks,  [employer]

## 2022-06-27 ENCOUNTER — TELEPHONE (OUTPATIENT)
Dept: OBGYN CLINIC | Facility: HOSPITAL | Age: 87
End: 2022-06-27

## 2022-07-07 ENCOUNTER — TRANSCRIBE ORDERS (OUTPATIENT)
Dept: LAB | Facility: CLINIC | Age: 87
End: 2022-07-07

## 2022-07-07 ENCOUNTER — APPOINTMENT (OUTPATIENT)
Dept: LAB | Facility: CLINIC | Age: 87
End: 2022-07-07
Payer: MEDICARE

## 2022-07-07 DIAGNOSIS — I50.42 CHRONIC COMBINED SYSTOLIC AND DIASTOLIC CONGESTIVE HEART FAILURE (HCC): ICD-10-CM

## 2022-07-07 DIAGNOSIS — N18.32 CHRONIC KIDNEY DISEASE (CKD) STAGE G3B/A1, MODERATELY DECREASED GLOMERULAR FILTRATION RATE (GFR) BETWEEN 30-44 ML/MIN/1.73 SQUARE METER AND ALBUMINURIA CREATININE RATIO LESS THAN 30 MG/G (HCC): Primary | ICD-10-CM

## 2022-07-07 DIAGNOSIS — E03.4 IDIOPATHIC ATROPHIC HYPOTHYROIDISM: ICD-10-CM

## 2022-07-07 DIAGNOSIS — E78.2 MIXED HYPERLIPIDEMIA: ICD-10-CM

## 2022-07-07 DIAGNOSIS — E55.9 VITAMIN D DEFICIENCY: ICD-10-CM

## 2022-07-07 DIAGNOSIS — R31.29 MICROSCOPIC HEMATURIA: ICD-10-CM

## 2022-07-07 DIAGNOSIS — R60.0 LOCALIZED EDEMA: ICD-10-CM

## 2022-07-07 DIAGNOSIS — N20.0 NEPHROLITHIASIS: ICD-10-CM

## 2022-07-07 DIAGNOSIS — N18.32 STAGE 3B CHRONIC KIDNEY DISEASE (HCC): ICD-10-CM

## 2022-07-07 DIAGNOSIS — E78.5 DYSLIPIDEMIA: ICD-10-CM

## 2022-07-07 DIAGNOSIS — N18.32 CHRONIC KIDNEY DISEASE (CKD) STAGE G3B/A1, MODERATELY DECREASED GLOMERULAR FILTRATION RATE (GFR) BETWEEN 30-44 ML/MIN/1.73 SQUARE METER AND ALBUMINURIA CREATININE RATIO LESS THAN 30 MG/G (HCC): ICD-10-CM

## 2022-07-07 DIAGNOSIS — N25.81 SECONDARY HYPERPARATHYROIDISM OF RENAL ORIGIN (HCC): ICD-10-CM

## 2022-07-07 DIAGNOSIS — I12.9 HYPERTENSIVE CHRONIC KIDNEY DISEASE WITH STAGE 1 THROUGH STAGE 4 CHRONIC KIDNEY DISEASE, OR UNSPECIFIED CHRONIC KIDNEY DISEASE: ICD-10-CM

## 2022-07-07 LAB
25(OH)D3 SERPL-MCNC: 41 NG/ML (ref 30–100)
ALBUMIN SERPL BCP-MCNC: 3.2 G/DL (ref 3.5–5)
ALP SERPL-CCNC: 115 U/L (ref 46–116)
ALT SERPL W P-5'-P-CCNC: 45 U/L (ref 12–78)
ANION GAP SERPL CALCULATED.3IONS-SCNC: 5 MMOL/L (ref 4–13)
AST SERPL W P-5'-P-CCNC: 57 U/L (ref 5–45)
BASOPHILS # BLD AUTO: 0.04 THOUSANDS/ΜL (ref 0–0.1)
BASOPHILS NFR BLD AUTO: 1 % (ref 0–1)
BILIRUB SERPL-MCNC: 0.61 MG/DL (ref 0.2–1)
BUN SERPL-MCNC: 35 MG/DL (ref 5–25)
CALCIUM ALBUM COR SERPL-MCNC: 10.2 MG/DL (ref 8.3–10.1)
CALCIUM SERPL-MCNC: 9.6 MG/DL (ref 8.3–10.1)
CHLORIDE SERPL-SCNC: 103 MMOL/L (ref 100–108)
CHOLEST SERPL-MCNC: 110 MG/DL
CK MB SERPL-MCNC: 2.2 % (ref 0–2.5)
CK MB SERPL-MCNC: 4.1 NG/ML (ref 0–5)
CK SERPL-CCNC: 188 U/L (ref 39–308)
CO2 SERPL-SCNC: 31 MMOL/L (ref 21–32)
CREAT SERPL-MCNC: 2.36 MG/DL (ref 0.6–1.3)
CREAT UR-MCNC: 54.6 MG/DL
EOSINOPHIL # BLD AUTO: 0.4 THOUSAND/ΜL (ref 0–0.61)
EOSINOPHIL NFR BLD AUTO: 6 % (ref 0–6)
ERYTHROCYTE [DISTWIDTH] IN BLOOD BY AUTOMATED COUNT: 14.6 % (ref 11.6–15.1)
GFR SERPL CREATININE-BSD FRML MDRD: 24 ML/MIN/1.73SQ M
GLUCOSE P FAST SERPL-MCNC: 104 MG/DL (ref 65–99)
HCT VFR BLD AUTO: 39.5 % (ref 36.5–49.3)
HDLC SERPL-MCNC: 40 MG/DL
HGB BLD-MCNC: 12.8 G/DL (ref 12–17)
IMM GRANULOCYTES # BLD AUTO: 0.03 THOUSAND/UL (ref 0–0.2)
IMM GRANULOCYTES NFR BLD AUTO: 0 % (ref 0–2)
LDLC SERPL CALC-MCNC: 49 MG/DL (ref 0–100)
LYMPHOCYTES # BLD AUTO: 1.26 THOUSANDS/ΜL (ref 0.6–4.47)
LYMPHOCYTES NFR BLD AUTO: 19 % (ref 14–44)
MAGNESIUM SERPL-MCNC: 2.3 MG/DL (ref 1.6–2.6)
MCH RBC QN AUTO: 32.2 PG (ref 26.8–34.3)
MCHC RBC AUTO-ENTMCNC: 32.4 G/DL (ref 31.4–37.4)
MCV RBC AUTO: 100 FL (ref 82–98)
MONOCYTES # BLD AUTO: 0.93 THOUSAND/ΜL (ref 0.17–1.22)
MONOCYTES NFR BLD AUTO: 14 % (ref 4–12)
NEUTROPHILS # BLD AUTO: 4.14 THOUSANDS/ΜL (ref 1.85–7.62)
NEUTS SEG NFR BLD AUTO: 60 % (ref 43–75)
NONHDLC SERPL-MCNC: 70 MG/DL
NRBC BLD AUTO-RTO: 0 /100 WBCS
PHOSPHATE SERPL-MCNC: 3.7 MG/DL (ref 2.3–4.1)
PLATELET # BLD AUTO: 275 THOUSANDS/UL (ref 149–390)
PMV BLD AUTO: 11 FL (ref 8.9–12.7)
POTASSIUM SERPL-SCNC: 3.4 MMOL/L (ref 3.5–5.3)
PROT SERPL-MCNC: 7 G/DL (ref 6.4–8.2)
PROT UR-MCNC: 9 MG/DL
PROT/CREAT UR: 0.16 MG/G{CREAT} (ref 0–0.1)
PTH-INTACT SERPL-MCNC: 135.8 PG/ML (ref 18.4–80.1)
RBC # BLD AUTO: 3.97 MILLION/UL (ref 3.88–5.62)
SODIUM SERPL-SCNC: 139 MMOL/L (ref 136–145)
TRIGL SERPL-MCNC: 106 MG/DL
TSH SERPL DL<=0.05 MIU/L-ACNC: 3.91 UIU/ML (ref 0.45–4.5)
WBC # BLD AUTO: 6.8 THOUSAND/UL (ref 4.31–10.16)

## 2022-07-07 PROCEDURE — 82553 CREATINE MB FRACTION: CPT

## 2022-07-07 PROCEDURE — 85025 COMPLETE CBC W/AUTO DIFF WBC: CPT

## 2022-07-07 PROCEDURE — 83735 ASSAY OF MAGNESIUM: CPT

## 2022-07-07 PROCEDURE — 84156 ASSAY OF PROTEIN URINE: CPT

## 2022-07-07 PROCEDURE — 82306 VITAMIN D 25 HYDROXY: CPT

## 2022-07-07 PROCEDURE — 84100 ASSAY OF PHOSPHORUS: CPT

## 2022-07-07 PROCEDURE — 83970 ASSAY OF PARATHORMONE: CPT

## 2022-07-07 PROCEDURE — 80061 LIPID PANEL: CPT

## 2022-07-07 PROCEDURE — 80053 COMPREHEN METABOLIC PANEL: CPT

## 2022-07-07 PROCEDURE — 36415 COLL VENOUS BLD VENIPUNCTURE: CPT

## 2022-07-07 PROCEDURE — 82570 ASSAY OF URINE CREATININE: CPT

## 2022-07-07 PROCEDURE — 82550 ASSAY OF CK (CPK): CPT

## 2022-07-07 PROCEDURE — 84443 ASSAY THYROID STIM HORMONE: CPT

## 2022-07-08 ENCOUNTER — TELEPHONE (OUTPATIENT)
Dept: NEPHROLOGY | Facility: CLINIC | Age: 87
End: 2022-07-08

## 2022-07-08 DIAGNOSIS — N18.32 STAGE 3B CHRONIC KIDNEY DISEASE (HCC): Primary | ICD-10-CM

## 2022-07-08 DIAGNOSIS — E87.6 HYPOKALEMIA: ICD-10-CM

## 2022-07-08 NOTE — TELEPHONE ENCOUNTER
Spoke with patient's wife, Raheem Lafleur, about the following, she expressed understanding and thanked us for the call:    ----- Message from Abhinav Roach MD sent at 7/8/2022  9:55 AM EDT -----  The patient's creatinine slightly higher and potassium low  Recommend:  1  Increase Urocit-K 20 mEq or 2 pills twice a day this will help the potassium and the stone prevention  2   Repeat a CMP 1-2 weeks after making this change nonfasting

## 2022-07-15 ENCOUNTER — OFFICE VISIT (OUTPATIENT)
Dept: CARDIOLOGY CLINIC | Facility: CLINIC | Age: 87
End: 2022-07-15
Payer: MEDICARE

## 2022-07-15 ENCOUNTER — TELEPHONE (OUTPATIENT)
Dept: CARDIOLOGY CLINIC | Facility: CLINIC | Age: 87
End: 2022-07-15

## 2022-07-15 VITALS
OXYGEN SATURATION: 96 % | SYSTOLIC BLOOD PRESSURE: 144 MMHG | WEIGHT: 201.6 LBS | DIASTOLIC BLOOD PRESSURE: 70 MMHG | BODY MASS INDEX: 31.64 KG/M2 | HEART RATE: 60 BPM | HEIGHT: 67 IN

## 2022-07-15 DIAGNOSIS — I25.810 CORONARY ARTERY DISEASE INVOLVING CORONARY BYPASS GRAFT OF NATIVE HEART WITHOUT ANGINA PECTORIS: ICD-10-CM

## 2022-07-15 DIAGNOSIS — I50.42 CHRONIC COMBINED SYSTOLIC AND DIASTOLIC CONGESTIVE HEART FAILURE (HCC): ICD-10-CM

## 2022-07-15 DIAGNOSIS — I10 BENIGN ESSENTIAL HYPERTENSION: ICD-10-CM

## 2022-07-15 DIAGNOSIS — I47.20 V TACH: ICD-10-CM

## 2022-07-15 DIAGNOSIS — E78.49 OTHER HYPERLIPIDEMIA: ICD-10-CM

## 2022-07-15 DIAGNOSIS — I48.0 PAROXYSMAL ATRIAL FIBRILLATION (HCC): Primary | ICD-10-CM

## 2022-07-15 DIAGNOSIS — I25.5 ISCHEMIC CARDIOMYOPATHY: ICD-10-CM

## 2022-07-15 PROCEDURE — 93000 ELECTROCARDIOGRAM COMPLETE: CPT | Performed by: INTERNAL MEDICINE

## 2022-07-15 PROCEDURE — 99215 OFFICE O/P EST HI 40 MIN: CPT | Performed by: INTERNAL MEDICINE

## 2022-07-15 NOTE — TELEPHONE ENCOUNTER
Pt wife called stating pt was just seen and they gave you the wrong dates in regards to when he took his Metolazone and his labs  The correct dates are he took Metolazone on 7/6/22 and had labs 7/7/22

## 2022-07-15 NOTE — PROGRESS NOTES
Follow-up - Cardiology   Petey Tam 80 y o  male MRN: [id number]        Problems    1  Paroxysmal atrial fibrillation (HCC)  POCT ECG   2  Chronic combined systolic and diastolic congestive heart failure (HCC)     3  Other hyperlipidemia     4  Coronary artery disease involving coronary bypass graft of native heart without angina pectoris     5  Ischemic cardiomyopathy     6  V tach ([address] )     7  Benign essential hypertension       Impression    [place]  Returns for a follow-up visit my [place] [facility]     Coronary artery disease  He has had a remote CABG  He has a longstanding severe ischemic cardiomyopathy  LVEF in 2021 declined to 20% amongst ventricular dysrhythmia  All grafts patent by cardiac catheterization 12/21  He offers no new ischemic symptoms  Chronic systolic CHF   LVEF 94-00% due to ischemic cardiomyopathy chronically  CRT-D with generator change 1/20 (not MRI compatible)  6/23/2022 corvue impedance level was crossed, he had about a 3 lb weight gain, he took metolazone 3 weeks ago  Blood work last week showed hypo kalemia, 3 4, creatinine up to 2 36 from 1 79  Nephrology added and increased dose of potassium citrate which he states he does not tolerate well  Hypertension  Blood pressure is well controlled    Hyperlipidemia  Lipids are well controlled    Hypothyroidism  Amiodarone induced hypothyroidism, treated with increased dose of levothyroxine in 2021  TSH is stable    Ventricular tachycardia  Symptomatic VT treated with antitachycardia pacing, device shocks in late 2021 prompted cardiac catheterization with discovery of normal graft, persistence despite maximum dose mexiletine, and increasing amiodarone to 200 mg twice a day prompted VT ablation 12/21  After 4 months of high-dose amiodarone, reduced to 100 mg daily  Only short episodes of nonsustained VT, 2 on last device check have occurred since  No symptomatic VT, no device therapies      Plan    His weights are actually quite steady, heart failure quite stable  Torsemide 40 mg alternating with 60 mg every other day  Last metolazone was probably a couple weeks ago, it did not really correlate with the timing of his blood work  Potassium citrate was increased which he does not tolerate well for a potassium at 3 4  Additionally his creatinine bumped from 1 79-2 36 without much clinical change from a heart failure perspective  Nephrology, Dr Bob Su has been involved, and he has repeat blood work ordered for next week  I would not change any of his diuretics this time  I would continue the lower dose of amiodarone 200 mg a day to mitigate risk of toxicity, noting however that he did have 2 episodes of nonsustained VT on his June device check  I will see him back on my usual schedule in 3 months  HPI: Radha Hernandez is a 80y o  year old male  With a longstanding history of CAD, CABG, ischemic cardiomyopathy with declining ejection fraction over the last couple years down to 11%, chronic systolic/ diastolic CHF, ventricular tachycardia and paroxysmal atrial fibrillation, on long-term amiodarone and mexiletine  His weights are mostly stable, he may have gained a couple lb at home over the last couple weeks  Creatinine jumped suggestive of acute kidney injury, 2 36 from 1 79, no major clinical change from what he tells me  Torsemide dosing unchanged at 40 mg alternating with 60 mg every other day  Only used 1 dose of metolazone a few weeks ago  Nephrology asked him to start taking potassium citrate, and increase the dose based on recent potassium is 3 4, he tells me he cannot really tolerated, it makes him feel numb/fatigue/washed out  He has not had significant VT increased since reducing amiodarone to 200 mg a day to mitigate side effects  His TSH remains stable, on previously developed amiodarone induced hypothyroidism  Blood pressure is stable  No device shocks  Denies any chest pains    A major complaints today as he states that when he drinks liquids, especially at night, and goes to lay down he feels a tingling paresthesia feeling in his feet  This does not occur when he eats and avoids drinking  Review of Systems   Constitutional: Positive for fatigue  Negative for appetite change, diaphoresis and fever  Respiratory: Negative for chest tightness, shortness of breath and wheezing  Cardiovascular: Negative for chest pain, palpitations and leg swelling  Gastrointestinal: Negative for abdominal pain and blood in stool  Musculoskeletal: Negative for arthralgias and joint swelling  Skin: Negative for rash  Neurological: Negative for dizziness, syncope and light-headedness  All other systems reviewed and are negative          Past Medical History:   Diagnosis Date    AICD (automatic cardioverter/defibrillator) present 2004    AICD (automatic cardioverter/defibrillator) present 2006    AICD (automatic cardioverter/defibrillator) present 2013    [facility]    Arthritis     R knee and neck    [facility] fibrillation [facility])     BPH (benign prostatic hyperplasia)     CAD (coronary artery disease) of artery bypass graft     CHF (congestive heart failure) ([facility])     combine    Coronary artery disease     Disease of thyroid gland     Hyperlipidemia     Hypertension     Ischemic cardiomyopathy     Left ureteral calculus 10/17/2017    Renal disorder     V tach ([facility])      Social History     Substance and Sexual Activity   Alcohol Use Yes     Social History     Substance and Sexual Activity   Drug Use No     Social History     Tobacco Use   Smoking Status Never Smoker   Smokeless Tobacco Never Used       Allergies:  No Known Allergies    Medications:     Current Outpatient Medications:     acetaminophen (TYLENOL) 500 mg tablet, Take 500 mg by mouth every 6 (six) hours as needed for mild pain , Disp: , Rfl:     amiodarone 200 mg tablet, Take 1 tablet (200 mg total) by mouth daily, Disp: 90 tablet, Rfl: 3   aspirin 81 MG tablet, Take 81 mg by mouth daily  , Disp: , Rfl:     atorvastatin (LIPITOR) 40 mg tablet, Take 1 tablet (40 mg total) by mouth daily with dinner, Disp: 30 tablet, Rfl: 11    isosorbide mononitrate (IMDUR) 30 mg 24 hr tablet, Take 1 tablet (30 mg total) by mouth daily, Disp: 90 tablet, Rfl: 3    levothyroxine 175 mcg tablet, Take 175 mcg by mouth daily  , Disp: , Rfl:     LORazepam (ATIVAN) 1 mg tablet, Take 1 mg by mouth as needed for anxiety  , Disp: , Rfl:     metoprolol succinate (TOPROL-XL) 50 mg 24 hr tablet, Take 1 tablet (50 mg total) by mouth see administration instructions Take 2 tabs( 100 mg ) in am  And 1tab(50  Mg) in pm , Disp: 270 tablet, Rfl: 3    mexiletine (MEXITIL) 150 mg capsule, Take 1 capsule (150 mg total) by mouth every 8 (eight) hours, Disp: 270 capsule, Rfl: 3    nitroglycerin (NITROSTAT) 0 4 mg SL tablet, Place 1 tablet (0 4 mg total) under the tongue every 5 (five) minutes as needed for chest pain, Disp: 30 tablet, Rfl: 2    potassium citrate (Urocit-K 10) 10 mEq, Take 1 tablet (10 mEq total) by mouth in the morning and 1 tablet (10 mEq total) before bedtime  , Disp: 180 tablet, Rfl: 3    tamsulosin (FLOMAX) 0 4 mg, Take 1 capsule (0 4 mg total) by mouth daily with dinner, Disp: 90 capsule, Rfl: 3    torsemide (DEMADEX) 20 mg tablet, Take 3 tablets (60 mg total) by mouth 2 (two) times a day (Patient taking differently: Take 40 mg by mouth 2 (two) times a day), Disp: 270 tablet, Rfl: 3    metolazone (ZAROXOLYN) 2 5 mg tablet, As needed no more than twice weekly for leg swelling (Patient not taking: No sig reported), Disp: 10 tablet, Rfl: 3      Vitals:    07/15/22 1127   BP: 144/70   Pulse: 60   SpO2: 96%     Weight (last 2 days)     Date/Time Weight    07/15/22 1127 91 4 (201 6)        Physical Exam  Constitutional:       General: He is not in acute distress  Appearance: Normal appearance  He is well-developed  He is not diaphoretic     HENT:      Head: Normocephalic and atraumatic  Eyes:      General: No scleral icterus  Conjunctiva/sclera: Conjunctivae normal       Pupils: Pupils are equal, round, and reactive to light  Neck:      Thyroid: No thyromegaly  Vascular: No JVD  Cardiovascular:      Rate and Rhythm: Normal rate and regular rhythm  Heart sounds: Normal heart sounds  No murmur heard  No friction rub  No gallop  Pulmonary:      Effort: Pulmonary effort is normal  No respiratory distress  Breath sounds: Normal breath sounds  No decreased breath sounds, wheezing, rhonchi or rales  Abdominal:      General: Bowel sounds are normal  There is no distension  Palpations: Abdomen is soft  Tenderness: There is no abdominal tenderness  Musculoskeletal:         General: No deformity  Normal range of motion  Cervical back: Normal range of motion and neck supple  Right lower leg: Edema present  Left lower leg: Edema present  Skin:     General: Skin is warm and dry  Capillary Refill: Capillary refill takes more than 3 seconds  Findings: No erythema or rash  Neurological:      General: No focal deficit present  Mental Status: He is alert and oriented to person, place, and time  Cranial Nerves: No cranial nerve deficit  Motor: No weakness             Laboratory Studies:    Laboratory studies have been personally reviewed    Cardiac testing:   EKG reviewed personally:   Results for orders placed or performed in visit on 07/15/22   POCT ECG    Impression    AV paced         Echocardiogram  2018-EF 30%, LV dilated, akinesis basal to mid inferior wall, and inferolateral wall, grade 3 diastolic dysfunction, RV dilated, left atrium dilated, mild MR, moderate TR, severe pulmonary hypertension   9/21-personally reviewed -EF 25%, LV dilated, regional wall motion abnormalities as noted above, moderate TR, severe pulmonary hypertension, dilated IVC     cardiac catheterization   11/21-all grafts patent    Device check  3/1/2021-nonsustained VT, normal Corvue  9/9/2021- normal device function, 76% a paced, 99% V paced, multiple nonsustained VT episodes, longest 32 beats,  No AFib detected  11/22/2021- normal device function,  VT episodes with successful ATP  12/21-normal device function, no VT episodes    Gil Suresh MD    Portions of the record may have been created with voice recognition software   Occasional wrong word or "sound a like" substitutions may have occurred due to the inherent limitations of voice recognition software   Read the chart carefully and recognize, using context, where substitutions have occurred

## 2022-07-15 NOTE — TELEPHONE ENCOUNTER
If that is the case, then he may not need the chronic high dose potassium which she is currently not tolerating well, make sure they discussed this with Nephrology, and when we get his follow-up blood work will no how his potassium levels look if he does not take  metolazone in the days preceding the blood work

## 2022-07-18 ENCOUNTER — APPOINTMENT (OUTPATIENT)
Dept: LAB | Facility: CLINIC | Age: 87
End: 2022-07-18
Payer: MEDICARE

## 2022-07-18 DIAGNOSIS — N18.32 STAGE 3B CHRONIC KIDNEY DISEASE (HCC): ICD-10-CM

## 2022-07-18 DIAGNOSIS — E87.6 HYPOKALEMIA: ICD-10-CM

## 2022-07-18 LAB
ALBUMIN SERPL BCP-MCNC: 3.2 G/DL (ref 3.5–5)
ALP SERPL-CCNC: 111 U/L (ref 46–116)
ALT SERPL W P-5'-P-CCNC: 44 U/L (ref 12–78)
ANION GAP SERPL CALCULATED.3IONS-SCNC: 7 MMOL/L (ref 4–13)
AST SERPL W P-5'-P-CCNC: 57 U/L (ref 5–45)
BILIRUB SERPL-MCNC: 0.74 MG/DL (ref 0.2–1)
BUN SERPL-MCNC: 26 MG/DL (ref 5–25)
CALCIUM ALBUM COR SERPL-MCNC: 9.9 MG/DL (ref 8.3–10.1)
CALCIUM SERPL-MCNC: 9.3 MG/DL (ref 8.3–10.1)
CHLORIDE SERPL-SCNC: 108 MMOL/L (ref 96–108)
CO2 SERPL-SCNC: 27 MMOL/L (ref 21–32)
CREAT SERPL-MCNC: 1.92 MG/DL (ref 0.6–1.3)
GFR SERPL CREATININE-BSD FRML MDRD: 30 ML/MIN/1.73SQ M
GLUCOSE P FAST SERPL-MCNC: 102 MG/DL (ref 65–99)
POTASSIUM SERPL-SCNC: 4 MMOL/L (ref 3.5–5.3)
PROT SERPL-MCNC: 6.9 G/DL (ref 6.4–8.4)
SODIUM SERPL-SCNC: 142 MMOL/L (ref 135–147)

## 2022-07-18 PROCEDURE — 80053 COMPREHEN METABOLIC PANEL: CPT

## 2022-07-18 PROCEDURE — 36415 COLL VENOUS BLD VENIPUNCTURE: CPT

## 2022-07-19 ENCOUNTER — TELEPHONE (OUTPATIENT)
Dept: NEPHROLOGY | Facility: CLINIC | Age: 87
End: 2022-07-19

## 2022-07-19 NOTE — TELEPHONE ENCOUNTER
----- Message from Beatris Hatchet, MD sent at 7/18/2022  3:26 PM EDT -----  Labs in general look very good and stable  Slight increase in AST which is somewhat chronic please have primary medical physician address and let me know that he is aware  Thank you

## 2022-07-19 NOTE — TELEPHONE ENCOUNTER
Spoke with patient's wife, James Duckworth, about the following, she expressed understanding and thanked us for the call:    Labs in general look very good and stable

## 2022-08-02 ENCOUNTER — REMOTE DEVICE CLINIC VISIT (OUTPATIENT)
Dept: CARDIOLOGY CLINIC | Facility: CLINIC | Age: 87
End: 2022-08-02
Payer: MEDICARE

## 2022-08-02 DIAGNOSIS — Z95.810 PRESENCE OF AUTOMATIC CARDIOVERTER/DEFIBRILLATOR (AICD): Primary | ICD-10-CM

## 2022-08-02 PROCEDURE — G2066 INTER DEVC REMOTE 30D: HCPCS | Performed by: INTERNAL MEDICINE

## 2022-08-02 PROCEDURE — 93297 REM INTERROG DEV EVAL ICPMS: CPT | Performed by: INTERNAL MEDICINE

## 2022-08-02 NOTE — PROGRESS NOTES
Assessment and Plan:    Concepcion Johnston was seen today for follow-up and ckd iii  Diagnoses and all orders for this visit:    Stage 3b chronic kidney disease ([address] )  -     Magnesium; Future  -     Phosphorus; Future  -     CBC; Future  -     Protein / creatinine ratio, urine; Future  -     Comprehensive metabolic panel; Future    Hypertensive chronic kidney disease with stage 1 through stage 4 chronic kidney disease, or unspecified chronic kidney disease    Chronic combined systolic and diastolic congestive heart failure (HCC)    Chronic kidney disease-mineral and bone disorder  -     PTH, intact; Future  -     Vitamin D 25 hydroxy; Future    Nephrolithiasis      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  Creatinine at baseline  Electrolytes are within normal limits  Kidney Smart: declined at this time    Proteinuria- Minimal with UPC ratio of 0 16  Hypertension- BP goal <125/80  Antihypertensive regimen includes metoprolol 100mg AM / 50mg PM, imdur 30mg daily, and torsemide 40mg twice a day  Avoid salt  Stay active  Lose weight and eat healthy  BP above goal in the office but very close to goal at home readings (105-138/60s)  Continue to monitor your blood pressure at home  CHF- follows with cardiology  He takes torsemide 40mg twice a day and increases as needed for weight gain  Consider taking 3 tablets AM and 2 tablets PM as patient has SOB with laying flat at night  Bone Mineral Disorder- PTH is slightly elevated but stable  Vitamin D level at goal     Nephrolithiasis- follows with urology  Continue potassium citrate BID  Continue to stay well hydrated  Follow up with Dr Gemma Martinez or an advanced practitioner in 3 months  Please call the office with any questions or concerns  They are moving to [place] in December 2022      Reason for Visit: Follow-up and CKD III    HPI: Phuc Smallwood Jordan Talavera is a 80 y o  male who is here for follow up of chronic kidney disease  He last saw Dr Tyler De La O in March 2022  He complains of poor sleep  He is up often to urinate but also just doesn't sleep well in general   He also becomes short of breath toward the AM hours as he slides off his 2 pillows that he starts on  We discussed taking an extra torsemide to help keep him drier volume standpoint  They are willing to consider this  He also has left arm pain and are going for a second opinion soon  He is here today with his wife who is a retired nurse  ROS: A complete review of systems was performed and was negative unless otherwise noted in the history of present illness  Allergies:   Patient has no known allergies  Medications:     Current Outpatient Medications:     amiodarone 200 mg tablet, Take 1 tablet (200 mg total) by mouth daily, Disp: 90 tablet, Rfl: 3    aspirin 81 MG tablet, Take 81 mg by mouth daily  , Disp: , Rfl:     atorvastatin (LIPITOR) 40 mg tablet, Take 1 tablet (40 mg total) by mouth daily with dinner, Disp: 30 tablet, Rfl: 11    isosorbide mononitrate (IMDUR) 30 mg 24 hr tablet, Take 1 tablet (30 mg total) by mouth daily, Disp: 90 tablet, Rfl: 3    levothyroxine 175 mcg tablet, Take 175 mcg by mouth daily  , Disp: , Rfl:     LORazepam (ATIVAN) 1 mg tablet, Take 1 mg by mouth as needed for anxiety  , Disp: , Rfl:     metolazone (ZAROXOLYN) 2 5 mg tablet, As needed no more than twice weekly for leg swelling, Disp: 10 tablet, Rfl: 3    metoprolol succinate (TOPROL-XL) 50 mg 24 hr tablet, Take 1 tablet (50 mg total) by mouth see administration instructions Take 2 tabs( 100 mg ) in am  And 1tab(50  Mg) in pm , Disp: 270 tablet, Rfl: 3    mexiletine (MEXITIL) 150 mg capsule, Take 1 capsule (150 mg total) by mouth every 8 (eight) hours, Disp: 270 capsule, Rfl: 3    potassium citrate (Urocit-K 10) 10 mEq, Take 1 tablet (10 mEq total) by mouth in the morning and 1 tablet (10 mEq total) before bedtime  , Disp: 180 tablet, Rfl: 3    tamsulosin (FLOMAX) 0 4 mg, Take 1 capsule (0 4 mg total) by mouth daily with dinner, Disp: 90 capsule, Rfl: 3    torsemide (DEMADEX) 20 mg tablet, Take 3 tablets (60 mg total) by mouth 2 (two) times a day (Patient taking differently: Take 40 mg by mouth 2 (two) times a day Take two tablets daily and an additional 20 mg  As needed), Disp: 270 tablet, Rfl: 3    acetaminophen (TYLENOL) 500 mg tablet, Take 500 mg by mouth every 6 (six) hours as needed for mild pain , Disp: , Rfl:     nitroglycerin (NITROSTAT) 0 4 mg SL tablet, Place 1 tablet (0 4 mg total) under the tongue every 5 (five) minutes as needed for chest pain, Disp: 30 tablet, Rfl: 2    Past Medical History:   Diagnosis Date    AICD (automatic cardioverter/defibrillator) present 2004    AICD (automatic cardioverter/defibrillator) present 2006    AICD (automatic cardioverter/defibrillator) present 2013    [facility]    Arthritis     R knee and neck    Atrial fibrillation (HCC)     BPH (benign prostatic hyperplasia)     CAD (coronary artery disease) of artery bypass graft     CHF (congestive heart failure) ([facility] [address]     combine    Coronary artery disease     Disease of thyroid gland     Hyperlipidemia     Hypertension     Ischemic cardiomyopathy     Left ureteral calculus 10/17/2017    Renal disorder     V [facility])      Past Surgical History:   Procedure Laterality Date    CARDIAC CATHETERIZATION      CARDIAC CATHETERIZATION Left 11/8/2021    Procedure: Cardiac Left Heart Cath;  Surgeon: Marie Casey MD;  [facility]: [facility]; Service: Cardiology    CARDIAC CATHETERIZATION N/A 11/8/2021    Procedure: Cardiac Coronary Angiogram;  Surgeon: Marie Casey MD;  [facility]; Service: Cardiology    CARDIAC ELECTROPHYSIOLOGY PROCEDURE N/A 12/3/2021    Procedure: Cardiac eps/vt ablation;  Surgeon: Carlos Rainey DO;  [facility]: [facility];   Service: Cardiology    CARDIAC SURGERY      Pacemaker    CORONARY ARTERY BYPASS GRAFT      CO CYSTOURETHROSCOPY,URETER CATHETER N/A 10/17/2017    Procedure: CYSTOSCOPY, left  RETROGRADE PYELOGRAM WITH LEFT URETEROSCOPY , stone extraction,LEFT STENT INSERTION;  Surgeon: Hardeep Barker MD;  Location: [facility];  Service: Urology     Family History   Problem Relation Age of Onset    Tuberculosis Father         WWI    Hypertension Mother     Stroke Brother       reports that he has never smoked  He has never used smokeless tobacco  He reports current alcohol use  He reports that he does not use drugs  Physical Exam:   Vitals:    08/03/22 0911 08/03/22 0946   BP:  142/68   BP Location:  Right arm   Patient Position:  Sitting   Cuff Size:  Standard   Pulse:  68   Weight: 91 6 kg (202 lb)    Height: 5' 7" (1 702 m)      Body mass index is 31 64 kg/m²  General: NAD  Neuro: AAO  Skin: no rash  Eyes: anicteric  ENMT: mm moist  Neck: no masses  Respiratory: CTAB  Cardiovascular: RRR  Extremities: + bilateral LE edema  Gastrointestinal: soft nt nd    Procedure:  No results found for this or any previous visit  Lab Results   Component Value Date    GLUCOSE 112 10/19/2015    CALCIUM 9 3 07/18/2022     10/19/2015    K 4 0 07/18/2022    CO2 27 07/18/2022     07/18/2022    BUN 26 (H) 07/18/2022    CREATININE 1 92 (H) 07/18/2022     I have personally reviewed the blood work as stated above and in my note  I have personally reviewed Dr Jefe Levine office note

## 2022-08-02 NOTE — PROGRESS NOTES
Results for orders placed or performed in visit on 08/02/22   Cardiac EP device report    Narrative    SJM CRT-D/DDDR MODENOT MRI R [place] [address]: [address]  AP 99% BVP 99%  ALL AVAILABLE LEAD PARAMETERS WITHIN NORMAL LIMITS  NO SIGNIFICANT HIGH RATE EPISODES  NORMAL DEVICE FUNCTION  ---PASTOR

## 2022-08-03 ENCOUNTER — OFFICE VISIT (OUTPATIENT)
Dept: NEPHROLOGY | Facility: CLINIC | Age: 87
End: 2022-08-03
Payer: MEDICARE

## 2022-08-03 VITALS
WEIGHT: 202 LBS | SYSTOLIC BLOOD PRESSURE: 142 MMHG | HEART RATE: 68 BPM | HEIGHT: 67 IN | BODY MASS INDEX: 31.71 KG/M2 | DIASTOLIC BLOOD PRESSURE: 68 MMHG

## 2022-08-03 DIAGNOSIS — N18.9 CHRONIC KIDNEY DISEASE-MINERAL AND BONE DISORDER: ICD-10-CM

## 2022-08-03 DIAGNOSIS — I50.42 CHRONIC COMBINED SYSTOLIC AND DIASTOLIC CONGESTIVE HEART FAILURE (HCC): ICD-10-CM

## 2022-08-03 DIAGNOSIS — M89.9 CHRONIC KIDNEY DISEASE-MINERAL AND BONE DISORDER: ICD-10-CM

## 2022-08-03 DIAGNOSIS — N18.32 STAGE 3B CHRONIC KIDNEY DISEASE (HCC): Primary | ICD-10-CM

## 2022-08-03 DIAGNOSIS — E83.9 CHRONIC KIDNEY DISEASE-MINERAL AND BONE DISORDER: ICD-10-CM

## 2022-08-03 DIAGNOSIS — I12.9 HYPERTENSIVE CHRONIC KIDNEY DISEASE WITH STAGE 1 THROUGH STAGE 4 CHRONIC KIDNEY DISEASE, OR UNSPECIFIED CHRONIC KIDNEY DISEASE: ICD-10-CM

## 2022-08-03 DIAGNOSIS — N20.0 NEPHROLITHIASIS: ICD-10-CM

## 2022-08-03 PROCEDURE — 99214 OFFICE O/P EST MOD 30 MIN: CPT | Performed by: PHYSICIAN ASSISTANT

## 2022-08-03 NOTE — PATIENT INSTRUCTIONS
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  Creatinine at baseline  Electrolytes are within normal limits  Kidney Smart: declined at this time    Proteinuria- Minimal with UPC ratio of 0 16  Hypertension- BP goal <125/80  Antihypertensive regimen includes metoprolol 100mg AM / 50mg PM, imdur 30mg daily, and torsemide 40mg twice a day  Avoid salt  Stay active  Lose weight and eat healthy  BP above goal in the office but very close to goal at home readings  CHF- follows with cardiology  He takes torsemide 40mg twice a day and increases as needed for weight gain  Consider taking 3 tablets AM and 2 tablets PM as patient has SOB with laying flat at night  Bone Mineral Disorder- PTH is slightly elevated but stable  Vitamin D level at goal     Nephrolithiasis- follows with urology  Continue potassium citrate BID  Continue to stay well hydrated  Follow up with Dr Tyler De La O or an advanced practitioner in 3 months  Please call the office with any questions or concerns

## 2022-08-08 ENCOUNTER — APPOINTMENT (OUTPATIENT)
Dept: RADIOLOGY | Facility: CLINIC | Age: 87
End: 2022-08-08
Payer: MEDICARE

## 2022-08-08 ENCOUNTER — OFFICE VISIT (OUTPATIENT)
Dept: OBGYN CLINIC | Facility: CLINIC | Age: 87
End: 2022-08-08
Payer: MEDICARE

## 2022-08-08 VITALS
WEIGHT: 203.4 LBS | HEIGHT: 67 IN | BODY MASS INDEX: 31.92 KG/M2 | HEART RATE: 60 BPM | DIASTOLIC BLOOD PRESSURE: 69 MMHG | SYSTOLIC BLOOD PRESSURE: 147 MMHG

## 2022-08-08 DIAGNOSIS — M25.512 LEFT SHOULDER PAIN, UNSPECIFIED CHRONICITY: ICD-10-CM

## 2022-08-08 DIAGNOSIS — M19.012 PRIMARY OSTEOARTHRITIS OF LEFT SHOULDER: Primary | ICD-10-CM

## 2022-08-08 PROCEDURE — 73030 X-RAY EXAM OF SHOULDER: CPT

## 2022-08-08 PROCEDURE — 99214 OFFICE O/P EST MOD 30 MIN: CPT | Performed by: ORTHOPAEDIC SURGERY

## 2022-08-08 NOTE — PROGRESS NOTES
Assessment/Plan:  1  Primary osteoarthritis of left shoulder  XR shoulder 2+ vw left       Scribe Attestation    I,:  La Chandra am acting as a scribe while in the presence of the attending physician :       I,:  Ivan Cruz MD personally performed the services described in this documentation    as scribed in my presence :           Александр Quach is an 80 y o  male with severe osteoarthritis of his left shoulder  Diagnosis and treatment options were discussed at length with the patient and he was given the opportunity to ask questions  Due to Wesly's additional health issues, particularly with his heart, he is not a candidate for surgical intervention at this time  Patient was given a home exercise program   We did talk about possible steroid injection however he did not wish to undergo that at this point  He states he may come back for knee injections in the future  He does have known arthritis there  Subjective:   Petey Tam is a 80 y o  male who presents to the office today for left shoulder pain  He notes a fall 6 months ago which is when his shoulder pain began  He indicates the anterior shoulder as the location of his pain  He notes pain radiates into his bicep  He denies numbness or tingling  He notes crepitus with range of motion  Review of Systems   Constitutional: Negative for chills and fever  HENT: Negative for ear pain and sore throat  Eyes: Negative for pain and visual disturbance  Respiratory: Negative for cough and shortness of breath  Cardiovascular: Negative for chest pain and palpitations  Gastrointestinal: Negative for abdominal pain and vomiting  Genitourinary: Negative for dysuria and hematuria  Musculoskeletal: Negative for arthralgias and back pain  Skin: Negative for color change and rash  Neurological: Negative for seizures and syncope  All other systems reviewed and are negative          Past Medical History: Diagnosis Date    AICD (automatic cardioverter/defibrillator) present 2004    AICD (automatic cardioverter/defibrillator) present 2006    AICD (automatic cardioverter/defibrillator) present 2013    [facility]    Arthritis     R knee and neck    Atrial fibrillation (HCC)     BPH (benign prostatic hyperplasia)     CAD (coronary artery disease) of artery bypass graft     CHF (congestive heart failure) ([facility] [address] )     combine    Coronary artery disease     Disease of thyroid gland     Hyperlipidemia     Hypertension     Ischemic cardiomyopathy     Left ureteral calculus 10/17/2017    Renal disorder     V tach [facility])        Past Surgical History:   Procedure Laterality Date    CARDIAC CATHETERIZATION      CARDIAC CATHETERIZATION Left 11/8/2021    Procedure: Cardiac Left Heart Cath;  Surgeon: Eryn Martinez MD;  [facility]; Service: Cardiology    CARDIAC CATHETERIZATION N/A 11/8/2021    Procedure: Cardiac Coronary Angiogram;  Surgeon: Eryn Martinez MD;  [facility]; Service: Cardiology    CARDIAC ELECTROPHYSIOLOGY PROCEDURE N/A 12/3/2021    Procedure: Cardiac eps/vt ablation;  Surgeon: Shantell Stewart DO;  [facility]; Service: Cardiology    CARDIAC SURGERY      Pacemaker    CORONARY ARTERY BYPASS GRAFT      DE CYSTOURETHROSCOPY,URETER CATHETER N/A 10/17/2017    Procedure: CYSTOSCOPY, left  RETROGRADE PYELOGRAM WITH LEFT URETEROSCOPY , stone extraction,LEFT STENT INSERTION;  Surgeon: Tommy Maxwell MD;  Location[facility] MA[facility];  Service: Urology       Family History   Problem Relation Age of Onset    Tuberculosis Father         WWI    Hypertension Mother     Stroke Brother        Social History     Occupational History    Occupation: Retired   Tobacco Use    Smoking status: Never Smoker    Smokeless tobacco: Never Used   Vaping Use    Vaping Use: Never used   Substance and Sexual Activity    Alcohol use:  Yes    Drug use: No    Sexual activity: Not on file         Current Outpatient Medications:     acetaminophen (TYLENOL) 500 mg tablet, Take 500 mg by mouth every 6 (six) hours as needed for mild pain , Disp: , Rfl:     amiodarone 200 mg tablet, Take 1 tablet (200 mg total) by mouth daily, Disp: 90 tablet, Rfl: 3    aspirin 81 MG tablet, Take 81 mg by mouth daily  , Disp: , Rfl:     atorvastatin (LIPITOR) 40 mg tablet, Take 1 tablet (40 mg total) by mouth daily with dinner, Disp: 30 tablet, Rfl: 11    isosorbide mononitrate (IMDUR) 30 mg 24 hr tablet, Take 1 tablet (30 mg total) by mouth daily, Disp: 90 tablet, Rfl: 3    levothyroxine 175 mcg tablet, Take 175 mcg by mouth daily  , Disp: , Rfl:     LORazepam (ATIVAN) 1 mg tablet, Take 1 mg by mouth as needed for anxiety  , Disp: , Rfl:     metolazone (ZAROXOLYN) 2 5 mg tablet, As needed no more than twice weekly for leg swelling, Disp: 10 tablet, Rfl: 3    metoprolol succinate (TOPROL-XL) 50 mg 24 hr tablet, Take 1 tablet (50 mg total) by mouth see administration instructions Take 2 tabs( 100 mg ) in am  And 1tab(50  Mg) in pm , Disp: 270 tablet, Rfl: 3    mexiletine (MEXITIL) 150 mg capsule, Take 1 capsule (150 mg total) by mouth every 8 (eight) hours, Disp: 270 capsule, Rfl: 3    nitroglycerin (NITROSTAT) 0 4 mg SL tablet, Place 1 tablet (0 4 mg total) under the tongue every 5 (five) minutes as needed for chest pain, Disp: 30 tablet, Rfl: 2    potassium citrate (Urocit-K 10) 10 mEq, Take 1 tablet (10 mEq total) by mouth in the morning and 1 tablet (10 mEq total) before bedtime  , Disp: 180 tablet, Rfl: 3    tamsulosin (FLOMAX) 0 4 mg, Take 1 capsule (0 4 mg total) by mouth daily with dinner, Disp: 90 capsule, Rfl: 3    torsemide (DEMADEX) 20 mg tablet, Take 3 tablets (60 mg total) by mouth 2 (two) times a day (Patient taking differently: Take 40 mg by mouth 2 (two) times a day Take two tablets daily and an additional 20 mg   As needed), Disp: 270 tablet, Rfl: 3    No Known Allergies    Objective:  Vitals:    08/08/22 0933   BP: 147/69   Pulse: 60       Left Shoulder Exam     Range of Motion   Active abduction: 130   External rotation: 50   Forward flexion: 140   Internal rotation 90 degrees: 0     Muscle Strength   Abduction: 4/5   Internal rotation: 5/5   External rotation: 5/5     Other   Erythema: absent  Sensation: normal             Physical Exam  HENT:      Head: Normocephalic  Eyes:      Pupils: Pupils are equal, round, and reactive to light  Pulmonary:      Effort: Pulmonary effort is normal    Musculoskeletal:         General: Normal range of motion  Comments: See HPI   Skin:     General: Skin is warm and dry  Neurological:      General: No focal deficit present  Mental Status: He is alert  Psychiatric:         Behavior: Behavior normal          I have personally reviewed pertinent films in PACS and my interpretation is as follows:  X-rays of the left shoulder demonstrate left shoulder severe arthritis with osteophyte and small enchondroma of the humerus noted

## 2022-09-02 ENCOUNTER — TELEPHONE (OUTPATIENT)
Dept: OBGYN CLINIC | Facility: CLINIC | Age: 87
End: 2022-09-02

## 2022-09-02 NOTE — TELEPHONE ENCOUNTER
Patient's wife contacted office reporting patient is in severe pain in his shoulder  She states he has been taking Tylenol with no relief  She reports he does have a heart condition so he is limited on what he is able to take  Patient last seen in off 08/08/22 and was offered CSI but declines at that time  Please advise if there are any further recommendations that we can offer him at this time, I am unsure if NSAID medication would be an option for this patient? No f/u scheduled at this time   Next avialable apt 09/14 Selwyn Bardales) 09/20 (Dr Yadira Null)

## 2022-09-02 NOTE — TELEPHONE ENCOUNTER
As per Dr Veena Aguilar he recommends use of Tylenol Arthritis as this is a stronger medication verses extra-strength Tylenol  He does not have other recommendations of oral medications other than Tylenol secondary to his heart condition  Kirk Frederick may also utilize heat and ice to the shoulder for 20 minutes that time for additional pain relief  Steroid injection is also an option in the future if he does wish to proceed forward with that as previously discussed

## 2022-09-12 ENCOUNTER — TELEPHONE (OUTPATIENT)
Dept: OBGYN CLINIC | Facility: HOSPITAL | Age: 87
End: 2022-09-12

## 2022-09-12 DIAGNOSIS — R73.09 HIGH GLUCOSE: Primary | ICD-10-CM

## 2022-09-12 NOTE — TELEPHONE ENCOUNTER
Patient's wife wanted to let the Dr and Ciara Yanez know that patient did not have a HbgA1C but glucose taken thru CMP - his blood sugar was just slightly elevated and the most it has been was back in 2021 at  134  He is not a diabetic she states  She is asking if Dr will reconsider giving steroid injection

## 2022-09-13 ENCOUNTER — APPOINTMENT (OUTPATIENT)
Dept: LAB | Facility: CLINIC | Age: 87
End: 2022-09-13
Payer: MEDICARE

## 2022-09-13 DIAGNOSIS — R73.09 HIGH GLUCOSE: ICD-10-CM

## 2022-09-13 LAB
EST. AVERAGE GLUCOSE BLD GHB EST-MCNC: 126 MG/DL
HBA1C MFR BLD: 6 %

## 2022-09-13 PROCEDURE — 36415 COLL VENOUS BLD VENIPUNCTURE: CPT

## 2022-09-13 PROCEDURE — 83036 HEMOGLOBIN GLYCOSYLATED A1C: CPT

## 2022-09-14 NOTE — TELEPHONE ENCOUNTER
Wife given above information  Patient is in a lot of pain    Would you be ok if I scheduled him for same day on Friday at 2pm?

## 2022-09-14 NOTE — TELEPHONE ENCOUNTER
Attempted to call twice but got dead air on both calls    AGUSTINA [facility]  Please let the patient know his hemoglobin A1c is elevated at 6 0  This is within the prediabetic range  His primary care doctor should be made aware of this  This is still low enough that he could receive an injection for his shoulder though  Please reschedule this if he would like to receive the injection

## 2022-09-14 NOTE — TELEPHONE ENCOUNTER
Patient's wife calling to state he has had test done, would like to discuss results and whether or not he will be able to receive injection        # [phone]

## 2022-09-19 ENCOUNTER — OFFICE VISIT (OUTPATIENT)
Dept: OBGYN CLINIC | Facility: CLINIC | Age: 87
End: 2022-09-19
Payer: MEDICARE

## 2022-09-19 VITALS
BODY MASS INDEX: 31.36 KG/M2 | TEMPERATURE: 98.6 F | WEIGHT: 199.8 LBS | SYSTOLIC BLOOD PRESSURE: 158 MMHG | HEART RATE: 65 BPM | DIASTOLIC BLOOD PRESSURE: 70 MMHG | HEIGHT: 67 IN

## 2022-09-19 DIAGNOSIS — M19.012 PRIMARY OSTEOARTHRITIS OF LEFT SHOULDER: Primary | ICD-10-CM

## 2022-09-19 PROCEDURE — 99213 OFFICE O/P EST LOW 20 MIN: CPT | Performed by: PHYSICIAN ASSISTANT

## 2022-09-19 PROCEDURE — 20610 DRAIN/INJ JOINT/BURSA W/O US: CPT | Performed by: PHYSICIAN ASSISTANT

## 2022-09-19 RX ORDER — DEXAMETHASONE SODIUM PHOSPHATE 100 MG/10ML
40 INJECTION INTRAMUSCULAR; INTRAVENOUS
Status: COMPLETED | OUTPATIENT
Start: 2022-09-19 | End: 2022-09-19

## 2022-09-19 RX ORDER — LIDOCAINE HYDROCHLORIDE 10 MG/ML
4 INJECTION, SOLUTION INFILTRATION; PERINEURAL
Status: COMPLETED | OUTPATIENT
Start: 2022-09-19 | End: 2022-09-19

## 2022-09-19 RX ADMIN — LIDOCAINE HYDROCHLORIDE 4 ML: 10 INJECTION, SOLUTION INFILTRATION; PERINEURAL at 08:51

## 2022-09-19 RX ADMIN — DEXAMETHASONE SODIUM PHOSPHATE 40 MG: 100 INJECTION INTRAMUSCULAR; INTRAVENOUS at 08:51

## 2022-09-19 NOTE — PROGRESS NOTES
Assessment/Plan:  1  Primary osteoarthritis of left shoulder       Patient was given a steroid injection for his left shoulder arthritis, as previously discussed in the office  We discussed that we do not like to get these injections any sooner than 6 months apart  He again is not a surgical candidate due to his multiple medical issues, including his cardiac history  He will follow up as needed  Subjective:   Kim Redmond is a 80 y o  male who presents today for steroid injection for his left shoulder arthritis  He has known severe arthritis of the shoulder and was offered a steroid injection last visit, however wanted to think about this  Last hgb A1C was 6 0, and patient has no history of diabetes  He notes ongoing pain and limitations in range of motion of the left shoulder  His pain is worse with activity and better with rest       Review of Systems   Constitutional: Negative  Negative for chills and fever  HENT: Negative  Negative for ear pain and sore throat  Eyes: Negative  Negative for pain and redness  Respiratory: Negative  Negative for shortness of breath and wheezing  Cardiovascular: Negative for chest pain and palpitations  Gastrointestinal: Negative  Negative for abdominal pain and blood in stool  Endocrine: Negative  Negative for polydipsia and polyuria  Genitourinary: Negative  Negative for difficulty urinating and dysuria  Musculoskeletal:        As noted in HPI   Skin: Negative  Negative for pallor and rash  Neurological: Negative  Negative for dizziness and numbness  Hematological: Negative  Negative for adenopathy  Does not bruise/bleed easily  Psychiatric/Behavioral: Negative  Negative for confusion and suicidal ideas           Past Medical History:   Diagnosis Date    AICD (automatic cardioverter/defibrillator) present 2004    AICD (automatic cardioverter/defibrillator) present 2006    AICD (automatic cardioverter/defibrillator) present 2013    [facility]    Arthritis     R knee and neck    Atrial fibrillation (HCC)     BPH (benign prostatic hyperplasia)     CAD (coronary artery disease) of artery bypass graft     CHF (congestive heart failure) (HCC)     combine    Coronary artery disease     Disease of thyroid gland     Hyperlipidemia     Hypertension     Ischemic cardiomyopathy     Left ureteral calculus 10/17/2017    Renal disorder     V tach [facility])        Past Surgical History:   Procedure Laterality Date    CARDIAC CATHETERIZATION      CARDIAC CATHETERIZATION Left 11/8/2021    Procedure: Cardiac Left Heart Cath;  Surgeon: Derek Yu MD;  [facility]; Service: Cardiology    CARDIAC CATHETERIZATION N/A 11/8/2021    Procedure: Cardiac Coronary Angiogram;  Surgeon: Derek Yu MD;  [facility]; Service: Cardiology    CARDIAC ELECTROPHYSIOLOGY PROCEDURE N/A 12/3/2021    Procedure: Cardiac eps/vt ablation;  Surgeon: Camden Mead DO;  [facility]; Service: Cardiology    CARDIAC SURGERY      Pacemaker    CORONARY ARTERY BYPASS GRAFT      SD CYSTOURETHROSCOPY,URETER CATHETER N/A 10/17/2017    Procedure: CYSTOSCOPY, left  RETROGRADE PYELOGRAM WITH LEFT URETEROSCOPY , stone extraction,LEFT STENT INSERTION;  Surgeon: Jackelyn Harman MD;  [facility] MA[facility];  Service: Urology       Family History   Problem Relation Age of Onset    Tuberculosis Father         WWI    Hypertension Mother     Stroke Brother        Social History     Occupational History    Occupation: Retired   Tobacco Use    Smoking status: Never Smoker    Smokeless tobacco: Never Used   Vaping Use    Vaping Use: Never used   Substance and Sexual Activity    Alcohol use:  Yes    Drug use: No    Sexual activity: Not on file         Current Outpatient Medications:     acetaminophen (TYLENOL) 500 mg tablet, Take 500 mg by mouth every 6 (six) hours as needed for mild pain , Disp: , Rfl:     amiodarone 200 mg tablet, Take 1 tablet (200 mg total) by mouth daily, Disp: 90 tablet, Rfl: 3    aspirin 81 MG tablet, Take 81 mg by mouth daily  , Disp: , Rfl:     atorvastatin (LIPITOR) 40 mg tablet, Take 1 tablet (40 mg total) by mouth daily with dinner, Disp: 30 tablet, Rfl: 11    isosorbide mononitrate (IMDUR) 30 mg 24 hr tablet, Take 1 tablet (30 mg total) by mouth daily, Disp: 90 tablet, Rfl: 3    levothyroxine 175 mcg tablet, Take 175 mcg by mouth daily  , Disp: , Rfl:     LORazepam (ATIVAN) 1 mg tablet, Take 1 mg by mouth as needed for anxiety  , Disp: , Rfl:     metolazone (ZAROXOLYN) 2 5 mg tablet, As needed no more than twice weekly for leg swelling, Disp: 10 tablet, Rfl: 3    metoprolol succinate (TOPROL-XL) 50 mg 24 hr tablet, Take 1 tablet (50 mg total) by mouth see administration instructions Take 2 tabs( 100 mg ) in am  And 1tab(50  Mg) in pm , Disp: 270 tablet, Rfl: 3    mexiletine (MEXITIL) 150 mg capsule, Take 1 capsule (150 mg total) by mouth every 8 (eight) hours, Disp: 270 capsule, Rfl: 3    nitroglycerin (NITROSTAT) 0 4 mg SL tablet, Place 1 tablet (0 4 mg total) under the tongue every 5 (five) minutes as needed for chest pain, Disp: 30 tablet, Rfl: 2    potassium citrate (Urocit-K 10) 10 mEq, Take 1 tablet (10 mEq total) by mouth in the morning and 1 tablet (10 mEq total) before bedtime  , Disp: 180 tablet, Rfl: 3    tamsulosin (FLOMAX) 0 4 mg, Take 1 capsule (0 4 mg total) by mouth daily with dinner, Disp: 90 capsule, Rfl: 3    torsemide (DEMADEX) 20 mg tablet, Take 3 tablets (60 mg total) by mouth 2 (two) times a day (Patient taking differently: Take 40 mg by mouth 2 (two) times a day Take two tablets daily and an additional 20 mg  As needed), Disp: 270 tablet, Rfl: 3    No Known Allergies    Objective:  Vitals:    09/19/22 0831   BP: 158/70   Pulse: 65   Temp: 98 6 °F (37 °C)       Ortho Exam      Physical Exam  Constitutional:       General: He is not in acute distress       Appearance: He is well-developed  HENT:      Head: Normocephalic and atraumatic  Eyes:      General: No scleral icterus  Conjunctiva/sclera: Conjunctivae normal    Neck:      Vascular: No JVD  Cardiovascular:      Rate and Rhythm: Normal rate  Pulmonary:      Effort: Pulmonary effort is normal  No respiratory distress  Skin:     General: Skin is warm  Neurological:      Mental Status: He is alert and oriented to person, place, and time  Coordination: Coordination normal          Left Shoulder Exam      Range of Motion   Active abduction: 130   External rotation: 50   Forward flexion: 140   Internal rotation 90 degrees: 0      Muscle Strength   Abduction: 4/5   Internal rotation: 5/5   External rotation: 5/5      Other   Erythema: absent  Sensation: normal     Large joint arthrocentesis: L glenohumeral  Universal Protocol:  Risks and benefits: risks, benefits and alternatives were discussed  Consent given by: patient  Time out: Immediately prior to procedure a "time out" was called to verify the correct patient, procedure, equipment, support staff and site/side marked as required  Timeout called at: 9/19/2022 8:49 AM   Site marked: the operative site was marked  Supporting Documentation  Indications: pain   Procedure Details  Location: shoulder - L glenohumeral  Preparation: Patient was prepped and draped in the usual sterile fashion  Needle size: 22 G  Ultrasound guidance: no  Approach: anterior  Medications administered: 4 mL lidocaine 1 %; 40 mg dexamethasone 100 mg/10 mL    Patient tolerance: patient tolerated the procedure well with no immediate complications  Dressing:  Sterile dressing applied            This document was created using speech voice recognition software  Grammatical errors, random word insertions, pronoun errors, and incomplete sentences are an occasional consequence of this system due to software limitations, ambient noise, and hardware issues     Any formal questions or concerns about content, text, or information contained within the body of this dictation should be directly addressed to the provider for clarification

## 2022-09-22 ENCOUNTER — TELEPHONE (OUTPATIENT)
Dept: CARDIOLOGY CLINIC | Facility: CLINIC | Age: 87
End: 2022-09-22

## 2022-09-22 ENCOUNTER — REMOTE DEVICE CLINIC VISIT (OUTPATIENT)
Dept: CARDIOLOGY CLINIC | Facility: CLINIC | Age: 87
End: 2022-09-22
Payer: MEDICARE

## 2022-09-22 DIAGNOSIS — Z95.810 PRESENCE OF AUTOMATIC CARDIOVERTER/DEFIBRILLATOR (AICD): Primary | ICD-10-CM

## 2022-09-22 PROCEDURE — 93296 REM INTERROG EVL PM/IDS: CPT | Performed by: INTERNAL MEDICINE

## 2022-09-22 PROCEDURE — 93295 DEV INTERROG REMOTE 1/2/MLT: CPT | Performed by: INTERNAL MEDICINE

## 2022-09-22 NOTE — TELEPHONE ENCOUNTER
Spoke with wife  She states he is ding ok  Wt is stable does have a little blle edema and has been taking extra torsemide as needed  Denies sob or abd bloating

## 2022-09-22 NOTE — TELEPHONE ENCOUNTER
----- Message from [facility] sent at 9/22/2022  8:54 AM EDT -----  Regarding: cor-fred crossed  Ken Luque,  Pts cor-fred crossed x 10 days  Pt takes demadex, zaroxolyn, mexiletine, amiodarone, metoprolol succ, asa 81mg  Ef: 20% (echo 6/17/21)  Thanks,  ~Cheryl MERLIN TRANSMISSION: BATTERY VOLTAGE ADEQUATE (4 YRS)  AP: 96%  BP: >99%  ALL AVAILABLE LEAD PARAMETERS WITHIN NORMAL LIMITS  NO SIGNIFICANT HIGH RATE EPISODES  CORVUE IMPEDANCE THRESHOLD CROSSED X 10 DAYS  PT TAKES DEMADEX, ZAROXOLYN, MEXINETINE, AMIODARONE  METOPROLOL SUCC, ASA 81MG  EF: 20% (ECHO 6/17/21)  TASK TO HF TEAM  APPROPRIATELY FUNCTIONING BI-V ICD    [address]

## 2022-09-22 NOTE — PROGRESS NOTES
Results for orders placed or performed in visit on 09/22/22   Cardiac EP device report    Narrative    SJM CRT-D/DDDR MODENOT MRI CONDITIONAL  MERLIN TRANSMISSION: BATTERY VOLTAGE ADEQUATE (4 YRS)  AP: 96%  BP: >99%  ALL AVAILABLE LEAD PARAMETERS WITHIN NORMAL LIMITS  NO SIGNIFICANT HIGH RATE EPISODES  CORVUE IMPEDANCE THRESHOLD CROSSED X 10 DAYS  PT TAKES DEMADEX, ZAROXOLYN, MEXINETINE, AMIODARONE  METOPROLOL SUCC, ASA 81MG  EF: 20% (ECHO 6/17/21)  TASK TO HF TEAM  APPROPRIATELY FUNCTIONING BI-V ICD    [address] [place] [address] Street

## 2022-10-07 ENCOUNTER — APPOINTMENT (OUTPATIENT)
Dept: LAB | Facility: CLINIC | Age: 87
End: 2022-10-07
Payer: MEDICARE

## 2022-10-07 ENCOUNTER — TRANSCRIBE ORDERS (OUTPATIENT)
Dept: LAB | Facility: CLINIC | Age: 87
End: 2022-10-07

## 2022-10-07 DIAGNOSIS — N18.32 STAGE 3B CHRONIC KIDNEY DISEASE (HCC): ICD-10-CM

## 2022-10-07 DIAGNOSIS — R73.9 BLOOD GLUCOSE ELEVATED: Primary | ICD-10-CM

## 2022-10-07 DIAGNOSIS — R73.9 BLOOD GLUCOSE ELEVATED: ICD-10-CM

## 2022-10-07 LAB
ANION GAP SERPL CALCULATED.3IONS-SCNC: 6 MMOL/L (ref 4–13)
BUN SERPL-MCNC: 36 MG/DL (ref 5–25)
CALCIUM SERPL-MCNC: 9.1 MG/DL (ref 8.3–10.1)
CHLORIDE SERPL-SCNC: 105 MMOL/L (ref 96–108)
CO2 SERPL-SCNC: 29 MMOL/L (ref 21–32)
CREAT SERPL-MCNC: 1.81 MG/DL (ref 0.6–1.3)
EST. AVERAGE GLUCOSE BLD GHB EST-MCNC: 128 MG/DL
GFR SERPL CREATININE-BSD FRML MDRD: 33 ML/MIN/1.73SQ M
GLUCOSE P FAST SERPL-MCNC: 112 MG/DL (ref 65–99)
HBA1C MFR BLD: 6.1 %
MAGNESIUM SERPL-MCNC: 2.2 MG/DL (ref 1.6–2.6)
POTASSIUM SERPL-SCNC: 3.9 MMOL/L (ref 3.5–5.3)
SODIUM SERPL-SCNC: 140 MMOL/L (ref 135–147)

## 2022-10-07 PROCEDURE — 36415 COLL VENOUS BLD VENIPUNCTURE: CPT

## 2022-10-07 PROCEDURE — 80048 BASIC METABOLIC PNL TOTAL CA: CPT

## 2022-10-07 PROCEDURE — 83735 ASSAY OF MAGNESIUM: CPT

## 2022-10-07 PROCEDURE — 83036 HEMOGLOBIN GLYCOSYLATED A1C: CPT

## 2022-10-28 ENCOUNTER — REMOTE DEVICE CLINIC VISIT (OUTPATIENT)
Dept: CARDIOLOGY CLINIC | Facility: CLINIC | Age: 87
End: 2022-10-28
Payer: MEDICARE

## 2022-10-28 ENCOUNTER — TELEPHONE (OUTPATIENT)
Dept: CARDIOLOGY CLINIC | Facility: CLINIC | Age: 87
End: 2022-10-28

## 2022-10-28 DIAGNOSIS — Z95.810 PRESENCE OF AUTOMATIC CARDIOVERTER/DEFIBRILLATOR (AICD): Primary | ICD-10-CM

## 2022-10-28 PROCEDURE — 93297 REM INTERROG DEV EVAL ICPMS: CPT | Performed by: INTERNAL MEDICINE

## 2022-10-28 PROCEDURE — G2066 INTER DEVC REMOTE 30D: HCPCS | Performed by: INTERNAL MEDICINE

## 2022-10-28 NOTE — TELEPHONE ENCOUNTER
----- Message from [facility] sent at 10/28/2022  8:52 AM EDT -----  Regarding: cor-fred crossed  [place],  Pts cor-fred crossed x 11 days, pt takes deamdex, zaroxolyn, mexiletine, amiodarone, metoprolol succ, asa 81mg  EF: 20% (echo 6/17/21)  Thanks,  ~Cheryl MERLIN TRANSMISSION - CORVUE ONLY: BATTERY VOLTAGE ADEQUATE (3 9 YRS)  AP: 97%  BP: >99%  ALL AVAILABLE LEAD PARAMETERS WITHIN NORMAL LIMITS  NO SIGNIFICANT HIGH RATE EPISODES  CORVUE IMPEDANCE THRESHOLD CROSSED X 11 DAYS  PT TAKES DEAMDEX, ZARXOLYN, MEXILETINE, AMIODARONE, METOPROLOL SUCC, ASA 81MG  EF: 20% (ECHO 6/17/21)  TASK TO HF TEAM  APPROPRIATELY FUNCTIONING BI-V ICD    [address]

## 2022-10-28 NOTE — PROGRESS NOTES
Results for orders placed or performed in visit on 10/28/22   Cardiac EP device report    Narrative    SJM CRT-D/DDDR MODENOT MRI CONDITIONAL  MERLIN TRANSMISSION - CORVUE ONLY: BATTERY VOLTAGE ADEQUATE (3 9 YRS)  AP: 97%  BP: >99%  ALL AVAILABLE LEAD PARAMETERS WITHIN NORMAL LIMITS  NO SIGNIFICANT HIGH RATE EPISODES  CORVUE IMPEDANCE THRESHOLD CROSSED X 11 DAYS  PT TAKES DEAMDEX, ZARXOLYN, MEXILETINE, AMIODARONE, METOPROLOL SUCC, ASA 81MG  EF: 20% (ECHO 6/17/21)  TASK TO HF TEAM  APPROPRIATELY FUNCTIONING BI-V ICD    [address] [place] [address] Street

## 2022-10-28 NOTE — TELEPHONE ENCOUNTER
S/w Mrs Pretty Kidd, discussed reason for my call  Patient denies- SOB  Having more dependent edema but is more active  Weights stable at 191-193 lbs  Patient and wife moving to [place] permanently in 3 weeks  Follow up w/ Dr Sidra Fothergill 11/9 @ 120 pm, they will be at this appt  Care will continue until they find a new cardiologist in [place]

## 2022-10-31 DIAGNOSIS — I25.810 CORONARY ARTERY DISEASE INVOLVING CORONARY BYPASS GRAFT OF NATIVE HEART WITHOUT ANGINA PECTORIS: ICD-10-CM

## 2022-10-31 RX ORDER — TORSEMIDE 20 MG/1
60 TABLET ORAL 2 TIMES DAILY
Qty: 270 TABLET | Refills: 3 | Status: SHIPPED | OUTPATIENT
Start: 2022-10-31

## 2022-11-01 RX ORDER — TORSEMIDE 20 MG/1
40 TABLET ORAL 2 TIMES DAILY
Qty: 220 TABLET | Refills: 3 | Status: SHIPPED | OUTPATIENT
Start: 2022-11-01

## 2022-11-02 ENCOUNTER — TELEPHONE (OUTPATIENT)
Dept: CARDIOLOGY CLINIC | Facility: CLINIC | Age: 87
End: 2022-11-02

## 2022-11-02 ENCOUNTER — OFFICE VISIT (OUTPATIENT)
Dept: UROLOGY | Facility: CLINIC | Age: 87
End: 2022-11-02

## 2022-11-02 VITALS
OXYGEN SATURATION: 98 % | HEART RATE: 59 BPM | DIASTOLIC BLOOD PRESSURE: 72 MMHG | WEIGHT: 196 LBS | SYSTOLIC BLOOD PRESSURE: 142 MMHG | HEIGHT: 67 IN | BODY MASS INDEX: 30.76 KG/M2

## 2022-11-02 DIAGNOSIS — N40.1 BPH WITH OBSTRUCTION/LOWER URINARY TRACT SYMPTOMS: Primary | ICD-10-CM

## 2022-11-02 DIAGNOSIS — N13.8 BPH WITH OBSTRUCTION/LOWER URINARY TRACT SYMPTOMS: Primary | ICD-10-CM

## 2022-11-02 LAB
POST-VOID RESIDUAL VOLUME, ML POC: 23 ML
SL AMB  POCT GLUCOSE, UA: ABNORMAL
SL AMB LEUKOCYTE ESTERASE,UA: ABNORMAL
SL AMB POCT BILIRUBIN,UA: ABNORMAL
SL AMB POCT BLOOD,UA: ABNORMAL
SL AMB POCT CLARITY,UA: ABNORMAL
SL AMB POCT COLOR,UA: YELLOW
SL AMB POCT KETONES,UA: ABNORMAL
SL AMB POCT NITRITE,UA: ABNORMAL
SL AMB POCT PH,UA: 6.5
SL AMB POCT SPECIFIC GRAVITY,UA: 1.03
SL AMB POCT URINE PROTEIN: ABNORMAL
SL AMB POCT UROBILINOGEN: 0.2

## 2022-11-02 NOTE — PROGRESS NOTES
1  BPH with obstruction/lower urinary tract symptoms  POCT urine dip    POCT Measure PVR         Assessment and plan:       1  BPH with LUTS  - continue tamsulosin and myrbetriq    2  Nephrolithiasis  - patient asymptomatic    Patient is moving to [place] next month  I counseled him on his stones and need to establish with urologist in [place]  We also discussed his pancreatic cysts that require MRI surveillance  Patient was printed a copy of his CT results and couseled to obtain the CT on a disc for transfer of care  Patient verbalized understanding  Helio Douglas      Chief Complaint     LUTS    History of Present Illness     Elina Grewal is a 80 y o  male presenting for follow up  Patient has a longstanding history of nephrolithiasis requiring  Previous ureteroscopic intervention  With Dr Balaji Suazo  Patient is managed on potassium citrate  patient reports that he has been having increasing lower urinary tract symptoms despite tamsulosin monotherapy  He is on a diuretic at this time  He feels like he voids  Frequently and urgently  added myrbetriq in 2021  Microscopic hematuria evaluation 9/23/22 negative for  abnormalities other than kidney stones  Recent CT abdomen and pelvis (6/2/22) showing large b/l stones, largest left lower pole 1 4cm  Urine dip leukocyte, nitrite, blood negative  PVR 23mL    Laboratory     Lab Results   Component Value Date    CREATININE 1 81 (H) 10/07/2022       Lab Results   Component Value Date    PSA 0 9 03/08/2016        Review of Systems     Review of Systems   Constitutional: Negative for activity change, appetite change, chills, diaphoresis, fatigue, fever and unexpected weight change  Respiratory: Negative for chest tightness and shortness of breath  Cardiovascular: Negative for chest pain, palpitations and leg swelling     Gastrointestinal: Negative for abdominal distention, abdominal pain, constipation, diarrhea, nausea and vomiting  Genitourinary: Negative for decreased urine volume, difficulty urinating, dysuria, enuresis, flank pain, frequency, genital sores, hematuria and urgency  Musculoskeletal: Negative for back pain, gait problem and myalgias  Skin: Negative for color change, pallor, rash and wound  Psychiatric/Behavioral: Negative for behavioral problems  The patient is not nervous/anxious  AUA SYMPTOM SCORE    Flowsheet Row Most Recent Value   AUA SYMPTOM SCORE    How often have you had a sensation of not emptying your bladder completely after you finished urinating? 4 (P)     How often have you had to urinate again less than two hours after you finished urinating? 4 (P)     How often have you found you stopped and started again several times when you urinate? 4 (P)     How often have you found it difficult to postpone urination? 4 (P)     How often have you had a weak urinary stream? 4 (P)     How often have you had to push or strain to begin urination? 4 (P)     How many times did you most typically get up to urinate from the time you went to bed at night until the time you got up in the morning? 4 (P)     Quality of Life: If you were to spend the rest of your life with your urinary condition just the way it is now, how would you feel about that? 5 (P)     AUA SYMPTOM SCORE 28 (P)           Allergies     No Known Allergies    Physical Exam     Physical Exam  Constitutional:       General: He is not in acute distress  Appearance: Normal appearance  He is normal weight  He is not ill-appearing, toxic-appearing or diaphoretic  HENT:      Mouth/Throat:      Mouth: Mucous membranes are moist    Eyes:      Conjunctiva/sclera: Conjunctivae normal       Pupils: Pupils are equal, round, and reactive to light  Pulmonary:      Effort: Pulmonary effort is normal  No respiratory distress  Musculoskeletal:         General: No swelling or tenderness  Normal range of motion     Skin:     General: Skin is warm and dry       Coloration: Skin is not jaundiced or pale  Neurological:      General: No focal deficit present  Mental Status: He is alert and oriented to person, place, and time  Psychiatric:         Mood and Affect: Mood normal          Behavior: Behavior normal            Vital Signs     There were no vitals filed for this visit  Current Medications       Current Outpatient Medications:   •  torsemide (DEMADEX) 20 mg tablet, Take 3 tablets (60 mg total) by mouth 2 (two) times a day, Disp: 270 tablet, Rfl: 3  •  acetaminophen (TYLENOL) 500 mg tablet, Take 500 mg by mouth every 6 (six) hours as needed for mild pain , Disp: , Rfl:   •  amiodarone 200 mg tablet, Take 1 tablet (200 mg total) by mouth daily, Disp: 90 tablet, Rfl: 3  •  aspirin 81 MG tablet, Take 81 mg by mouth daily  , Disp: , Rfl:   •  atorvastatin (LIPITOR) 40 mg tablet, Take 1 tablet (40 mg total) by mouth daily with dinner, Disp: 30 tablet, Rfl: 11  •  isosorbide mononitrate (IMDUR) 30 mg 24 hr tablet, Take 1 tablet (30 mg total) by mouth daily, Disp: 90 tablet, Rfl: 3  •  levothyroxine 175 mcg tablet, Take 175 mcg by mouth daily  , Disp: , Rfl:   •  LORazepam (ATIVAN) 1 mg tablet, Take 1 mg by mouth as needed for anxiety  , Disp: , Rfl:   •  metolazone (ZAROXOLYN) 2 5 mg tablet, As needed no more than twice weekly for leg swelling, Disp: 10 tablet, Rfl: 3  •  metoprolol succinate (TOPROL-XL) 50 mg 24 hr tablet, Take 1 tablet (50 mg total) by mouth see administration instructions Take 2 tabs( 100 mg ) in am  And 1tab(50  Mg) in pm , Disp: 270 tablet, Rfl: 3  •  mexiletine (MEXITIL) 150 mg capsule, Take 1 capsule (150 mg total) by mouth every 8 (eight) hours, Disp: 270 capsule, Rfl: 3  •  nitroglycerin (NITROSTAT) 0 4 mg SL tablet, Place 1 tablet (0 4 mg total) under the tongue every 5 (five) minutes as needed for chest pain, Disp: 30 tablet, Rfl: 2  •  potassium citrate (Urocit-K 10) 10 mEq, Take 1 tablet (10 mEq total) by mouth in the morning and 1 tablet (10 mEq total) before bedtime  , Disp: 180 tablet, Rfl: 3  •  tamsulosin (FLOMAX) 0 4 mg, Take 1 capsule (0 4 mg total) by mouth daily with dinner, Disp: 90 capsule, Rfl: 3  •  torsemide (DEMADEX) 20 mg tablet, Take 2 tablets (40 mg total) by mouth 2 (two) times a day Take two tablets daily and an additional 20 mg  As needed, Disp: 220 tablet, Rfl: 3      Active Problems     Patient Active Problem List   Diagnosis   • Chronic combined systolic and diastolic congestive heart failure (HCC)   • Hyperlipidemia   • Benign essential hypertension   • Paroxysmal atrial fibrillation (HCC)   • BPH (benign prostatic hyperplasia)   • CAD (coronary artery disease) of artery bypass graft   • Ischemic cardiomyopathy   • Hypertensive chronic kidney disease with stage 1 through stage 4 chronic kidney disease, or unspecified chronic kidney disease   • V tach   • Strep pharyngitis   • Pain with swallowing   • Dehydration   • Chronic kidney disease, stage III (moderate) (HCC)   • Dyslipidemia   • Nephrolithiasis   • Hypokalemia   • Localized edema   • Secondary hyperparathyroidism of renal origin ([facility] [address] )   • Disease of thyroid gland   • Obesity (BMI 30 0-34  9)   • Vitamin D deficiency   • Benign prostatic hyperplasia with urinary frequency   • OAB (overactive bladder)   • Microscopic hematuria   • Chronic kidney disease-mineral and bone disorder         Past Medical History     Past Medical History:   Diagnosis Date   • AICD (automatic cardioverter/defibrillator) present 2004   • AICD (automatic cardioverter/defibrillator) present 2006   • AICD (automatic cardioverter/defibrillator) present 2013    [facility]   • Arthritis     R knee and neck   • Atrial fibrillation ([facility])    • BPH (benign prostatic hyperplasia)    • CAD (coronary artery disease) of artery bypass graft    • CHF (congestive heart failure) ([facility])     combine   • Coronary artery disease    • Disease of thyroid gland    • Hyperlipidemia    • Hypertension    • Ischemic cardiomyopathy    • Left ureteral calculus 10/17/2017   • Renal disorder    • V tach [facility])          Surgical History     Past Surgical History:   Procedure Laterality Date   • CARDIAC CATHETERIZATION     • CARDIAC CATHETERIZATION Left 11/8/2021    Procedure: Cardiac Left Heart Cath;  Surgeon: Mercedes Dasilva MD;  Location: [facility]ARD[facility]; Service: Cardiology   • CARDIAC CATHETERIZATION N/A 11/8/2021    Procedure: Cardiac Coronary Angiogram;  Surgeon: Mercedes Dasilva MD;  Location: [facility]; Service: Cardiology   • CARDIAC ELECTROPHYSIOLOGY PROCEDURE N/A 12/3/2021    Procedure: Cardiac eps/vt ablation;  Surgeon: Jairo Lo DO;  Location: [facility] CARD[facility]AC [facility];   Service: Cardiology   • CARDIAC SURGERY      Pacemaker   • CORONARY ARTERY BYPASS GRAFT     • CO CYSTOURETHROSCOPY,URETER CATHETER N/A 10/17/2017    Procedure: CYSTOSCOPY, left  RETROGRADE PYELOGRAM WITH LEFT URETEROSCOPY , stone extraction,LEFT STENT INSERTION;  Surgeon: Clemencia Lozano MD;  Location: [facility] MAIN OR;  Service: Urology         Family History     Family History   Problem Relation Age of Onset   • Tuberculosis Father         WWI   • Hypertension Mother    • Stroke Brother          Social History     Social History       Radiology

## 2022-11-02 NOTE — TELEPHONE ENCOUNTER
Pt torsemide dose 60 mg bid ? Is what pt is on- Pharmacy has 40 mg bid & a additional 20 mg if needed   I need a clarification please

## 2022-11-03 NOTE — TELEPHONE ENCOUNTER
His wife keeps a close eye on this, per my last note it was 40 mg a day alternating with 60 mg a day

## 2022-11-04 ENCOUNTER — TELEPHONE (OUTPATIENT)
Dept: UROLOGY | Facility: CLINIC | Age: 87
End: 2022-11-04

## 2022-11-04 DIAGNOSIS — R35.0 URINARY FREQUENCY: Primary | ICD-10-CM

## 2022-11-04 LAB — BACTERIA UR CULT: ABNORMAL

## 2022-11-04 RX ORDER — CEPHALEXIN 250 MG/1
250 CAPSULE ORAL EVERY 8 HOURS SCHEDULED
Qty: 14 CAPSULE | Refills: 0 | Status: SHIPPED | OUTPATIENT
Start: 2022-11-04 | End: 2022-11-05

## 2022-11-04 NOTE — TELEPHONE ENCOUNTER
Pt wife called in stating that she was contacted by the pharmacy saying that the instructions for his medication cephalexin (KEFLEX) 250 mg capsule doesn't match with the medication  Please call pt wife back to clarify  Pt wife was told that pharmacy has to speak with the provider

## 2022-11-05 DIAGNOSIS — R35.0 URINARY FREQUENCY: ICD-10-CM

## 2022-11-05 RX ORDER — CEPHALEXIN 250 MG/1
500 CAPSULE ORAL EVERY 12 HOURS SCHEDULED
Qty: 28 CAPSULE | Refills: 0 | Status: SHIPPED | OUTPATIENT
Start: 2022-11-05 | End: 2022-11-12

## 2022-11-05 NOTE — TELEPHONE ENCOUNTER
Received a call from [facility] with [address] in [facility] - requesting a call back to clarify instructions and quantity for [employer]  Call back# [phone]  On call paged via TC

## 2022-11-07 ENCOUNTER — APPOINTMENT (OUTPATIENT)
Dept: LAB | Facility: CLINIC | Age: 87
End: 2022-11-07

## 2022-11-07 DIAGNOSIS — N18.32 STAGE 3B CHRONIC KIDNEY DISEASE (HCC): ICD-10-CM

## 2022-11-07 DIAGNOSIS — E83.9 CHRONIC KIDNEY DISEASE-MINERAL AND BONE DISORDER: ICD-10-CM

## 2022-11-07 DIAGNOSIS — M89.9 CHRONIC KIDNEY DISEASE-MINERAL AND BONE DISORDER: ICD-10-CM

## 2022-11-07 DIAGNOSIS — N18.9 CHRONIC KIDNEY DISEASE-MINERAL AND BONE DISORDER: ICD-10-CM

## 2022-11-07 LAB
25(OH)D3 SERPL-MCNC: 31.6 NG/ML (ref 30–100)
ALBUMIN SERPL BCP-MCNC: 3.1 G/DL (ref 3.5–5)
ALP SERPL-CCNC: 101 U/L (ref 46–116)
ALT SERPL W P-5'-P-CCNC: 49 U/L (ref 12–78)
ANION GAP SERPL CALCULATED.3IONS-SCNC: 5 MMOL/L (ref 4–13)
AST SERPL W P-5'-P-CCNC: 55 U/L (ref 5–45)
BILIRUB SERPL-MCNC: 0.58 MG/DL (ref 0.2–1)
BUN SERPL-MCNC: 25 MG/DL (ref 5–25)
CALCIUM ALBUM COR SERPL-MCNC: 10 MG/DL (ref 8.3–10.1)
CALCIUM SERPL-MCNC: 9.3 MG/DL (ref 8.3–10.1)
CHLORIDE SERPL-SCNC: 106 MMOL/L (ref 96–108)
CO2 SERPL-SCNC: 29 MMOL/L (ref 21–32)
CREAT SERPL-MCNC: 1.64 MG/DL (ref 0.6–1.3)
CREAT UR-MCNC: 105 MG/DL
ERYTHROCYTE [DISTWIDTH] IN BLOOD BY AUTOMATED COUNT: 15.1 % (ref 11.6–15.1)
GFR SERPL CREATININE-BSD FRML MDRD: 37 ML/MIN/1.73SQ M
GLUCOSE P FAST SERPL-MCNC: 110 MG/DL (ref 65–99)
HCT VFR BLD AUTO: 36.9 % (ref 36.5–49.3)
HGB BLD-MCNC: 11.8 G/DL (ref 12–17)
MCH RBC QN AUTO: 32.6 PG (ref 26.8–34.3)
MCHC RBC AUTO-ENTMCNC: 32 G/DL (ref 31.4–37.4)
MCV RBC AUTO: 102 FL (ref 82–98)
PHOSPHATE SERPL-MCNC: 3.3 MG/DL (ref 2.3–4.1)
PLATELET # BLD AUTO: 217 THOUSANDS/UL (ref 149–390)
PMV BLD AUTO: 10.7 FL (ref 8.9–12.7)
POTASSIUM SERPL-SCNC: 3.9 MMOL/L (ref 3.5–5.3)
PROT SERPL-MCNC: 6.5 G/DL (ref 6.4–8.4)
PROT UR-MCNC: 30 MG/DL
PROT/CREAT UR: 0.29 MG/G{CREAT} (ref 0–0.1)
PTH-INTACT SERPL-MCNC: 103.9 PG/ML (ref 18.4–80.1)
RBC # BLD AUTO: 3.62 MILLION/UL (ref 3.88–5.62)
SODIUM SERPL-SCNC: 140 MMOL/L (ref 135–147)
WBC # BLD AUTO: 6.01 THOUSAND/UL (ref 4.31–10.16)

## 2022-11-09 ENCOUNTER — OFFICE VISIT (OUTPATIENT)
Dept: CARDIOLOGY CLINIC | Facility: CLINIC | Age: 87
End: 2022-11-09

## 2022-11-09 VITALS
HEIGHT: 67 IN | OXYGEN SATURATION: 96 % | WEIGHT: 198.6 LBS | SYSTOLIC BLOOD PRESSURE: 130 MMHG | DIASTOLIC BLOOD PRESSURE: 76 MMHG | HEART RATE: 60 BPM | BODY MASS INDEX: 31.17 KG/M2

## 2022-11-09 DIAGNOSIS — I47.20 V TACH: ICD-10-CM

## 2022-11-09 DIAGNOSIS — I12.9 HYPERTENSIVE CHRONIC KIDNEY DISEASE WITH STAGE 1 THROUGH STAGE 4 CHRONIC KIDNEY DISEASE, OR UNSPECIFIED CHRONIC KIDNEY DISEASE: ICD-10-CM

## 2022-11-09 DIAGNOSIS — I50.22 CHRONIC SYSTOLIC CONGESTIVE HEART FAILURE (HCC): ICD-10-CM

## 2022-11-09 DIAGNOSIS — R60.0 LOCALIZED EDEMA: ICD-10-CM

## 2022-11-09 DIAGNOSIS — E78.49 OTHER HYPERLIPIDEMIA: ICD-10-CM

## 2022-11-09 DIAGNOSIS — I25.5 ISCHEMIC CARDIOMYOPATHY: ICD-10-CM

## 2022-11-09 DIAGNOSIS — N20.0 NEPHROLITHIASIS: ICD-10-CM

## 2022-11-09 DIAGNOSIS — I48.0 PAROXYSMAL ATRIAL FIBRILLATION (HCC): ICD-10-CM

## 2022-11-09 DIAGNOSIS — N20.1 LEFT URETERAL CALCULUS: ICD-10-CM

## 2022-11-09 DIAGNOSIS — I10 BENIGN ESSENTIAL HYPERTENSION: ICD-10-CM

## 2022-11-09 DIAGNOSIS — I25.810 CORONARY ARTERY DISEASE INVOLVING CORONARY BYPASS GRAFT OF NATIVE HEART WITHOUT ANGINA PECTORIS: ICD-10-CM

## 2022-11-09 DIAGNOSIS — E78.5 DYSLIPIDEMIA: ICD-10-CM

## 2022-11-09 DIAGNOSIS — N18.30 CHRONIC KIDNEY DISEASE, STAGE III (MODERATE) (HCC): ICD-10-CM

## 2022-11-09 DIAGNOSIS — I50.42 CHRONIC COMBINED SYSTOLIC AND DIASTOLIC CONGESTIVE HEART FAILURE (HCC): Primary | ICD-10-CM

## 2022-11-09 DIAGNOSIS — I47.20 V-TACH: ICD-10-CM

## 2022-11-09 NOTE — PROGRESS NOTES
Follow-up - Cardiology   Howie Camilo 80 y o  male MRN: [id number]        Problems    1  Chronic combined systolic and diastolic congestive heart failure (HCC)  POCT ECG   2  Other hyperlipidemia     3  Paroxysmal atrial fibrillation (HCC)     4  Coronary artery disease involving coronary bypass graft of native heart without angina pectoris     5  V tach     6  Ischemic cardiomyopathy     7  Benign essential hypertension     8  Hypertensive chronic kidney disease with stage 1 through stage 4 chronic kidney disease, or unspecified chronic kidney disease       Jim Jarrell  Returns for a follow-up visit my [address] [facility] office     Coronary artery disease  He has had a remote CABG  He has a longstanding severe ischemic cardiomyopathy  LVEF in 2021 declined to 20% amongst ventricular dysrhythmia  All grafts patent by cardiac catheterization 12/21 amidst VT occurrence   No new ischemic symptoms    Chronic systolic CHF   LVEF 64-02% due to ischemic cardiomyopathy chronically  CRT-D with generator change 1/20 (not MRI compatible)  Corvue impedance is noted to be crossed on almost every monthly device check, but today in the office he has no edema, minimal JVD, he is not in any CHF  He is on isosorbide mononitrate 30 mg, metoprolol succinate 50 mg, torsemide 40 mg twice daily, and sometimes increases to 60 mg twice a day for weight gain  Hypertension  Blood pressure is controlled    Hyperlipidemia  Lipids are controlled    Hypothyroidism  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      Ventricular tachycardia  In late 2021, he had symptomatic VT treated with antitachycardia pacing and device shock   Mexiletine was added, amiodarone increased, but recurrence prompted VT ablation   Lack of recurrence prompted us to reduce his amiodarone dose back to 200 mg daily which he tolerates well  He has not had any significant recurrent    Plan    He is on monthly device checks due to battery voltage   He is moving to [place] to live with a son, this was my final visit with him today  Creatinine improved at 1 6  Amiodarone surveillance up-to-date        HPI: Mattie Oglesby is a 80y o  year old male  With a longstanding history of CAD, CABG, ischemic cardiomyopathy with declining ejection fraction over the last couple years down to 57%, chronic systolic/ diastolic CHF, ventricular tachycardia and paroxysmal atrial fibrillation, on long-term amiodarone and mexiletine  He has no complaints today  Urinary frequency is his biggest concern chronically   He had a recent UTI that is been treated   Device shocks   No CHF hospitalizations   No worsening leg edema   Weights have been relatively stable   He alternates between torsemide 40 mg twice a day and torsemide 60 mg twice a day depending on weight   He does have metolazone on hand which he takes sparing  Amiodarone previously reduced to 200 mg a day, had been increased to 400 mg daily for VT suppression, but with the addition of mexiletine there has been improvement  He is moving to [place] to live with his son, he and his wife were here for his final visit with me today  On a good note, recent assessment of kidney function is significantly improved down to 1 6  Review of Systems   Constitutional: Negative for appetite change, diaphoresis, fatigue and fever  Respiratory: Negative for chest tightness, shortness of breath and wheezing  Cardiovascular: Negative for chest pain, palpitations and leg swelling  Gastrointestinal: Negative for abdominal pain and blood in stool  Musculoskeletal: Negative for arthralgias and joint swelling  Skin: Negative for rash  Neurological: Negative for dizziness, syncope and light-headedness           Past Medical History:   Diagnosis Date   • AICD (automatic cardioverter/defibrillator) present 2004   • AICD (automatic cardioverter/defibrillator) present 2006   • AICD (automatic cardioverter/defibrillator) present 2013    [facility]   • Arthritis     R knee and neck   • Atrial fibrillation (HCC)    • BPH (benign prostatic hyperplasia)    • CAD (coronary artery disease) of artery bypass graft    • CHF (congestive heart failure) (HCC)     combine   • Coronary artery disease    • Disease of thyroid gland    • Hyperlipidemia    • Hypertension    • Ischemic cardiomyopathy    • Left ureteral calculus 10/17/2017   • Renal disorder    • V tach      Social History     Substance and Sexual Activity   Alcohol Use Yes     Social History     Substance and Sexual Activity   Drug Use No     Social History     Tobacco Use   Smoking Status Never Smoker   Smokeless Tobacco Never Used       Allergies:  No Known Allergies    Medications:     Current Outpatient Medications:   •  acetaminophen (TYLENOL) 500 mg tablet, Take 500 mg by mouth every 6 (six) hours as needed for mild pain , Disp: , Rfl:   •  amiodarone 200 mg tablet, Take 1 tablet (200 mg total) by mouth daily, Disp: 90 tablet, Rfl: 3  •  aspirin 81 MG tablet, Take 81 mg by mouth daily  , Disp: , Rfl:   •  atorvastatin (LIPITOR) 40 mg tablet, Take 1 tablet (40 mg total) by mouth daily with dinner, Disp: 30 tablet, Rfl: 11  •  cephalexin (KEFLEX) 250 mg capsule, Take 2 capsules (500 mg total) by mouth every 12 (twelve) hours for 7 days, Disp: 28 capsule, Rfl: 0  •  isosorbide mononitrate (IMDUR) 30 mg 24 hr tablet, Take 1 tablet (30 mg total) by mouth daily, Disp: 90 tablet, Rfl: 3  •  levothyroxine 175 mcg tablet, Take 175 mcg by mouth daily  , Disp: , Rfl:   •  LORazepam (ATIVAN) 1 mg tablet, Take 1 mg by mouth as needed for anxiety  , Disp: , Rfl:   •  metoprolol succinate (TOPROL-XL) 50 mg 24 hr tablet, Take 1 tablet (50 mg total) by mouth see administration instructions Take 2 tabs( 100 mg ) in am  And 1tab(50  Mg) in pm , Disp: 270 tablet, Rfl: 3  •  mexiletine (MEXITIL) 150 mg capsule, Take 1 capsule (150 mg total) by mouth every 8 (eight) hours, Disp: 270 capsule, Rfl: 3  •  potassium citrate (Urocit-K 10) 10 mEq, Take 1 tablet (10 mEq total) by mouth in the morning and 1 tablet (10 mEq total) before bedtime  , Disp: 180 tablet, Rfl: 3  •  tamsulosin (FLOMAX) 0 4 mg, Take 1 capsule (0 4 mg total) by mouth daily with dinner, Disp: 90 capsule, Rfl: 3  •  torsemide (DEMADEX) 20 mg tablet, Take 2 tablets (40 mg total) by mouth 2 (two) times a day Take two tablets daily and an additional 20 mg  As needed, Disp: 220 tablet, Rfl: 3  •  torsemide (DEMADEX) 20 mg tablet, Take 3 tablets (60 mg total) by mouth 2 (two) times a day, Disp: 270 tablet, Rfl: 3  •  metolazone (ZAROXOLYN) 2 5 mg tablet, As needed no more than twice weekly for leg swelling (Patient not taking: Reported on 11/9/2022), Disp: 10 tablet, Rfl: 3  •  nitroglycerin (NITROSTAT) 0 4 mg SL tablet, Place 1 tablet (0 4 mg total) under the tongue every 5 (five) minutes as needed for chest pain (Patient not taking: Reported on 11/9/2022), Disp: 30 tablet, Rfl: 2      Vitals:    11/09/22 1311   BP: 130/76   Pulse: 60   SpO2: 96%     Weight (last 2 days)     Date/Time Weight    11/09/22 1311 90 1 (198 6)        Physical Exam  Constitutional:       General: He is not in acute distress  Appearance: He is not diaphoretic  HENT:      Head: Normocephalic and atraumatic  Eyes:      General: No scleral icterus  Conjunctiva/sclera: Conjunctivae normal    Neck:      Vascular: No JVD  Cardiovascular:      Rate and Rhythm: Normal rate and regular rhythm  Heart sounds: Normal heart sounds  No murmur heard  Pulmonary:      Effort: Pulmonary effort is normal  No respiratory distress  Breath sounds: Normal breath sounds  No decreased breath sounds, wheezing, rhonchi or rales  Musculoskeletal:      Cervical back: Normal range of motion  Right lower leg: Normal  No edema  Left lower leg: Normal  No edema  Skin:     General: Skin is warm and dry     Neurological:      Mental Status: He is alert and oriented to person, place, and time  Laboratory Studies:    Labs personally reviewed    Cardiac testing:   EKG reviewed personally:   Results for orders placed or performed in visit on 11/09/22   POCT ECG    Impression    AV paced         Echocardiogram  2018-EF 30%, LV dilated, akinesis basal to mid inferior wall, and inferolateral wall, grade 3 diastolic dysfunction, RV dilated, left atrium dilated, mild MR, moderate TR, severe pulmonary hypertension   9/21-personally reviewed -EF 25%, LV dilated, regional wall motion abnormalities as noted above, moderate TR, severe pulmonary hypertension, dilated IVC     cardiac catheterization   11/21-all grafts patent    Device check  3/1/2021-nonsustained VT, normal Corvue  9/9/2021- normal device function, 76% a paced, 99% V paced, multiple nonsustained VT episodes, longest 32 beats,  No AFib detected  11/22/2021- normal device function,  VT episodes with successful ATP  12/21-normal device function, no VT episodes    Grady Bernal MD    Portions of the record may have been created with voice recognition software   Occasional wrong word or "sound a like" substitutions may have occurred due to the inherent limitations of voice recognition software   Read the chart carefully and recognize, using context, where substitutions have occurred

## 2022-11-10 ENCOUNTER — OFFICE VISIT (OUTPATIENT)
Dept: NEPHROLOGY | Facility: CLINIC | Age: 87
End: 2022-11-10

## 2022-11-10 VITALS
DIASTOLIC BLOOD PRESSURE: 72 MMHG | WEIGHT: 200 LBS | HEART RATE: 64 BPM | BODY MASS INDEX: 31.39 KG/M2 | SYSTOLIC BLOOD PRESSURE: 140 MMHG | HEIGHT: 67 IN

## 2022-11-10 DIAGNOSIS — N20.0 NEPHROLITHIASIS: ICD-10-CM

## 2022-11-10 DIAGNOSIS — R80.1 PERSISTENT PROTEINURIA: ICD-10-CM

## 2022-11-10 DIAGNOSIS — E83.9 CHRONIC KIDNEY DISEASE-MINERAL AND BONE DISORDER: ICD-10-CM

## 2022-11-10 DIAGNOSIS — I12.9 HYPERTENSIVE CHRONIC KIDNEY DISEASE WITH STAGE 1 THROUGH STAGE 4 CHRONIC KIDNEY DISEASE, OR UNSPECIFIED CHRONIC KIDNEY DISEASE: ICD-10-CM

## 2022-11-10 DIAGNOSIS — N18.9 CHRONIC KIDNEY DISEASE-MINERAL AND BONE DISORDER: ICD-10-CM

## 2022-11-10 DIAGNOSIS — I50.42 CHRONIC COMBINED SYSTOLIC AND DIASTOLIC CONGESTIVE HEART FAILURE (HCC): ICD-10-CM

## 2022-11-10 DIAGNOSIS — M89.9 CHRONIC KIDNEY DISEASE-MINERAL AND BONE DISORDER: ICD-10-CM

## 2022-11-10 DIAGNOSIS — N18.32 STAGE 3B CHRONIC KIDNEY DISEASE (HCC): Primary | ICD-10-CM

## 2022-11-10 NOTE — PATIENT INSTRUCTIONS
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  Creatinine at baseline  Electrolytes are within normal limits  Proteinuria- Minimal with UPC ratio of 0 29  Hypertension- BP goal <125/80  Antihypertensive regimen includes metoprolol 100mg AM / 50mg PM, imdur 30mg daily, and torsemide 40mg twice a day  Avoid salt  Stay active  Lose weight and eat healthy  Continue to monitor your blood pressure at home  CHF- follows with cardiology  He takes torsemide 40mg twice a day and increases as needed for weight gain  Bone Mineral Disorder- PTH is slightly elevated but stable  Vitamin D level at goal but borderline  Would recommend vitamin D OTC supplementation  Nephrolithiasis- follows with urology  Continue potassium citrate BID  Stay well hydrated  Drink more water  UTI- currently on keflex per urology     Follow up with a nephrologist in [place]  Please call the office with any questions or concerns  They are moving to [place] next week

## 2022-11-10 NOTE — PROGRESS NOTES
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  Creatinine at baseline  Electrolytes are within normal limits      Proteinuria- Minimal with UPC ratio of 0 29      Hypertension- BP goal <125/80  Antihypertensive regimen includes metoprolol 100mg AM / 50mg PM, imdur 30mg daily, and torsemide 40mg twice a day  Avoid salt  Stay active  Lose weight and eat healthy  Continue to monitor your blood pressure at home      CHF- follows with cardiology  He takes torsemide 40mg twice a day and increases as needed for weight gain        Bone Mineral Disorder- PTH is slightly elevated but stable  Vitamin D level at goal but borderline  Would recommend vitamin D OTC supplementation      Nephrolithiasis- follows with urology  Continue potassium citrate BID  Stay well hydrated  Drink more water  UTI- currently on keflex per urology     Follow up with a nephrologist in [place]  Please call the office with any questions or concerns  They are moving to [place] next week  Reason for Visit: Follow-up and CKD IV    HPI: Rosario Blanton is a 80 y o  male who is here for follow up of chronic kidney disease  He last saw me in the office in August   He is feeling well and denies all complaints  His LE edema is stable  He does not drink much water  They are moving to [place] next week so will get all new doctors down there      ROS: A complete review of systems was performed and was negative unless otherwise noted in the history of present illness  Allergies:   Patient has no known allergies  Medications:     Current Outpatient Medications:   •  acetaminophen (TYLENOL) 500 mg tablet, Take 500 mg by mouth every 6 (six) hours as needed for mild pain , Disp: , Rfl:   •  amiodarone 200 mg tablet, Take 1 tablet (200 mg total) by mouth daily, Disp: 90 tablet, Rfl: 3  •  atorvastatin (LIPITOR) 40 mg tablet, Take 1 tablet (40 mg total) by mouth daily with dinner, Disp: 30 tablet, Rfl: 11  •  cephalexin (KEFLEX) 250 mg capsule, Take 2 capsules (500 mg total) by mouth every 12 (twelve) hours for 7 days, Disp: 28 capsule, Rfl: 0  •  isosorbide mononitrate (IMDUR) 30 mg 24 hr tablet, Take 1 tablet (30 mg total) by mouth daily, Disp: 90 tablet, Rfl: 3  •  levothyroxine 175 mcg tablet, Take 175 mcg by mouth daily  , Disp: , Rfl:   •  LORazepam (ATIVAN) 1 mg tablet, Take 1 mg by mouth as needed for anxiety  , Disp: , Rfl:   •  metolazone (ZAROXOLYN) 2 5 mg tablet, As needed no more than twice weekly for leg swelling (Patient taking differently: As needed no more than twice weekly for leg swelling/PRN), Disp: 10 tablet, Rfl: 3  •  metoprolol succinate (TOPROL-XL) 50 mg 24 hr tablet, Take 1 tablet (50 mg total) by mouth see administration instructions Take 2 tabs( 100 mg ) in am  And 1tab(50  Mg) in pm , Disp: 270 tablet, Rfl: 3  •  mexiletine (MEXITIL) 150 mg capsule, Take 1 capsule (150 mg total) by mouth every 8 (eight) hours, Disp: 270 capsule, Rfl: 3  •  potassium citrate (Urocit-K 10) 10 mEq, Take 1 tablet (10 mEq total) by mouth in the morning and 1 tablet (10 mEq total) before bedtime  , Disp: 180 tablet, Rfl: 3  •  tamsulosin (FLOMAX) 0 4 mg, Take 1 capsule (0 4 mg total) by mouth daily with dinner, Disp: 90 capsule, Rfl: 3  •  torsemide (DEMADEX) 20 mg tablet, Take 2 tablets (40 mg total) by mouth 2 (two) times a day Take two tablets daily and an additional 20 mg   As needed, Disp: 220 tablet, Rfl: 3  •  aspirin 81 MG tablet, Take 81 mg by mouth daily  , Disp: , Rfl:   •  nitroglycerin (NITROSTAT) 0 4 mg SL tablet, Place 1 tablet (0 4 mg total) under the tongue every 5 (five) minutes as needed for chest pain (Patient not taking: Reported on 11/9/2022), Disp: 30 tablet, Rfl: 2  •  torsemide (DEMADEX) 20 mg tablet, Take 3 tablets (60 mg total) by mouth 2 (two) times a day (Patient not taking: Reported on 11/10/2022), Disp: 270 tablet, Rfl: 3    Past Medical History:   Diagnosis Date   • AICD (automatic cardioverter/defibrillator) present 2004   • AICD (automatic cardioverter/defibrillator) present 2006   • AICD (automatic cardioverter/defibrillator) present 2013    [facility]   • Arthritis     R knee and neck   • Atrial fibrillation (HCC)    • BPH (benign prostatic hyperplasia)    • CAD (coronary artery disease) of artery bypass graft    • CHF (congestive heart failure) ([facility] [address] )     combine   • Coronary artery disease    • Disease of thyroid gland    • Hyperlipidemia    • Hypertension    • Ischemic cardiomyopathy    • Left ureteral calculus 10/17/2017   • Renal disorder    • V tach      Past Surgical History:   Procedure Laterality Date   • CARDIAC CATHETERIZATION     • CARDIAC CATHETERIZATION Left 11/8/2021    Procedure: Cardiac Left Heart Cath;  Surgeon: Twila Baron MD;  Location: [facility]; Service: Cardiology   • CARDIAC CATHETERIZATION N/A 11/8/2021    Procedure: Cardiac Coronary Angiogram;  Surgeon: Twila Baron MD;  Location: [facility]; Service: Cardiology   • CARDIAC ELECTROPHYSIOLOGY PROCEDURE N/A 12/3/2021    Procedure: Cardiac eps/vt ablation;  Surgeon: Beti Porter DO;  Location: [facility];   Service: Cardiology   • CARDIAC SURGERY      Pacemaker   • CORONARY ARTERY BYPASS GRAFT     • RI CYSTOURETHROSCOPY,URETER CATHETER N/A 10/17/2017    Procedure: CYSTOSCOPY, left  RETROGRADE PYELOGRAM WITH LEFT URETEROSCOPY , stone extraction,LEFT STENT INSERTION;  Surgeon: Baldemar Verdugo MD;  Location: [facility];  Service: Urology     Family History   Problem Relation Age of Onset   • Tuberculosis Father         WWI   • Hypertension Mother    • Stroke Brother       reports that he has never smoked  He has never used smokeless tobacco  He reports current alcohol use  He reports that he does not use drugs  Physical Exam:   Vitals:    11/10/22 1117 11/10/22 1134   BP:  140/72   BP Location:  Left arm   Patient Position:  Sitting   Cuff Size:  Large   Pulse:  64   Weight: 90 7 kg (200 lb)    Height: 5' 7" (1 702 m)      Body mass index is 31 32 kg/m²  General: NAD  Neuro: AAO  Skin: no rash  Eyes: anicteric  ENMT: mm moist  Neck: no masses  Respiratory: CTAB  Cardiovascular: RRR  Extremities: mild LE edema  Gastrointestinal: soft nt nd    Procedure:  No results found for this or any previous visit  Lab Results   Component Value Date    GLUCOSE 112 10/19/2015    CALCIUM 9 3 11/07/2022     10/19/2015    K 3 9 11/07/2022    CO2 29 11/07/2022     11/07/2022    BUN 25 11/07/2022    CREATININE 1 64 (H) 11/07/2022       Results from last 7 days   Lab Units 11/07/22  0724   WBC Thousand/uL 6 01   HEMOGLOBIN g/dL 11 8*   HEMATOCRIT % 36 9   PLATELETS Thousands/uL 217   POTASSIUM mmol/L 3 9   CHLORIDE mmol/L 106   CO2 mmol/L 29   BUN mg/dL 25   CREATININE mg/dL 1 64*   CALCIUM mg/dL 9 3   PHOSPHORUS mg/dL 3 3     I have personally reviewed the blood work as stated above and in my note

## 2022-11-11 RX ORDER — METOLAZONE 2.5 MG/1
TABLET ORAL
Qty: 10 TABLET | Refills: 3 | Status: SHIPPED | OUTPATIENT
Start: 2022-11-11 | End: 2022-11-15 | Stop reason: SDUPTHER

## 2022-11-11 RX ORDER — MEXILETINE HYDROCHLORIDE 150 MG/1
150 CAPSULE ORAL EVERY 8 HOURS SCHEDULED
Qty: 270 CAPSULE | Refills: 1 | Status: SHIPPED | OUTPATIENT
Start: 2022-11-11

## 2022-11-11 RX ORDER — ATORVASTATIN CALCIUM 40 MG/1
40 TABLET, FILM COATED ORAL
Qty: 90 TABLET | Refills: 1 | Status: SHIPPED | OUTPATIENT
Start: 2022-11-11

## 2022-11-11 RX ORDER — NITROGLYCERIN 0.4 MG/1
0.4 TABLET SUBLINGUAL
Qty: 25 TABLET | Refills: 2 | Status: SHIPPED | OUTPATIENT
Start: 2022-11-11

## 2022-11-11 RX ORDER — POTASSIUM CITRATE 10 MEQ/1
10 TABLET, EXTENDED RELEASE ORAL 2 TIMES DAILY
Qty: 90 TABLET | Refills: 3 | Status: SHIPPED | OUTPATIENT
Start: 2022-11-11

## 2022-11-11 RX ORDER — ISOSORBIDE MONONITRATE 30 MG/1
30 TABLET, EXTENDED RELEASE ORAL DAILY
Qty: 90 TABLET | Refills: 1 | Status: SHIPPED | OUTPATIENT
Start: 2022-11-11

## 2022-11-11 RX ORDER — METOPROLOL SUCCINATE 50 MG/1
50 TABLET, EXTENDED RELEASE ORAL DAILY
Qty: 90 TABLET | Refills: 1 | Status: SHIPPED | OUTPATIENT
Start: 2022-11-11

## 2022-11-11 RX ORDER — AMIODARONE HYDROCHLORIDE 200 MG/1
200 TABLET ORAL DAILY
Qty: 90 TABLET | Refills: 1 | Status: SHIPPED | OUTPATIENT
Start: 2022-11-11

## 2022-11-15 DIAGNOSIS — I50.22 CHRONIC SYSTOLIC CONGESTIVE HEART FAILURE (HCC): ICD-10-CM

## 2022-11-15 RX ORDER — METOLAZONE 2.5 MG/1
TABLET ORAL
Qty: 10 TABLET | Refills: 3 | Status: SHIPPED | OUTPATIENT
Start: 2022-11-15

## 2022-12-01 ENCOUNTER — REMOTE DEVICE CLINIC VISIT (OUTPATIENT)
Dept: CARDIOLOGY CLINIC | Facility: CLINIC | Age: 87
End: 2022-12-01

## 2022-12-01 DIAGNOSIS — Z95.810 PRESENCE OF IMPLANTABLE CARDIOVERTER-DEFIBRILLATOR (ICD): Primary | ICD-10-CM

## 2022-12-01 NOTE — PROGRESS NOTES
SJM CRT-D/DDDR MODENOT MRI CONDITIONAL   [facility]SM[facility] - CORVUE ONLY:  CORVUE IMPEDANCE MONITORING WITHIN NORMAL LIMITS   BATTERY VOLTAGE ADEQUATE (3 8-4 1 YR )   AP 97% BP >99%   ALL LEAD PARAMETERS WITHIN NORMAL LIMITS   NO SIGNIFICANT HIGH RATE EPISODES   NORMAL DEVICE FUNCTION   RG

## 2023-01-03 ENCOUNTER — REMOTE DEVICE CLINIC VISIT (OUTPATIENT)
Dept: CARDIOLOGY CLINIC | Facility: CLINIC | Age: 88
End: 2023-01-03

## 2023-01-03 DIAGNOSIS — Z95.810 PRESENCE OF IMPLANTABLE CARDIOVERTER-DEFIBRILLATOR (ICD): Primary | ICD-10-CM

## 2023-01-03 NOTE — PROGRESS NOTES
Results for orders placed or performed in visit on 01/03/23   Cardiac EP device report    Narrative    SJM CRT-D/DDDR MODENOT MRI CONDITIONAL  [facility] TRANSMISSION - CORVUE ONLY:  [address]  BATTERY VOLTAGE ADEQUATE 3 7- 4 0 YRS)  AP 97% BVP >99% (DDDR 60)  ALL AVAILABLE LEAD PARAMETERS WITHIN NORMAL LIMITS  NO SIGNIFICANT HIGH RATE EPISODES  NORMAL DEVICE FUNCTION    ES

## 2023-03-21 DIAGNOSIS — I47.20 V-TACH (HCC): ICD-10-CM

## 2023-03-21 RX ORDER — METOPROLOL SUCCINATE 50 MG/1
TABLET, EXTENDED RELEASE ORAL
Qty: 270 TABLET | Refills: 0 | Status: SHIPPED | OUTPATIENT
Start: 2023-03-21

## 2023-06-04 DIAGNOSIS — I25.810 CORONARY ARTERY DISEASE INVOLVING CORONARY BYPASS GRAFT OF NATIVE HEART WITHOUT ANGINA PECTORIS: ICD-10-CM

## 2023-06-04 DIAGNOSIS — I50.22 CHRONIC SYSTOLIC CONGESTIVE HEART FAILURE (HCC): ICD-10-CM

## 2023-06-06 RX ORDER — ISOSORBIDE MONONITRATE 30 MG/1
TABLET, EXTENDED RELEASE ORAL
Qty: 90 TABLET | Refills: 0 | Status: SHIPPED | OUTPATIENT
Start: 2023-06-06

## 2023-06-06 RX ORDER — ATORVASTATIN CALCIUM 40 MG/1
TABLET, FILM COATED ORAL
Qty: 90 TABLET | Refills: 0 | Status: SHIPPED | OUTPATIENT
Start: 2023-06-06

## 2023-07-19 DIAGNOSIS — I50.42 CHRONIC COMBINED SYSTOLIC AND DIASTOLIC CONGESTIVE HEART FAILURE (HCC): ICD-10-CM

## 2023-08-01 RX ORDER — TORSEMIDE 20 MG/1
TABLET ORAL
Qty: 180 TABLET | Refills: 0 | Status: SHIPPED | OUTPATIENT
Start: 2023-08-01

## 2023-08-07 DIAGNOSIS — N18.30 CHRONIC KIDNEY DISEASE, STAGE III (MODERATE) (HCC): ICD-10-CM

## 2023-08-07 DIAGNOSIS — N20.1 LEFT URETERAL CALCULUS: ICD-10-CM

## 2023-08-07 DIAGNOSIS — I12.9 HYPERTENSIVE CHRONIC KIDNEY DISEASE WITH STAGE 1 THROUGH STAGE 4 CHRONIC KIDNEY DISEASE, OR UNSPECIFIED CHRONIC KIDNEY DISEASE: ICD-10-CM

## 2023-08-07 DIAGNOSIS — N20.0 NEPHROLITHIASIS: ICD-10-CM

## 2023-08-07 DIAGNOSIS — R60.0 LOCALIZED EDEMA: ICD-10-CM

## 2023-08-07 DIAGNOSIS — E78.5 DYSLIPIDEMIA: ICD-10-CM

## 2023-08-08 DIAGNOSIS — I47.20 V-TACH (HCC): ICD-10-CM

## 2023-08-14 RX ORDER — POTASSIUM CITRATE 10 MEQ/1
10 TABLET, EXTENDED RELEASE ORAL 2 TIMES DAILY
Qty: 90 TABLET | Refills: 3 | OUTPATIENT
Start: 2023-08-14

## 2023-08-14 RX ORDER — MEXILETINE HYDROCHLORIDE 150 MG/1
150 CAPSULE ORAL EVERY 8 HOURS
Qty: 540 CAPSULE | Refills: 0 | OUTPATIENT
Start: 2023-08-14

## 2024-04-17 ENCOUNTER — APPOINTMENT (RX ONLY)
Dept: URBAN - METROPOLITAN AREA CLINIC 77 | Facility: CLINIC | Age: 89
Setting detail: DERMATOLOGY
End: 2024-04-17

## 2024-04-17 DIAGNOSIS — L57.8 OTHER SKIN CHANGES DUE TO CHRONIC EXPOSURE TO NONIONIZING RADIATION: ICD-10-CM

## 2024-04-17 DIAGNOSIS — L82.1 OTHER SEBORRHEIC KERATOSIS: ICD-10-CM

## 2024-04-17 DIAGNOSIS — L57.0 ACTINIC KERATOSIS: ICD-10-CM

## 2024-04-17 DIAGNOSIS — L82.0 INFLAMED SEBORRHEIC KERATOSIS: ICD-10-CM

## 2024-04-17 DIAGNOSIS — D22 MELANOCYTIC NEVI: ICD-10-CM

## 2024-04-17 PROBLEM — D22.72 MELANOCYTIC NEVI OF LEFT LOWER LIMB, INCLUDING HIP: Status: ACTIVE | Noted: 2024-04-17

## 2024-04-17 PROBLEM — D22.39 MELANOCYTIC NEVI OF OTHER PARTS OF FACE: Status: ACTIVE | Noted: 2024-04-17

## 2024-04-17 PROBLEM — D22.62 MELANOCYTIC NEVI OF LEFT UPPER LIMB, INCLUDING SHOULDER: Status: ACTIVE | Noted: 2024-04-17

## 2024-04-17 PROBLEM — D22.61 MELANOCYTIC NEVI OF RIGHT UPPER LIMB, INCLUDING SHOULDER: Status: ACTIVE | Noted: 2024-04-17

## 2024-04-17 PROBLEM — D22.71 MELANOCYTIC NEVI OF RIGHT LOWER LIMB, INCLUDING HIP: Status: ACTIVE | Noted: 2024-04-17

## 2024-04-17 PROBLEM — D22.5 MELANOCYTIC NEVI OF TRUNK: Status: ACTIVE | Noted: 2024-04-17

## 2024-04-17 PROCEDURE — ? COUNSELING

## 2024-04-17 PROCEDURE — ? PRESCRIPTION

## 2024-04-17 PROCEDURE — 99203 OFFICE O/P NEW LOW 30 MIN: CPT | Mod: 25

## 2024-04-17 PROCEDURE — ? LIQUID NITROGEN

## 2024-04-17 PROCEDURE — ? PRESCRIPTION MEDICATION MANAGEMENT

## 2024-04-17 PROCEDURE — 17110 DESTRUCTION B9 LES UP TO 14: CPT

## 2024-04-17 RX ORDER — FLUOROURACIL 5 MG/G
THIN LAYER CREAM TOPICAL BID
Qty: 40 | Refills: 0 | Status: ERX | COMMUNITY
Start: 2024-04-17

## 2024-04-17 RX ADMIN — FLUOROURACIL THIN LAYER: 5 CREAM TOPICAL at 00:00

## 2024-04-17 ASSESSMENT — LOCATION DETAILED DESCRIPTION DERM
LOCATION DETAILED: LEFT SUPERIOR PARIETAL SCALP
LOCATION DETAILED: EPIGASTRIC SKIN
LOCATION DETAILED: RIGHT ANTERIOR PROXIMAL UPPER ARM
LOCATION DETAILED: LEFT LATERAL MALAR CHEEK
LOCATION DETAILED: LEFT RIB CAGE
LOCATION DETAILED: RIGHT ANTERIOR DISTAL UPPER ARM
LOCATION DETAILED: RIGHT SUPERIOR HELIX
LOCATION DETAILED: LEFT CENTRAL MALAR CHEEK
LOCATION DETAILED: LEFT ANTERIOR PROXIMAL THIGH
LOCATION DETAILED: LEFT SUPERIOR HELIX
LOCATION DETAILED: RIGHT ANTERIOR PROXIMAL THIGH
LOCATION DETAILED: LEFT ANTERIOR DISTAL UPPER ARM
LOCATION DETAILED: RIGHT SUPERIOR ANTERIOR NECK
LOCATION DETAILED: RIGHT INFERIOR LATERAL NECK
LOCATION DETAILED: LEFT ANTERIOR PROXIMAL UPPER ARM
LOCATION DETAILED: RIGHT CLAVICULAR NECK

## 2024-04-17 ASSESSMENT — LOCATION SIMPLE DESCRIPTION DERM
LOCATION SIMPLE: LEFT THIGH
LOCATION SIMPLE: RIGHT UPPER ARM
LOCATION SIMPLE: SCALP
LOCATION SIMPLE: LEFT UPPER ARM
LOCATION SIMPLE: LEFT CHEEK
LOCATION SIMPLE: RIGHT THIGH
LOCATION SIMPLE: LEFT EAR
LOCATION SIMPLE: RIGHT ANTERIOR NECK
LOCATION SIMPLE: ABDOMEN
LOCATION SIMPLE: RIGHT EAR

## 2024-04-17 ASSESSMENT — LOCATION ZONE DERM
LOCATION ZONE: ARM
LOCATION ZONE: EAR
LOCATION ZONE: SCALP
LOCATION ZONE: FACE
LOCATION ZONE: NECK
LOCATION ZONE: TRUNK
LOCATION ZONE: LEG

## 2024-04-17 NOTE — PROCEDURE: LIQUID NITROGEN
Include Z78.9 (Other Specified Conditions Influencing Health Status) As An Associated Diagnosis?: No
Show Topical Anesthesia Variable?: Yes
Post-Care Instructions: I reviewed with the patient in detail post-care instructions. Patient is to wear sunprotection, and avoid picking at any of the treated lesions. Pt may apply Vaseline to crusted or scabbing areas.
Detail Level: Detailed
Spray Paint Text: The liquid nitrogen was applied to the skin utilizing a spray paint frosting technique.
Consent: The patient's consent was obtained including but not limited to risks of crusting, scabbing, blistering, scarring, darker or lighter pigmentary change, recurrence, incomplete removal and infection.
Medical Necessity Clause: This procedure was medically necessary because the lesions that were treated were:
Medical Necessity Information: It is in your best interest to select a reason for this procedure from the list below. All of these items fulfill various CMS LCD requirements except the new and changing color options.

## 2024-04-17 NOTE — PROCEDURE: PRESCRIPTION MEDICATION MANAGEMENT
Initiate Treatment: fluorouracil 5 % topical cream \\nApply thin layer to scalp and ears twice daily x 2 weeks. Wash hands after application or use a q-tip to apply. Do not bandage
Detail Level: Zone
Samples Given: Patient to use EpiCeram to calm the reaction after treatment
Render In Strict Bullet Format?: No

## 2024-05-30 ENCOUNTER — APPOINTMENT (RX ONLY)
Dept: URBAN - METROPOLITAN AREA CLINIC 77 | Facility: CLINIC | Age: 89
Setting detail: DERMATOLOGY
End: 2024-05-30

## 2024-05-30 DIAGNOSIS — L57.0 ACTINIC KERATOSIS: ICD-10-CM

## 2024-05-30 PROCEDURE — ? PRESCRIPTION MEDICATION MANAGEMENT

## 2024-05-30 PROCEDURE — 17003 DESTRUCT PREMALG LES 2-14: CPT

## 2024-05-30 PROCEDURE — ? LIQUID NITROGEN

## 2024-05-30 PROCEDURE — 17000 DESTRUCT PREMALG LESION: CPT

## 2024-05-30 PROCEDURE — ? COUNSELING

## 2024-05-30 ASSESSMENT — LOCATION SIMPLE DESCRIPTION DERM
LOCATION SIMPLE: LEFT EAR
LOCATION SIMPLE: POSTERIOR SCALP
LOCATION SIMPLE: RIGHT EAR
LOCATION SIMPLE: SUPERIOR FOREHEAD
LOCATION SIMPLE: LEFT SCALP

## 2024-05-30 ASSESSMENT — LOCATION DETAILED DESCRIPTION DERM
LOCATION DETAILED: LEFT SUPERIOR HELIX
LOCATION DETAILED: SUPERIOR MID FOREHEAD
LOCATION DETAILED: RIGHT SUPERIOR HELIX
LOCATION DETAILED: POSTERIOR MID-PARIETAL SCALP
LOCATION DETAILED: LEFT CENTRAL FRONTAL SCALP

## 2024-05-30 ASSESSMENT — LOCATION ZONE DERM
LOCATION ZONE: FACE
LOCATION ZONE: EAR
LOCATION ZONE: SCALP

## 2024-05-30 NOTE — PROCEDURE: PRESCRIPTION MEDICATION MANAGEMENT
Discontinue Regimen: Patient treated with 5FU BID x 2 weeks on AA of the ears, scalp, and face. May discontinue.\\nMuch improved today, will freeze 2 remaining spots.
Render In Strict Bullet Format?: No
Detail Level: Zone

## 2025-04-29 ENCOUNTER — APPOINTMENT (OUTPATIENT)
Dept: URBAN - METROPOLITAN AREA CLINIC 77 | Facility: CLINIC | Age: OVER 89
Setting detail: DERMATOLOGY
End: 2025-04-29

## 2025-04-29 DIAGNOSIS — D22 MELANOCYTIC NEVI: ICD-10-CM

## 2025-04-29 DIAGNOSIS — L82.1 OTHER SEBORRHEIC KERATOSIS: ICD-10-CM

## 2025-04-29 DIAGNOSIS — L57.8 OTHER SKIN CHANGES DUE TO CHRONIC EXPOSURE TO NONIONIZING RADIATION: ICD-10-CM

## 2025-04-29 DIAGNOSIS — D18.0 HEMANGIOMA: ICD-10-CM

## 2025-04-29 PROBLEM — D18.01 HEMANGIOMA OF SKIN AND SUBCUTANEOUS TISSUE: Status: ACTIVE | Noted: 2025-04-29

## 2025-04-29 PROBLEM — D22.72 MELANOCYTIC NEVI OF LEFT LOWER LIMB, INCLUDING HIP: Status: ACTIVE | Noted: 2025-04-29

## 2025-04-29 PROBLEM — D22.62 MELANOCYTIC NEVI OF LEFT UPPER LIMB, INCLUDING SHOULDER: Status: ACTIVE | Noted: 2025-04-29

## 2025-04-29 PROBLEM — D22.5 MELANOCYTIC NEVI OF TRUNK: Status: ACTIVE | Noted: 2025-04-29

## 2025-04-29 PROBLEM — D22.71 MELANOCYTIC NEVI OF RIGHT LOWER LIMB, INCLUDING HIP: Status: ACTIVE | Noted: 2025-04-29

## 2025-04-29 PROBLEM — D22.39 MELANOCYTIC NEVI OF OTHER PARTS OF FACE: Status: ACTIVE | Noted: 2025-04-29

## 2025-04-29 PROBLEM — D22.61 MELANOCYTIC NEVI OF RIGHT UPPER LIMB, INCLUDING SHOULDER: Status: ACTIVE | Noted: 2025-04-29

## 2025-04-29 PROCEDURE — 99213 OFFICE O/P EST LOW 20 MIN: CPT

## 2025-04-29 PROCEDURE — ? COUNSELING

## 2025-04-29 ASSESSMENT — LOCATION SIMPLE DESCRIPTION DERM
LOCATION SIMPLE: ABDOMEN
LOCATION SIMPLE: LEFT THIGH
LOCATION SIMPLE: LEFT CHEEK
LOCATION SIMPLE: RIGHT SHOULDER
LOCATION SIMPLE: RIGHT THIGH
LOCATION SIMPLE: LEFT SHOULDER

## 2025-04-29 ASSESSMENT — LOCATION DETAILED DESCRIPTION DERM
LOCATION DETAILED: RIGHT ANTERIOR PROXIMAL THIGH
LOCATION DETAILED: RIGHT ANTERIOR DISTAL THIGH
LOCATION DETAILED: LEFT INFERIOR CENTRAL MALAR CHEEK
LOCATION DETAILED: PERIUMBILICAL SKIN
LOCATION DETAILED: RIGHT ANTERIOR SHOULDER
LOCATION DETAILED: LEFT ANTERIOR PROXIMAL THIGH
LOCATION DETAILED: LEFT ANTERIOR SHOULDER
LOCATION DETAILED: LEFT CENTRAL MALAR CHEEK

## 2025-04-29 ASSESSMENT — LOCATION ZONE DERM
LOCATION ZONE: FACE
LOCATION ZONE: LEG
LOCATION ZONE: ARM
LOCATION ZONE: TRUNK

## (undated) DEVICE — CATH URETERAL 5FR X 70 CM FLEX TIP POLYUR BARD

## (undated) DEVICE — CATH EP ADVISOR HD GRID MAPPING 8F

## (undated) DEVICE — CATH DIAG 5FR IMPULSE 100CM IM

## (undated) DEVICE — CATH F4 INF JR 4 100CM: Brand: INFINITI

## (undated) DEVICE — GLOVE INDICATOR PI UNDERGLOVE SZ 8 BLUE

## (undated) DEVICE — CATH DIAG 5FR IMPULSE 100CM FL4

## (undated) DEVICE — CATH EP 7FR STEER DX OCTAPOLAR 2.5/5/2.5MM

## (undated) DEVICE — GUIDE SHEATH SLO 8.5 FR

## (undated) DEVICE — CATH EP 5FR QUAD SUPREME JSN

## (undated) DEVICE — RADIFOCUS GLIDEWIRE: Brand: GLIDEWIRE

## (undated) DEVICE — GUIDE SHEATH AGILIS TRANSSEPTAL 8.5FR 50CM LRG CURL

## (undated) DEVICE — PINNACLE INTRODUCER SHEATH: Brand: PINNACLE

## (undated) DEVICE — NEEDLE TRANSSEPTAL BRK-1 98CM

## (undated) DEVICE — CATH ULTRASOUND ACUNAV ICE 8FR 90CM GE VIVID-I

## (undated) DEVICE — GLOVE SRG BIOGEL ECLIPSE 8

## (undated) DEVICE — GUIDEWIRE STRGHT TIP 0.035 IN  SOLO PLUS

## (undated) DEVICE — AMPLATZ EXTRA STIFF WIRE GUIDE: Brand: AMPLATZ

## (undated) DEVICE — SPECIMEN CONTAINER STERILE PEEL PACK

## (undated) DEVICE — GUIDEWIRE .038 X 145

## (undated) DEVICE — STERILE CYSTO PACK: Brand: CARDINAL HEALTH

## (undated) DEVICE — CATH DIAG 5FR IMPULSE 100CM MPA-1

## (undated) DEVICE — REF PATCH ENSITE PRECISION

## (undated) DEVICE — CATH DIAG 5FR IMPULSE 100CM FR4

## (undated) DEVICE — GUIDEWIRE .035 260CM 3MM J TIP